# Patient Record
Sex: FEMALE | Race: WHITE | NOT HISPANIC OR LATINO | Employment: OTHER | URBAN - METROPOLITAN AREA
[De-identification: names, ages, dates, MRNs, and addresses within clinical notes are randomized per-mention and may not be internally consistent; named-entity substitution may affect disease eponyms.]

---

## 2017-01-02 DIAGNOSIS — E78.5 HYPERLIPIDEMIA LDL GOAL <130: Primary | ICD-10-CM

## 2017-01-02 RX ORDER — ROSUVASTATIN CALCIUM 10 MG/1
TABLET, COATED ORAL
Qty: 45 TABLET | Refills: 3 | Status: SHIPPED | OUTPATIENT
Start: 2017-01-02 | End: 2017-11-09

## 2017-01-02 NOTE — TELEPHONE ENCOUNTER
CRESTOR 10MG     Last Written Prescription Date: 9/22/2016  Last Fill Quantity: 45, # refills: 3  Last Office Visit with [facility], [facility] or [facility] prescribing provider: 9/22/2016       CHOL      157   12/22/2016  HDL       35   12/22/2016  LDL       69   12/22/2016  LDL      105   9/3/2014  TRIG      264   12/22/2016  CHOLHDLRATIO      4.7   10/13/2015

## 2017-01-02 NOTE — TELEPHONE ENCOUNTER
Prescription approved per [facility] [place] Protocol.  Per lab note, continue same dose.  Martha Rendon, RN  Triage Nurse

## 2017-01-25 ENCOUNTER — ALLIED HEALTH/NURSE VISIT (OUTPATIENT)
Dept: CARDIOLOGY | Facility: CLINIC | Age: 72
End: 2017-01-25
Payer: COMMERCIAL

## 2017-01-25 DIAGNOSIS — Z95.810 ICD (IMPLANTABLE CARDIOVERTER-DEFIBRILLATOR), BIVENTRICULAR, IN SITU: Primary | ICD-10-CM

## 2017-01-25 DIAGNOSIS — I42.9 IDIOPATHIC CARDIOMYOPATHY (H): ICD-10-CM

## 2017-01-25 PROCEDURE — 93296 REM INTERROG EVL PM/IDS: CPT | Performed by: INTERNAL MEDICINE

## 2017-01-25 PROCEDURE — 93295 DEV INTERROG REMOTE 1/2/MLT: CPT | Performed by: INTERNAL MEDICINE

## 2017-01-25 NOTE — PROGRESS NOTES
[employer] Incepta CRT ICD Remote Device Check  AP: 0 % : 0 %  Mode: VVI Lower rate 40 bpm        Presenting Rhythm: Presenting EGM on transmission showed AS/VS  Heart Rate: Heart rate stable with good variability.  Sensing: P waves chronically low, R waves WNL    Pacing Threshold: not performed, auto capture not on    Impedance: WNL  Battery Status: Approximately 6.5 years longevity.  Atrial Arrhythmia: 0  Ventricular Arrhythmia: 0  ATP: 0    Shocks: 0    Care Plan: Follow up with Q 3 month remote checks. Will call the patient with today's results and date of next remote. SEFERINO Ahn RN

## 2017-04-27 ENCOUNTER — ALLIED HEALTH/NURSE VISIT (OUTPATIENT)
Dept: CARDIOLOGY | Facility: CLINIC | Age: 72
End: 2017-04-27
Payer: COMMERCIAL

## 2017-04-27 DIAGNOSIS — I42.9 IDIOPATHIC CARDIOMYOPATHY (H): Primary | ICD-10-CM

## 2017-04-27 DIAGNOSIS — Z95.810 ICD (IMPLANTABLE CARDIOVERTER-DEFIBRILLATOR), BIVENTRICULAR, IN SITU: ICD-10-CM

## 2017-04-27 PROCEDURE — 93296 REM INTERROG EVL PM/IDS: CPT | Performed by: INTERNAL MEDICINE

## 2017-04-27 PROCEDURE — 93295 DEV INTERROG REMOTE 1/2/MLT: CPT | Performed by: INTERNAL MEDICINE

## 2017-04-27 NOTE — PROGRESS NOTES
[employer] Incepta CRT-D ICD Remote Device Check  AP: 0 % BVP: 0 %  Mode: VVI 40        Presenting Rhythm: regular VS  Heart Rate: excellent variability  Sensing: WNL on RV and LV, no reading on A (A lead known microdislodgment)    Pacing Threshold: not on auto capture    Impedance: WNL  Battery Status: 6.5 yrs estimated longevity, 10.3 second charge time  Atrial Arrhythmia: none  Ventricular Arrhythmia: none  ATP: none    Shocks: none    Care Plan: Pt due for annual OV with Dr. Osborne in August, entered order in [facility] for annual threshold on same day as OV with Dr. Osborne.   Called pt, no answer, left message with results and plan.   MAXIME MOCTEZUMA

## 2017-04-27 NOTE — MR AVS SNAPSHOT
After Visit Summary   4/27/2017    Kristie Jones    MRN: [id number]           Patient Information     Date Of Birth          1945        Visit Information        Provider Department      4/27/2017 4:30 PM CULLEN DCR2 [facility]        Today's Diagnoses     Idiopathic cardiomyopathy (H)    -  1    ICD (implantable cardioverter-defibrillator), biventricular, in situ           Follow-ups after your visit        Additional Services     Follow-Up with Device Clinic                 Your next 10 appointments already scheduled     Apr 27, 2017  4:30 PM CDT   Remote ICD Check with CULLEN DCR2   [facility] ([facility])    [address] [place], [place] [id number]  [facility] [address] [phone]           This appointment is for a remote check of your debrillator.  This is not an appointment at the office.              Future tests that were ordered for you today     Open Future Orders        Priority Expected Expires Ordered    Follow-Up with Device Clinic Routine 8/1/2017 4/27/2018 4/27/2017            Who to contact     If you have questions or need follow up information about today's clinic visit or your schedule please contact [facility] directly at [phone].  Normal or non-critical lab and imaging results will be communicated to you by MyChart, letter or phone within 4 business days after the clinic has received the results. If you do not hear from us within 7 days, please contact the clinic through [email or web address] or phone. If you have a critical or abnormal lab result, we will notify you by phone as soon as possible.  Submit refill requests through [employer] or call your pharmacy and they will forward the refill request to us. Please allow 3 business days for your refill to be completed.          Additional Information About Your Visit        MyChart Information     [employer] gives you  secure access to your electronic health record. If you see a primary care provider, you can also send messages to your care team and make appointments. If you have questions, please call your primary care clinic.  If you do not have a primary care provider, please call [phone] and they will assist you.        Care EveryWhere ID     This is your Care EveryWhere ID. This could be used by other organizations to access your [place] medical records  [id number]         Blood Pressure from Last 3 Encounters:   09/22/16 102/54   08/01/16 108/60   03/04/16 144/87    Weight from Last 3 Encounters:   09/22/16 90.9 kg (200 lb 6.4 oz)   08/01/16 90.7 kg (200 lb)   03/04/16 86.2 kg (190 lb)              We Performed the Following     ICD DEVICE INTERROGAT REMOTE (95217)     INTERROGATION DEVICE EVAL REMOTE, PACER/ICD (14223)          Today's Medication Changes          These changes are accurate as of: 4/27/17 10:30 AM.  If you have any questions, ask your nurse or doctor.               These medicines have changed or have updated prescriptions.        Dose/Directions    celecoxib 200 MG capsule   Commonly known as:  celeBREX   This may have changed:    - when to take this  - reasons to take this   Used for:  Status post total right knee replacement        Dose:  200 mg   Take 1 capsule (200 mg) by mouth every morning   Quantity:  90 capsule   Refills:  3                Primary Care Provider Office Phone # Fax #    Christin Diehl [phone]       [address] [place] [address]        Thank you!     Thank you for choosing [facility] [place]  for your care. Our goal is always to provide you with excellent care. Hearing back from our patients is one way we can continue to improve our services. Please take a few minutes to complete the written survey that you may receive in the mail after your visit with us. Thank you!              Your Updated Medication List - Protect others around you: Learn how to safely use, store and throw away your medicines at www.disposemymeds.org.          This list is accurate as of: 4/27/17 10:30 AM.  Always use your most recent med list.                   Brand Name Dispense Instructions for use    acetaminophen 650 MG 8 hour tablet     100 tablet    Take 650 mg by mouth every 6 hours       ALPRAZolam 0.25 MG tablet    XANAX    30 tablet    Take 1 tablet (0.25 mg) by mouth 3 times daily as needed       blood glucose monitoring lancets     1 Box    Use to test blood sugar 1 time daily or as directed.       blood glucose monitoring test strip    no brand specified    3 Month    Use to test blood sugar 1 time daily or as directed.       calcium carbonate 500 MG tablet    OS-WILEY 500 mg [place]. Ca     Takes 3 tabs daily takees total of 1200 mg daily       carvedilol 25 MG tablet    COREG    180 tablet    Take 1 tablet (25 mg) by mouth 2 times daily (with meals)       celecoxib 200 MG capsule    celeBREX    90 capsule    Take 1 capsule (200 mg) by mouth every morning       ciprofloxacin 500 MG tablet    CIPRO    6 tablet    Take 1 tablet (500 mg) by mouth 2 times daily       co-enzyme Q-10 50 MG Caps      Take 1 capsule by mouth daily.       fluticasone 50 MCG/ACT spray    FLONASE    48 g    Spray 1-2 sprays into both nostrils daily       IRON SUPPLEMENT PO      Take 325 mg by mouth daily (with breakfast)       MULTIVITAMIN TABS   OR      1 qd       rosuvastatin 10 MG tablet    CRESTOR    45 tablet    Take one tablet every other day       UNKNOWN TO PATIENT      Topical cream       valsartan 160 MG tablet    DIOVAN    45 tablet    Take one half tablet (80 MG) daily at bedtime       venlafaxine 75 MG 24 hr capsule    EFFEXOR-XR    270 capsule    Take 3 capsules (225 mg) by mouth daily       vitamin D 2000 UNITS tablet      Take 1 tablet by mouth daily.

## 2017-05-14 DIAGNOSIS — I42.9 IDIOPATHIC CARDIOMYOPATHY (H): ICD-10-CM

## 2017-05-16 RX ORDER — CARVEDILOL 25 MG/1
TABLET ORAL
Qty: 180 TABLET | Refills: 1 | Status: SHIPPED | OUTPATIENT
Start: 2017-05-16 | End: 2017-08-31

## 2017-05-16 RX ORDER — VALSARTAN 160 MG/1
TABLET ORAL
Qty: 45 TABLET | Refills: 1 | Status: SHIPPED | OUTPATIENT
Start: 2017-05-16 | End: 2017-11-22

## 2017-05-16 NOTE — TELEPHONE ENCOUNTER
Prescription approved per [facility] Refill Protocol.  Martha Rendon, RN  Triage Nurse

## 2017-08-28 ENCOUNTER — PRE VISIT (OUTPATIENT)
Dept: CARDIOLOGY | Facility: CLINIC | Age: 72
End: 2017-08-28

## 2017-08-30 ENCOUNTER — OFFICE VISIT (OUTPATIENT)
Dept: CARDIOLOGY | Facility: CLINIC | Age: 72
End: 2017-08-30
Attending: INTERNAL MEDICINE
Payer: COMMERCIAL

## 2017-08-30 VITALS
SYSTOLIC BLOOD PRESSURE: 110 MMHG | HEIGHT: 65 IN | BODY MASS INDEX: 32.57 KG/M2 | WEIGHT: 195.5 LBS | HEART RATE: 64 BPM | DIASTOLIC BLOOD PRESSURE: 66 MMHG

## 2017-08-30 DIAGNOSIS — I44.7 LBBB (LEFT BUNDLE BRANCH BLOCK): ICD-10-CM

## 2017-08-30 DIAGNOSIS — I42.9 IDIOPATHIC CARDIOMYOPATHY (H): ICD-10-CM

## 2017-08-30 DIAGNOSIS — Z95.810 ICD (IMPLANTABLE CARDIOVERTER-DEFIBRILLATOR), BIVENTRICULAR, IN SITU: ICD-10-CM

## 2017-08-30 DIAGNOSIS — Z95.0 CARDIAC PACEMAKER IN SITU: ICD-10-CM

## 2017-08-30 DIAGNOSIS — E78.5 HYPERLIPIDEMIA LDL GOAL <130: ICD-10-CM

## 2017-08-30 DIAGNOSIS — I51.9 LEFT VENTRICULAR SYSTOLIC DYSFUNCTION: ICD-10-CM

## 2017-08-30 PROCEDURE — 99213 OFFICE O/P EST LOW 20 MIN: CPT | Performed by: INTERNAL MEDICINE

## 2017-08-30 PROCEDURE — 93284 PRGRMG EVAL IMPLANTABLE DFB: CPT | Performed by: INTERNAL MEDICINE

## 2017-08-30 NOTE — PROGRESS NOTES
HPI and Plan:   See dictation    No orders of the defined types were placed in this encounter.      No orders of the defined types were placed in this encounter.      Encounter Diagnoses   Name Primary?     Left ventricular systolic dysfunction:EF 35%      Idiopathic cardiomyopathy (H)      LBBB (left bundle branch block)      Hyperlipidemia LDL goal <130      Cardiac pacemaker in situ:5/1/12 for [place]CM[place]        CURRENT MEDICATIONS:  Current Outpatient Prescriptions   Medication Sig Dispense Refill     carvedilol (COREG) 25 MG tablet TAKE 1 TABLET TWO TIMES A DAY WITH MEALS 180 tablet 1     valsartan (DIOVAN) 160 MG tablet TAKE 1/2 (ONE-HALF) TABLET (80MG) DAILY AT BEDTIME 45 tablet 1     rosuvastatin (CRESTOR) 10 MG tablet Take one tablet every other day 45 tablet 3     venlafaxine (EFFEXOR-XR) 75 MG 24 hr capsule Take 3 capsules (225 mg) by mouth daily 270 capsule 2     UNKNOWN TO PATIENT Topical cream       celecoxib (CELEBREX) 200 MG capsule Take 1 capsule (200 mg) by mouth every morning (Patient taking differently: Take 200 mg by mouth as needed ) 90 capsule 3     fluticasone (FLONASE) 50 MCG/ACT nasal spray Spray 1-2 sprays into both nostrils daily 48 g 3     blood glucose monitoring (NO BRAND SPECIFIED) test strip Use to test blood sugar 1 time daily or as directed. 3 Month 3     blood glucose monitoring (ACCU-CHEK FASTCLIX) lancets Use to test blood sugar 1 time daily or as directed. 1 Box 3     acetaminophen 650 MG TABS Take 650 mg by mouth every 6 hours 100 tablet      ALPRAZolam (XANAX) 0.25 MG tablet Take 1 tablet (0.25 mg) by mouth 3 times daily as needed 30 tablet 0     Ferrous Sulfate (IRON SUPPLEMENT PO) Take 325 mg by mouth daily (with breakfast)       Cholecalciferol (VITAMIN D) 2000 UNITS tablet Take 1 tablet by mouth daily.       co-enzyme Q-10 50 MG CAPS Take 1 capsule by mouth daily.       CALCIUM 500 MG OR TABS Takes 3 tabs daily takees total of 1200 mg daily       MULTIVITAMIN TABS   OR 1 qd  0        ALLERGIES     Allergies   Allergen Reactions     Prochlorperazine      Other reaction(s): Tardive Dyskinesia     Amoxicillin Hives     Decadron      flushing     Dexamethasone      Other reaction(s): Erythema     Compazine Anxiety       PAST MEDICAL HISTORY:  Past Medical History:   Diagnosis Date     Acute posthemorrhagic anemia 9/11/2015     Anemia      Anxiety      Arthritis      Depression      DM type 2 (diabetes mellitus, type 2) (H)     Diet controlled     Fibromyalgia      Gastro-oesophageal reflux disease      Hypertension      Iron deficiency anemia 8/12/2015     Problem list name updated by automated process. Provider to review     LBBB (left bundle branch block)      Migraines      Nonischemic cardiomyopathy (H)     EF 30-35%, Dr. Osborne [facility] (suspect virus); s/p AICD     NSVT (nonsustained ventricular tachycardia) (H)      Pure hypercholesterolemia      Restless leg syndrome      Sleep apnea     CPAP        PAST SURGICAL HISTORY:  Past Surgical History:   Procedure Laterality Date     APPENDECTOMY       ARTHROPLASTY KNEE  1/7/2013    Procedure: ARTHROPLASTY KNEE;  Right Total Knee Arthroplasty       ARTHROPLASTY REVISION KNEE Right 8/26/2015    Procedure: ARTHROPLASTY REVISION KNEE;  Surgeon: Néstor Beth MD;  Location: [facility] OR     ARTHROSCOPY KNEE       bunionectomy left foot       CORONARY ANGIOGRAPHY ADULT ORDER  2002    normal     CORONARY ANGIOGRAPHY ADULT ORDER  12/7/12    no significant focal narrowing that would benefit from mechanical intervention      HYSTERECTOMY       IMPLANT AUTOMATIC IMPLANTABLE CARDIOVERTER DEFIBRILLATOR  4/30/12     INSERT THORACIC PACEMAKER LEAD EPICARDIAL  5/1/2012    Procedure:INSERT THORACIC PACEMAKER LEAD EPICARDIAL; EPICARDIAL LEAD PLACEMENT; Surgeon:WEST ARECHIGA; Location:[facility] OR     TONSILLECTOMY       TRANSPLANT - corneal lenses      Bilateral for cataracts       FAMILY HISTORY:  Family History   Problem Relation Age of Onset     CANCER Father       intraabdominal mass     C.A.D. 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 Multiple ignacio problems, multiple hospitalizations in last 7 years, anxiety        Pt has 2 daughters, 2 step-daughters's and  7 grandchildrens        Youngest daughter Sabi has malignant brain tumor                   Review of Systems:  Skin:  Positive for scaling   Eyes:  Positive for    ENT:  Positive for nasal congestion  Respiratory:  Positive for shortness of breath;sleep apnea;CPAP  Cardiovascular:    chest pain;Positive for  Gastroenterology: Negative    Genitourinary:       Musculoskeletal:  Negative    Neurologic:  Positive for numbness or tingling of hands;numbness or tingling of feet  Psychiatric:  Negative   "  Heme/Lymph/Imm:  Negative    Endocrine:  Positive for diabetes    Physical Exam:  Vitals: /66 (BP Location: Right arm, Patient Position: Sitting, Cuff Size: Adult Regular)  Pulse 64  Ht 1.651 m (5' 5\")  Wt 88.7 kg (195 lb 8 oz)  Breastfeeding? No  BMI 32.53 kg/m2    Constitutional:  cooperative, alert and oriented, well developed, well nourished, in no acute distress        Skin:  warm and dry to the touch, no apparent skin lesions or masses noted        Head:  normocephalic, no masses or lesions        Eyes:           ENT:  no pallor or cyanosis, dentition good        Neck:  carotid pulses are full and equal bilaterally, JVP normal, no carotid bruit, no thyromegaly        Chest:  normal breath sounds, clear to auscultation, normal A-P diameter, normal symmetry, normal respiratory excursion, no use of accessory muscles        Cardiac: regular rhythm, normal S1/S2, no S3 or S4, apical impulse not displaced, no murmurs, gallops or rubs                  Abdomen:  abdomen soft, non-tender, BS normoactive, no mass, no HSM, no bruits        Vascular: pulses full and equal, no bruits auscultated                                      Extremities and Back:  no deformities, clubbing, cyanosis, erythema observed        Neurological:  affect appropriate, oriented to time, person and place          Recent Lab Results:  LIPID RESULTS:  Lab Results   Component Value Date    CHOL 157 12/22/2016    HDL 35 (L) 12/22/2016    LDL 69 12/22/2016    TRIG 264 (H) 12/22/2016    CHOLHDLRATIO 4.7 10/13/2015       LIVER ENZYME RESULTS:  Lab Results   Component Value Date    AST 20 12/22/2016    ALT 28 12/22/2016       CBC RESULTS:  Lab Results   Component Value Date    WBC 3.8 (L) 03/04/2016    RBC 3.58 (L) 03/04/2016    HGB 11.5 (L) 03/04/2016    HCT 33.2 (L) 03/04/2016    MCV 93 03/04/2016    MCH 32.1 03/04/2016    MCHC 34.6 03/04/2016    RDW 12.3 03/04/2016     03/04/2016       BMP RESULTS:  Lab Results   Component Value " Date     12/22/2016    POTASSIUM 4.0 12/22/2016    CHLORIDE 108 12/22/2016    CO2 30 12/22/2016    ANIONGAP 4 12/22/2016     (H) 12/22/2016    BUN 15 12/22/2016    CR 1.02 12/22/2016    GFRESTIMATED 54 (L) 12/22/2016    GFRESTBLACK 65 12/22/2016    WILEY 9.0 12/22/2016        A1C RESULTS:  Lab Results   Component Value Date    A1C 4.9 12/22/2016       INR RESULTS:  Lab Results   Component Value Date    INR 0.88 12/05/2012    INR 0.92 06/26/2012           CC  Ciaran Osborne MD  [id number]  [place] [id number]

## 2017-08-30 NOTE — MR AVS SNAPSHOT
After Visit Summary   8/30/2017    Kristie Jones    MRN: [id number]           Patient Information     Date Of Birth          1945        Visit Information        Provider Department      8/30/2017 10:45 AM Ciaran Osborne MD [facility]        Today's Diagnoses     Left ventricular systolic dysfunction:EF 35%        Idiopathic cardiomyopathy (H)        LBBB (left bundle branch block)        Hyperlipidemia LDL goal <130        Cardiac pacemaker in situ:5/1/12 for ICMY           Follow-ups after your visit        Additional Services     Follow-Up with Cardiologist                 Your next 10 appointments already scheduled     Dec 18, 2017  4:30 PM CST   Remote ICD Check with CULLEN DCR2   [facility] ([facility])    [address] [place] [address] [phone]           This appointment is for a remote check of your debrillator.  This is not an appointment at the office.              Future tests that were ordered for you today     Open Future Orders        Priority Expected Expires Ordered    Echocardiogram Routine 8/30/2018 8/31/2018 8/30/2017    Follow-Up with Cardiologist Routine 8/30/2018 8/31/2018 8/30/2017            Who to contact     If you have questions or need follow up information about today's clinic visit or your schedule please contact [facility] directly at [phone].  Normal or non-critical lab and imaging results will be communicated to you by MyChart, letter or phone within 4 business days after the clinic has received the results. If you do not hear from us within 7 days, please contact the clinic through MyChart or phone. If you have a critical or abnormal lab result, we will notify you by phone as soon as possible.  Submit refill requests through [employer] or call your pharmacy and they will forward the refill request  "to us. Please allow 3 business days for your refill to be completed.          Additional Information About Your Visit        Lozoh[employer] Information     [employer] gives you secure access to your electronic health record. If you see a primary care provider, you can also send messages to your care team and make appointments. If you have questions, please call your primary care clinic.  If you do not have a primary care provider, please call [phone] and they will assist you.        Care EveryWhere ID     This is your Care EveryWhere ID. This could be used by other organizations to access your [place] medical records  [id number]        Your Vitals Were     Pulse Height Breastfeeding? BMI (Body Mass Index)          64 1.651 m (5' 5\") No 32.53 kg/m2         Blood Pressure from Last 3 Encounters:   08/30/17 110/66   09/22/16 102/54   08/01/16 108/60    Weight from Last 3 Encounters:   08/30/17 88.7 kg (195 lb 8 oz)   09/22/16 90.9 kg (200 lb 6.4 oz)   08/01/16 90.7 kg (200 lb)              We Performed the Following     Follow-Up with Cardiologist          Today's Medication Changes          These changes are accurate as of: 8/30/17 11:26 AM.  If you have any questions, ask your nurse or doctor.               These medicines have changed or have updated prescriptions.        Dose/Directions    celecoxib 200 MG capsule   Commonly known as:  celeBREX   This may have changed:    - when to take this  - reasons to take this   Used for:  Status post total right knee replacement        Dose:  200 mg   Take 1 capsule (200 mg) by mouth every morning   Quantity:  90 capsule   Refills:  3                Primary Care Provider Office Phone # Fax #    Christin Dihel [phone] [address] [place] [address]        Equal Access to Services     ROBIN GALEANO AH: Peterson Tatum, lazarus oneil, dolly adams. So wacierra " [phone].    ATENCIÓN: Si arnold lópez, tiene a robles disposición servicios gratuitos de asistencia lingüística. Pratima kirkpatrick [phone].    We comply with applicable federal civil rights laws and [place] laws. We do not discriminate on the basis of race, color, national origin, age, disability sex, sexual orientation or gender identity.            Thank you!     Thank you for choosing [facility] [place]  for your care. Our goal is always to provide you with excellent care. Hearing back from our patients is one way we can continue to improve our services. Please take a few minutes to complete the written survey that you may receive in the mail after your visit with us. Thank you!             Your Updated Medication List - Protect others around you: Learn how to safely use, store and throw away your medicines at www.disposemymeds.org.          This list is accurate as of: 8/30/17 11:26 AM.  Always use your most recent med list.                   Brand Name Dispense Instructions for use Diagnosis    acetaminophen 650 MG 8 hour tablet     100 tablet    Take 650 mg by mouth every 6 hours    Status post total right knee replacement       ALPRAZolam 0.25 MG tablet    XANAX    30 tablet    Take 1 tablet (0.25 mg) by mouth 3 times daily as needed    Generalized anxiety disorder       blood glucose monitoring lancets     1 Box    Use to test blood sugar 1 time daily or as directed.    Type 2 diabetes, HbA1C goal < 8% (H)       blood glucose monitoring test strip    no brand specified    3 Month    Use to test blood sugar 1 time daily or as directed.    Type 2 diabetes, HbA1C goal < 8% (H)       calcium carbonate 1250 MG tablet    OS-WILEY 500 mg [place]. Ca     Takes 3 tabs daily takees total of 1200 mg daily        carvedilol 25 MG tablet    COREG    180 tablet    TAKE 1 TABLET TWO TIMES A DAY WITH MEALS    Idiopathic cardiomyopathy (H)       celecoxib 200 MG capsule    celeBREX    90 capsule     Take 1 capsule (200 mg) by mouth every morning    Status post total right knee replacement       co-enzyme Q-10 50 MG Caps      Take 1 capsule by mouth daily.        fluticasone 50 MCG/ACT spray    FLONASE    48 g    Spray 1-2 sprays into both nostrils daily    PND (post-nasal drip)       IRON SUPPLEMENT PO      Take 325 mg by mouth daily (with breakfast)        MULTIVITAMIN TABS   OR      1 qd        rosuvastatin 10 MG tablet    CRESTOR    45 tablet    Take one tablet every other day    Hyperlipidemia LDL goal <130       UNKNOWN TO PATIENT      Topical cream        valsartan 160 MG tablet    DIOVAN    45 tablet    TAKE 1/2 (ONE-HALF) TABLET (80MG) DAILY AT BEDTIME    Idiopathic cardiomyopathy (H)       venlafaxine 75 MG 24 hr capsule    EFFEXOR-XR    270 capsule    Take 3 capsules (225 mg) by mouth daily    Generalized anxiety disorder       vitamin D 2000 UNITS tablet      Take 1 tablet by mouth daily.    Obesity, unspecified

## 2017-08-30 NOTE — MR AVS SNAPSHOT
After Visit Summary   8/30/2017    Kristie Jones    MRN: [id number]           Patient Information     Date Of Birth          1945        Visit Information        Provider Department      8/30/2017 10:10 AM SHIRA SAXENA [facility]        Today's Diagnoses     ICD (implantable cardioverter-defibrillator), biventricular, in situ        Idiopathic cardiomyopathy (H)           Follow-ups after your visit        Your next 10 appointments already scheduled     Aug 30, 2017 10:45 AM CDT   Return Visit with Ciaran Osborne MD   [facility] ([facility])    [address]  [place] [id number] [phone]            Dec 18, 2017  4:30 PM CST   Remote ICD Check with ALYSE MENDOZA2   [facility] ([facility])    [address]  [place] [id number] [phone]           This appointment is for a remote check of your debrillator.  This is not an appointment at the office.              Who to contact     If you have questions or need follow up information about today's clinic visit or your schedule please contact [facility] directly at [phone].  Normal or non-critical lab and imaging results will be communicated to you by GLGhart, letter or phone within 4 business days after the clinic has received the results. If you do not hear from us within 7 days, please contact the clinic through GLGhart or phone. If you have a critical or abnormal lab result, we will notify you by phone as soon as possible.  Submit refill requests through Bizmore or call your pharmacy and they will forward the refill request to us. Please allow 3 business days for your refill to be completed.          Additional Information About Your Visit        GLGharOlacabs Information     Bizmore gives you secure access to your electronic  health record. If you see a primary care provider, you can also send messages to your care team and make appointments. If you have questions, please call your primary care clinic.  If you do not have a primary care provider, please call [phone] and they will assist you.        Care EveryWhere ID     This is your Care EveryWhere ID. This could be used by other organizations to access your [facility] medical records  [id number]         Blood Pressure from Last 3 Encounters:   09/22/16 102/54   08/01/16 108/60   03/04/16 144/87    Weight from Last 3 Encounters:   09/22/16 90.9 kg (200 lb 6.4 oz)   08/01/16 90.7 kg (200 lb)   03/04/16 86.2 kg (190 lb)              We Performed the Following     Follow-Up with Device Clinic     ICD DEVICE PROGRAMMING EVAL, MULTI LEAD ICD (48455)          Today's Medication Changes          These changes are accurate as of: 8/30/17 10:44 AM.  If you have any questions, ask your nurse or doctor.               These medicines have changed or have updated prescriptions.        Dose/Directions    celecoxib 200 MG capsule   Commonly known as:  celeBREX   This may have changed:    - when to take this  - reasons to take this   Used for:  Status post total right knee replacement        Dose:  200 mg   Take 1 capsule (200 mg) by mouth every morning   Quantity:  90 capsule   Refills:  3                Primary Care Provider Office Phone # Fax #    Christin Diehl [phone] [address] [place] [address]        Equal Access to Services     ROBIN GALEANO AH: Hadii salbador larson Soiglesia, waaxda luqadaha, qaybta kaalmadolly cheng. So [facility] [phone].    ATENCIÓN: Si habla español, tiene a robles disposición servicios gratuitos de asistencia lingüística. Llame al [phone].    We comply with applicable federal civil rights laws and [place] laws. We do not discriminate on the basis of race, color, national  origin, age, disability sex, sexual orientation or gender identity.            Thank you!     Thank you for choosing [facility] [place]  for your care. Our goal is always to provide you with excellent care. Hearing back from our patients is one way we can continue to improve our services. Please take a few minutes to complete the written survey that you may receive in the mail after your visit with us. Thank you!             Your Updated Medication List - Protect others around you: Learn how to safely use, store and throw away your medicines at www.disposemymeds.org.          This list is accurate as of: 8/30/17 10:44 AM.  Always use your most recent med list.                   Brand Name Dispense Instructions for use Diagnosis    acetaminophen 650 MG 8 hour tablet     100 tablet    Take 650 mg by mouth every 6 hours    Status post total right knee replacement       ALPRAZolam 0.25 MG tablet    XANAX    30 tablet    Take 1 tablet (0.25 mg) by mouth 3 times daily as needed    Generalized anxiety disorder       blood glucose monitoring lancets     1 Box    Use to test blood sugar 1 time daily or as directed.    Type 2 diabetes, HbA1C goal < 8% (H)       blood glucose monitoring test strip    no brand specified    3 Month    Use to test blood sugar 1 time daily or as directed.    Type 2 diabetes, HbA1C goal < 8% (H)       calcium carbonate 1250 MG tablet    OS-WILEY 500 mg [place]. Ca     Takes 3 tabs daily takees total of 1200 mg daily        carvedilol 25 MG tablet    COREG    180 tablet    TAKE 1 TABLET TWO TIMES A DAY WITH MEALS    Idiopathic cardiomyopathy (H)       celecoxib 200 MG capsule    celeBREX    90 capsule    Take 1 capsule (200 mg) by mouth every morning    Status post total right knee replacement       co-enzyme Q-10 50 MG Caps      Take 1 capsule by mouth daily.        fluticasone 50 MCG/ACT spray    FLONASE    48 g    Spray 1-2 sprays into both nostrils daily    PND  (post-nasal drip)       IRON SUPPLEMENT PO      Take 325 mg by mouth daily (with breakfast)        MULTIVITAMIN TABS   OR      1 qd        rosuvastatin 10 MG tablet    CRESTOR    45 tablet    Take one tablet every other day    Hyperlipidemia LDL goal <130       UNKNOWN TO PATIENT      Topical cream        valsartan 160 MG tablet    DIOVAN    45 tablet    TAKE 1/2 (ONE-HALF) TABLET (80MG) DAILY AT BEDTIME    Idiopathic cardiomyopathy (H)       venlafaxine 75 MG 24 hr capsule    EFFEXOR-XR    270 capsule    Take 3 capsules (225 mg) by mouth daily    Generalized anxiety disorder       vitamin D 2000 UNITS tablet      Take 1 tablet by mouth daily.    Obesity, unspecified

## 2017-08-30 NOTE — PROGRESS NOTES
[employer] Incept[employer] BV/ ICD Device Check  BVP: RV pacing only 1 %. LV lead off  Mode: VVI        Underlying Rhythm: SR  Heart Rate: Adequate variation per histogram.   Sensing: stable    Pacing Threshold: stable    Impedance: stable  Battery Status: 6 years  Atrial Arrhythmia: na  Ventricular Arrhythmia: 1 NSVT: EGM shows 5 beats of VT at rate 173/   ATP: none   Shocks: none  Setting Change: none    Care Plan: f/u 3 months remote ICD check and with Dr Osborne today. Ter[place]

## 2017-08-30 NOTE — PROGRESS NOTES
HISTORY OF PRESENT ILLNESS:  It is a pleasure for me to see Mrs. Mckeon.  This is a delightful lady with nonischemic cardiomyopathy with moderate-to-severe left ventricular systolic dysfunction.  She has a biventricular ICD in place.  She remains completely asymptomatic from a cardiac point of view with good exercise tolerance.  I am happy to hear that she had a good trip to [place] towards the end of last year.  She was very physically vigorous during that tour.  I am also happy to hear that her daughter who has brain cancer now appears quite stable after the diagnosis was made 5 years ago.  Cardiac examination remains completely normal.        ASSESSMENT:  Device interrogation today revealed something untoward, though she does not have any symptoms.  I will follow up on that.  Otherwise, medications remain unchanged.  I will see her again in a year's time.  I requested an echocardiogram for periodic followup of her left ventricular systolic function.  It was nice to see her again.         TRENT BARBOSA MD, [facility]             D: 2017 11:25   T: 2017 13:34   MT: GAIL      Name:     LEONID MCKEON   MRN:      [id number]        Account:      [id number]   :      1945           Service Date: 2017      Document: [id number]

## 2017-08-30 NOTE — LETTER
8/30/2017    Christin Diehl MD  [address] [place] [address]    RE: Kristie Jones       Dear Colleague,    I had the pleasure of seeing Kristie Jones in the [facility].    It is a pleasure for me to see Mrs. Jones.  This is a delightful lady with nonischemic cardiomyopathy with moderate-to-severe left ventricular systolic dysfunction.  She has a biventricular ICD in place.  She remains completely asymptomatic from a cardiac point of view with good exercise tolerance.  I am happy to hear that she had a good trip to [place] towards the end of last year.  She was very physically vigorous during that tour.  I am also happy to hear that her daughter who has brain cancer now appears quite stable after the diagnosis was made 5 years ago.  Cardiac examination remains completely normal.       Outpatient Encounter Prescriptions as of 8/30/2017   Medication Sig Dispense Refill     valsartan (DIOVAN) 160 MG tablet TAKE 1/2 (ONE-HALF) TABLET (80MG) DAILY AT BEDTIME 45 tablet 1     [DISCONTINUED] carvedilol (COREG) 25 MG tablet TAKE 1 TABLET TWO TIMES A DAY WITH MEALS 180 tablet 1     rosuvastatin (CRESTOR) 10 MG tablet Take one tablet every other day 45 tablet 3     [DISCONTINUED] venlafaxine (EFFEXOR-XR) 75 MG 24 hr capsule Take 3 capsules (225 mg) by mouth daily 270 capsule 2     UNKNOWN TO PATIENT Topical cream       celecoxib (CELEBREX) 200 MG capsule Take 1 capsule (200 mg) by mouth every morning (Patient taking differently: Take 200 mg by mouth as needed ) 90 capsule 3     fluticasone (FLONASE) 50 MCG/ACT nasal spray Spray 1-2 sprays into both nostrils daily 48 g 3     blood glucose monitoring (NO BRAND SPECIFIED) test strip Use to test blood sugar 1 time daily or as directed. 3 Month 3     blood glucose monitoring (ACCU-CHEK FASTCLIX) lancets Use to test blood sugar 1 time daily or as directed. 1 Box 3     acetaminophen 650 MG TABS Take 650 mg by mouth every 6  hours 100 tablet      ALPRAZolam (XANAX) 0.25 MG tablet Take 1 tablet (0.25 mg) by mouth 3 times daily as needed 30 tablet 0     Ferrous Sulfate (IRON SUPPLEMENT PO) Take 325 mg by mouth daily (with breakfast)       Cholecalciferol (VITAMIN D) 2000 UNITS tablet Take 1 tablet by mouth daily.       co-enzyme Q-10 50 MG CAPS Take 1 capsule by mouth daily.       CALCIUM 500 MG OR TABS Takes 3 tabs daily takees total of 1200 mg daily       MULTIVITAMIN TABS   OR 1 qd  0     [DISCONTINUED] ciprofloxacin (CIPRO) 500 MG tablet Take 1 tablet (500 mg) by mouth 2 times daily 6 tablet 0     No facility-administered encounter medications on file as of 8/30/2017.       ASSESSMENT:  Device interrogation today revealed something untoward, though she does not have any symptoms.  I will follow up on that.  Otherwise, medications remain unchanged.  I will see her again in a year's time.  I requested an echocardiogram for periodic followup of her left ventricular systolic function.  It was nice to see her again.     Again, thank you for allowing me to participate in the care of your patient.      Sincerely,    DR TRENT BARBOSA MD     [facility]

## 2017-08-31 ENCOUNTER — TELEPHONE (OUTPATIENT)
Dept: PEDIATRICS | Facility: CLINIC | Age: 72
End: 2017-08-31

## 2017-08-31 DIAGNOSIS — I42.9 IDIOPATHIC CARDIOMYOPATHY (H): ICD-10-CM

## 2017-08-31 DIAGNOSIS — F41.1 GENERALIZED ANXIETY DISORDER: ICD-10-CM

## 2017-08-31 RX ORDER — CARVEDILOL 25 MG/1
TABLET ORAL
Qty: 14 TABLET | Refills: 0 | Status: SHIPPED | OUTPATIENT
Start: 2017-08-31 | End: 2018-02-20

## 2017-08-31 NOTE — TELEPHONE ENCOUNTER
Patient calling to request a 7 day supply of Carvedilol to be sent to a local pharmacy.  Has a current RX for Carvedilol but will run out before mail order supply arrives.  Ordered 05/16/17 for a 6 month supply.  Order placed (see prior encounter) per patient request.  No further questions.  Will call back if any other questions or concerns.  SEFERINO Sosa RN

## 2017-09-01 NOTE — TELEPHONE ENCOUNTER
venlafaxine (EFFEXOR-XR) 75 MG 24 hr capsule    Last Written Prescription Date: 12/5/2016  Last Fill Quantity: 270, # refills: 2  Last Office Visit with [facility], [facility] or [facility] prescribing provider: 9/22/2016        BP Readings from Last 3 Encounters:   08/30/17 110/66   09/22/16 102/54   08/01/16 108/60     Pulse: (for Fetzima)  Creatinine   Date Value Ref Range Status   12/22/2016 1.02 0.52 - 1.04 mg/dL Final   ]    Last PHQ-9 score on record=   PHQ-9 SCORE 9/22/2016   Total Score -   Total Score 11

## 2017-09-06 RX ORDER — VENLAFAXINE HYDROCHLORIDE 75 MG/1
225 CAPSULE, EXTENDED RELEASE ORAL DAILY
Qty: 270 CAPSULE | Refills: 0 | Status: SHIPPED | OUTPATIENT
Start: 2017-09-06 | End: 2017-11-22

## 2017-09-06 RX ORDER — VENLAFAXINE HYDROCHLORIDE 75 MG/1
CAPSULE, EXTENDED RELEASE ORAL
Qty: 270 CAPSULE | Refills: 0 | OUTPATIENT
Start: 2017-09-06

## 2017-09-06 ASSESSMENT — ANXIETY QUESTIONNAIRES
1. FEELING NERVOUS, ANXIOUS, OR ON EDGE: SEVERAL DAYS
GAD7 TOTAL SCORE: 3
6. BECOMING EASILY ANNOYED OR IRRITABLE: SEVERAL DAYS
5. BEING SO RESTLESS THAT IT IS HARD TO SIT STILL: NOT AT ALL
2. NOT BEING ABLE TO STOP OR CONTROL WORRYING: NOT AT ALL
7. FEELING AFRAID AS IF SOMETHING AWFUL MIGHT HAPPEN: NOT AT ALL
IF YOU CHECKED OFF ANY PROBLEMS ON THIS QUESTIONNAIRE, HOW DIFFICULT HAVE THESE PROBLEMS MADE IT FOR YOU TO DO YOUR WORK, TAKE CARE OF THINGS AT HOME, OR GET ALONG WITH OTHER PEOPLE: NOT DIFFICULT AT ALL
3. WORRYING TOO MUCH ABOUT DIFFERENT THINGS: SEVERAL DAYS

## 2017-09-06 ASSESSMENT — PATIENT HEALTH QUESTIONNAIRE - PHQ9
SUM OF ALL RESPONSES TO PHQ QUESTIONS 1-9: 1
5. POOR APPETITE OR OVEREATING: NOT AT ALL

## 2017-09-06 NOTE — TELEPHONE ENCOUNTER
Patient is not active on my chart. Due for a visit and   Updated PHQ 9/TOD 7.  Called her to update this over the phone. PHQ 9 is a 1 today, TOD 7 is a 3.  Has some family members going through some health issues now, and is having more  Trouble thinking about it. She is doing fine, does not feel her medication needs to be adjusted.  Transferred her to scheduling line to set up an annual visit with Dr. Diehl.  Refills are sent.     Martha Rendon RN  Triage Nurse

## 2017-09-07 ASSESSMENT — ANXIETY QUESTIONNAIRES: GAD7 TOTAL SCORE: 3

## 2017-09-12 ENCOUNTER — TRANSFERRED RECORDS (OUTPATIENT)
Dept: HEALTH INFORMATION MANAGEMENT | Facility: CLINIC | Age: 72
End: 2017-09-12

## 2017-09-15 ENCOUNTER — TRANSFERRED RECORDS (OUTPATIENT)
Dept: HEALTH INFORMATION MANAGEMENT | Facility: CLINIC | Age: 72
End: 2017-09-15

## 2017-09-27 ENCOUNTER — ALLIED HEALTH/NURSE VISIT (OUTPATIENT)
Dept: NURSING | Facility: CLINIC | Age: 72
End: 2017-09-27
Payer: COMMERCIAL

## 2017-09-27 DIAGNOSIS — Z23 NEED FOR PROPHYLACTIC VACCINATION AND INOCULATION AGAINST INFLUENZA: Primary | ICD-10-CM

## 2017-09-27 PROCEDURE — G0008 ADMIN INFLUENZA VIRUS VAC: HCPCS

## 2017-09-27 PROCEDURE — 90662 IIV NO PRSV INCREASED AG IM: CPT

## 2017-09-27 PROCEDURE — 99207 ZZC NO CHARGE NURSE ONLY: CPT

## 2017-09-27 NOTE — PROGRESS NOTES
Injectable Influenza Immunization Documentation    1.  Is the person to be vaccinated sick today?   No    2. Does the person to be vaccinated have an allergy to a component   of the vaccine?   No    3. Has the person to be vaccinated ever had a serious reaction   to influenza vaccine in the past?   No    4. Has the person to be vaccinated ever had Guillain-Barré syndrome?   No    Form completed by Denise Ann MA// September 27, 2017 2:53 PM

## 2017-09-27 NOTE — MR AVS SNAPSHOT
After Visit Summary   9/27/2017    Kristie Jones    MRN: [id number]           Patient Information     Date Of Birth          1945        Visit Information        Provider Department      9/27/2017 2:00 PM AGUILAR NURSE [facility]        Today's Diagnoses     Need for prophylactic vaccination and inoculation against influenza    -  1       Follow-ups after your visit        Your next 10 appointments already scheduled     Dec 18, 2017  4:30 PM CST   Remote ICD Check with CULLEN DCR2   [facility] [place] ([facility])    [address] [place] [id number] [phone]           This appointment is for a remote check of your debrillator.  This is not an appointment at the office.              Who to contact     If you have questions or need follow up information about today's clinic visit or your schedule please contact [facility] directly at [phone].  Normal or non-critical lab and imaging results will be communicated to you by Offermatichart, letter or phone within 4 business days after the clinic has received the results. If you do not hear from us within 7 days, please contact the clinic through Offermatichart or phone. If you have a critical or abnormal lab result, we will notify you by phone as soon as possible.  Submit refill requests through Flomio or call your pharmacy and they will forward the refill request to us. Please allow 3 business days for your refill to be completed.          Additional Information About Your Visit        MyChart Information     Flomio gives you secure access to your electronic health record. If you see a primary care provider, you can also send messages to your care team and make appointments. If you have questions, please call your primary care clinic.  If you do not have a primary care provider, please call [phone] and they will assist you.        Care EveryWhere ID     This is your  Care EveryWhere ID. This could be used by other organizations to access your [facility] medical records  [id number]         Blood Pressure from Last 3 Encounters:   08/30/17 110/66   09/22/16 102/54   08/01/16 108/60    Weight from Last 3 Encounters:   08/30/17 195 lb 8 oz (88.7 kg)   09/22/16 200 lb 6.4 oz (90.9 kg)   08/01/16 200 lb (90.7 kg)              We Performed the Following     FLU VACCINE, INCREASED ANTIGEN, PRESV FREE, AGE 65+ [29715]     Vaccine Administration, Initial [40543]          Today's Medication Changes          These changes are accurate as of: 9/27/17  2:55 PM.  If you have any questions, ask your nurse or doctor.               These medicines have changed or have updated prescriptions.        Dose/Directions    celecoxib 200 MG capsule   Commonly known as:  celeBREX   This may have changed:    - when to take this  - reasons to take this   Used for:  Status post total right knee replacement        Dose:  200 mg   Take 1 capsule (200 mg) by mouth every morning   Quantity:  90 capsule   Refills:  3                Primary Care Provider Office Phone # Fax #    Christin Diehl [phone] [address] [place] [address]        Equal Access to Services     ROBIN GALEANO AH: Peterson santizoo Rc, waaxda ludavonadaha, qaybta kaalmada aram, dolly doan. So [facility] [phone].    ATENCIÓN: Si habla español, tiene a robles disposición servicios gratuitos de asistencia lingüística. Llame al [phone].    We comply with applicable federal civil rights laws and [place] laws. We do not discriminate on the basis of race, color, national origin, age, disability sex, sexual orientation or gender identity.            Thank you!     Thank you for choosing [facility]  for your care. Our goal is always to provide you with excellent care. Hearing back from our patients is one way we can continue to improve our services.  Please take a few minutes to complete the written survey that you may receive in the mail after your visit with us. Thank you!             Your Updated Medication List - Protect others around you: Learn how to safely use, store and throw away your medicines at www.disposemymeds.org.          This list is accurate as of: 9/27/17  2:55 PM.  Always use your most recent med list.                   Brand Name Dispense Instructions for use Diagnosis    acetaminophen 650 MG 8 hour tablet     100 tablet    Take 650 mg by mouth every 6 hours    Status post total right knee replacement       ALPRAZolam 0.25 MG tablet    XANAX    30 tablet    Take 1 tablet (0.25 mg) by mouth 3 times daily as needed    Generalized anxiety disorder       blood glucose monitoring lancets     1 Box    Use to test blood sugar 1 time daily or as directed.    Type 2 diabetes, HbA1C goal < 8% (H)       blood glucose monitoring test strip    no brand specified    3 Month    Use to test blood sugar 1 time daily or as directed.    Type 2 diabetes, HbA1C goal < 8% (H)       calcium carbonate 1250 MG tablet    OS-WILEY 500 mg [place]. Ca     Takes 3 tabs daily takees total of 1200 mg daily        carvedilol 25 MG tablet    COREG    14 tablet    TAKE 1 TABLET TWO TIMES A DAY WITH MEALS-SHORT TERM SUPPY-7 DAYS ONLY    Idiopathic cardiomyopathy (H)       celecoxib 200 MG capsule    celeBREX    90 capsule    Take 1 capsule (200 mg) by mouth every morning    Status post total right knee replacement       co-enzyme Q-10 50 MG Caps      Take 1 capsule by mouth daily.        fluticasone 50 MCG/ACT spray    FLONASE    48 g    Spray 1-2 sprays into both nostrils daily    PND (post-nasal drip)       IRON SUPPLEMENT PO      Take 325 mg by mouth daily (with breakfast)        MULTIVITAMIN TABS   OR      1 qd        rosuvastatin 10 MG tablet    CRESTOR    45 tablet    Take one tablet every other day    Hyperlipidemia LDL goal <130       UNKNOWN TO PATIENT      Topical cream         valsartan 160 MG tablet    DIOVAN    45 tablet    TAKE 1/2 (ONE-HALF) TABLET (80MG) DAILY AT BEDTIME    Idiopathic cardiomyopathy (H)       venlafaxine 75 MG 24 hr capsule    EFFEXOR-XR    270 capsule    Take 3 capsules (225 mg) by mouth daily    Generalized anxiety disorder       vitamin D 2000 UNITS tablet      Take 1 tablet by mouth daily.    Obesity, unspecified

## 2017-11-08 ENCOUNTER — E-VISIT (OUTPATIENT)
Dept: PEDIATRICS | Facility: CLINIC | Age: 72
End: 2017-11-08
Payer: COMMERCIAL

## 2017-11-08 DIAGNOSIS — R19.7 DIARRHEA, UNSPECIFIED TYPE: Primary | ICD-10-CM

## 2017-11-08 PROCEDURE — 99207 ZZC NO CHARGE LOS: CPT | Performed by: INTERNAL MEDICINE

## 2017-11-08 NOTE — MR AVS SNAPSHOT
After Visit Summary   11/8/2017    Kristie Jones    MRN: [id number]           Patient Information     Date Of Birth          1945        Visit Information        Provider Department      11/8/2017 9:38 AM Christin Diehl MD [facility]        Today's Diagnoses     Diarrhea, unspecified type    -  1       Follow-ups after your visit        Your next 10 appointments already scheduled     Nov 09, 2017 11:00 AM CST   Office Visit with Christin Diehl MD   [facility] ([facility])    [address]  [address]  [place] [place] [facility] [id number] [phone]           Bring a current list of meds and any records pertaining to this visit. For Physicals, please bring immunization records and any forms needing to be filled out. Please arrive 10 minutes early to complete paperwork.            Dec 18, 2017  4:30 PM CST   Remote ICD Check with CULLEN DCR2   [facility] ([facility])    [address]  [facility] [id number] [phone]           This appointment is for a remote check of your debrillator.  This is not an appointment at the office.              Who to contact     If you have questions or need follow up information about today's clinic visit or your schedule please contact [facility] directly at [phone].  Normal or non-critical lab and imaging results will be communicated to you by MyChart, letter or phone within 4 business days after the clinic has received the results. If you do not hear from us within 7 days, please contact the clinic through MyChart or phone. If you have a critical or abnormal lab result, we will notify you by phone as soon as possible.  Submit refill requests through EverTrue or call your pharmacy and they will forward the refill request to us. Please allow 3 business days for your refill to be completed.          Additional Information  About Your Visit        [employer] Information     Keep[employer] gives you secure access to your electronic health record. If you see a primary care provider, you can also send messages to your care team and make appointments. If you have questions, please call your primary care clinic.  If you do not have a primary care provider, please call [phone] and they will assist you.        Care EveryWhere ID     This is your Care EveryWhere ID. This could be used by other organizations to access your [facility] medical records  [id number]         Blood Pressure from Last 3 Encounters:   08/30/17 110/66   09/22/16 102/54   08/01/16 108/60    Weight from Last 3 Encounters:   08/30/17 195 lb 8 oz (88.7 kg)   09/22/16 200 lb 6.4 oz (90.9 kg)   08/01/16 200 lb (90.7 kg)              Today, you had the following     No orders found for display         Today's Medication Changes          These changes are accurate as of: 11/8/17 11:59 PM.  If you have any questions, ask your nurse or doctor.               These medicines have changed or have updated prescriptions.        Dose/Directions    celecoxib 200 MG capsule   Commonly known as:  celeBREX   This may have changed:    - when to take this  - reasons to take this   Used for:  Status post total right knee replacement        Dose:  200 mg   Take 1 capsule (200 mg) by mouth every morning   Quantity:  90 capsule   Refills:  3                Primary Care Provider Office Phone # Fax #    Christin Diehl [phone] [address] [place] [address]        Equal Access to Services     [facility] AH: Hadii salbadro renee hadasho Sokeishaali, waaxda luqadaha, qaybta kaalmada ildada, dolly doan. So [facility] [phone].    ATENCIÓN: Si habla español, tiene a robles disposición servicios gratuitos de asistencia lingüística. Llame al [phone].    We comply with applicable federal civil rights laws and [place] laws. We do not  discriminate on the basis of race, color, national origin, age, disability, sex, sexual orientation, or gender identity.            Thank you!     Thank you for choosing [facility]  for your care. Our goal is always to provide you with excellent care. Hearing back from our patients is one way we can continue to improve our services. Please take a few minutes to complete the written survey that you may receive in the mail after your visit with us. Thank you!             Your Updated Medication List - Protect others around you: Learn how to safely use, store and throw away your medicines at www.disposemymeds.org.          This list is accurate as of: 11/8/17 11:59 PM.  Always use your most recent med list.                   Brand Name Dispense Instructions for use Diagnosis    acetaminophen 650 MG 8 hour tablet     100 tablet    Take 650 mg by mouth every 6 hours    Status post total right knee replacement       ALPRAZolam 0.25 MG tablet    XANAX    30 tablet    Take 1 tablet (0.25 mg) by mouth 3 times daily as needed    Generalized anxiety disorder       blood glucose monitoring lancets     1 Box    Use to test blood sugar 1 time daily or as directed.    Type 2 diabetes, HbA1C goal < 8% (H)       blood glucose monitoring test strip    no brand specified    3 Month    Use to test blood sugar 1 time daily or as directed.    Type 2 diabetes, HbA1C goal < 8% (H)       calcium carbonate 1250 MG tablet    OS-WILEY 500 mg [place]. Ca     Takes 3 tabs daily takees total of 1200 mg daily        carvedilol 25 MG tablet    COREG    14 tablet    TAKE 1 TABLET TWO TIMES A DAY WITH MEALS-SHORT TERM SUPPY-7 DAYS ONLY    Idiopathic cardiomyopathy (H)       celecoxib 200 MG capsule    celeBREX    90 capsule    Take 1 capsule (200 mg) by mouth every morning    Status post total right knee replacement       co-enzyme Q-10 50 MG Caps      Take 1 capsule by mouth daily.        fluticasone 50 MCG/ACT spray    FLONASE    48 g     Spray 1-2 sprays into both nostrils daily    PND (post-nasal drip)       IRON SUPPLEMENT PO      Take 325 mg by mouth daily (with breakfast)        MULTIVITAMIN TABS   OR      1 qd        rosuvastatin 10 MG tablet    CRESTOR    45 tablet    Take one tablet every other day    Hyperlipidemia LDL goal <130       UNKNOWN TO PATIENT      Topical cream        valsartan 160 MG tablet    DIOVAN    45 tablet    TAKE 1/2 (ONE-HALF) TABLET (80MG) DAILY AT BEDTIME    Idiopathic cardiomyopathy (H)       venlafaxine 75 MG 24 hr capsule    EFFEXOR-XR    270 capsule    Take 3 capsules (225 mg) by mouth daily    Generalized anxiety disorder       vitamin D 2000 UNITS tablet      Take 1 tablet by mouth daily.    Obesity, unspecified

## 2017-11-08 NOTE — TELEPHONE ENCOUNTER
Please call patient and help her schedule an appointment to discuss these symptoms; not an appropriate evisit.  Christin Diehl MD

## 2017-11-09 ENCOUNTER — OFFICE VISIT (OUTPATIENT)
Dept: PEDIATRICS | Facility: CLINIC | Age: 72
End: 2017-11-09
Payer: COMMERCIAL

## 2017-11-09 VITALS
DIASTOLIC BLOOD PRESSURE: 52 MMHG | BODY MASS INDEX: 33.3 KG/M2 | WEIGHT: 199.9 LBS | SYSTOLIC BLOOD PRESSURE: 100 MMHG | OXYGEN SATURATION: 98 % | HEART RATE: 79 BPM | HEIGHT: 65 IN | TEMPERATURE: 96.7 F

## 2017-11-09 DIAGNOSIS — R19.8 RECTAL DISCHARGE: Primary | ICD-10-CM

## 2017-11-09 DIAGNOSIS — E11.9 TYPE 2 DIABETES MELLITUS WITHOUT COMPLICATION, WITHOUT LONG-TERM CURRENT USE OF INSULIN (H): ICD-10-CM

## 2017-11-09 DIAGNOSIS — E78.5 HYPERLIPIDEMIA LDL GOAL <130: ICD-10-CM

## 2017-11-09 LAB
CRP SERPL-MCNC: 96 MG/L (ref 0–8)
ERYTHROCYTE [DISTWIDTH] IN BLOOD BY AUTOMATED COUNT: 12.1 % (ref 10–15)
ERYTHROCYTE [SEDIMENTATION RATE] IN BLOOD BY WESTERGREN METHOD: 33 MM/H (ref 0–30)
HCT VFR BLD AUTO: 37.6 % (ref 35–47)
HGB BLD-MCNC: 12.2 G/DL (ref 11.7–15.7)
MCH RBC QN AUTO: 31.4 PG (ref 26.5–33)
MCHC RBC AUTO-ENTMCNC: 32.4 G/DL (ref 31.5–36.5)
MCV RBC AUTO: 97 FL (ref 78–100)
PLATELET # BLD AUTO: 179 10E9/L (ref 150–450)
RBC # BLD AUTO: 3.89 10E12/L (ref 3.8–5.2)
WBC # BLD AUTO: 9.5 10E9/L (ref 4–11)

## 2017-11-09 PROCEDURE — 86140 C-REACTIVE PROTEIN: CPT | Performed by: INTERNAL MEDICINE

## 2017-11-09 PROCEDURE — 80053 COMPREHEN METABOLIC PANEL: CPT | Performed by: INTERNAL MEDICINE

## 2017-11-09 PROCEDURE — 99214 OFFICE O/P EST MOD 30 MIN: CPT | Performed by: INTERNAL MEDICINE

## 2017-11-09 PROCEDURE — 85652 RBC SED RATE AUTOMATED: CPT | Performed by: INTERNAL MEDICINE

## 2017-11-09 PROCEDURE — 87506 IADNA-DNA/RNA PROBE TQ 6-11: CPT | Performed by: INTERNAL MEDICINE

## 2017-11-09 PROCEDURE — 36415 COLL VENOUS BLD VENIPUNCTURE: CPT | Performed by: INTERNAL MEDICINE

## 2017-11-09 PROCEDURE — 85027 COMPLETE CBC AUTOMATED: CPT | Performed by: INTERNAL MEDICINE

## 2017-11-09 RX ORDER — LORATADINE 10 MG/1
10 TABLET ORAL EVERY EVENING
COMMUNITY

## 2017-11-09 NOTE — MR AVS SNAPSHOT
After Visit Summary   11/9/2017    Kristie Jones    MRN: [id number]           Patient Information     Date Of Birth          1945        Visit Information        Provider Department      11/9/2017 11:00 AM Christin Diehl MD [facility]        Today's Diagnoses     Rectal discharge    -  1    Type 2 diabetes mellitus without complication, without long-term current use of insulin (H)        Hyperlipidemia LDL goal <130          Care Instructions    Take stool culture and bring into lab.     Make the appointment with GI; you can cancel the appointment if you do not need it.     Come back for physical in one month. Schedule fasting labs 2-3 days before visit           Follow-ups after your visit        Additional Services     GASTROENTEROLOGY ADULT REF CONSULT ONLY       Preferred Location: [place] [facility] [phone]      Please be aware that coverage of these services is subject to the terms and limitations of your health insurance plan.  Call member services at your health plan with any benefit or coverage questions.  Any procedures must be performed at a [place] facility OR coordinated by your clinic's referral office.    Please bring the following with you to your appointment:    (1) Any X-Rays, CTs or MRIs which have been performed.  Contact the facility where they were done to arrange for  prior to your scheduled appointment.    (2) List of current medications   (3) This referral request   (4) Any documents/labs given to you for this referral                  Your next 10 appointments already scheduled     Dec 18, 2017  4:30 PM CST   Remote ICD Check with CULLEN DCR2   [facility] ([facility])    [address] [place] [id number] [phone]           This appointment is for a remote check of your debrillator.  This is not an appointment at the office.              Future tests that were ordered for you  "today     Open Future Orders        Priority Expected Expires Ordered    Lipid panel reflex to direct LDL Fasting Routine  2/9/2018 11/9/2017    TSH Routine  2/9/2018 11/9/2017    T4 FREE Routine  2/9/2018 11/9/2017    Hemoglobin A1c Routine  2/9/2018 11/9/2017    Albumin Random Urine Quantitative with Creat Ratio Routine  2/9/2018 11/9/2017    Enteric Bacteria and Virus Panel by ZOË Stool Routine  11/9/2018 11/9/2017            Who to contact     If you have questions or need follow up information about today's clinic visit or your schedule please contact [facility] directly at [phone].  Normal or non-critical lab and imaging results will be communicated to you by MicroEdge[employer], letter or phone within 4 business days after the clinic has received the results. If you do not hear from us within 7 days, please contact the clinic through [employer] or phone. If you have a critical or abnormal lab result, we will notify you by phone as soon as possible.  Submit refill requests through [employer] or call your pharmacy and they will forward the refill request to us. Please allow 3 business days for your refill to be completed.          Additional Information About Your Visit        [employer] Information     [employer] gives you secure access to your electronic health record. If you see a primary care provider, you can also send messages to your care team and make appointments. If you have questions, please call your primary care clinic.  If you do not have a primary care provider, please call [phone] and they will assist you.        Care EveryWhere ID     This is your Care EveryWhere ID. This could be used by other organizations to access your [place] medical records  [id number]        Your Vitals Were     Pulse Temperature Height Pulse Oximetry BMI (Body Mass Index)       79 96.7  F (35.9  C) (Tympanic) 5' 5\" (1.651 m) 98% 33.27 kg/m2        Blood Pressure from Last 3 Encounters:   11/09/17 100/52   08/30/17 " 110/66   09/22/16 102/54    Weight from Last 3 Encounters:   11/09/17 199 lb 14.4 oz (90.7 kg)   08/30/17 195 lb 8 oz (88.7 kg)   09/22/16 200 lb 6.4 oz (90.9 kg)              We Performed the Following     CBC with platelets     Comprehensive metabolic panel     CRP inflammation     DEPRESSION ACTION PLAN (DAP)     Erythrocyte sedimentation rate auto     GASTROENTEROLOGY ADULT REF CONSULT ONLY          Today's Medication Changes          These changes are accurate as of: 11/9/17 11:45 AM.  If you have any questions, ask your nurse or doctor.               These medicines have changed or have updated prescriptions.        Dose/Directions    celecoxib 200 MG capsule   Commonly known as:  celeBREX   This may have changed:    - when to take this  - reasons to take this   Used for:  Status post total right knee replacement        Dose:  200 mg   Take 1 capsule (200 mg) by mouth every morning   Quantity:  90 capsule   Refills:  3                Primary Care Provider Office Phone # Fax #    Christin Diehl [phone]       [facility] [place] [address]        Equal Access to Services     [facility] AH: Hadii salbador renee hadasho Soomaali, waaxda luqadaha, qaybta kaalmada adeegchristal, dolly saldana . So [facility] [phone].    ATENCIÓN: Si habla español, tiene a robles disposición servicios gratuitos de asistencia lingüística. Llame al [phone].    We comply with applicable federal civil rights laws and [place] laws. We do not discriminate on the basis of race, color, national origin, age, disability, sex, sexual orientation, or gender identity.            Thank you!     Thank you for choosing [facility]  for your care. Our goal is always to provide you with excellent care. Hearing back from our patients is one way we can continue to improve our services. Please take a few minutes to complete the written survey that you may receive in the mail after  your visit with us. Thank you!             Your Updated Medication List - Protect others around you: Learn how to safely use, store and throw away your medicines at www.disposemymeds.org.          This list is accurate as of: 11/9/17 11:45 AM.  Always use your most recent med list.                   Brand Name Dispense Instructions for use Diagnosis    acetaminophen 650 MG 8 hour tablet     100 tablet    Take 650 mg by mouth every 6 hours    Status post total right knee replacement       ALPRAZolam 0.25 MG tablet    XANAX    30 tablet    Take 1 tablet (0.25 mg) by mouth 3 times daily as needed    Generalized anxiety disorder       blood glucose monitoring lancets     1 Box    Use to test blood sugar 1 time daily or as directed.    Type 2 diabetes, HbA1C goal < 8% (H)       blood glucose monitoring test strip    no brand specified    3 Month    Use to test blood sugar 1 time daily or as directed.    Type 2 diabetes, HbA1C goal < 8% (H)       calcium carbonate 1250 MG tablet    OS-WILEY 500 mg [place]. Ca     Takes 3 tabs daily takees total of 1200 mg daily        carvedilol 25 MG tablet    COREG    14 tablet    TAKE 1 TABLET TWO TIMES A DAY WITH MEALS-SHORT TERM SUPPY-7 DAYS ONLY    Idiopathic cardiomyopathy (H)       celecoxib 200 MG capsule    celeBREX    90 capsule    Take 1 capsule (200 mg) by mouth every morning    Status post total right knee replacement       co-enzyme Q-10 50 MG Caps      Take 1 capsule by mouth daily.        fluticasone 50 MCG/ACT spray    FLONASE    48 g    Spray 1-2 sprays into both nostrils daily    PND (post-nasal drip)       IRON SUPPLEMENT PO      Take 325 mg by mouth daily (with breakfast)        loratadine 10 MG tablet    CLARITIN     Take 10 mg by mouth daily        MULTIVITAMIN TABS   OR      1 qd        UNKNOWN TO PATIENT      Topical cream        valsartan 160 MG tablet    DIOVAN    45 tablet    TAKE 1/2 (ONE-HALF) TABLET (80MG) DAILY AT BEDTIME    Idiopathic cardiomyopathy (H)        venlafaxine 75 MG 24 hr capsule    EFFEXOR-XR    270 capsule    Take 3 capsules (225 mg) by mouth daily    Generalized anxiety disorder       vitamin D 2000 UNITS tablet      Take 1 tablet by mouth daily.    Obesity, unspecified

## 2017-11-09 NOTE — PROGRESS NOTES
SUBJECTIVE:   Kristie Jones is a 72 year old female who presents to clinic today for the following health issues:    Kristie Mcknight is a patient with a complex PMHx who presents to the clinic for the complaint of pelvic abdominal pain, rectal discharge and fatigue. Patient states pain started about one week ago accompanied by mucus discharge from rectum with painful cramping and bloating. Pain is described as stabbing and sharp with a 5-6/10 severity. BM are regular and covered in mucus. Mucus is a clear, yellow color; changes to brown at times. She has to wear a pad to control leakage. Notes she has had episodes of mucus in her stool in the past but would resolve after 24 hours. Soaking sweats last night; appetite is regular. Denies fevers, nausea, vomiting,weight loss and hematazochia. Of note, patient returned from [place] on 10/20; she did travel within [place] but did not have any fresh water exposures.      Gastrointestinal symptoms      Duration: one week     Description    ABDOMINAL PAIN - bilateral lower quadrants.   Pain is described as stabbing and sharp.      Intensity:  5-6/10    Accompanying signs and symptoms:  mucus in stools, painful bowel movements and bloating    History  Previous {similar problem: YES- Has had occasional mucous, but would go away after a day  Previous evaluation:  none    Aggravating factors: none    Alleviating factors: nothing    Other Therapies tried: None            Problem list and histories reviewed & adjusted, as indicated.  Additional history: as documented    Patient Active Problem List   Diagnosis     Chest pain     GENERAL OSTEOARTHROSIS [715.00]     Decreased libido     Migraine     Esophageal reflux     Hyperlipidemia LDL goal <130     LBBB (left bundle branch block)     Advanced directives, counseling/discussion     SUSAN (obstructive sleep apnea)     Idiopathic cardiomyopathy (H)     Cataract     Leg pain     Insomnia     Obesity     Left ventricular systolic  dysfunction:EF 35%     Physical deconditioning     S/P total  right knee replacement: 1/7/13     Generalized anxiety disorder     Total knee replacement status     S/P revision of total knee, 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     Major depressive disorder, recurrent episode, mild (H)     ICD (implantable cardioverter-defibrillator), biventricular, in situ     Type 2 diabetes mellitus without complication (H)     Past Surgical History:   Procedure Laterality Date     APPENDECTOMY       ARTHROPLASTY KNEE  1/7/2013    Procedure: ARTHROPLASTY KNEE;  Right Total Knee Arthroplasty       ARTHROPLASTY REVISION KNEE Right 8/26/2015    Procedure: ARTHROPLASTY REVISION KNEE;  Surgeon: Néstor Beth MD;  Location: [facility] OR     ARTHROSCOPY KNEE       bunionectomy left foot       CORONARY ANGIOGRAPHY ADULT ORDER  2002    normal     CORONARY ANGIOGRAPHY ADULT ORDER  12/7/12    no significant focal narrowing that would benefit from mechanical intervention      HYSTERECTOMY       IMPLANT AUTOMATIC IMPLANTABLE CARDIOVERTER DEFIBRILLATOR  4/30/12     INSERT THORACIC PACEMAKER LEAD EPICARDIAL  5/1/2012    Procedure:INSERT THORACIC PACEMAKER LEAD EPICARDIAL; EPICARDIAL LEAD PLACEMENT; Surgeon:WEST ARECHIGA; Location:[facility] OR     TONSILLECTOMY       TRANSPLANT - corneal lenses      Bilateral for cataracts       Social History   Substance Use Topics     Smoking status: Never Smoker     Smokeless tobacco: Never Used     Alcohol use 0.0 oz/week     0 Standard drinks or equivalent per week      Comment: One glass of wine per week     Family History   Problem Relation Age of Onset     CANCER Father      intraabdominal mass     C.A.D. Mother      Hypertension Mother      Lipids Mother      HEART DISEASE Mother      CANCER Daughter 36     brain      DIABETES Paternal Uncle 52     Prostate Cancer Brother      HEART DISEASE Sister      murmur     Other - See Comments Sister      anorexia     Depression Sister      Anxiety Disorder Sister          Current  Outpatient Prescriptions   Medication Sig Dispense Refill     loratadine (CLARITIN) 10 MG tablet Take 10 mg by mouth daily       venlafaxine (EFFEXOR-XR) 75 MG 24 hr capsule Take 3 capsules (225 mg) by mouth daily 270 capsule 0     carvedilol (COREG) 25 MG tablet TAKE 1 TABLET TWO TIMES A DAY WITH MEALS-SHORT TERM SUPPY-7 DAYS ONLY 14 tablet 0     valsartan (DIOVAN) 160 MG tablet TAKE 1/2 (ONE-HALF) TABLET (80MG) DAILY AT BEDTIME 45 tablet 1     UNKNOWN TO PATIENT Topical cream       celecoxib (CELEBREX) 200 MG capsule Take 1 capsule (200 mg) by mouth every morning (Patient taking differently: Take 200 mg by mouth as needed ) 90 capsule 3     blood glucose monitoring (NO BRAND SPECIFIED) test strip Use to test blood sugar 1 time daily or as directed. 3 Month 3     blood glucose monitoring (ACCU-CHEK FASTCLIX) lancets Use to test blood sugar 1 time daily or as directed. 1 Box 3     acetaminophen 650 MG TABS Take 650 mg by mouth every 6 hours 100 tablet      ALPRAZolam (XANAX) 0.25 MG tablet Take 1 tablet (0.25 mg) by mouth 3 times daily as needed 30 tablet 0     Ferrous Sulfate (IRON SUPPLEMENT PO) Take 325 mg by mouth daily (with breakfast)       Cholecalciferol (VITAMIN D) 2000 UNITS tablet Take 1 tablet by mouth daily.       co-enzyme Q-10 50 MG CAPS Take 1 capsule by mouth daily.       CALCIUM 500 MG OR TABS Takes 3 tabs daily takees total of 1200 mg daily       MULTIVITAMIN TABS   OR 1 qd  0     fluticasone (FLONASE) 50 MCG/ACT nasal spray Spray 1-2 sprays into both nostrils daily (Patient not taking: Reported on 11/9/2017) 48 g 3     Allergies   Allergen Reactions     Prochlorperazine      Other reaction(s): Tardive Dyskinesia     Amoxicillin Hives     Decadron      flushing     Dexamethasone      Other reaction(s): Erythema     Compazine Anxiety     BP Readings from Last 3 Encounters:   11/09/17 100/52   08/30/17 110/66   09/22/16 102/54    Wt Readings from Last 3 Encounters:   11/09/17 199 lb 14.4 oz (90.7  "kg)   08/30/17 195 lb 8 oz (88.7 kg)   09/22/16 200 lb 6.4 oz (90.9 kg)                        Reviewed and updated as needed this visit by clinical staffTobacco  Allergies  Meds  Med Hx  Surg Hx  Fam Hx  Soc Hx      Reviewed and updated as needed this visit by Provider         ROS:  Constitutional, HEENT, cardiovascular, pulmonary, GI, , musculoskeletal, neuro, skin, endocrine and psych systems are negative, except as otherwise noted.    SEE HPI ABOVE     This document serves as a record of the services and decisions personally performed and made by Christin Diehl MD. It was created on her behalf by Sulma Moreno, a trained medical scribe. The creation of this document is based the provider's statements to the medical scribe.    Sulma Moreno November 9, 2017 10:47 AM  OBJECTIVE:   /52 (BP Location: Right arm, Patient Position: Sitting, Cuff Size: Adult Regular)  Pulse 79  Temp 96.7  F (35.9  C) (Tympanic)  Ht 5' 5\" (1.651 m)  Wt 199 lb 14.4 oz (90.7 kg)  SpO2 98%  BMI 33.27 kg/m2  Body mass index is 33.27 kg/(m^2).     GENERAL: healthy, alert and no distress  EYES: Eyes grossly normal to inspection, PERRL and conjunctivae and sclerae normal  HENT: ear canals and TM's normal, nose and mouth without ulcers or lesions  NECK: no adenopathy, no asymmetry, masses, or scars and thyroid normal to palpation  RESP: lungs clear to auscultation - no rales, rhonchi or wheezes  BREAST: normal without masses, tenderness or nipple discharge and no palpable axillary masses or adenopathy  CV: 1/6 systolic murmur left upper sternal border  ABDOMEN: BS +, soft.  Mildly tender bilateral lower quadrants, no rebound or guarding   MS: no gross musculoskeletal defects noted, no edema  SKIN: no suspicious lesions or rashes  NEURO: Normal strength and tone, mentation intact and speech normal  PSYCH: mentation appears normal, affect normal/bright      Diagnostic Test Results:  No results found for this or any " previous visit (from the past 24 hour(s)).    ASSESSMENT/PLAN:   (R19.8) Rectal discharge  (primary encounter diagnosis)  -- unclear etiology- pt with normal stools but significant rectal mucous discharge  -- will check stool studies and labs   -- reviewed red flag sx to come into clinic  -- will have her see GI   -no indication for CT scan today- benign abdominal exam   Plan: GASTROENTEROLOGY ADULT REF CONSULT ONLY,         Comprehensive metabolic panel, Erythrocyte         sedimentation rate auto, CRP inflammation,         Enteric Bacteria and Virus Panel by [facility] Stool,         CBC with platelets         (E11.9) Type 2 diabetes mellitus without complication, without long-term current use of insulin (H)  -- due for fasting labs and dm follow up  -will schedule fasting labs,  needs f/u visit in 1 month for DM visit   Plan: TSH, T4 FREE, Hemoglobin A1c, Albumin Random         Urine Quantitative with Creat Ratio          (E78.5) Hyperlipidemia LDL goal <130  -- future labs   Plan: Lipid panel reflex to direct LDL Fasting          Follow up in one month or as needed     The information in this document, created by the medical scribe for me, accurately reflects the services I personally performed and the decisions made by me. I have reviewed and approved this document for accuracy prior to leaving the patient care area.  Christin Diehl MD  [facility]

## 2017-11-09 NOTE — PATIENT INSTRUCTIONS
Take stool culture and bring into lab.     Make the appointment with GI; you can cancel the appointment if you do not need it.     Come back for physical in one month. Schedule fasting labs 2-3 days before visit

## 2017-11-09 NOTE — NURSING NOTE
"Chief Complaint   Patient presents with     Gastrointestinal Problem       Initial /52 (BP Location: Right arm, Patient Position: Sitting, Cuff Size: Adult Regular)  Pulse 79  Temp 96.7  F (35.9  C) (Tympanic)  Ht 5' 5\" (1.651 m)  Wt 199 lb 14.4 oz (90.7 kg)  SpO2 98%  BMI 33.27 kg/m2 Estimated body mass index is 33.27 kg/(m^2) as calculated from the following:    Height as of this encounter: 5' 5\" (1.651 m).    Weight as of this encounter: 199 lb 14.4 oz (90.7 kg).  Medication Reconciliation: complete     Maria L Simon      "

## 2017-11-09 NOTE — LETTER
My Depression Action Plan  Name: Kristie Jones   Date of Birth 1945  Date: 11/9/2017    My doctor: Christin Diehl   My clinic: [address]  [address]  [place] [address] [phone]          GREEN    ZONE   Good Control    What it looks like:     Things are going generally well. You have normal up s and down s. You may even feel depressed from time to time, but bad moods usually last less than a day.   What you need to do:  1. Continue to care for yourself (see self care plan)  2. Check your depression survival kit and update it as needed  3. Follow your physician s recommendations including any medication.  4. Do not stop taking medication unless you consult with your physician first.           YELLOW         ZONE Getting Worse    What it looks like:     Depression is starting to interfere with your life.     It may be hard to get out of bed; you may be starting to isolate yourself from others.    Symptoms of depression are starting to last most all day and this has happened for several days.     You may have suicidal thoughts but they are not constant.   What you need to do:     1. Call your care team, your response to treatment will improve if you keep your care team informed of your progress. Yellow periods are signs an adjustment may need to be made.     2. Continue your self-care, even if you have to fake it!    3. Talk to someone in your support network    4. Open up your depression survival kit           RED    ZONE Medical Alert - Get Help    What it looks like:     Depression is seriously interfering with your life.     You may experience these or other symptoms: You can t get out of bed most days, can t work or engage in other necessary activities, you have trouble taking care of basic hygiene, or basic responsibilities, thoughts of suicide or death that will not go away, self-injurious behavior.     What you need to  do:  1. Call your care team and request a same-day appointment. If they are not available (weekends or after hours) call your local crisis line, emergency room or 911.      Electronically signed by: Maria L Simon, November 9, 2017    Depression Self Care Plan / Survival Kit    Self-Care for Depression  Here s the deal. Your body and mind are really not as separate as most people think.  What you do and think affects how you feel and how you feel influences what you do and think. This means if you do things that people who feel good do, it will help you feel better.  Sometimes this is all it takes.  There is also a place for medication and therapy depending on how severe your depression is, so be sure to consult with your medical provider and/ or Behavioral Health Consultant if your symptoms are worsening or not improving.     In order to better manage my stress, I will:    Exercise  Get some form of exercise, every day. This will help reduce pain and release endorphins, the  feel good  chemicals in your brain. This is almost as good as taking antidepressants!  This is not the same as joining a gym and then never going! (they count on that by the way ) It can be as simple as just going for a walk or doing some gardening, anything that will get you moving.      Hygiene   Maintain good hygiene (Get out of bed in the morning, Make your bed, Brush your teeth, Take a shower, and Get dressed like you were going to work, even if you are unemployed).  If your clothes don't fit try to get ones that do.    Diet  I will strive to eat foods that are good for me, drink plenty of water, and avoid excessive sugar, caffeine, alcohol, and other mood-altering substances.  Some foods that are helpful in depression are: complex carbohydrates, B vitamins, flaxseed, fish or fish oil, fresh fruits and vegetables.    Psychotherapy  I agree to participate in Individual Therapy (if recommended).    Medication  If prescribed medications, I  agree to take them.  Missing doses can result in serious side effects.  I understand that drinking alcohol, or other illicit drug use, may cause potential side effects.  I will not stop my medication abruptly without first discussing it with my provider.    Staying Connected With Others  I will stay in touch with my friends, family members, and my primary care provider/team.    Use your imagination  Be creative.  We all have a creative side; it doesn t matter if it s oil painting, sand castles, or mud pies! This will also kick up the endorphins.    Witness Beauty  (AKA stop and smell the roses) Take a look outside, even in mid-winter. Notice colors, textures. Watch the squirrels and birds.     Service to others  Be of service to others.  There is always someone else in need.  By helping others we can  get out of ourselves  and remember the really important things.  This also provides opportunities for practicing all the other parts of the program.    Humor  Laugh and be silly!  Adjust your TV habits for less news and crime-drama and more comedy.    Control your stress  Try breathing deep, massage therapy, biofeedback, and meditation. Find time to relax each day.     My support system    Clinic Contact:  Phone number:    Contact 1:  Phone number:    Contact 2:  Phone number:    Latter-day/:  Phone number:    Therapist:  Phone number:    Local crisis center:    Phone number:    Other community support:  Phone number:

## 2017-11-10 DIAGNOSIS — R19.8 RECTAL DISCHARGE: ICD-10-CM

## 2017-11-10 LAB
ALBUMIN SERPL-MCNC: 3.5 G/DL (ref 3.4–5)
ALP SERPL-CCNC: 95 U/L (ref 40–150)
ALT SERPL W P-5'-P-CCNC: 78 U/L (ref 0–50)
ANION GAP SERPL CALCULATED.3IONS-SCNC: 7 MMOL/L (ref 3–14)
AST SERPL W P-5'-P-CCNC: 57 U/L (ref 0–45)
BILIRUB SERPL-MCNC: 0.8 MG/DL (ref 0.2–1.3)
BUN SERPL-MCNC: 16 MG/DL (ref 7–30)
CALCIUM SERPL-MCNC: 9.3 MG/DL (ref 8.5–10.1)
CHLORIDE SERPL-SCNC: 102 MMOL/L (ref 94–109)
CO2 SERPL-SCNC: 27 MMOL/L (ref 20–32)
CREAT SERPL-MCNC: 1 MG/DL (ref 0.52–1.04)
GFR SERPL CREATININE-BSD FRML MDRD: 55 ML/MIN/1.7M2
GLUCOSE SERPL-MCNC: 139 MG/DL (ref 70–99)
POTASSIUM SERPL-SCNC: 3.9 MMOL/L (ref 3.4–5.3)
PROT SERPL-MCNC: 7.2 G/DL (ref 6.8–8.8)
SODIUM SERPL-SCNC: 136 MMOL/L (ref 133–144)

## 2017-11-14 ENCOUNTER — TRANSFERRED RECORDS (OUTPATIENT)
Dept: HEALTH INFORMATION MANAGEMENT | Facility: CLINIC | Age: 72
End: 2017-11-14

## 2017-12-18 ENCOUNTER — ALLIED HEALTH/NURSE VISIT (OUTPATIENT)
Dept: CARDIOLOGY | Facility: CLINIC | Age: 72
End: 2017-12-18
Payer: COMMERCIAL

## 2017-12-18 DIAGNOSIS — Z95.810 ICD (IMPLANTABLE CARDIOVERTER-DEFIBRILLATOR), BIVENTRICULAR, IN SITU: ICD-10-CM

## 2017-12-18 PROCEDURE — 93296 REM INTERROG EVL PM/IDS: CPT | Performed by: INTERNAL MEDICINE

## 2017-12-18 PROCEDURE — 93295 DEV INTERROG REMOTE 1/2/MLT: CPT | Performed by: INTERNAL MEDICINE

## 2017-12-18 NOTE — LETTER
Kristie Jones    [address]  [place] [address]    December 18, 2017      Dear Kristie Jones,     Your transmission sent on 12/18/17 has been reviewed. It was determined the results are normal. No significant events were detected.   ___X__ Your next scheduled date for you to send a transmission is on 4/9/18.   _____ You are due for an in office appointment, please call [phone] to arrange.   Please call Danish at [phone] to change your appointment if needed. You may call and leave a message for a device RN if you have any questions at [phone] and they will return your call. Device clinic hours: Monday - Friday  8:00am-4:00pm   Sincerely,   Leilani Hess RN

## 2017-12-18 NOTE — MR AVS SNAPSHOT
After Visit Summary   12/18/2017    Kristie Jones    MRN: [id number]           Patient Information     Date Of Birth          1945        Visit Information        Provider Department      12/18/2017 4:30 PM [facility] DCR2 [facility]        Today's Diagnoses     ICD (implantable cardioverter-defibrillator), biventricular, in situ           Follow-ups after your visit        Your next 10 appointments already scheduled     Dec 18, 2017  4:30 PM CST   Remote ICD Check with [facility]ULLEN DCR2   [facility] ([facility])    [id number]  [facility] [address] [phone]           This appointment is for a remote check of your debrillator.  This is not an appointment at the office.            Apr 09, 2018  4:30 PM CDT   Remote ICD Check with CULLEN DCR2   [facility] ([facility])    [id number]  [facility] [id number] [phone]           This appointment is for a remote check of your debrillator.  This is not an appointment at the office.              Who to contact     If you have questions or need follow up information about today's clinic visit or your schedule please contact [facility] directly at [phone].  Normal or non-critical lab and imaging results will be communicated to you by MyChart, letter or phone within 4 business days after the clinic has received the results. If you do not hear from us within 7 days, please contact the clinic through MyChart or phone. If you have a critical or abnormal lab result, we will notify you by phone as soon as possible.  Submit refill requests through [employer] or call your pharmacy and they will forward the refill request to us. Please allow 3 business days for your refill to be completed.          Additional Information About Your Visit        MyChart Information      [employer] gives you secure access to your electronic health record. If you see a primary care provider, you can also send messages to your care team and make appointments. If you have questions, please call your primary care clinic.  If you do not have a primary care provider, please call [phone] and they will assist you.        Care EveryWhere ID     This is your Care EveryWhere ID. This could be used by other organizations to access your [facility] medical records  [id number]         Blood Pressure from Last 3 Encounters:   11/09/17 100/52   08/30/17 110/66   09/22/16 102/54    Weight from Last 3 Encounters:   11/09/17 90.7 kg (199 lb 14.4 oz)   08/30/17 88.7 kg (195 lb 8 oz)   09/22/16 90.9 kg (200 lb 6.4 oz)              We Performed the Following     ICD DEVICE INTERROGAT REMOTE (21097)     INTERROGATION DEVICE EVAL REMOTE, PACER/ICD (55526)          Today's Medication Changes          These changes are accurate as of: 12/18/17 10:34 AM.  If you have any questions, ask your nurse or doctor.               These medicines have changed or have updated prescriptions.        Dose/Directions    celecoxib 200 MG capsule   Commonly known as:  celeBREX   This may have changed:    - when to take this  - reasons to take this   Used for:  Status post total right knee replacement        Dose:  200 mg   Take 1 capsule (200 mg) by mouth every morning   Quantity:  90 capsule   Refills:  3                Primary Care Provider Office Phone # Fax #    Christin Diehl [phone] [address] [place] [address]        Equal Access to Services     [facility]: Hadii salbador larson Soiglesia, waaxda luqadaha, qaybta kaalmadolly cheng. So [facility] [phone].    ATENCIÓN: Si habla español, tiene a robles disposición servicios gratuitos de asistencia lingüística. Llame al [phone].    We comply with applicable federal civil rights laws and  [place] laws. We do not discriminate on the basis of race, color, national origin, age, disability, sex, sexual orientation, or gender identity.            Thank you!     Thank you for choosing [facility]  for your care. Our goal is always to provide you with excellent care. Hearing back from our patients is one way we can continue to improve our services. Please take a few minutes to complete the written survey that you may receive in the mail after your visit with us. Thank you!             Your Updated Medication List - Protect others around you: Learn how to safely use, store and throw away your medicines at www.disposemymeds.org.          This list is accurate as of: 12/18/17 10:34 AM.  Always use your most recent med list.                   Brand Name Dispense Instructions for use Diagnosis    acetaminophen 650 MG 8 hour tablet     100 tablet    Take 650 mg by mouth every 6 hours    Status post total right knee replacement       ALPRAZolam 0.25 MG tablet    XANAX    30 tablet    Take 1 tablet (0.25 mg) by mouth 3 times daily as needed    Generalized anxiety disorder       blood glucose monitoring lancets     1 Box    Use to test blood sugar 1 time daily or as directed.    Type 2 diabetes, HbA1C goal < 8% (H)       blood glucose monitoring test strip    no brand specified    3 Month    Use to test blood sugar 1 time daily or as directed.    Type 2 diabetes, HbA1C goal < 8% (H)       calcium carbonate 1250 MG tablet    OS-WILEY 500 mg [place]. Ca     Takes 3 tabs daily takees total of 1200 mg daily        * carvedilol 25 MG tablet    COREG    14 tablet    TAKE 1 TABLET TWO TIMES A DAY WITH MEALS-SHORT TERM SUPPY-7 DAYS ONLY    Idiopathic cardiomyopathy (H)       * carvedilol 25 MG tablet    COREG    180 tablet    TAKE 1 TABLET TWICE A DAY WITH MEALS    Idiopathic cardiomyopathy (H)       celecoxib 200 MG capsule    celeBREX    90 capsule    Take 1 capsule (200 mg) by mouth  every morning    Status post total right knee replacement       co-enzyme Q-10 50 MG Caps      Take 1 capsule by mouth daily.        fluticasone 50 MCG/ACT spray    FLONASE    48 g    Spray 1-2 sprays into both nostrils daily    PND (post-nasal drip)       IRON SUPPLEMENT PO      Take 325 mg by mouth daily (with breakfast)        loratadine 10 MG tablet    CLARITIN     Take 10 mg by mouth daily        MULTIVITAMIN TABS   OR      1 qd        UNKNOWN TO PATIENT      Topical cream        valsartan 160 MG tablet    DIOVAN    45 tablet    TAKE ONE-HALF (1/2) TABLET DAILY AT BEDTIME    Idiopathic cardiomyopathy (H)       venlafaxine 75 MG 24 hr capsule    EFFEXOR-XR    270 capsule    TAKE 3 CAPSULES DAILY    Generalized anxiety disorder       vitamin D 2000 UNITS tablet      Take 1 tablet by mouth daily.    Obesity, unspecified       * Notice:  This list has 2 medication(s) that are the same as other medications prescribed for you. Read the directions carefully, and ask your doctor or other care provider to review them with you.

## 2017-12-18 NOTE — PROGRESS NOTES
[employer]  ICD Remote Device Check  AP: 0 % : 0 %  Mode: VVI - 40        Presenting Rhythm: SR   Heart Rate: Adequate variation per histogram  Sensing: stable    Pacing Threshold: na    Impedance: stable  Battery Status: 5.5 years  Atrial Arrhythmia: na  Ventricular Arrhythmia: 1 NSVT : EGMS show 5 sec of VT at rate 170 on 10/7/17.  ATP: none    Shocks: none    Care Plan: f/u 3 months remote ICD check. Sent letter with results. Terrell

## 2018-02-13 DIAGNOSIS — E11.9 TYPE 2 DIABETES MELLITUS WITHOUT COMPLICATION, WITHOUT LONG-TERM CURRENT USE OF INSULIN (H): ICD-10-CM

## 2018-02-13 DIAGNOSIS — E78.5 HYPERLIPIDEMIA LDL GOAL <130: ICD-10-CM

## 2018-02-13 LAB
CHOLEST SERPL-MCNC: 249 MG/DL
CREAT UR-MCNC: 113 MG/DL
HBA1C MFR BLD: 5.4 % (ref 4.3–6)
HDLC SERPL-MCNC: 34 MG/DL
LDLC SERPL CALC-MCNC: 160 MG/DL
MICROALBUMIN UR-MCNC: 7 MG/L
MICROALBUMIN/CREAT UR: 6.02 MG/G CR (ref 0–25)
NONHDLC SERPL-MCNC: 215 MG/DL
T4 FREE SERPL-MCNC: 0.78 NG/DL (ref 0.76–1.46)
TRIGL SERPL-MCNC: 276 MG/DL
TSH SERPL DL<=0.005 MIU/L-ACNC: 1.89 MU/L (ref 0.4–4)

## 2018-02-13 PROCEDURE — 84439 ASSAY OF FREE THYROXINE: CPT | Performed by: INTERNAL MEDICINE

## 2018-02-13 PROCEDURE — 84443 ASSAY THYROID STIM HORMONE: CPT | Performed by: INTERNAL MEDICINE

## 2018-02-13 PROCEDURE — 80061 LIPID PANEL: CPT | Performed by: INTERNAL MEDICINE

## 2018-02-13 PROCEDURE — 83036 HEMOGLOBIN GLYCOSYLATED A1C: CPT | Performed by: INTERNAL MEDICINE

## 2018-02-13 PROCEDURE — 82043 UR ALBUMIN QUANTITATIVE: CPT | Performed by: INTERNAL MEDICINE

## 2018-02-13 PROCEDURE — 36415 COLL VENOUS BLD VENIPUNCTURE: CPT | Performed by: INTERNAL MEDICINE

## 2018-02-20 ENCOUNTER — OFFICE VISIT (OUTPATIENT)
Dept: PEDIATRICS | Facility: CLINIC | Age: 73
End: 2018-02-20
Payer: COMMERCIAL

## 2018-02-20 VITALS
SYSTOLIC BLOOD PRESSURE: 108 MMHG | HEART RATE: 69 BPM | TEMPERATURE: 98.5 F | WEIGHT: 201.1 LBS | BODY MASS INDEX: 33.51 KG/M2 | OXYGEN SATURATION: 97 % | HEIGHT: 65 IN | DIASTOLIC BLOOD PRESSURE: 58 MMHG

## 2018-02-20 DIAGNOSIS — Z00.00 ROUTINE GENERAL MEDICAL EXAMINATION AT A HEALTH CARE FACILITY: Primary | ICD-10-CM

## 2018-02-20 DIAGNOSIS — I42.9 IDIOPATHIC CARDIOMYOPATHY (H): ICD-10-CM

## 2018-02-20 DIAGNOSIS — Z13.820 SCREENING FOR OSTEOPOROSIS: ICD-10-CM

## 2018-02-20 DIAGNOSIS — Z23 NEED FOR TD VACCINE: ICD-10-CM

## 2018-02-20 DIAGNOSIS — E78.5 HYPERLIPIDEMIA LDL GOAL <130: ICD-10-CM

## 2018-02-20 DIAGNOSIS — E11.9 TYPE 2 DIABETES MELLITUS WITHOUT COMPLICATION, WITHOUT LONG-TERM CURRENT USE OF INSULIN (H): ICD-10-CM

## 2018-02-20 DIAGNOSIS — F33.0 MAJOR DEPRESSIVE DISORDER, RECURRENT EPISODE, MILD (H): ICD-10-CM

## 2018-02-20 DIAGNOSIS — F41.1 GENERALIZED ANXIETY DISORDER: ICD-10-CM

## 2018-02-20 PROCEDURE — 99214 OFFICE O/P EST MOD 30 MIN: CPT | Mod: 25 | Performed by: INTERNAL MEDICINE

## 2018-02-20 PROCEDURE — 90471 IMMUNIZATION ADMIN: CPT | Performed by: INTERNAL MEDICINE

## 2018-02-20 PROCEDURE — 90714 TD VACC NO PRESV 7 YRS+ IM: CPT | Performed by: INTERNAL MEDICINE

## 2018-02-20 PROCEDURE — G0439 PPPS, SUBSEQ VISIT: HCPCS | Performed by: INTERNAL MEDICINE

## 2018-02-20 RX ORDER — ROSUVASTATIN CALCIUM 10 MG/1
10 TABLET, COATED ORAL EVERY OTHER DAY
Qty: 45 TABLET | Refills: 1 | Status: SHIPPED | OUTPATIENT
Start: 2018-02-20 | End: 2018-09-02

## 2018-02-20 RX ORDER — VALSARTAN 160 MG/1
TABLET ORAL
Qty: 45 TABLET | Refills: 1 | Status: SHIPPED | OUTPATIENT
Start: 2018-02-20 | End: 2018-06-17

## 2018-02-20 RX ORDER — VENLAFAXINE HYDROCHLORIDE 75 MG/1
225 CAPSULE, EXTENDED RELEASE ORAL DAILY
Qty: 270 CAPSULE | Refills: 3 | Status: SHIPPED | OUTPATIENT
Start: 2018-02-20 | End: 2019-02-16

## 2018-02-20 RX ORDER — CARVEDILOL 25 MG/1
TABLET ORAL
Qty: 180 TABLET | Refills: 1 | Status: SHIPPED | OUTPATIENT
Start: 2018-02-20 | End: 2018-09-02

## 2018-02-20 ASSESSMENT — PATIENT HEALTH QUESTIONNAIRE - PHQ9: 5. POOR APPETITE OR OVEREATING: NOT AT ALL

## 2018-02-20 ASSESSMENT — ANXIETY QUESTIONNAIRES
5. BEING SO RESTLESS THAT IT IS HARD TO SIT STILL: NOT AT ALL
GAD7 TOTAL SCORE: 0
6. BECOMING EASILY ANNOYED OR IRRITABLE: NOT AT ALL
7. FEELING AFRAID AS IF SOMETHING AWFUL MIGHT HAPPEN: NOT AT ALL
IF YOU CHECKED OFF ANY PROBLEMS ON THIS QUESTIONNAIRE, HOW DIFFICULT HAVE THESE PROBLEMS MADE IT FOR YOU TO DO YOUR WORK, TAKE CARE OF THINGS AT HOME, OR GET ALONG WITH OTHER PEOPLE: NOT DIFFICULT AT ALL
2. NOT BEING ABLE TO STOP OR CONTROL WORRYING: NOT AT ALL
1. FEELING NERVOUS, ANXIOUS, OR ON EDGE: NOT AT ALL
3. WORRYING TOO MUCH ABOUT DIFFERENT THINGS: NOT AT ALL

## 2018-02-20 ASSESSMENT — ACTIVITIES OF DAILY LIVING (ADL)
I_NEED_ASSISTANCE_FOR_THE_FOLLOWING_DAILY_ACTIVITIES:: NO ASSISTANCE IS NEEDED
CURRENT_FUNCTION: NO ASSISTANCE NEEDED

## 2018-02-20 NOTE — PROGRESS NOTES
"SUBJECTIVE:   Kristie Jones is a 72 year old female who presents for Preventive Visit.  Are you in the first 12 months of your Medicare coverage?  No    Kristie Mcknight is a patient with a complex PMHx who presents to the clinic for her annual physical examination. She believes she is doing well even though she gained some weight. Reports when she was abroad she was able to do everything she wanted with occasional shortness of breath; notes shortness of breath is her biggest problem.. She reports fatigue waxes and wanes, but she is able to manage. . Notes yesterday she had to scrape ice off her car and the next morning she had muscle pain in her chest. Denies LE swelling, dizziness.     Mood is reported stable. Patient believes her  may be dealing with dementia and this is stressful and frustrating for her. Reports a period of time where she was taking Xanax for 1 week to sleep at night.  About 1-1.5 years ago he had an evaluation which found no problem. Patient is frustrated that he does not believe he has a problem.     Reports she had a \"terrible reaction\" when on Clindamycin, called her orthopedist and changed to Cipro. Her orthopedist feels she should be on antibiotics with dental procedures for life.      Notes having issues while on statins in the past, but tolerated Crestor every other day. She stopped it as her cholesterol was \"better\". Notes she exercises 3x a week. BP in clinic was 108/58; weight was 201 lbs.       Recent Labs   Lab Test  02/13/18   0833  12/22/16   0925  10/13/15   0858   09/03/14   0853   CHOL  249*  157  173   --   207*   HDL  34*  35*  37*   --   30*   LDL  160*  69  74   --   Cannot estimate LDL when triglyceride exceeds 400 mg/dL  105   TRIG  276*  264*  312*   < >  711*   CHOLHDLRATIO   --    --   4.7   --   6.9*    < > = values in this interval not displayed.               Physical   Annual:     Getting at least 3 servings of Calcium per day::  Yes    Bi-annual eye exam::  " Yes    Dental care twice a year::  Yes    Sleep apnea or symptoms of sleep apnea::  Sleep apnea    Diet::  Diabetic    Frequency of exercise::  2-3 days/week    Duration of exercise::  30-45 minutes    Taking medications regularly::  Yes    Medication side effects::  Muscle aches    Additional concerns today::  YES (discuss  with possibility of having dementia)    Ability to successfully perform activities of daily living: no assistance needed  Home Safety:  No safety concerns identified  Hearing Impairment: no hearing concerns      Fall risk:  Fallen 2 or more times in the past year?: No  Any fall with injury in the past year?: No  click delete button to remove this line now  COGNITIVE SCREEN  1) Repeat 3 items (Banana, Sunrise, Chair)    2) Clock draw: NORMAL  3) 3 item recall: Recalls 3 objects  Results: 3 items recalled: COGNITIVE IMPAIRMENT LESS LIKELY    Mini-CogTM Copyright S [employer]. Licensed by the author for use in [facility]; reprinted with permission (jeramy@[email or web address]). All rights reserved.        Reviewed and updated as needed this visit by clinical staff  Tobacco  Allergies  Meds  Med Hx  Surg Hx  Fam Hx  Soc Hx        Reviewed and updated as needed this visit by Provider        Social History   Substance Use Topics     Smoking status: Never Smoker     Smokeless tobacco: Never Used     Alcohol use 0.0 oz/week     0 Standard drinks or equivalent per week      Comment: One glass of wine per week       Alcohol Use 2/20/2018   If you drink alcohol, do you typically have greater than 3 drinks per day OR greater than 7 drinks per week?   No   No flowsheet data found.            Today's PHQ-2 Score:   PHQ-2 ( 1999 [employer]) 2/20/2018   Q1: Little interest or pleasure in doing things 0   Q2: Feeling down, depressed or hopeless 0   PHQ-2 Score 0   Q1: Little interest or pleasure in doing things Not at all   Q2: Feeling down, depressed or hopeless Not at all   PHQ-2 Score 0       Do you feel  safe in your environment - Yes    Do you have a Health Care Directive?: Yes: Advance Directive has been received and scanned.    Current providers sharing in care for this patient include:   Patient Care Team:  Christin Diehl MD as PCP - General (Pediatrics)  Pepper Baugh MD as MD (Internal Medicine)    The following health maintenance items are reviewed in Epic and correct as of today:  Health Maintenance   Topic Date Due     TETANUS Q10 YR  10/31/2016     FOOT EXAM Q1 YEAR  02/16/2017     MEDICARE ANNUAL WELLNESS VISIT  02/16/2017     DEXA Q3 YR  03/20/2017     EYE EXAM Q1 YEAR  08/08/2017     FALL RISK ASSESSMENT  09/22/2017     COLONOSCOPY Q10 YR  12/19/2017     PHQ-9 Q6 MONTHS  02/28/2018     BMP Q6 MOS  05/09/2018     A1C Q6 MO  08/13/2018     ALT Q1 YR  11/09/2018     CREATININE Q1 YEAR  11/09/2018     DEPRESSION ACTION PLAN Q1 YR  11/09/2018     CBC Q1 YR  11/09/2018     MAMMO Q2 YR  11/17/2018     LIPID MONITORING Q1 YEAR  02/13/2019     MICROALBUMIN Q1 YEAR  02/13/2019     HF ACTION PLAN Q3 YR  02/16/2019     TSH W/ FREE T4 REFLEX Q2 YEAR  02/13/2020     ADVANCE DIRECTIVE PLANNING Q5 YRS  02/20/2023     INFLUENZA VACCINE (SYSTEM ASSIGNED)  Completed     MIGRAINE ACTION PLAN  Completed     PNEUMOCOCCAL  Completed     HEPATITIS C SCREENING  Completed     Labs reviewed in EPIC  BP Readings from Last 3 Encounters:   02/20/18 108/58   11/09/17 100/52   08/30/17 110/66    Wt Readings from Last 3 Encounters:   02/20/18 91.2 kg (201 lb 1.6 oz)   11/09/17 90.7 kg (199 lb 14.4 oz)   08/30/17 88.7 kg (195 lb 8 oz)                  Patient Active Problem List   Diagnosis     GENERAL OSTEOARTHROSIS [715.00]     Esophageal reflux     Hyperlipidemia LDL goal <130     LBBB (left bundle branch block)     Advanced directives, counseling/discussion     SUSAN (obstructive sleep apnea)     Idiopathic cardiomyopathy (H)     Cataract     Leg pain     Insomnia     Obesity     Left ventricular systolic  dysfunction:EF 35%     S/P total  right knee replacement: 1/7/13     Generalized anxiety disorder     Total knee replacement status     S/P revision of total knee, 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     Major depressive disorder, recurrent episode, mild (H)     ICD (implantable cardioverter-defibrillator), biventricular, in situ     Type 2 diabetes mellitus without complication (H)     Past Surgical History:   Procedure Laterality Date     APPENDECTOMY       ARTHROPLASTY KNEE  1/7/2013    Procedure: ARTHROPLASTY KNEE;  Right Total Knee Arthroplasty       ARTHROPLASTY REVISION KNEE Right 8/26/2015    Procedure: ARTHROPLASTY REVISION KNEE;  Surgeon: Néstor Beth MD;  Location: [facility] OR     ARTHROSCOPY KNEE       bunionectomy left foot       CORONARY ANGIOGRAPHY ADULT ORDER  2002    normal     CORONARY ANGIOGRAPHY ADULT ORDER  12/7/12    no significant focal narrowing that would benefit from mechanical intervention      HYSTERECTOMY       IMPLANT AUTOMATIC IMPLANTABLE CARDIOVERTER DEFIBRILLATOR  4/30/12     INSERT THORACIC PACEMAKER LEAD EPICARDIAL  5/1/2012    Procedure:INSERT THORACIC PACEMAKER LEAD EPICARDIAL; EPICARDIAL LEAD PLACEMENT; Surgeon:WEST ARECHIGA; Location:[facility] OR     TONSILLECTOMY       TRANSPLANT - corneal lenses      Bilateral for cataracts       Social History   Substance Use Topics     Smoking status: Never Smoker     Smokeless tobacco: Never Used     Alcohol use 0.0 oz/week     0 Standard drinks or equivalent per week      Comment: One glass of wine per week     Family History   Problem Relation Age of Onset     CANCER Father      intraabdominal mass     C.A.D. Mother      Hypertension Mother      Lipids Mother      HEART DISEASE Mother      CANCER Daughter 36     brain      DIABETES Paternal Uncle 52     Prostate Cancer Brother      HEART DISEASE Sister      murmur     Other - See Comments Sister      anorexia     Depression Sister      Anxiety Disorder Sister          Current Outpatient Prescriptions    Medication Sig Dispense Refill     venlafaxine (EFFEXOR-XR) 75 MG 24 hr capsule TAKE 3 CAPSULES DAILY 270 capsule 0     valsartan (DIOVAN) 160 MG tablet TAKE ONE-HALF (1/2) TABLET DAILY AT BEDTIME 45 tablet 1     carvedilol (COREG) 25 MG tablet TAKE 1 TABLET TWICE A DAY WITH MEALS 180 tablet 1     loratadine (CLARITIN) 10 MG tablet Take 10 mg by mouth daily       UNKNOWN TO PATIENT Topical cream       celecoxib (CELEBREX) 200 MG capsule Take 1 capsule (200 mg) by mouth every morning (Patient taking differently: Take 200 mg by mouth as needed ) 90 capsule 3     fluticasone (FLONASE) 50 MCG/ACT nasal spray Spray 1-2 sprays into both nostrils daily 48 g 3     blood glucose monitoring (NO BRAND SPECIFIED) test strip Use to test blood sugar 1 time daily or as directed. 3 Month 3     blood glucose monitoring (ACCU-CHEK FASTCLIX) lancets Use to test blood sugar 1 time daily or as directed. 1 Box 3     acetaminophen 650 MG TABS Take 650 mg by mouth every 6 hours 100 tablet      ALPRAZolam (XANAX) 0.25 MG tablet Take 1 tablet (0.25 mg) by mouth 3 times daily as needed 30 tablet 0     Ferrous Sulfate (IRON SUPPLEMENT PO) Take 325 mg by mouth daily (with breakfast)       Cholecalciferol (VITAMIN D) 2000 UNITS tablet Take 1 tablet by mouth daily.       co-enzyme Q-10 50 MG CAPS Take 1 capsule by mouth daily.       CALCIUM 500 MG OR TABS Takes 3 tabs daily takees total of 1200 mg daily       MULTIVITAMIN TABS   OR 1 qd  0     [DISCONTINUED] carvedilol (COREG) 25 MG tablet TAKE 1 TABLET TWO TIMES A DAY WITH MEALS-SHORT TERM SUPPY-7 DAYS ONLY 14 tablet 0     Allergies   Allergen Reactions     Prochlorperazine      Other reaction(s): Tardive Dyskinesia     Amoxicillin Hives     Clindamycin Other (See Comments)     Burning sensation in chest     Decadron      flushing     Dexamethasone      Other reaction(s): Erythema     Compazine Anxiety           Review of Systems  Constitutional, HEENT, cardiovascular, pulmonary, GI, ,  "musculoskeletal, neuro, skin, endocrine and psych systems are negative, except as otherwise noted.    This document serves as a record of the services and decisions personally performed and made by Christin Diehl MD. It was created on her behalf by Sulma Moreno, a trained medical scribe. The creation of this document is based the provider's statements to the medical scribe.    Sulma Moreno February 20, 2018 2:06 PM  OBJECTIVE:   /58 (BP Location: Right arm, Patient Position: Chair, Cuff Size: Adult Regular)  Pulse 69  Temp 98.5  F (36.9  C) (Oral)  Ht 1.651 m (5' 5\")  Wt 91.2 kg (201 lb 1.6 oz)  SpO2 97%  BMI 33.46 kg/m2 Estimated body mass index is 33.46 kg/(m^2) as calculated from the following:    Height as of this encounter: 1.651 m (5' 5\").    Weight as of this encounter: 91.2 kg (201 lb 1.6 oz).  Physical Exam  GENERAL APPEARANCE: healthy, alert and no distress  EYES: Eyes grossly normal to inspection, PERRL and conjunctivae and sclerae normal  HENT: ear canals and TM's normal, nose and mouth without ulcers or lesions, oropharynx clear and oral mucous membranes moist  NECK: no adenopathy, no asymmetry, masses, or scars and thyroid normal to palpation  RESP: lungs clear to auscultation - no rales, rhonchi or wheezes  BREAST: normal without masses, tenderness or nipple discharge and no palpable axillary masses or adenopathy  CV: regular rate and rhythm, normal S1 S2, no S3 or S4, no murmur, click or rub, no peripheral edema   ABDOMEN: soft, nontender, no hepatosplenomegaly, no masses and bowel sounds normal  MS: no musculoskeletal defects are noted and gait is age appropriate without ataxia  SKIN: no suspicious lesions or rashes  NEURO: Normal strength and tone, sensory exam grossly normal, mentation intact and speech normal  PSYCH: mentation appears normal and affect normal/bright    ASSESSMENT / PLAN:   (Z00.00) Routine general medical examination at a health care facility  (primary " encounter diagnosis)  -- immunizations utd; teatnus today   -- schedule DEXA  -- colon scheduled   -- schedule eye exam   -- mammogram utd     (E11.9) Type 2 diabetes mellitus without complication, without long-term current use of insulin (H)  -- A1c on 2/13 was 5.4   -- diabetes diet controlled  -on arb and asa, add statin  -- encouraged continued exercise and diet modifications     (I42.8) Idiopathic cardiomyopathy (H)  -- reviewed cardiology notes  -pt doing well, very good functional status  -no change to medications   Plan: valsartan (DIOVAN) 160 MG tablet, carvedilol         (COREG) 25 MG tablet            (E78.5) Hyperlipidemia LDL goal <130  -- patient has not tolerated statins in the past, but tolerated every other day Crestor schedule   -- will start back on Crestor every other night; recheck cholesterol in 3 months  -- encouraged continued exercise and diet modifications   Plan: rosuvastatin (CRESTOR) 10 MG tablet, Lipid         panel reflex to direct LDL Fasting,         Comprehensive metabolic panel            (F41.1) Generalized anxiety disorder  -- controlled on Effexor  TOD 7 = 0  -discussed self care and self compassion as a caregiver  -- recommended getting established with therapy group for dementia after  is diagnosed   Plan: venlafaxine (EFFEXOR-XR) 75 MG 24 hr capsule            (F33.0) Major depressive disorder, recurrent episode, mild (H)  -- well controlled; PHQ 9 = 0  -no change to medications.      Follow up in 6 months or as needed.     End of Life Planning:  Patient currently has an advanced directive: Yes.  Practitioner is supportive of decision.    COUNSELING:  Reviewed preventive health counseling, as reflected in patient instructions       Regular exercise       Healthy diet/nutrition       Vision screening       Dental care       Colon cancer screening        Estimated body mass index is 33.46 kg/(m^2) as calculated from the following:    Height as of this encounter: 1.651 m  "(5' 5\").    Weight as of this encounter: 91.2 kg (201 lb 1.6 oz).  Weight management plan: Discussed healthy diet and exercise guidelines and patient will follow up in 12 months in clinic to re-evaluate.   reports that she has never smoked. She has never used smokeless tobacco.      Appropriate preventive services were discussed with this patient, including applicable screening as appropriate for cardiovascular disease, diabetes, osteopenia/osteoporosis, and glaucoma.  As appropriate for age/gender, discussed screening for colorectal cancer, prostate cancer, breast cancer, and cervical cancer. Checklist reviewing preventive services available has been given to the patient.    Reviewed patients plan of care and provided an AVS. The Basic Care Plan (routine screening as documented in Health Maintenance) for Kristie Mcknight meets the Care Plan requirement. This Care Plan has been established and reviewed with the Patient.    Counseling Resources:  ATP IV Guidelines  Pooled Cohorts Equation Calculator  Breast Cancer Risk Calculator  FRAX Risk Assessment  ICSI Preventive Guidelines  Dietary Guidelines for Americans, 2010  USDA's MyPlate  ASA Prophylaxis  Lung CA Screening    The information in this document, created by the medical scribe for me, accurately reflects the services I personally performed and the decisions made by me. I have reviewed and approved this document for accuracy prior to leaving the patient care area.  Christin Diehl MD  [facility]  "

## 2018-02-20 NOTE — PATIENT INSTRUCTIONS
Go back on cholesterol medication every other night.      Let me know what dose of antibiotics your orthopedist wants you on.     Schedule appointment with neurologist.     Practice self care with your . Get out of the situation or the house when you need to.     You may use a physical reminder to let yourself know you are struggling at that point in time and treat yourself with compassion.     Think about finding a support group so you can connect with others going through the same thing.     Schedule fasting labs in 3 months.     Follow up in 6 months or as needed.       Preventive Health Recommendations    Female Ages 65 +    Yearly exam:     See your health care provider every year in order to  o Review health changes.   o Discuss preventive care.    o Review your medicines if your doctor has prescribed any.      You no longer need a yearly Pap test unless you've had an abnormal Pap test in the past 10 years. If you have vaginal symptoms, such as bleeding or discharge, be sure to talk with your provider about a Pap test.      Every 1 to 2 years, have a mammogram.  If you are over 69, talk with your health care provider about whether or not you want to continue having screening mammograms.      Every 10 years, have a colonoscopy. Or, have a yearly FIT test (stool test). These exams will check for colon cancer.       Have a cholesterol test every 5 years, or more often if your doctor advises it.       Have a diabetes test (fasting glucose) every three years. If you are at risk for diabetes, you should have this test more often.       At age 65, have a bone density scan (DEXA) to check for osteoporosis (brittle bone disease).    Shots:    Get a flu shot each year.    Get a tetanus shot every 10 years.    Talk to your doctor about your pneumonia vaccines. There are now two you should receive - Pneumovax (PPSV 23) and Prevnar (PCV 13).    Talk to your doctor about the shingles vaccine.    Talk to your doctor  about the hepatitis B vaccine.    Nutrition:     Eat at least 5 servings of fruits and vegetables each day.      Eat whole-grain bread, whole-wheat pasta and brown rice instead of white grains and rice.      Talk to your provider about Calcium and Vitamin D.     Lifestyle    Exercise at least 150 minutes a week (30 minutes a day, 5 days a week). This will help you control your weight and prevent disease.      Limit alcohol to one drink per day.      No smoking.       Wear sunscreen to prevent skin cancer.       See your dentist twice a year for an exam and cleaning.      See your eye doctor every 1 to 2 years to screen for conditions such as glaucoma, macular degeneration and cataracts.

## 2018-02-20 NOTE — MR AVS SNAPSHOT
After Visit Summary   2/20/2018    Kristie Jones    MRN: [id number]           Patient Information     Date Of Birth          1945        Visit Information        Provider Department      2/20/2018 1:40 PM Christin Diehl MD [facility]        Today's Diagnoses     Routine general medical examination at a health care facility    -  1    Type 2 diabetes mellitus without complication, without long-term current use of insulin (H)        Idiopathic cardiomyopathy (H)        Hyperlipidemia LDL goal <130        Generalized anxiety disorder        Major depressive disorder, recurrent episode, mild (H)        Screening for osteoporosis        Need for TD vaccine          Care Instructions    Go back on cholesterol medication every other night.      Let me know what dose of antibiotics your orthopedist wants you on.     Schedule appointment with neurologist.     Practice self care with your . Get out of the situation or the house when you need to.     You may use a physical reminder to let yourself know you are struggling at that point in time and treat yourself with compassion.     Think about finding a support group so you can connect with others going through the same thing.     Schedule fasting labs in 3 months.     Follow up in 6 months or as needed.       Preventive Health Recommendations    Female Ages 65 +    Yearly exam:     See your health care provider every year in order to  o Review health changes.   o Discuss preventive care.    o Review your medicines if your doctor has prescribed any.      You no longer need a yearly Pap test unless you've had an abnormal Pap test in the past 10 years. If you have vaginal symptoms, such as bleeding or discharge, be sure to talk with your provider about a Pap test.      Every 1 to 2 years, have a mammogram.  If you are over 69, talk with your health care provider about whether or not you want to continue having screening  mammograms.      Every 10 years, have a colonoscopy. Or, have a yearly FIT test (stool test). These exams will check for colon cancer.       Have a cholesterol test every 5 years, or more often if your doctor advises it.       Have a diabetes test (fasting glucose) every three years. If you are at risk for diabetes, you should have this test more often.       At age 65, have a bone density scan (DEXA) to check for osteoporosis (brittle bone disease).    Shots:    Get a flu shot each year.    Get a tetanus shot every 10 years.    Talk to your doctor about your pneumonia vaccines. There are now two you should receive - Pneumovax (PPSV 23) and Prevnar (PCV 13).    Talk to your doctor about the shingles vaccine.    Talk to your doctor about the hepatitis B vaccine.    Nutrition:     Eat at least 5 servings of fruits and vegetables each day.      Eat whole-grain bread, whole-wheat pasta and brown rice instead of white grains and rice.      Talk to your provider about Calcium and Vitamin D.     Lifestyle    Exercise at least 150 minutes a week (30 minutes a day, 5 days a week). This will help you control your weight and prevent disease.      Limit alcohol to one drink per day.      No smoking.       Wear sunscreen to prevent skin cancer.       See your dentist twice a year for an exam and cleaning.      See your eye doctor every 1 to 2 years to screen for conditions such as glaucoma, macular degeneration and cataracts.          Follow-ups after your visit        Your next 10 appointments already scheduled     Apr 04, 2018   Procedure with Travis Hoskins MD   [facility] ([facility])    [address] [place] [address]   [phone]           [facility] is located at [address] [place]            Apr 09, 2018  4:30 PM CDT   Remote ICD Check with CULLEN DCR2   [facility] ([facility])    [address] [place] [address]  "[address]  [place] [id number] [phone]           This appointment is for a remote check of your debrillator.  This is not an appointment at the office.              Future tests that were ordered for you today     Open Future Orders        Priority Expected Expires Ordered    DX Hip/Pelvis/Spine Routine  2/20/2019 2/20/2018    Lipid panel reflex to direct LDL Fasting Routine  6/20/2018 2/20/2018    Comprehensive metabolic panel Routine  6/20/2018 2/20/2018            Who to contact     If you have questions or need follow up information about today's clinic visit or your schedule please contact [facility] [place] directly at [phone].  Normal or non-critical lab and imaging results will be communicated to you by Bluechillihart, letter or phone within 4 business days after the clinic has received the results. If you do not hear from us within 7 days, please contact the clinic through Dojot or phone. If you have a critical or abnormal lab result, we will notify you by phone as soon as possible.  Submit refill requests through Fixber or call your pharmacy and they will forward the refill request to us. Please allow 3 business days for your refill to be completed.          Additional Information About Your Visit        [employer] Information     Fixber gives you secure access to your electronic health record. If you see a primary care provider, you can also send messages to your care team and make appointments. If you have questions, please call your primary care clinic.  If you do not have a primary care provider, please call [phone] and they will assist you.        Care EveryWhere ID     This is your Care EveryWhere ID. This could be used by other organizations to access your [place] medical records  [id number]        Your Vitals Were     Pulse Temperature Height Pulse Oximetry BMI (Body Mass Index)       69 98.5  F (36.9  C) (Oral) 5' 5\" (1.651 m) 97% 33.46 kg/m2        Blood Pressure from Last 3 " Encounters:   02/20/18 108/58   11/09/17 100/52   08/30/17 110/66    Weight from Last 3 Encounters:   02/20/18 201 lb 1.6 oz (91.2 kg)   11/09/17 199 lb 14.4 oz (90.7 kg)   08/30/17 195 lb 8 oz (88.7 kg)              We Performed the Following     TD PRESERV FREE >=7 YRS ADS IM          Today's Medication Changes          These changes are accurate as of 2/20/18  2:32 PM.  If you have any questions, ask your nurse or doctor.               Start taking these medicines.        Dose/Directions    rosuvastatin 10 MG tablet   Commonly known as:  CRESTOR   Used for:  Hyperlipidemia LDL goal <130   Started by:  Christin Diehl MD        Dose:  10 mg   Take 1 tablet (10 mg) by mouth every other day   Quantity:  45 tablet   Refills:  1         These medicines have changed or have updated prescriptions.        Dose/Directions    carvedilol 25 MG tablet   Commonly known as:  COREG   This may have changed:  See the new instructions.   Used for:  Idiopathic cardiomyopathy (H)   Changed by:  Christin Diehl MD        TAKE 1 TABLET TWICE A DAY WITH MEALS   Quantity:  180 tablet   Refills:  1       celecoxib 200 MG capsule   Commonly known as:  celeBREX   This may have changed:    - when to take this  - reasons to take this   Used for:  Status post total right knee replacement        Dose:  200 mg   Take 1 capsule (200 mg) by mouth every morning   Quantity:  90 capsule   Refills:  3       valsartan 160 MG tablet   Commonly known as:  DIOVAN   This may have changed:  See the new instructions.   Used for:  Idiopathic cardiomyopathy (H)   Changed by:  Christin Diehl MD        TAKE ONE-HALF (1/2) TABLET DAILY AT BEDTIME   Quantity:  45 tablet   Refills:  1       venlafaxine 75 MG 24 hr capsule   Commonly known as:  EFFEXOR-XR   This may have changed:  See the new instructions.   Used for:  Generalized anxiety disorder   Changed by:  Christin Diehl MD        Dose:  225 mg   Take 3 capsules (225  mg) by mouth daily   Quantity:  270 capsule   Refills:  3         Stop taking these medicines if you haven't already. Please contact your care team if you have questions.     CIPROFLOXACIN HCL PO   Stopped by:  Christin Diehl MD                Where to get your medicines      These medications were sent to [id number] - [place], [place] - [address]  [address], [place] [address]     Phone:  [phone]     carvedilol 25 MG tablet    rosuvastatin 10 MG tablet    valsartan 160 MG tablet    venlafaxine 75 MG 24 hr capsule                Primary Care Provider Office Phone # Fax #    Christin Diehl [phone] [address] [place] [address]        Equal Access to Services     [facility]: Hadii aad ku hadasho Soomaali, waaxda luqadaha, qaybta kaalmada adeegyada, waxay idiin hayclayn yo saldana . So [facility] [phone].    ATENCIÓN: Si habla español, tiene a robles disposición servicios gratuitos de asistencia lingüística. [facility] [phone].    We comply with applicable federal civil rights laws and [place] laws. We do not discriminate on the basis of race, color, national origin, age, disability, sex, sexual orientation, or gender identity.            Thank you!     Thank you for choosing [facility]  for your care. Our goal is always to provide you with excellent care. Hearing back from our patients is one way we can continue to improve our services. Please take a few minutes to complete the written survey that you may receive in the mail after your visit with us. Thank you!             Your Updated Medication List - Protect others around you: Learn how to safely use, store and throw away your medicines at www.disposemymeds.org.          This list is accurate as of 2/20/18  2:32 PM.  Always use your most recent med list.                   Brand Name Dispense Instructions for use Diagnosis    acetaminophen 650 MG 8  hour tablet     100 tablet    Take 650 mg by mouth every 6 hours    Status post total right knee replacement       ALPRAZolam 0.25 MG tablet    XANAX    30 tablet    Take 1 tablet (0.25 mg) by mouth 3 times daily as needed    Generalized anxiety disorder       blood glucose monitoring lancets     1 Box    Use to test blood sugar 1 time daily or as directed.    Type 2 diabetes, HbA1C goal < 8% (H)       blood glucose monitoring test strip    no brand specified    3 Month    Use to test blood sugar 1 time daily or as directed.    Type 2 diabetes, HbA1C goal < 8% (H)       calcium carbonate 1250 MG tablet    OS-WILEY 500 mg [place]. Ca     Takes 3 tabs daily takees total of 1200 mg daily        carvedilol 25 MG tablet    COREG    180 tablet    TAKE 1 TABLET TWICE A DAY WITH MEALS    Idiopathic cardiomyopathy (H)       celecoxib 200 MG capsule    celeBREX    90 capsule    Take 1 capsule (200 mg) by mouth every morning    Status post total right knee replacement       co-enzyme Q-10 50 MG Caps      Take 1 capsule by mouth daily.        fluticasone 50 MCG/ACT spray    FLONASE    48 g    Spray 1-2 sprays into both nostrils daily    PND (post-nasal drip)       IRON SUPPLEMENT PO      Take 325 mg by mouth daily (with breakfast)        loratadine 10 MG tablet    CLARITIN     Take 10 mg by mouth daily        MULTIVITAMIN TABS   OR      1 qd        rosuvastatin 10 MG tablet    CRESTOR    45 tablet    Take 1 tablet (10 mg) by mouth every other day    Hyperlipidemia LDL goal <130       UNKNOWN TO PATIENT      Topical cream        valsartan 160 MG tablet    DIOVAN    45 tablet    TAKE ONE-HALF (1/2) TABLET DAILY AT BEDTIME    Idiopathic cardiomyopathy (H)       venlafaxine 75 MG 24 hr capsule    EFFEXOR-XR    270 capsule    Take 3 capsules (225 mg) by mouth daily    Generalized anxiety disorder       vitamin D 2000 UNITS tablet      Take 1 tablet by mouth daily.    Obesity, unspecified

## 2018-02-21 ASSESSMENT — PATIENT HEALTH QUESTIONNAIRE - PHQ9: SUM OF ALL RESPONSES TO PHQ QUESTIONS 1-9: 0

## 2018-02-21 ASSESSMENT — ANXIETY QUESTIONNAIRES: GAD7 TOTAL SCORE: 0

## 2018-02-24 ENCOUNTER — HEALTH MAINTENANCE LETTER (OUTPATIENT)
Age: 73
End: 2018-02-24

## 2018-04-04 ENCOUNTER — HOSPITAL ENCOUNTER (OUTPATIENT)
Facility: CLINIC | Age: 73
Discharge: HOME | End: 2018-04-04
Attending: INTERNAL MEDICINE | Admitting: INTERNAL MEDICINE
Payer: COMMERCIAL

## 2018-04-04 ENCOUNTER — SURGERY (OUTPATIENT)
Age: 73
End: 2018-04-04

## 2018-04-04 VITALS
DIASTOLIC BLOOD PRESSURE: 57 MMHG | OXYGEN SATURATION: 93 % | HEIGHT: 65 IN | WEIGHT: 200 LBS | BODY MASS INDEX: 33.32 KG/M2 | SYSTOLIC BLOOD PRESSURE: 114 MMHG | RESPIRATION RATE: 16 BRPM

## 2018-04-04 LAB
COLONOSCOPY: NORMAL
GLUCOSE BLDC GLUCOMTR-MCNC: 121 MG/DL (ref 70–99)

## 2018-04-04 PROCEDURE — G0500 MOD SEDAT ENDO SERVICE >5YRS: HCPCS | Performed by: INTERNAL MEDICINE

## 2018-04-04 PROCEDURE — 45385 COLONOSCOPY W/LESION REMOVAL: CPT | Performed by: INTERNAL MEDICINE

## 2018-04-04 PROCEDURE — 25000128 H RX IP 250 OP 636: Performed by: INTERNAL MEDICINE

## 2018-04-04 PROCEDURE — 88305 TISSUE EXAM BY PATHOLOGIST: CPT | Mod: 26 | Performed by: INTERNAL MEDICINE

## 2018-04-04 PROCEDURE — 82962 GLUCOSE BLOOD TEST: CPT

## 2018-04-04 PROCEDURE — 88305 TISSUE EXAM BY PATHOLOGIST: CPT | Performed by: INTERNAL MEDICINE

## 2018-04-04 RX ORDER — ONDANSETRON 4 MG/1
4 TABLET, ORALLY DISINTEGRATING ORAL EVERY 6 HOURS PRN
Status: DISCONTINUED | OUTPATIENT
Start: 2018-04-04 | End: 2018-04-04 | Stop reason: HOSPADM

## 2018-04-04 RX ORDER — LIDOCAINE 40 MG/G
CREAM TOPICAL
Status: DISCONTINUED | OUTPATIENT
Start: 2018-04-04 | End: 2018-04-04 | Stop reason: HOSPADM

## 2018-04-04 RX ORDER — ONDANSETRON 2 MG/ML
4 INJECTION INTRAMUSCULAR; INTRAVENOUS
Status: DISCONTINUED | OUTPATIENT
Start: 2018-04-04 | End: 2018-04-04 | Stop reason: HOSPADM

## 2018-04-04 RX ORDER — NALOXONE HYDROCHLORIDE 0.4 MG/ML
.1-.4 INJECTION, SOLUTION INTRAMUSCULAR; INTRAVENOUS; SUBCUTANEOUS
Status: DISCONTINUED | OUTPATIENT
Start: 2018-04-04 | End: 2018-04-04 | Stop reason: HOSPADM

## 2018-04-04 RX ORDER — FENTANYL CITRATE 50 UG/ML
INJECTION, SOLUTION INTRAMUSCULAR; INTRAVENOUS PRN
Status: DISCONTINUED | OUTPATIENT
Start: 2018-04-04 | End: 2018-04-04 | Stop reason: HOSPADM

## 2018-04-04 RX ORDER — ONDANSETRON 2 MG/ML
4 INJECTION INTRAMUSCULAR; INTRAVENOUS EVERY 6 HOURS PRN
Status: DISCONTINUED | OUTPATIENT
Start: 2018-04-04 | End: 2018-04-04 | Stop reason: HOSPADM

## 2018-04-04 RX ORDER — FLUMAZENIL 0.1 MG/ML
0.2 INJECTION, SOLUTION INTRAVENOUS
Status: DISCONTINUED | OUTPATIENT
Start: 2018-04-04 | End: 2018-04-04 | Stop reason: HOSPADM

## 2018-04-04 RX ADMIN — MIDAZOLAM 1 MG: 1 INJECTION INTRAMUSCULAR; INTRAVENOUS at 08:12

## 2018-04-04 RX ADMIN — FENTANYL CITRATE 100 MCG: 50 INJECTION, SOLUTION INTRAMUSCULAR; INTRAVENOUS at 08:12

## 2018-04-04 NOTE — PROCEDURES
Pre-Endoscopy History and Physical     Kristie Jones MRN# [id number]   YOB: 1945 Age: 72 year old     Date of Procedure: 4/4/2018  Primary care provider: Christin Diehl  Type of Endoscopy: colonoscopy  Reason for Procedure: screening  Type of Anesthesia Anticipated: Moderate (conscious) sedation    HPI:    Kristie Mcknight is a 72 year old female who will be undergoing the above procedure.      A history and physical has been performed. The patient's medications and allergies have been reviewed. The risks and benefits of the procedure and the sedation options and risks were discussed with the patient.  All questions were answered and informed consent was obtained.      Allergies   Allergen Reactions     Prochlorperazine      Other reaction(s): Tardive Dyskinesia     Amoxicillin Hives     Clindamycin Other (See Comments)     Burning sensation in chest     Decadron      flushing     Dexamethasone      Other reaction(s): Erythema     Compazine Anxiety        Current Facility-Administered Medications   Medication     lidocaine 1 % 1 mL     lidocaine (LMX4) kit     sodium chloride (PF) 0.9% PF flush 3 mL     sodium chloride (PF) 0.9% PF flush 3 mL     0.9% sodium chloride BOLUS     sodium chloride (PF) 0.9% PF flush 3 mL     ondansetron (ZOFRAN) injection 4 mg       Patient Active Problem List   Diagnosis     GENERAL OSTEOARTHROSIS [715.00]     Esophageal reflux     Hyperlipidemia LDL goal <130     LBBB (left bundle branch block)     Advanced directives, counseling/discussion     SUSAN (obstructive sleep apnea)     Idiopathic cardiomyopathy (H)     Cataract     Leg pain     Insomnia     Obesity     Left ventricular systolic dysfunction:EF 35%     S/P total  right knee replacement: 1/7/13     Generalized anxiety disorder     Total knee replacement status     S/P revision of total knee, 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     Major depressive disorder, recurrent episode, mild (H)     ICD (implantable  cardioverter-defibrillator), biventricular, in situ     Type 2 diabetes mellitus without complication (H)        Past Medical History:   Diagnosis Date     Acute posthemorrhagic anemia 9/11/2015     Anemia      Anxiety      Arthritis      Depression      Depressive disorder      DM type 2 (diabetes mellitus, type 2) (H)     Diet controlled     Fibromyalgia      Gastro-oesophageal reflux disease      Hypertension     because of the heart     Iron deficiency anemia 8/12/2015     Problem list name updated by automated process. Provider to review     LBBB (left bundle branch block)      Migraine 6/21/2005     Problem list name updated by automated process. Provider to review     Migraines      Nonischemic cardiomyopathy (H)     EF 30-35%, Dr. [facility] (suspect virus); s/p AICD     NSVT (nonsustained ventricular tachycardia) (H)      Pure hypercholesterolemia      Restless leg syndrome      Sleep apnea     CPAP         Past Surgical History:   Procedure Laterality Date     APPENDECTOMY       ARTHROPLASTY KNEE  1/7/2013    Procedure: ARTHROPLASTY KNEE;  Right Total Knee Arthroplasty       ARTHROPLASTY REVISION KNEE Right 8/26/2015    Procedure: ARTHROPLASTY REVISION KNEE;  Surgeon: Néstor Beth MD;  Location: [facility] OR     ARTHROSCOPY KNEE       bunionectomy left foot       COLONOSCOPY       CORONARY ANGIOGRAPHY ADULT ORDER  2002    normal     CORONARY ANGIOGRAPHY ADULT ORDER  12/7/12    no significant focal narrowing that would benefit from mechanical intervention      HYSTERECTOMY       IMPLANT AUTOMATIC IMPLANTABLE CARDIOVERTER DEFIBRILLATOR  4/30/12     INSERT THORACIC PACEMAKER LEAD EPICARDIAL  5/1/2012    Procedure:INSERT THORACIC PACEMAKER LEAD EPICARDIAL; EPICARDIAL LEAD PLACEMENT; Surgeon:WEST ARECHIGA; Location:[facility] OR     TONSILLECTOMY       TRANSPLANT - corneal lenses      Bilateral for cataracts       Social History   Substance Use Topics     Smoking status: Never Smoker     Smokeless tobacco: Never  Used     Alcohol use 0.0 oz/week     0 Standard drinks or equivalent per week      Comment: One glass of wine per week       Family History   Problem Relation Age of Onset     CANCER Father      intraabdominal mass - not colon cancer     C.A.D. Mother      Hypertension Mother      Lipids Mother      HEART DISEASE Mother      CANCER Daughter 36     brain      DIABETES Paternal Uncle 52     Prostate Cancer Brother      HEART DISEASE Sister      murmur     Other - See Comments Sister      anorexia     Depression Sister      Anxiety Disorder Sister      Colon Cancer No family hx of             Medications:     Prescriptions Prior to Admission   Medication Sig Dispense Refill Last Dose     venlafaxine (EFFEXOR-XR) 75 MG 24 hr capsule Take 3 capsules (225 mg) by mouth daily 270 capsule 3 4/3/2018     valsartan (DIOVAN) 160 MG tablet TAKE ONE-HALF (1/2) TABLET DAILY AT BEDTIME 45 tablet 1 4/3/2018     carvedilol (COREG) 25 MG tablet TAKE 1 TABLET TWICE A DAY WITH MEALS 180 tablet 1 4/3/2018     rosuvastatin (CRESTOR) 10 MG tablet Take 1 tablet (10 mg) by mouth every other day 45 tablet 1 Past Week     loratadine (CLARITIN) 10 MG tablet Take 10 mg by mouth daily   Taking     celecoxib (CELEBREX) 200 MG capsule Take 1 capsule (200 mg) by mouth every morning (Patient taking differently: Take 200 mg by mouth as needed ) 90 capsule 3 Taking     fluticasone (FLONASE) 50 MCG/ACT nasal spray Spray 1-2 sprays into both nostrils daily 48 g 3 4/3/2018     blood glucose monitoring (NO BRAND SPECIFIED) test strip Use to test blood sugar 1 time daily or as directed. 3 Month 3 Taking     blood glucose monitoring (ACCU-CHEK FASTCLIX) lancets Use to test blood sugar 1 time daily or as directed. 1 Box 3 Taking     acetaminophen 650 MG TABS Take 650 mg by mouth every 6 hours 100 tablet  Taking     ALPRAZolam (XANAX) 0.25 MG tablet Take 1 tablet (0.25 mg) by mouth 3 times daily as needed 30 tablet 0 Taking     Ferrous Sulfate (IRON SUPPLEMENT  "PO) Take 325 mg by mouth daily (with breakfast)   Past Week     Cholecalciferol (VITAMIN D) 2000 UNITS tablet Take 1 tablet by mouth daily.   4/3/2018     co-enzyme Q-10 50 MG CAPS Take 1 capsule by mouth daily.   4/3/2018     CALCIUM 500 MG OR TABS Takes 3 tabs daily takees total of 1200 mg daily   4/3/2018     MULTIVITAMIN TABS   OR 1 qd  0 Past Week       Scheduled Medications:    sodium chloride (PF)  3 mL Intracatheter Q8H     sodium chloride 0.9%  500 mL Intravenous Once       PRN:  lidocaine (buffered or not buffered), lidocaine 4%, sodium chloride (PF), sodium chloride (PF), ondansetron    PHYSICAL EXAM:   /60  Resp 22  Ht 1.651 m (5' 5\")  Wt 90.7 kg (200 lb)  SpO2 96%  BMI 33.28 kg/m2 Estimated body mass index is 33.28 kg/(m^2) as calculated from the following:    Height as of this encounter: 1.651 m (5' 5\").    Weight as of this encounter: 90.7 kg (200 lb).   RESP: lungs clear to auscultation - no rales, rhonchi or wheezes  CV: regular rates and rhythm    IMPRESSION   ASA Class 2 - Mild systemic disease      Signed Electronically by: Travis Hoskins MD  April 4, 2018    .            "

## 2018-04-04 NOTE — DISCHARGE INSTRUCTIONS
Understanding Colon and Rectal Polyps     The colon has a smooth lining composed of millions of cells.     The colon (also called the large intestine) is a muscular tube that forms the last part of the digestive tract. It absorbs water and stores food waste. The colon is about 4 to 6 feet long. The rectum is the last 6 inches of the colon. The colon and rectum have a smooth lining composed of millions of cells. Changes in these cells can lead to growths in the colon that can become cancerous and should be removed.     When the Colon Lining Changes  Changes that occur in the cells that line the colon or rectum can lead to growths called polyps. Over a period of years, polyps can turn cancerous. Removing polyps early may prevent cancer from ever forming.      Polyps  Polyps are fleshy clumps of tissue that form on the lining of the colon or rectum. Small polyps are usually benign (not cancerous). However, over time, cells in a polyp can change and become cancerous. The larger a polyp grows, the more likely this is to happen. Also, certain types of polyps known as adenomatous polyps are considered premalignant. This means that they will almost always become cancerous if they re not removed.          Cancer  Almost all colorectal cancers start when polyp cells begin growing abnormally. As a cancerous tumor grows, it may involve more and more of the colon or rectum. In time, cancer can also grow beyond the colon or rectum and spread to nearby organs or to glands called lymph nodes. The cells can also travel to other parts of the body. This is known as metastasis. The earlier a cancerous tumor is removed, the better the chance of preventing its spread.        [id number] [facility], [address] [place] [address], [place] [address]. All rights reserved. This information is not intended as a substitute for professional medical care. Always follow your healthcare professional's instructions.

## 2018-04-04 NOTE — IP AVS SNAPSHOT
MRN:[id number]                      After Visit Summary   4/4/2018    Kristie Jones    MRN: [id number]           Thank you!     Thank you for choosing [facility] for your care. Our goal is always to provide you with excellent care. Hearing back from our patients is one way we can continue to improve our services. Please take a few minutes to complete the written survey that you may receive in the mail after you visit. If you would like to speak to someone directly about your visit please contact Patient Relations at [phone]. Thank you!          Patient Information     Date Of Birth          1945        About your hospital stay     You were admitted on:  April 4, 2018 You last received care in the:  [facility]    You were discharged on:  April 4, 2018       Who to Call     For medical emergencies, please call 911.  For non-urgent questions about your medical care, please call your primary care provider or clinic, [phone]  For questions related to your surgery, please call your surgery clinic        Attending Provider     Provider Specialty    Travis Hoskins MD Gastroenterology       Primary Care Provider Office Phone # Fax #    Christin Diehl [phone]      Your next 10 appointments already scheduled     Apr 09, 2018  4:30 PM CDT   Remote ICD Check with CULLEN DCR2   [facility] ([facility])    [address] [place] [id number] [phone] OPT 2           This appointment is for a remote check of your debrillator.  This is not an appointment at the office.              Further instructions from your care team         Understanding Colon and Rectal Polyps     The colon has a smooth lining composed of millions of cells.     The colon (also called the large intestine) is a muscular tube that forms the last part of the digestive tract. It absorbs water and  stores food waste. The colon is about 4 to 6 feet long. The rectum is the last 6 inches of the colon. The colon and rectum have a smooth lining composed of millions of cells. Changes in these cells can lead to growths in the colon that can become cancerous and should be removed.     When the Colon Lining Changes  Changes that occur in the cells that line the colon or rectum can lead to growths called polyps. Over a period of years, polyps can turn cancerous. Removing polyps early may prevent cancer from ever forming.      Polyps  Polyps are fleshy clumps of tissue that form on the lining of the colon or rectum. Small polyps are usually benign (not cancerous). However, over time, cells in a polyp can change and become cancerous. The larger a polyp grows, the more likely this is to happen. Also, certain types of polyps known as adenomatous polyps are considered premalignant. This means that they will almost always become cancerous if they re not removed.          Cancer  Almost all colorectal cancers start when polyp cells begin growing abnormally. As a cancerous tumor grows, it may involve more and more of the colon or rectum. In time, cancer can also grow beyond the colon or rectum and spread to nearby organs or to glands called lymph nodes. The cells can also travel to other parts of the body. This is known as metastasis. The earlier a cancerous tumor is removed, the better the chance of preventing its spread.        [id number] [facility], [address] [place] [address], [place] [address]. All rights reserved. This information is not intended as a substitute for professional medical care. Always follow your healthcare professional's instructions.    Pending Results     No orders found from 4/2/2018 to 4/5/2018.            Admission Information     Date & Time Provider Department Dept. Phone    4/4/2018 Travis Hoskins MD [facility] [phone]      Your Vitals Were     Blood Pressure Respirations  "Height Weight Pulse Oximetry BMI (Body Mass Index)    106/58 16 1.651 m (5' 5\") 90.7 kg (200 lb) 92% 33.28 kg/m2      JoyTunes Information     JoyTunes gives you secure access to your electronic health record. If you see a primary care provider, you can also send messages to your care team and make appointments. If you have questions, please call your primary care clinic.  If you do not have a primary care provider, please call [phone] and they will assist you.        Care EveryWhere ID     This is your Care EveryWhere ID. This could be used by other organizations to access your [place] medical records  [id number]        Equal Access to Services     ROBIN GALEANO : Peterson Tatum, lazarus oneil, yo chiu, dolly doan. So [facility] [phone].    ATENCIÓN: Si habla español, tiene a robles disposición servicios gratuitos de asistencia lingüística. Llame al [phone].    We comply with applicable federal civil rights laws and [place] laws. We do not discriminate on the basis of race, color, national origin, age, disability, sex, sexual orientation, or gender identity.               Review of your medicines      CONTINUE these medicines which may have CHANGED, or have new prescriptions. If we are uncertain of the size of tablets/capsules you have at home, strength may be listed as something that might have changed.        Dose / Directions    celecoxib 200 MG capsule   Commonly known as:  celeBREX   This may have changed:    - when to take this  - reasons to take this   Used for:  Status post total right knee replacement        Dose:  200 mg   Take 1 capsule (200 mg) by mouth every morning   Quantity:  90 capsule   Refills:  3         CONTINUE these medicines which have NOT CHANGED        Dose / Directions    acetaminophen 650 MG 8 hour tablet   Used for:  Status post total right knee replacement        Dose:  650 mg   Take 650 mg by mouth every 6 hours "   Quantity:  100 tablet   Refills:  0       ALPRAZolam 0.25 MG tablet   Commonly known as:  XANAX   Used for:  Generalized anxiety disorder        Dose:  0.25 mg   Take 1 tablet (0.25 mg) by mouth 3 times daily as needed   Quantity:  30 tablet   Refills:  0       blood glucose monitoring lancets   Used for:  Type 2 diabetes, HbA1C goal < 8% (H)        Use to test blood sugar 1 time daily or as directed.   Quantity:  1 Box   Refills:  3       blood glucose monitoring test strip   Commonly known as:  no brand specified   Used for:  Type 2 diabetes, HbA1C goal < 8% (H)        Use to test blood sugar 1 time daily or as directed.   Quantity:  3 Month   Refills:  3       calcium carbonate 1250 MG tablet   Commonly known as:  OS-WILEY 500 mg [place]. Ca        Takes 3 tabs daily takees total of 1200 mg daily   Refills:  0       carvedilol 25 MG tablet   Commonly known as:  COREG   Used for:  Idiopathic cardiomyopathy (H)        TAKE 1 TABLET TWICE A DAY WITH MEALS   Quantity:  180 tablet   Refills:  1       co-enzyme Q-10 50 MG Caps        Dose:  1 capsule   Take 1 capsule by mouth daily.   Refills:  0       fluticasone 50 MCG/ACT spray   Commonly known as:  FLONASE   Used for:  PND (post-nasal drip)        Dose:  1-2 spray   Spray 1-2 sprays into both nostrils daily   Quantity:  48 g   Refills:  3       IRON SUPPLEMENT PO        Dose:  325 mg   Take 325 mg by mouth daily (with breakfast)   Refills:  0       loratadine 10 MG tablet   Commonly known as:  CLARITIN        Dose:  10 mg   Take 10 mg by mouth daily   Refills:  0       MULTIVITAMIN TABS   OR        1 qd   Refills:  0       rosuvastatin 10 MG tablet   Commonly known as:  CRESTOR   Used for:  Hyperlipidemia LDL goal <130        Dose:  10 mg   Take 1 tablet (10 mg) by mouth every other day   Quantity:  45 tablet   Refills:  1       valsartan 160 MG tablet   Commonly known as:  DIOVAN   Used for:  Idiopathic cardiomyopathy (H)        TAKE ONE-HALF (1/2) TABLET DAILY AT  BEDTIME   Quantity:  45 tablet   Refills:  1       venlafaxine 75 MG 24 hr capsule   Commonly known as:  EFFEXOR-XR   Used for:  Generalized anxiety disorder        Dose:  225 mg   Take 3 capsules (225 mg) by mouth daily   Quantity:  270 capsule   Refills:  3       vitamin D 2000 UNITS tablet   Used for:  Obesity, unspecified        Dose:  1 tablet   Take 1 tablet by mouth daily.   Refills:  0                Protect others around you: Learn how to safely use, store and throw away your medicines at www.disposemymeds.org.             Medication List: This is a list of all your medications and when to take them. Check marks below indicate your daily home schedule. Keep this list as a reference.      Medications           Morning Afternoon Evening Bedtime As Needed    acetaminophen 650 MG 8 hour tablet   Take 650 mg by mouth every 6 hours                                ALPRAZolam 0.25 MG tablet   Commonly known as:  XANAX   Take 1 tablet (0.25 mg) by mouth 3 times daily as needed                                blood glucose monitoring lancets   Use to test blood sugar 1 time daily or as directed.                                blood glucose monitoring test strip   Commonly known as:  no brand specified   Use to test blood sugar 1 time daily or as directed.                                calcium carbonate 1250 MG tablet   Commonly known as:  OS-WILEY 500 mg [place]. Ca   Takes 3 tabs daily takees total of 1200 mg daily                                carvedilol 25 MG tablet   Commonly known as:  COREG   TAKE 1 TABLET TWICE A DAY WITH MEALS                                celecoxib 200 MG capsule   Commonly known as:  celeBREX   Take 1 capsule (200 mg) by mouth every morning                                co-enzyme Q-10 50 MG Caps   Take 1 capsule by mouth daily.                                fluticasone 50 MCG/ACT spray   Commonly known as:  FLONASE   Spray 1-2 sprays into both nostrils daily                                 IRON SUPPLEMENT PO   Take 325 mg by mouth daily (with breakfast)                                loratadine 10 MG tablet   Commonly known as:  CLARITIN   Take 10 mg by mouth daily                                MULTIVITAMIN TABS   OR   1 qd                                rosuvastatin 10 MG tablet   Commonly known as:  CRESTOR   Take 1 tablet (10 mg) by mouth every other day                                valsartan 160 MG tablet   Commonly known as:  DIOVAN   TAKE ONE-HALF (1/2) TABLET DAILY AT BEDTIME                                venlafaxine 75 MG 24 hr capsule   Commonly known as:  EFFEXOR-XR   Take 3 capsules (225 mg) by mouth daily                                vitamin D 2000 UNITS tablet   Take 1 tablet by mouth daily.

## 2018-04-05 LAB — COPATH REPORT: NORMAL

## 2018-04-07 ENCOUNTER — MYC REFILL (OUTPATIENT)
Dept: PEDIATRICS | Facility: CLINIC | Age: 73
End: 2018-04-07

## 2018-04-07 DIAGNOSIS — Z96.651 STATUS POST TOTAL RIGHT KNEE REPLACEMENT: ICD-10-CM

## 2018-04-09 ENCOUNTER — ALLIED HEALTH/NURSE VISIT (OUTPATIENT)
Dept: CARDIOLOGY | Facility: CLINIC | Age: 73
End: 2018-04-09
Payer: COMMERCIAL

## 2018-04-09 DIAGNOSIS — I42.9 IDIOPATHIC CARDIOMYOPATHY (H): Primary | ICD-10-CM

## 2018-04-09 DIAGNOSIS — Z95.810 ICD (IMPLANTABLE CARDIOVERTER-DEFIBRILLATOR), BIVENTRICULAR, IN SITU: ICD-10-CM

## 2018-04-09 PROCEDURE — 93296 REM INTERROG EVL PM/IDS: CPT | Performed by: INTERNAL MEDICINE

## 2018-04-09 PROCEDURE — 93295 DEV INTERROG REMOTE 1/2/MLT: CPT | Performed by: INTERNAL MEDICINE

## 2018-04-09 NOTE — PROGRESS NOTES
[employer] CRT-D Remote Device Check (BiV OFF)  AP: 0 % : 0 %  Mode: VVI 40        Presenting Rhythm: Sinus  Heart Rate: Stable with good variabilty  Sensing: WNL    Pacing Threshold: Not on auto capture    Impedance: WNL  Battery Status: Estimated at 5.5 years remaining longevity  Atrial Arrhythmia: N/A  Ventricular Arrhythmia: 0  ATP: 0    Shocks: 0    Care Plan: Next remote in 3 months. JOSE ELIAS Sierra

## 2018-04-09 NOTE — TELEPHONE ENCOUNTER
Message from Shahid:  Original authorizing provider: MD Kristie Berger would like a refill of the following medications:  celecoxib (CELEBREX) 200 MG capsule [Christin Diehl MD]    Preferred pharmacy: [facility] - [place], [place] - [address]    Comment:

## 2018-04-09 NOTE — TELEPHONE ENCOUNTER
Celebrex 200 mg  Last Written Prescription Date:  02/16/16  Last Fill Quantity: 90,  # refills: 3   Last office visit: 2/20/2018 with prescribing provider:  02/20/18   Future Office Visit:      Routing refill request to provider for review/approval because:  A break in medication  SEFERINO Sosa RN

## 2018-04-09 NOTE — MR AVS SNAPSHOT
After Visit Summary   4/9/2018    Kristie Jones    MRN: [id number]           Patient Information     Date Of Birth          1945        Visit Information        Provider Department      4/9/2018 4:30 PM CULLEN DCR2 [facility]        Today's Diagnoses     Idiopathic cardiomyopathy (H)    -  1    ICD (implantable cardioverter-defibrillator), biventricular, in situ           Follow-ups after your visit        Your next 10 appointments already scheduled     Apr 09, 2018  4:30 PM CDT   Remote ICD Check with CULLEN DCR2   [facility] ([facility])    [address] [id number] [phone] OPT 2           This appointment is for a remote check of your debrillator.  This is not an appointment at the office.            Jul 26, 2018  4:30 PM CDT   Remote ICD Check with CULLEN DCR2   [facility] ([facility])    [id number]  [facility] [id number] [phone] OPT 2           This appointment is for a remote check of your debrillator.  This is not an appointment at the office.              Who to contact     If you have questions or need follow up information about today's clinic visit or your schedule please contact [facility] directly at [phone].  Normal or non-critical lab and imaging results will be communicated to you by MyChart, letter or phone within 4 business days after the clinic has received the results. If you do not hear from us within 7 days, please contact the clinic through MyChart or phone. If you have a critical or abnormal lab result, we will notify you by phone as soon as possible.  Submit refill requests through Workable or call your pharmacy and they will forward the refill request to us. Please allow 3 business days for your refill to be completed.          Additional  Information About Your Visit        [employer] Information     Chatterfly gives you secure access to your electronic health record. If you see a primary care provider, you can also send messages to your care team and make appointments. If you have questions, please call your primary care clinic.  If you do not have a primary care provider, please call [phone] and they will assist you.        Care EveryWhere ID     This is your Care EveryWhere ID. This could be used by other organizations to access your [place] medical records  [id number]         Blood Pressure from Last 3 Encounters:   04/04/18 114/57   02/20/18 108/58   11/09/17 100/52    Weight from Last 3 Encounters:   04/04/18 90.7 kg (200 lb)   02/20/18 91.2 kg (201 lb 1.6 oz)   11/09/17 90.7 kg (199 lb 14.4 oz)              We Performed the Following     ICD DEVICE INTERROGAT REMOTE (95629)     INTERROGATION DEVICE EVAL REMOTE, PACER/ICD (00230)          Today's Medication Changes          These changes are accurate as of 4/9/18 11:56 AM.  If you have any questions, ask your nurse or doctor.               These medicines have changed or have updated prescriptions.        Dose/Directions    celecoxib 200 MG capsule   Commonly known as:  celeBREX   This may have changed:    - when to take this  - reasons to take this   Used for:  Status post total right knee replacement        Dose:  200 mg   Take 1 capsule (200 mg) by mouth every morning   Quantity:  90 capsule   Refills:  3                Primary Care Provider Office Phone # Fax #    Christin Diehl [phone] [address] [place] [address]        Equal Access to Services     [facility] AH: Hadii salbador renee haddaniella Soiglesia, waaxda luqadaha, qaybta kaalmada dolly chiu. So [facility] [phone].    ATENCIÓN: Si habla español, tiene a robles disposición servicios gratuitos de asistencia lingüística. Llame al [phone].    We comply  with applicable federal civil rights laws and [place] laws. We do not discriminate on the basis of race, color, national origin, age, disability, sex, sexual orientation, or gender identity.            Thank you!     Thank you for choosing [facility]  for your care. Our goal is always to provide you with excellent care. Hearing back from our patients is one way we can continue to improve our services. Please take a few minutes to complete the written survey that you may receive in the mail after your visit with us. Thank you!             Your Updated Medication List - Protect others around you: Learn how to safely use, store and throw away your medicines at www.disposemymeds.org.          This list is accurate as of 4/9/18 11:56 AM.  Always use your most recent med list.                   Brand Name Dispense Instructions for use Diagnosis    acetaminophen 650 MG 8 hour tablet     100 tablet    Take 650 mg by mouth every 6 hours    Status post total right knee replacement       ALPRAZolam 0.25 MG tablet    XANAX    30 tablet    Take 1 tablet (0.25 mg) by mouth 3 times daily as needed    Generalized anxiety disorder       blood glucose monitoring lancets     1 Box    Use to test blood sugar 1 time daily or as directed.    Type 2 diabetes, HbA1C goal < 8% (H)       blood glucose monitoring test strip    no brand specified    3 Month    Use to test blood sugar 1 time daily or as directed.    Type 2 diabetes, HbA1C goal < 8% (H)       calcium carbonate 1250 MG tablet    OS-WILEY 500 mg [place]. Ca     Takes 3 tabs daily takees total of 1200 mg daily        carvedilol 25 MG tablet    COREG    180 tablet    TAKE 1 TABLET TWICE A DAY WITH MEALS    Idiopathic cardiomyopathy (H)       celecoxib 200 MG capsule    celeBREX    90 capsule    Take 1 capsule (200 mg) by mouth every morning    Status post total right knee replacement       co-enzyme Q-10 50 MG Caps      Take 1 capsule by mouth  daily.        fluticasone 50 MCG/ACT spray    FLONASE    48 g    Spray 1-2 sprays into both nostrils daily    PND (post-nasal drip)       IRON SUPPLEMENT PO      Take 325 mg by mouth daily (with breakfast)        loratadine 10 MG tablet    CLARITIN     Take 10 mg by mouth daily        MULTIVITAMIN TABS   OR      1 qd        rosuvastatin 10 MG tablet    CRESTOR    45 tablet    Take 1 tablet (10 mg) by mouth every other day    Hyperlipidemia LDL goal <130       valsartan 160 MG tablet    DIOVAN    45 tablet    TAKE ONE-HALF (1/2) TABLET DAILY AT BEDTIME    Idiopathic cardiomyopathy (H)       venlafaxine 75 MG 24 hr capsule    EFFEXOR-XR    270 capsule    Take 3 capsules (225 mg) by mouth daily    Generalized anxiety disorder       vitamin D 2000 UNITS tablet      Take 1 tablet by mouth daily.    Obesity, unspecified

## 2018-04-10 RX ORDER — CELECOXIB 200 MG/1
200 CAPSULE ORAL EVERY MORNING
Qty: 90 CAPSULE | Refills: 3 | Status: SHIPPED | OUTPATIENT
Start: 2018-04-10 | End: 2019-08-04

## 2018-06-17 DIAGNOSIS — I42.9 IDIOPATHIC CARDIOMYOPATHY (H): ICD-10-CM

## 2018-06-17 NOTE — TELEPHONE ENCOUNTER
"Requested Prescriptions   Pending Prescriptions Disp Refills     valsartan (DIOVAN) 160 MG tablet [Pharmacy Med Name: VALSARTAN 160MG TAB]  Last Written Prescription Date:  02/20/2018  Last Fill Quantity: 45 tablet,  # refills: 1   Last office visit: 2/20/2018 with prescribing provider:  Christin Diehl MD    Future Office Visit:   Next 5 appointments (look out 90 days)     Sep 10, 2018  2:15 PM CDT   Return Visit with Ciaran Osborne MD   [facility] ([facility])    [address]  [facility] [address] [phone]                  45 tablet 1     Sig: TAKE 1/2 (ONE-HALF) TABLET BY MOUTH ONCE DAILY AT BEDTIME    Angiotensin-II Receptors Passed    6/17/2018 12:01 PM       Passed - Blood pressure under 140/90 in past 12 months    BP Readings from Last 3 Encounters:   04/04/18 114/57   02/20/18 108/58   11/09/17 100/52                Passed - Recent (12 mo) or future (30 days) visit within the authorizing provider's specialty    Patient had office visit in the last 12 months or has a visit in the next 30 days with authorizing provider or within the authorizing provider's specialty.  See \"Patient Info\" tab in inbasket, or \"Choose Columns\" in Meds & Orders section of the refill encounter.           Passed - Patient is age 18 or older       Passed - No active pregnancy on record       Passed - Normal serum creatinine on file in past 12 months    Recent Labs   Lab Test  11/09/17   1149   CR  1.00            Passed - Normal serum potassium on file in past 12 months    Recent Labs   Lab Test  11/09/17   1149   POTASSIUM  3.9                   Passed - No positive pregnancy test in past 12 months          "

## 2018-06-19 RX ORDER — VALSARTAN 160 MG/1
TABLET ORAL
Qty: 45 TABLET | Refills: 0 | Status: SHIPPED | OUTPATIENT
Start: 2018-06-19 | End: 2018-08-07

## 2018-06-19 NOTE — TELEPHONE ENCOUNTER
Script sent.  Pt is due to see me in august for 6 month f/u, please help her schedule.  Christin Diehl MD

## 2018-06-19 NOTE — TELEPHONE ENCOUNTER
Left vm for patient to call us back. She is due for an office visit around 8/20 to follow up with Dr Diehl.

## 2018-06-27 NOTE — TELEPHONE ENCOUNTER
Sent PROSimity message to schedule appointment.     Maria L Simon CMA ([facility])

## 2018-07-03 ENCOUNTER — TRANSFERRED RECORDS (OUTPATIENT)
Dept: HEALTH INFORMATION MANAGEMENT | Facility: CLINIC | Age: 73
End: 2018-07-03

## 2018-07-26 ENCOUNTER — ALLIED HEALTH/NURSE VISIT (OUTPATIENT)
Dept: CARDIOLOGY | Facility: CLINIC | Age: 73
End: 2018-07-26
Payer: COMMERCIAL

## 2018-07-26 DIAGNOSIS — Z95.810 ICD (IMPLANTABLE CARDIOVERTER-DEFIBRILLATOR), BIVENTRICULAR, IN SITU: ICD-10-CM

## 2018-07-26 DIAGNOSIS — I42.9 IDIOPATHIC CARDIOMYOPATHY (H): Primary | ICD-10-CM

## 2018-07-26 PROCEDURE — 93296 REM INTERROG EVL PM/IDS: CPT | Performed by: INTERNAL MEDICINE

## 2018-07-26 PROCEDURE — 93295 DEV INTERROG REMOTE 1/2/MLT: CPT | Performed by: INTERNAL MEDICINE

## 2018-07-26 NOTE — PROGRESS NOTES
[employer] CRT-D with LV off- Remote Device Check  AP: 0 % : 0 %  Mode: VVI 40        Presenting Rhythm: Sinus  Heart Rate: Stable with excellent variability  Sensing: WNL (a- a little low at 0.4-0.5)       Pacing Threshold: Not on auto capture    Impedance: WNL  Battery Status: Estimated at 5 years remaining longevity  Atrial Arrhythmia: 0  Ventricular Arrhythmia: 0  ATP: 0    Shocks: 0    Care Plan: Due for annual in office device check, [address]hl[address] Dr Ip in August. JOSE ELIAS Sierra

## 2018-07-26 NOTE — MR AVS SNAPSHOT
After Visit Summary   7/26/2018    Kristie Jones    MRN: [id number]           Patient Information     Date Of Birth          1945        Visit Information        Provider Department      7/26/2018 4:30 PM ALYSE MENDOZA2 [facility]        Today's Diagnoses     Idiopathic cardiomyopathy (H)    -  1    ICD (implantable cardioverter-defibrillator), biventricular, in situ           Follow-ups after your visit        Your next 10 appointments already scheduled     Jul 26, 2018  4:30 PM CDT   Remote ICD Check with CULLEN REJI2   [facility] ([facility])    [address]  [facility] [address] [phone] OPT 2           This appointment is for a remote check of your debrillator.  This is not an appointment at the office.            Sep 04, 2018  1:30 PM CDT   Ech Complete with [id number] ([facility])    [address]  [facility] [address] [phone]           1.  Please bring or wear a comfortable two-piece outfit. 2.  You may eat, drink and take your normal medicines. 3.  For any questions that cannot be answered, please contact the ordering physician 4.  Please do not wear perfumes or scented lotions on the day of your exam. ***Please check-in at the [facility] located in [address] in the [facility] building. When you are finished registering, please go to [address] and have a seat. The technician will call your name for the test.            Sep 10, 2018  2:15 PM CDT   Return Visit with Ciaran Osborne MD   [facility] ([facility])    [address]  [facility] [place] [address] [phone]              Who to contact     If you have questions or need follow up information about today's clinic visit or your schedule please contact  [facility] directly at [phone].  Normal or non-critical lab and imaging results will be communicated to you by MyChart, letter or phone within 4 business days after the clinic has received the results. If you do not hear from us within 7 days, please contact the clinic through Ulehart or phone. If you have a critical or abnormal lab result, we will notify you by phone as soon as possible.  Submit refill requests through [employer] ([employer]) or call your pharmacy and they will forward the refill request to us. Please allow 3 business days for your refill to be completed.          Additional Information About Your Visit        UleharAndre Phillipe Information     [employer] ([employer]) gives you secure access to your electronic health record. If you see a primary care provider, you can also send messages to your care team and make appointments. If you have questions, please call your primary care clinic.  If you do not have a primary care provider, please call [phone] and they will assist you.        Care EveryWhere ID     This is your Care EveryWhere ID. This could be used by other organizations to access your [facility] medical records  [id number]         Blood Pressure from Last 3 Encounters:   04/04/18 114/57   02/20/18 108/58   11/09/17 100/52    Weight from Last 3 Encounters:   04/04/18 90.7 kg (200 lb)   02/20/18 91.2 kg (201 lb 1.6 oz)   11/09/17 90.7 kg (199 lb 14.4 oz)              We Performed the Following     ICD DEVICE INTERROGAT REMOTE (25963)     INTERROGATION DEVICE EVAL REMOTE, PACER/ICD (08185)        Primary Care Provider Office Phone # Fax #    Christin Diehl [phone] [address] [place] [address]        Equal Access to Services     MAHNAZ GALEANO : Hadii salbador Tatum, wasorinda thad, qaybta kaaljose ramon chiu, dolly doan. So [facility] [phone].    ATENCIÓN: Si habla español, tiene a robles disposición  servicios gratuitos de asistencia lingüística. Pratima kirkpatrick [phone].    We comply with applicable federal civil rights laws and [place] laws. We do not discriminate on the basis of race, color, national origin, age, disability, sex, sexual orientation, or gender identity.            Thank you!     Thank you for choosing [facility]  for your care. Our goal is always to provide you with excellent care. Hearing back from our patients is one way we can continue to improve our services. Please take a few minutes to complete the written survey that you may receive in the mail after your visit with us. Thank you!             Your Updated Medication List - Protect others around you: Learn how to safely use, store and throw away your medicines at www.disposemymeds.org.          This list is accurate as of 7/26/18  8:24 AM.  Always use your most recent med list.                   Brand Name Dispense Instructions for use Diagnosis    acetaminophen 650 MG 8 hour tablet     100 tablet    Take 650 mg by mouth every 6 hours    Status post total right knee replacement       ALPRAZolam 0.25 MG tablet    XANAX    30 tablet    Take 1 tablet (0.25 mg) by mouth 3 times daily as needed    Generalized anxiety disorder       blood glucose monitoring lancets     1 Box    Use to test blood sugar 1 time daily or as directed.    Type 2 diabetes, HbA1C goal < 8% (H)       blood glucose monitoring test strip    no brand specified    3 Month    Use to test blood sugar 1 time daily or as directed.    Type 2 diabetes, HbA1C goal < 8% (H)       calcium carbonate 500 MG tablet    OS-WILEY 500 mg [place]. Ca     Takes 3 tabs daily takees total of 1200 mg daily        carvedilol 25 MG tablet    COREG    180 tablet    TAKE 1 TABLET TWICE A DAY WITH MEALS    Idiopathic cardiomyopathy (H)       celecoxib 200 MG capsule    celeBREX    90 capsule    Take 1 capsule (200 mg) by mouth every morning    Status post total  right knee replacement       co-enzyme Q-10 50 MG Caps      Take 1 capsule by mouth daily.        fluticasone 50 MCG/ACT spray    FLONASE    48 g    Spray 1-2 sprays into both nostrils daily    PND (post-nasal drip)       IRON SUPPLEMENT PO      Take 325 mg by mouth daily (with breakfast)        loratadine 10 MG tablet    CLARITIN     Take 10 mg by mouth daily        MULTIVITAMIN TABS   OR      1 qd        rosuvastatin 10 MG tablet    CRESTOR    45 tablet    Take 1 tablet (10 mg) by mouth every other day    Hyperlipidemia LDL goal <130       valsartan 160 MG tablet    DIOVAN    45 tablet    TAKE 1/2 (ONE-HALF) TABLET BY MOUTH ONCE DAILY AT BEDTIME    Idiopathic cardiomyopathy (H)       venlafaxine 75 MG 24 hr capsule    EFFEXOR-XR    270 capsule    Take 3 capsules (225 mg) by mouth daily    Generalized anxiety disorder       vitamin D 2000 units tablet      Take 1 tablet by mouth daily.    Obesity, unspecified

## 2018-08-06 ENCOUNTER — MYC MEDICAL ADVICE (OUTPATIENT)
Dept: PEDIATRICS | Facility: CLINIC | Age: 73
End: 2018-08-06

## 2018-08-06 DIAGNOSIS — E11.9 TYPE 2 DIABETES MELLITUS WITHOUT COMPLICATION (H): Primary | ICD-10-CM

## 2018-08-07 ENCOUNTER — TELEPHONE (OUTPATIENT)
Dept: PEDIATRICS | Facility: CLINIC | Age: 73
End: 2018-08-07

## 2018-08-07 DIAGNOSIS — I42.9 IDIOPATHIC CARDIOMYOPATHY (H): Primary | ICD-10-CM

## 2018-08-07 RX ORDER — LOSARTAN POTASSIUM 50 MG/1
50 TABLET ORAL DAILY
Qty: 30 TABLET | Refills: 1 | Status: SHIPPED | OUTPATIENT
Start: 2018-08-07 | End: 2018-09-30

## 2018-08-07 NOTE — TELEPHONE ENCOUNTER
Spoke to pt. She confirmed understanding.     Maria L Simon CMA ([facility])

## 2018-08-07 NOTE — TELEPHONE ENCOUNTER
Received request to changes patient diovan due to recall.  I sent prescription for her for losartan to the pharmacy.  She will need to continue to monitor her bp and heart failure symptoms with the change in medications.  Please let her know.    Christin Diehl MD

## 2018-08-07 NOTE — TELEPHONE ENCOUNTER
[employer] Smartview Test strips  Last Written Prescription Date:  09/11/15  Last Fill Quantity: 90,  # refills: 3   Last office visit: 2/20/2018 with prescribing provider:  02/20/18   Future Office Visit:   Next 5 appointments (look out 90 days)     Sep 10, 2018  2:15 PM CDT   Return Visit with Ciaran Osborne MD   [facility] ([facility])    [address] [address] [address] [phone]               Medication is being filled for 1 time refill only due to:  Due for diabetic check at the end of the month.   SEFERINO Sosa RN

## 2018-08-09 NOTE — TELEPHONE ENCOUNTER
Pharmacy Request:    Insurance prefers on touch ultra blue. Please send new rx for meter, strips, and Lancets.

## 2018-08-10 ENCOUNTER — MYC MEDICAL ADVICE (OUTPATIENT)
Dept: PEDIATRICS | Facility: CLINIC | Age: 73
End: 2018-08-10

## 2018-08-10 DIAGNOSIS — E11.9 TYPE 2 DIABETES MELLITUS WITHOUT COMPLICATION, WITHOUT LONG-TERM CURRENT USE OF INSULIN (H): Primary | ICD-10-CM

## 2018-08-10 RX ORDER — LANCING DEVICE
EACH MISCELLANEOUS
Qty: 1 EACH | Refills: 0 | Status: SHIPPED | OUTPATIENT
Start: 2018-08-10 | End: 2019-08-04

## 2018-08-10 RX ORDER — LANCETS 23 GAUGE
EACH MISCELLANEOUS
Qty: 100 EACH | Refills: 0 | Status: SHIPPED | OUTPATIENT
Start: 2018-08-10 | End: 2019-08-04

## 2018-09-02 DIAGNOSIS — I42.9 IDIOPATHIC CARDIOMYOPATHY (H): ICD-10-CM

## 2018-09-02 DIAGNOSIS — E78.5 HYPERLIPIDEMIA LDL GOAL <130: ICD-10-CM

## 2018-09-02 NOTE — TELEPHONE ENCOUNTER
"Requested Prescriptions   Pending Prescriptions Disp Refills     carvedilol (COREG) 25 MG tablet [Pharmacy Med Name: CARVEDILOL 25MG     TAB]  Last Written Prescription Date:  2/20/18  Last Fill Quantity: 180,  # refills: 1   Last office visit: 2/20/2018 with prescribing provider:  2/20/18   Future Office Visit:   Next 5 appointments (look out 90 days)     Sep 10, 2018  2:15 PM CDT   Return Visit with Ciaran Osborne MD   [facility] ([facility])    [address] [place], [place] [address] [phone]                  180 tablet 1     Sig: TAKE 1 TABLET BY MOUTH TWICE DAILY WITH MEALS    Beta-Blockers Protocol Passed    9/2/2018 11:45 AM       Passed - Blood pressure under 140/90 in past 12 months    BP Readings from Last 3 Encounters:   04/04/18 114/57   02/20/18 108/58   11/09/17 100/52                Passed - Patient is age 6 or older       Passed - Recent (12 mo) or future (30 days) visit within the authorizing provider's specialty    Patient had office visit in the last 12 months or has a visit in the next 30 days with authorizing provider or within the authorizing provider's specialty.  See \"Patient Info\" tab in inbasket, or \"Choose Columns\" in Meds & Orders section of the refill encounter.            rosuvastatin (CRESTOR) 10 MG tablet [Pharmacy Med Name: ROSUVASTATIN 10MG TAB]  Last Written Prescription Date:  2/20/18  Last Fill Quantity: 45,  # refills: 1   Last office visit: 2/20/2018 with prescribing provider:  2/20/18   Future Office Visit:   Next 5 appointments (look out 90 days)     Sep 10, 2018  2:15 PM CDT   Return Visit with Ciaran Osborne MD   [facility] ([facility])    [address] [id number] [phone]                  45 tablet 1     Sig: TAKE 1 TABLET BY MOUTH EVERY OTHER DAY    Statins Protocol Passed    9/2/2018 11:45 AM       Passed - " "LDL on file in past 12 months    Recent Labs   Lab Test  02/13/18   0833   LDL  160*            Passed - No abnormal creatine kinase in past 12 months    Recent Labs   Lab Test  02/04/15   1519   CKT  99               Passed - Recent (12 mo) or future (30 days) visit within the authorizing provider's specialty    Patient had office visit in the last 12 months or has a visit in the next 30 days with authorizing provider or within the authorizing provider's specialty.  See \"Patient Info\" tab in inbasket, or \"Choose Columns\" in Meds & Orders section of the refill encounter.           Passed - Patient is age 18 or older       Passed - No active pregnancy on record       Passed - No positive pregnancy test in past 12 months          "

## 2018-09-04 ENCOUNTER — HOSPITAL ENCOUNTER (OUTPATIENT)
Dept: CARDIOLOGY | Facility: CLINIC | Age: 73
Discharge: HOME | End: 2018-09-04
Attending: INTERNAL MEDICINE | Admitting: INTERNAL MEDICINE
Payer: COMMERCIAL

## 2018-09-04 DIAGNOSIS — Z95.0 CARDIAC PACEMAKER IN SITU: ICD-10-CM

## 2018-09-04 DIAGNOSIS — I51.9 LEFT VENTRICULAR SYSTOLIC DYSFUNCTION: ICD-10-CM

## 2018-09-04 DIAGNOSIS — E78.5 HYPERLIPIDEMIA LDL GOAL <130: ICD-10-CM

## 2018-09-04 DIAGNOSIS — I42.9 IDIOPATHIC CARDIOMYOPATHY (H): ICD-10-CM

## 2018-09-04 DIAGNOSIS — I44.7 LBBB (LEFT BUNDLE BRANCH BLOCK): ICD-10-CM

## 2018-09-04 PROCEDURE — 93306 TTE W/DOPPLER COMPLETE: CPT | Mod: 26 | Performed by: INTERNAL MEDICINE

## 2018-09-04 PROCEDURE — 25500064 ZZH RX 255 OP 636: Performed by: INTERNAL MEDICINE

## 2018-09-04 PROCEDURE — 40000264 ECHO COMPLETE WITH OPTISON

## 2018-09-04 RX ADMIN — HUMAN ALBUMIN MICROSPHERES AND PERFLUTREN 3 ML: 10; .22 INJECTION, SOLUTION INTRAVENOUS at 14:36

## 2018-09-06 RX ORDER — ROSUVASTATIN CALCIUM 10 MG/1
TABLET, COATED ORAL
Qty: 15 TABLET | Refills: 0 | Status: SHIPPED | OUTPATIENT
Start: 2018-09-06 | End: 2018-10-10

## 2018-09-06 RX ORDER — CARVEDILOL 25 MG/1
TABLET ORAL
Qty: 60 TABLET | Refills: 0 | Status: SHIPPED | OUTPATIENT
Start: 2018-09-06 | End: 2018-09-30

## 2018-09-06 NOTE — TELEPHONE ENCOUNTER
30 day supply sent.  Pt is due for follow up visit with a1c and bmp just prior to visit.  Does not need to be fasting.   Christin Diehl MD

## 2018-09-06 NOTE — TELEPHONE ENCOUNTER
Routing refill request to provider for review/approval because:  Labs out of range:  LDL above goal    Katya Carrillo,RN BSN  [facility]  [phone]

## 2018-09-07 NOTE — TELEPHONE ENCOUNTER
Called patient and gave her message. She is finishing up visits with her Cardiologist and states that she will be done after next week and then she will call us to schedule.

## 2018-09-25 ENCOUNTER — OFFICE VISIT (OUTPATIENT)
Dept: CARDIOLOGY | Facility: CLINIC | Age: 73
End: 2018-09-25
Payer: COMMERCIAL

## 2018-09-25 VITALS
WEIGHT: 203 LBS | HEART RATE: 60 BPM | SYSTOLIC BLOOD PRESSURE: 104 MMHG | DIASTOLIC BLOOD PRESSURE: 64 MMHG | HEIGHT: 65 IN | BODY MASS INDEX: 33.82 KG/M2

## 2018-09-25 DIAGNOSIS — I42.9 IDIOPATHIC CARDIOMYOPATHY (H): ICD-10-CM

## 2018-09-25 DIAGNOSIS — I51.9 LEFT VENTRICULAR SYSTOLIC DYSFUNCTION: ICD-10-CM

## 2018-09-25 DIAGNOSIS — G47.33 OSA (OBSTRUCTIVE SLEEP APNEA): ICD-10-CM

## 2018-09-25 DIAGNOSIS — E78.5 HYPERLIPIDEMIA LDL GOAL <130: ICD-10-CM

## 2018-09-25 DIAGNOSIS — E78.5 HYPERLIPIDEMIA LDL GOAL <130: Primary | ICD-10-CM

## 2018-09-25 LAB
ALBUMIN SERPL-MCNC: 4.1 G/DL (ref 3.4–5)
ALP SERPL-CCNC: 85 U/L (ref 40–150)
ALT SERPL W P-5'-P-CCNC: 44 U/L (ref 0–50)
ANION GAP SERPL CALCULATED.3IONS-SCNC: 9 MMOL/L (ref 3–14)
AST SERPL W P-5'-P-CCNC: 29 U/L (ref 0–45)
BILIRUB SERPL-MCNC: 0.8 MG/DL (ref 0.2–1.3)
BUN SERPL-MCNC: 15 MG/DL (ref 7–30)
CALCIUM SERPL-MCNC: 9.1 MG/DL (ref 8.5–10.1)
CHLORIDE SERPL-SCNC: 106 MMOL/L (ref 94–109)
CHOLEST SERPL-MCNC: 166 MG/DL
CO2 SERPL-SCNC: 26 MMOL/L (ref 20–32)
CREAT SERPL-MCNC: 0.93 MG/DL (ref 0.52–1.04)
GFR SERPL CREATININE-BSD FRML MDRD: 59 ML/MIN/1.7M2
GLUCOSE SERPL-MCNC: 131 MG/DL (ref 70–99)
HDLC SERPL-MCNC: 33 MG/DL
LDLC SERPL CALC-MCNC: 84 MG/DL
NONHDLC SERPL-MCNC: 133 MG/DL
POTASSIUM SERPL-SCNC: 4 MMOL/L (ref 3.4–5.3)
PROT SERPL-MCNC: 7.1 G/DL (ref 6.8–8.8)
SODIUM SERPL-SCNC: 141 MMOL/L (ref 133–144)
TRIGL SERPL-MCNC: 246 MG/DL

## 2018-09-25 PROCEDURE — 80061 LIPID PANEL: CPT | Performed by: INTERNAL MEDICINE

## 2018-09-25 PROCEDURE — 99214 OFFICE O/P EST MOD 30 MIN: CPT | Performed by: INTERNAL MEDICINE

## 2018-09-25 PROCEDURE — 36415 COLL VENOUS BLD VENIPUNCTURE: CPT | Performed by: INTERNAL MEDICINE

## 2018-09-25 PROCEDURE — 80053 COMPREHEN METABOLIC PANEL: CPT | Performed by: INTERNAL MEDICINE

## 2018-09-25 RX ORDER — SPIRONOLACTONE 25 MG/1
12.5 TABLET ORAL DAILY
Qty: 30 TABLET | Refills: 3 | Status: SHIPPED | OUTPATIENT
Start: 2018-09-25 | End: 2019-05-17

## 2018-09-25 NOTE — MR AVS SNAPSHOT
After Visit Summary   9/25/2018    Kristie Jones    MRN: [id number]           Patient Information     Date Of Birth          1945        Visit Information        Provider Department      9/25/2018 10:45 AM Ip, [facility]        Today's Diagnoses     Hyperlipidemia LDL goal <130    -  1    Idiopathic cardiomyopathy (H)        SUSAN (obstructive sleep apnea)        Left ventricular systolic dysfunction:EF 35%           Follow-ups after your visit        Additional Services     Follow-Up with Cardiac Advanced Practice Provider                 Your next 10 appointments already scheduled     Sep 26, 2018  9:40 AM CDT   Office Visit with Kyle Tejada MD   [facility] ([facility])    [address] [place] [address]  [address]  [facility] [address] [phone]           Bring a current list of meds and any records pertaining to this visit. For Physicals, please bring immunization records and any forms needing to be filled out. Please arrive 10 minutes early to complete paperwork.            Oct 01, 2018  9:30 AM CDT   Lab visit with EA LAB   [facility] ([facility])    [address] [place] [address]  [address]  [facility] [address] [phone]           Please do not eat 10-12 hours before your appointment if you are coming in fasting for labs on lipids, cholesterol, or glucose (sugar). Does not apply to pregnant women.  Water with medications is okay. Do not drink coffee or other fluids.  If you have concerns about taking your medications, please send a message by clicking on Secure Messaging, Message Your Care Team.            Oct 10, 2018  9:40 AM CDT   MyChart Long with Christin Diehl MD   [facility] ([facility])    [address] [place] [address]  [address]  [facility] [address]   [phone]            Nov 07, 2018  2:20 PM CST   ICD Check with RU DCR2  "  [facility] ([facility])    [address]  [facility] [id number] [phone]              Future tests that were ordered for you today     Open Future Orders        Priority Expected Expires Ordered    Basic metabolic panel Routine 10/2/2018 9/25/2019 9/25/2018    Follow-Up with Cardiac Advanced Practice Provider Routine 10/2/2018 9/25/2019 9/25/2018            Who to contact     If you have questions or need follow up information about today's clinic visit or your schedule please contact [facility]   [facility] directly at [phone].  Normal or non-critical lab and imaging results will be communicated to you by [employer], letter or phone within 4 business days after the clinic has received the results. If you do not hear from us within 7 days, please contact the clinic through [employer] or phone. If you have a critical or abnormal lab result, we will notify you by phone as soon as possible.  Submit refill requests through [employer] or call your pharmacy and they will forward the refill request to us. Please allow 3 business days for your refill to be completed.          Additional Information About Your Visit        [employer] Information     [employer] gives you secure access to your electronic health record. If you see a primary care provider, you can also send messages to your care team and make appointments. If you have questions, please call your primary care clinic.  If you do not have a primary care provider, please call [phone] and they will assist you.        Care EveryWhere ID     This is your Care EveryWhere ID. This could be used by other organizations to access your [facility] medical records  [id number]        Your Vitals Were     Pulse Height BMI (Body Mass Index)             60 1.651 m (5' 5\") 33.78 kg/m2          Blood Pressure from Last 3 Encounters:   09/25/18 104/64   04/04/18 114/57   02/20/18 108/58 "    Weight from Last 3 Encounters:   09/25/18 92.1 kg (203 lb)   04/04/18 90.7 kg (200 lb)   02/20/18 91.2 kg (201 lb 1.6 oz)                 Today's Medication Changes          These changes are accurate as of 9/25/18 11:12 AM.  If you have any questions, ask your nurse or doctor.               Start taking these medicines.        Dose/Directions    spironolactone 25 MG tablet   Commonly known as:  ALDACTONE   Used for:  Idiopathic cardiomyopathy (H)   Started by:  Ciaran Osborne MD        Dose:  12.5 mg   Take 0.5 tablets (12.5 mg) by mouth daily   Quantity:  30 tablet   Refills:  3         These medicines have changed or have updated prescriptions.        Dose/Directions    acetaminophen 650 MG 8 hour tablet   This may have changed:  additional instructions   Used for:  Status post total right knee replacement        Dose:  650 mg   Take 650 mg by mouth every 6 hours   Quantity:  100 tablet   Refills:  0       celecoxib 200 MG capsule   Commonly known as:  celeBREX   This may have changed:    - when to take this  - reasons to take this   Used for:  Status post total right knee replacement        Dose:  200 mg   Take 1 capsule (200 mg) by mouth every morning   Quantity:  90 capsule   Refills:  3            Where to get your medicines      These medications were sent to [id number]  [place], [place] - [address]  [address] [address]     Phone:  [phone]     spironolactone 25 MG tablet                Primary Care Provider Office Phone # Fax #    Christin Diehl [phone] [address] [place] [address]        Equal Access to Services     [facility] AH: Hadii salbador ku hadasho Soomaali, waaxda luqadaha, qaybta kaalmada adeegyada, dolly daon. So [facility] [phone].    ATENCIÓN: Si habla español, tiene a robles disposición servicios gratuitos de asistencia lingüística. Llame al [phone].    We comply  with applicable federal civil rights laws and [place] laws. We do not discriminate on the basis of race, color, national origin, age, disability, sex, sexual orientation, or gender identity.            Thank you!     Thank you for choosing [facility]  for your care. Our goal is always to provide you with excellent care. Hearing back from our patients is one way we can continue to improve our services. Please take a few minutes to complete the written survey that you may receive in the mail after your visit with us. Thank you!             Your Updated Medication List - Protect others around you: Learn how to safely use, store and throw away your medicines at www.disposemymeds.org.          This list is accurate as of 9/25/18 11:12 AM.  Always use your most recent med list.                   Brand Name Dispense Instructions for use Diagnosis    acetaminophen 650 MG 8 hour tablet     100 tablet    Take 650 mg by mouth every 6 hours    Status post total right knee replacement       ALPRAZolam 0.25 MG tablet    XANAX    30 tablet    Take 1 tablet (0.25 mg) by mouth 3 times daily as needed    Generalized anxiety disorder       blood glucose lancing device    no brand specified    1 each    Use to test blood sugars 1 times daily or as directed.Brand per insurance    Type 2 diabetes mellitus without complication, without long-term current use of insulin (H)       * blood glucose monitoring lancets     1 Box    Use to test blood sugar 1 time daily or as directed.    Type 2 diabetes, HbA1C goal < 8% (H)       * lancets 28G Misc     100 each    Use to test 1 time daily or as directed. Brand per insurance.    Type 2 diabetes mellitus without complication, without long-term current use of insulin (H)       blood glucose monitoring meter device kit    no brand specified    1 kit    Use to test blood sugar 1 times daily or as directed. Brand per insurance    Type 2 diabetes mellitus without  complication, without long-term current use of insulin (H)       * blood glucose monitoring test strip    no brand specified    90 strip    Use to test blood sugar 1 time daily or as directed.  Using Acc[employer]hek Smartview Test strips    Type 2 diabetes mellitus without complication (H)       * blood glucose monitoring test strip    no brand specified    100 strip    Use to test blood sugars 1 times daily or as directed Brand per insurance    Type 2 diabetes mellitus without complication, without long-term current use of insulin (H)       calcium carbonate 500 mg {elemental} 500 MG tablet    OS-WILEY     Takes 3 tabs daily takees total of 1200 mg daily        carvedilol 25 MG tablet    COREG    60 tablet    TAKE 1 TABLET BY MOUTH TWICE DAILY WITH MEALS    Idiopathic cardiomyopathy (H)       celecoxib 200 MG capsule    celeBREX    90 capsule    Take 1 capsule (200 mg) by mouth every morning    Status post total right knee replacement       co-enzyme Q-10 50 MG Caps      Take 1 capsule by mouth daily.        fluticasone 50 MCG/ACT spray    FLONASE    48 g    Spray 1-2 sprays into both nostrils daily    PND (post-nasal drip)       IRON SUPPLEMENT PO      Take 325 mg by mouth daily (with breakfast)        loratadine 10 MG tablet    CLARITIN     Take 10 mg by mouth daily        losartan 50 MG tablet    COZAAR    30 tablet    Take 1 tablet (50 mg) by mouth daily    Idiopathic cardiomyopathy (H)       MULTIVITAMIN TABS   OR      1 qd        rosuvastatin 10 MG tablet    CRESTOR    15 tablet    TAKE 1 TABLET BY MOUTH EVERY OTHER DAY    Hyperlipidemia LDL goal <130       spironolactone 25 MG tablet    ALDACTONE    30 tablet    Take 0.5 tablets (12.5 mg) by mouth daily    Idiopathic cardiomyopathy (H)       venlafaxine 75 MG 24 hr capsule    EFFEXOR-XR    270 capsule    Take 3 capsules (225 mg) by mouth daily    Generalized anxiety disorder       vitamin D 2000 units tablet      Take 1 tablet by mouth daily.    Obesity, unspecified        * Notice:  This list has 4 medication(s) that are the same as other medications prescribed for you. Read the directions carefully, and ask your doctor or other care provider to review them with you.

## 2018-09-25 NOTE — LETTER
9/25/2018    Christin Diehl MD  [address] [place] [address]    RE: Kristie Mcknight Karen       Dear Colleague,    I had the pleasure of seeing Kristie Roxanna Jones in the [facility].    HPI and Plan:   See dictation    Orders Placed This Encounter   Procedures     Basic metabolic panel     Follow-Up with Cardiac Advanced Practice Provider       Orders Placed This Encounter   Medications     spironolactone (ALDACTONE) 25 MG tablet     Sig: Take 0.5 tablets (12.5 mg) by mouth daily     Dispense:  30 tablet     Refill:  3       Encounter Diagnoses   Name Primary?     Hyperlipidemia LDL goal <130 Yes     Idiopathic cardiomyopathy (H)      SUSAN (obstructive sleep apnea)      Left ventricular systolic dysfunction:EF 35%        CURRENT MEDICATIONS:  Current Outpatient Prescriptions   Medication Sig Dispense Refill     acetaminophen 650 MG TABS Take 650 mg by mouth every 6 hours (Patient taking differently: Take 650 mg by mouth every 6 hours PRN) 100 tablet      ALPRAZolam (XANAX) 0.25 MG tablet Take 1 tablet (0.25 mg) by mouth 3 times daily as needed 30 tablet 0     blood glucose (NO BRAND SPECIFIED) lancing device Use to test blood sugars 1 times daily or as directed.Brand per insurance 1 each 0     blood glucose monitoring (ACCU-CHEK FASTCLIX) lancets Use to test blood sugar 1 time daily or as directed. 1 Box 3     blood glucose monitoring (NO BRAND SPECIFIED) test strip Use to test blood sugars 1 times daily or as directed Brand per insurance 100 strip 0     blood glucose monitoring (NO BRAND SPECIFIED) test strip Use to test blood sugar 1 time daily or as directed.  Using Accuchek Smartview Test strips 90 strip 0     CALCIUM 500 MG OR TABS Takes 3 tabs daily takees total of 1200 mg daily       carvedilol (COREG) 25 MG tablet TAKE 1 TABLET BY MOUTH TWICE DAILY WITH MEALS 60 tablet 0     celecoxib (CELEBREX) 200 MG capsule Take 1 capsule (200 mg) by mouth every morning  (Patient taking differently: Take 200 mg by mouth daily as needed ) 90 capsule 3     Cholecalciferol (VITAMIN D) 2000 UNITS tablet Take 1 tablet by mouth daily.       co-enzyme Q-10 50 MG CAPS Take 1 capsule by mouth daily.       Ferrous Sulfate (IRON SUPPLEMENT PO) Take 325 mg by mouth daily (with breakfast)       lancets 28G MISC Use to test 1 time daily or as directed. Brand per insurance. 100 each 0     loratadine (CLARITIN) 10 MG tablet Take 10 mg by mouth daily       losartan (COZAAR) 50 MG tablet Take 1 tablet (50 mg) by mouth daily 30 tablet 1     MULTIVITAMIN TABS   OR 1 qd  0     rosuvastatin (CRESTOR) 10 MG tablet TAKE 1 TABLET BY MOUTH EVERY OTHER DAY 15 tablet 0     spironolactone (ALDACTONE) 25 MG tablet Take 0.5 tablets (12.5 mg) by mouth daily 30 tablet 3     venlafaxine (EFFEXOR-XR) 75 MG 24 hr capsule Take 3 capsules (225 mg) by mouth daily 270 capsule 3     blood glucose monitoring (NO BRAND SPECIFIED) meter device kit Use to test blood sugar 1 times daily or as directed. Brand per insurance 1 kit 0     fluticasone (FLONASE) 50 MCG/ACT nasal spray Spray 1-2 sprays into both nostrils daily (Patient not taking: Reported on 9/25/2018) 48 g 3       ALLERGIES     Allergies   Allergen Reactions     Prochlorperazine      Other reaction(s): Tardive Dyskinesia     Amoxicillin Hives     Clindamycin Other (See Comments)     Burning sensation in chest     Decadron      flushing     Dexamethasone      Other reaction(s): Erythema     Compazine Anxiety       PAST MEDICAL HISTORY:  Past Medical History:   Diagnosis Date     Acute posthemorrhagic anemia 9/11/2015     Anemia      Fibromyalgia      Gastro-oesophageal reflux disease      GENERAL OSTEOARTHROSIS [715.00] 11/23/2004    knee     Generalized anxiety disorder 9/30/2013     Hyperlipidemia LDL goal <130 2/10/2010    Failed simvastatin- muscle aches      Hypertension     because of the heart     Insomnia 1/19/2012     Iron deficiency anemia 8/12/2015      Problem list name updated by automated process. Provider to review     LBBB (left bundle branch block)      Left ventricular systolic dysfunction:EF 35% 2/5/2012    Must stay on diovan per cardiology      Major depressive disorder, recurrent episode, mild (H) 2/17/2016     Migraine 6/21/2005     Problem list name updated by automated process. Provider to review     Nonischemic cardiomyopathy (H)     EF 30-35%, Dr. Osborne [facility] (suspect virus); s/p AICD     NSVT (nonsustained ventricular tachycardia) (H)      Obesity 1/20/2012     SUSAN (obstructive sleep apnea) 01/19/2012    CPAP      Pure hypercholesterolemia      Restless leg syndrome      Type 2 diabetes mellitus without complication (H) 9/24/2016       PAST SURGICAL HISTORY:  Past Surgical History:   Procedure Laterality Date     APPENDECTOMY       ARTHROPLASTY KNEE  1/7/2013    Procedure: ARTHROPLASTY KNEE;  Right Total Knee Arthroplasty       ARTHROPLASTY REVISION KNEE Right 8/26/2015    Procedure: ARTHROPLASTY REVISION KNEE;  Surgeon: Néstor Beth MD;  Location: [facility] OR     ARTHROSCOPY KNEE       bunionectomy left foot       COLONOSCOPY       CORONARY ANGIOGRAPHY ADULT ORDER  2002    normal     CORONARY ANGIOGRAPHY ADULT ORDER  12/7/12    no significant focal narrowing that would benefit from mechanical intervention      HYSTERECTOMY       IMPLANT AUTOMATIC IMPLANTABLE CARDIOVERTER DEFIBRILLATOR  4/30/12     INSERT THORACIC PACEMAKER LEAD EPICARDIAL  5/1/2012    Procedure:INSERT THORACIC PACEMAKER LEAD EPICARDIAL; EPICARDIAL LEAD PLACEMENT; Surgeon:WEST ARECHIGA; Location:[facility] OR     TONSILLECTOMY       TRANSPLANT - corneal lenses      Bilateral for cataracts       FAMILY HISTORY:  Family History   Problem Relation Age of Onset     Cancer Father      intraabdominal mass - not colon cancer     C.A.D. 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 Multiple ignacio problems, multiple hospitalizations in last 7 years, anxiety        Pt has 2 daughters, 2 step-daughters's and  7 grandchildrens        Youngest daughter Sabi has malignant brain tumor                   Review of Systems:  Skin:  Positive for scaling   Eyes:  Positive for    ENT:  Negative    Respiratory:  Positive for sleep apnea;CPAP;cough  Cardiovascular:  Negative;exercise intolerance;cyanosis;palpitations;chest pain;lightheadedness;dizziness;syncope or near-syncope;fatigue;edema Positive for  Gastroenterology: Positive for    Genitourinary:  Negative    Musculoskeletal:  Positive for joint pain  Neurologic:  Positive for numbness or tingling of hands  Psychiatric:  Positive for anxiety;excessive stress  Heme/Lymph/Imm:  Negative    Endocrine:  Positive for      Physical Exam:  Vitals: /64 (BP Location: Left arm,  "Cuff Size: Adult Large)  Pulse 60  Ht 1.651 m (5' 5\")  Wt 92.1 kg (203 lb)  BMI 33.78 kg/m2    Constitutional:           Skin:             Head:           Eyes:           Lymph:      ENT:           Neck:           Respiratory:            Cardiac:                                                           GI:           Extremities and Muscular Skeletal:                 Neurological:           Psych:           Recent Lab Results:  LIPID RESULTS:  Lab Results   Component Value Date    CHOL 249 (H) 02/13/2018    HDL 34 (L) 02/13/2018     (H) 02/13/2018    TRIG 276 (H) 02/13/2018    CHOLHDLRATIO 4.7 10/13/2015       LIVER ENZYME RESULTS:  Lab Results   Component Value Date    AST 57 (H) 11/09/2017    ALT 78 (H) 11/09/2017       CBC RESULTS:  Lab Results   Component Value Date    WBC 9.5 11/09/2017    RBC 3.89 11/09/2017    HGB 12.2 11/09/2017    HCT 37.6 11/09/2017    MCV 97 11/09/2017    MCH 31.4 11/09/2017    MCHC 32.4 11/09/2017    RDW 12.1 11/09/2017     11/09/2017       BMP RESULTS:  Lab Results   Component Value Date     11/09/2017    POTASSIUM 3.9 11/09/2017    CHLORIDE 102 11/09/2017    CO2 27 11/09/2017    ANIONGAP 7 11/09/2017     (H) 11/09/2017    BUN 16 11/09/2017    CR 1.00 11/09/2017    GFRESTIMATED 55 (L) 11/09/2017    GFRESTBLACK 66 11/09/2017    WILEY 9.3 11/09/2017        A1C RESULTS:  Lab Results   Component Value Date    A1C 5.4 02/13/2018       INR RESULTS:  Lab Results   Component Value Date    INR 0.88 12/05/2012    INR 0.92 06/26/2012           CC  No referring provider defined for this encounter.                    Thank you for allowing me to participate in the care of your patient.      Sincerely,     DR TRENT BARBOSA MD     [facility]    cc:   No referring provider defined for this encounter.        "

## 2018-09-25 NOTE — PROGRESS NOTES
Service Date: 09/25/2018      HISTORY OF PRESENT ILLNESS:  It is a pleasure for me to see Mrs. Jones as always for followup of nonischemic cardiomyopathy.  She has moderate-to-severe left ventricular systolic dysfunction, but has not had congestive heart failure for quite some time.       I saw her about a year ago and she was doing fine.      She is under even more stress at this time as her son-in-law will be deployed to the [place].  His wife has a brain tumor and now has to look after her children at home by herself.        Mrs. Jones tells me that she has been feeling more fatigued than usual.  She gets more easily out of breath when she walks and she sometimes gets out of breath when she bends over.  When she takes a deep breath in, she has to cough.  She has an occasional sharp pain in her left chest wall which lasts for seconds and I reassured her that I do not think this is likely cardiac in origin.  She also has tingling in her left hand if she holds objects for prolonged periods of time.  Again, I reassured her that this is probably noncardiac.      She has a biventricular pacer ICD in place, but the biventricular pacing has been turned off per Dr. Sahni's instructions as she has narrow QRS now.      Physical examination reveals no evidence of congestive heart failure.  Blood pressure is chronically on the low side.      Mrs. Jones definitely has more psychologic stress than usual and it is difficult to know whether her symptoms do that or whether it is due to low cardiac output.  Recent echocardiography continues to show ejection fraction of 25%-30% which is unchanged from previously.      She does use a CPAP mask for obstructive sleep apnea on a regular basis.      IMPRESSION:  I think it may be worthwhile adding spironolactone to see if it might make a difference to her symptomatology.  As her blood pressure is low, I will introduce this medication at a low dose of 12.5 mg once daily.  She  will follow up with my colleague, Merlyn Arango, in 2-3 weeks' time with electrolyte check prior to clinic visit.  I will see her again in a couple of months' time for further followup.         TRENT BARBOSA MD, [facility]             D: 2018   T: 2018   MT: GAIL      Name:     LEONID MCKEON   MRN:      [id number]        Account:      [id number]   :      1945           Service Date: 2018      Document: [id number]

## 2018-09-25 NOTE — PROGRESS NOTES
HPI and Plan:   See dictation    Orders Placed This Encounter   Procedures     Basic metabolic panel     Follow-Up with Cardiac Advanced Practice Provider       Orders Placed This Encounter   Medications     spironolactone (ALDACTONE) 25 MG tablet     Sig: Take 0.5 tablets (12.5 mg) by mouth daily     Dispense:  30 tablet     Refill:  3       Encounter Diagnoses   Name Primary?     Hyperlipidemia LDL goal <130 Yes     Idiopathic cardiomyopathy (H)      SUSAN (obstructive sleep apnea)      Left ventricular systolic dysfunction:EF 35%        CURRENT MEDICATIONS:  Current Outpatient Prescriptions   Medication Sig Dispense Refill     acetaminophen 650 MG TABS Take 650 mg by mouth every 6 hours (Patient taking differently: Take 650 mg by mouth every 6 hours PRN) 100 tablet      ALPRAZolam (XANAX) 0.25 MG tablet Take 1 tablet (0.25 mg) by mouth 3 times daily as needed 30 tablet 0     blood glucose (NO BRAND SPECIFIED) lancing device Use to test blood sugars 1 times daily or as directed.Brand per insurance 1 each 0     blood glucose monitoring (ACCU-CHEK FASTCLIX) lancets Use to test blood sugar 1 time daily or as directed. 1 Box 3     blood glucose monitoring (NO BRAND SPECIFIED) test strip Use to test blood sugars 1 times daily or as directed Brand per insurance 100 strip 0     blood glucose monitoring (NO BRAND SPECIFIED) test strip Use to test blood sugar 1 time daily or as directed.  Using Accuche[employer] Smartview Test strips 90 strip 0     CALCIUM 500 MG OR TABS Takes 3 tabs daily takees total of 1200 mg daily       carvedilol (COREG) 25 MG tablet TAKE 1 TABLET BY MOUTH TWICE DAILY WITH MEALS 60 tablet 0     celecoxib (CELEBREX) 200 MG capsule Take 1 capsule (200 mg) by mouth every morning (Patient taking differently: Take 200 mg by mouth daily as needed ) 90 capsule 3     Cholecalciferol (VITAMIN D) 2000 UNITS tablet Take 1 tablet by mouth daily.       co-enzyme Q-10 50 MG CAPS Take 1 capsule by mouth daily.       Ferrous  Sulfate (IRON SUPPLEMENT PO) Take 325 mg by mouth daily (with breakfast)       lancets 28G MISC Use to test 1 time daily or as directed. Brand per insurance. 100 each 0     loratadine (CLARITIN) 10 MG tablet Take 10 mg by mouth daily       losartan (COZAAR) 50 MG tablet Take 1 tablet (50 mg) by mouth daily 30 tablet 1     MULTIVITAMIN TABS   OR 1 qd  0     rosuvastatin (CRESTOR) 10 MG tablet TAKE 1 TABLET BY MOUTH EVERY OTHER DAY 15 tablet 0     spironolactone (ALDACTONE) 25 MG tablet Take 0.5 tablets (12.5 mg) by mouth daily 30 tablet 3     venlafaxine (EFFEXOR-XR) 75 MG 24 hr capsule Take 3 capsules (225 mg) by mouth daily 270 capsule 3     blood glucose monitoring (NO BRAND SPECIFIED) meter device kit Use to test blood sugar 1 times daily or as directed. Brand per insurance 1 kit 0     fluticasone (FLONASE) 50 MCG/ACT nasal spray Spray 1-2 sprays into both nostrils daily (Patient not taking: Reported on 9/25/2018) 48 g 3       ALLERGIES     Allergies   Allergen Reactions     Prochlorperazine      Other reaction(s): Tardive Dyskinesia     Amoxicillin Hives     Clindamycin Other (See Comments)     Burning sensation in chest     Decadron      flushing     Dexamethasone      Other reaction(s): Erythema     Compazine Anxiety       PAST MEDICAL HISTORY:  Past Medical History:   Diagnosis Date     Acute posthemorrhagic anemia 9/11/2015     Anemia      Fibromyalgia      Gastro-oesophageal reflux disease      GENERAL OSTEOARTHROSIS [715.00] 11/23/2004    knee     Generalized anxiety disorder 9/30/2013     Hyperlipidemia LDL goal <130 2/10/2010    Failed simvastatin- muscle aches      Hypertension     because of the heart     Insomnia 1/19/2012     Iron deficiency anemia 8/12/2015     Problem list name updated by automated process. Provider to review     LBBB (left bundle branch block)      Left ventricular systolic dysfunction:EF 35% 2/5/2012    Must stay on diovan per cardiology      Major depressive disorder, recurrent  episode, mild (H) 2/17/2016     Migraine 6/21/2005     Problem list name updated by automated process. Provider to review     Nonischemic cardiomyopathy (H)     EF 30-35%, Dr. Osborne [facility] (suspect virus); s/p AICD     NSVT (nonsustained ventricular tachycardia) (H)      Obesity 1/20/2012     SUSAN (obstructive sleep apnea) 01/19/2012    CPAP      Pure hypercholesterolemia      Restless leg syndrome      Type 2 diabetes mellitus without complication (H) 9/24/2016       PAST SURGICAL HISTORY:  Past Surgical History:   Procedure Laterality Date     APPENDECTOMY       ARTHROPLASTY KNEE  1/7/2013    Procedure: ARTHROPLASTY KNEE;  Right Total Knee Arthroplasty       ARTHROPLASTY REVISION KNEE Right 8/26/2015    Procedure: ARTHROPLASTY REVISION KNEE;  Surgeon: Néstor Beth MD;  Location: [facility] OR     ARTHROSCOPY KNEE       bunionectomy left foot       COLONOSCOPY       CORONARY ANGIOGRAPHY ADULT ORDER  2002    normal     CORONARY ANGIOGRAPHY ADULT ORDER  12/7/12    no significant focal narrowing that would benefit from mechanical intervention      HYSTERECTOMY       IMPLANT AUTOMATIC IMPLANTABLE CARDIOVERTER DEFIBRILLATOR  4/30/12     INSERT THORACIC PACEMAKER LEAD EPICARDIAL  5/1/2012    Procedure:INSERT THORACIC PACEMAKER LEAD EPICARDIAL; EPICARDIAL LEAD PLACEMENT; Surgeon:WEST ARECHIGA; Location:[facility] OR     TONSILLECTOMY       TRANSPLANT - corneal lenses      Bilateral for cataracts       FAMILY HISTORY:  Family History   Problem Relation Age of Onset     Cancer Father      intraabdominal mass - not colon cancer     C.A.D. 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 Multiple ignacio problems, multiple hospitalizations in last 7 years, anxiety        Pt has 2 daughters, 2 step-daughters's and  7 grandchildrens        Youngest daughter Sabi has malignant brain tumor                   Review of Systems:  Skin:  Positive for scaling   Eyes:  Positive for    ENT:  Negative    Respiratory:  Positive for sleep apnea;CPAP;cough  Cardiovascular:  Negative;exercise intolerance;cyanosis;palpitations;chest pain;lightheadedness;dizziness;syncope or near-syncope;fatigue;edema Positive for  Gastroenterology: Positive for    Genitourinary:  Negative    Musculoskeletal:  Positive for joint pain  Neurologic:  Positive for numbness or tingling of hands  Psychiatric:  Positive for anxiety;excessive stress  Heme/Lymph/Imm:  Negative    Endocrine:  Positive for      Physical Exam:  Vitals: /64 (BP Location: Left arm, Cuff Size: Adult Large)  Pulse 60  Ht 1.651 m (5' 5\")  Wt 92.1 kg (203 lb)  BMI 33.78 kg/m2    Constitutional:           Skin:             Head:           Eyes:           Lymph:      ENT:           Neck:           Respiratory:        "     Cardiac:                                                           GI:           Extremities and Muscular Skeletal:                 Neurological:           Psych:           Recent Lab Results:  LIPID RESULTS:  Lab Results   Component Value Date    CHOL 249 (H) 02/13/2018    HDL 34 (L) 02/13/2018     (H) 02/13/2018    TRIG 276 (H) 02/13/2018    CHOLHDLRATIO 4.7 10/13/2015       LIVER ENZYME RESULTS:  Lab Results   Component Value Date    AST 57 (H) 11/09/2017    ALT 78 (H) 11/09/2017       CBC RESULTS:  Lab Results   Component Value Date    WBC 9.5 11/09/2017    RBC 3.89 11/09/2017    HGB 12.2 11/09/2017    HCT 37.6 11/09/2017    MCV 97 11/09/2017    MCH 31.4 11/09/2017    MCHC 32.4 11/09/2017    RDW 12.1 11/09/2017     11/09/2017       BMP RESULTS:  Lab Results   Component Value Date     11/09/2017    POTASSIUM 3.9 11/09/2017    CHLORIDE 102 11/09/2017    CO2 27 11/09/2017    ANIONGAP 7 11/09/2017     (H) 11/09/2017    BUN 16 11/09/2017    CR 1.00 11/09/2017    GFRESTIMATED 55 (L) 11/09/2017    GFRESTBLACK 66 11/09/2017    WILEY 9.3 11/09/2017        A1C RESULTS:  Lab Results   Component Value Date    A1C 5.4 02/13/2018       INR RESULTS:  Lab Results   Component Value Date    INR 0.88 12/05/2012    INR 0.92 06/26/2012           CC  No referring provider defined for this encounter.

## 2018-09-25 NOTE — LETTER
9/25/2018      Christin Diehl MD  [address] [place] [address]      RE: Kristie Jones       Dear Colleague,    I had the pleasure of seeing Kristie Jones in the [facility].    Service Date: 09/25/2018      HISTORY OF PRESENT ILLNESS:  It is a pleasure for me to see Mrs. Jones as always for followup of nonischemic cardiomyopathy.  She has moderate-to-severe left ventricular systolic dysfunction, but has not had congestive heart failure for quite some time.       I saw her about a year ago and she was doing fine.      She is under even more stress at this time as her son-in-law will be deployed to the [place].  His wife has a brain tumor and now has to look after her children at home by herself.        Mrs. Jones tells me that she has been feeling more fatigued than usual.  She gets more easily out of breath when she walks and she sometimes gets out of breath when she bends over.  When she takes a deep breath in, she has to cough.  She has an occasional sharp pain in her left chest wall which lasts for seconds and I reassured her that I do not think this is likely cardiac in origin.  She also has tingling in her left hand if she holds objects for prolonged periods of time.  Again, I reassured her that this is probably noncardiac.      She has a biventricular pacer ICD in place, but the biventricular pacing has been turned off per Dr. Sahni's instructions as she has narrow QRS now.      Physical examination reveals no evidence of congestive heart failure.  Blood pressure is chronically on the low side.      Mrs. Jones definitely has more psychologic stress than usual and it is difficult to know whether her symptoms do that or whether it is due to low cardiac output.  Recent echocardiography continues to show ejection fraction of 25%-30% which is unchanged from previously.      She does use a CPAP mask for obstructive sleep apnea on a regular basis.       IMPRESSION:  I think it may be worthwhile adding spironolactone to see if it might make a difference to her symptomatology.  As her blood pressure is low, I will introduce this medication at a low dose of 12.5 mg once daily.  She will follow up with my colleague, Merlyn Arango, in 2-3 weeks' time with electrolyte check prior to clinic visit.  I will see her again in a couple of months' time for further followup.         TRENT BARBOSA MD, [facility]             D: 2018   T: 2018   MT: GAIL      Name:     LEONID MCKEON   MRN:      [id number]        Account:      [id number]   :      1945           Service Date: 2018      Document: [id number]           Outpatient Encounter Prescriptions as of 2018   Medication Sig Dispense Refill     acetaminophen 650 MG TABS Take 650 mg by mouth every 6 hours (Patient taking differently: Take 650 mg by mouth every 6 hours PRN) 100 tablet      blood glucose (NO BRAND SPECIFIED) lancing device Use to test blood sugars 1 times daily or as directed.Brand per insurance 1 each 0     blood glucose monitoring (ACCU-CHEK FASTCLIX) lancets Use to test blood sugar 1 time daily or as directed. 1 Box 3     blood glucose monitoring (NO BRAND SPECIFIED) test strip Use to test blood sugars 1 times daily or as directed Brand per insurance 100 strip 0     CALCIUM 500 MG OR TABS Takes 3 tabs daily takees total of 1200 mg daily       carvedilol (COREG) 25 MG tablet TAKE 1 TABLET BY MOUTH TWICE DAILY WITH MEALS 60 tablet 0     celecoxib (CELEBREX) 200 MG capsule Take 1 capsule (200 mg) by mouth every morning (Patient taking differently: Take 200 mg by mouth daily as needed ) 90 capsule 3     Cholecalciferol (VITAMIN D) 2000 UNITS tablet Take 1 tablet by mouth daily.       co-enzyme Q-10 50 MG CAPS Take 1 capsule by mouth daily.       Ferrous Sulfate (IRON SUPPLEMENT PO) Take 325 mg by mouth daily (with breakfast)       lancets 28G MISC Use to test 1 time daily or as directed.  Brand per insurance. 100 each 0     loratadine (CLARITIN) 10 MG tablet Take 10 mg by mouth daily       losartan (COZAAR) 50 MG tablet Take 1 tablet (50 mg) by mouth daily 30 tablet 1     MULTIVITAMIN TABS   OR 1 qd  0     rosuvastatin (CRESTOR) 10 MG tablet TAKE 1 TABLET BY MOUTH EVERY OTHER DAY 15 tablet 0     spironolactone (ALDACTONE) 25 MG tablet Take 0.5 tablets (12.5 mg) by mouth daily 30 tablet 3     venlafaxine (EFFEXOR-XR) 75 MG 24 hr capsule Take 3 capsules (225 mg) by mouth daily 270 capsule 3     [DISCONTINUED] ALPRAZolam (XANAX) 0.25 MG tablet Take 1 tablet (0.25 mg) by mouth 3 times daily as needed 30 tablet 0     [DISCONTINUED] blood glucose monitoring (NO BRAND SPECIFIED) test strip Use to test blood sugar 1 time daily or as directed.  Using [employer] Smartview Test strips 90 strip 0     blood glucose monitoring (NO BRAND SPECIFIED) meter device kit Use to test blood sugar 1 times daily or as directed. Brand per insurance 1 kit 0     fluticasone (FLONASE) 50 MCG/ACT nasal spray Spray 1-2 sprays into both nostrils daily 48 g 3     No facility-administered encounter medications on file as of 9/25/2018.      Again, thank you for allowing me to participate in the care of your patient.      Sincerely,    DR TRENT BARBOSA MD     [facility]

## 2018-09-26 ENCOUNTER — TELEPHONE (OUTPATIENT)
Dept: PEDIATRICS | Facility: CLINIC | Age: 73
End: 2018-09-26

## 2018-09-26 ENCOUNTER — OFFICE VISIT (OUTPATIENT)
Dept: PEDIATRICS | Facility: CLINIC | Age: 73
End: 2018-09-26
Payer: COMMERCIAL

## 2018-09-26 VITALS
BODY MASS INDEX: 33.82 KG/M2 | OXYGEN SATURATION: 96 % | HEART RATE: 78 BPM | HEIGHT: 65 IN | DIASTOLIC BLOOD PRESSURE: 58 MMHG | WEIGHT: 203 LBS | RESPIRATION RATE: 13 BRPM | SYSTOLIC BLOOD PRESSURE: 108 MMHG

## 2018-09-26 DIAGNOSIS — F41.1 GENERALIZED ANXIETY DISORDER: ICD-10-CM

## 2018-09-26 DIAGNOSIS — E11.9 TYPE 2 DIABETES MELLITUS WITHOUT COMPLICATION, WITHOUT LONG-TERM CURRENT USE OF INSULIN (H): Primary | ICD-10-CM

## 2018-09-26 DIAGNOSIS — I42.9 IDIOPATHIC CARDIOMYOPATHY (H): ICD-10-CM

## 2018-09-26 LAB — HBA1C MFR BLD: 5.6 % (ref 0–5.6)

## 2018-09-26 PROCEDURE — 36415 COLL VENOUS BLD VENIPUNCTURE: CPT | Performed by: INTERNAL MEDICINE

## 2018-09-26 PROCEDURE — 99214 OFFICE O/P EST MOD 30 MIN: CPT | Performed by: INTERNAL MEDICINE

## 2018-09-26 PROCEDURE — 83036 HEMOGLOBIN GLYCOSYLATED A1C: CPT | Performed by: INTERNAL MEDICINE

## 2018-09-26 RX ORDER — ALPRAZOLAM 0.25 MG
0.25 TABLET ORAL 3 TIMES DAILY PRN
Qty: 30 TABLET | Refills: 1 | Status: SHIPPED | OUTPATIENT
Start: 2018-09-26 | End: 2019-12-20

## 2018-09-26 RX ORDER — BLOOD-GLUCOSE CONTROL, NORMAL
EACH MISCELLANEOUS
Qty: 1 EACH | Refills: 0 | Status: SHIPPED | OUTPATIENT
Start: 2018-09-26 | End: 2019-08-04

## 2018-09-26 ASSESSMENT — ANXIETY QUESTIONNAIRES
6. BECOMING EASILY ANNOYED OR IRRITABLE: SEVERAL DAYS
1. FEELING NERVOUS, ANXIOUS, OR ON EDGE: SEVERAL DAYS
IF YOU CHECKED OFF ANY PROBLEMS ON THIS QUESTIONNAIRE, HOW DIFFICULT HAVE THESE PROBLEMS MADE IT FOR YOU TO DO YOUR WORK, TAKE CARE OF THINGS AT HOME, OR GET ALONG WITH OTHER PEOPLE: SOMEWHAT DIFFICULT
7. FEELING AFRAID AS IF SOMETHING AWFUL MIGHT HAPPEN: SEVERAL DAYS
5. BEING SO RESTLESS THAT IT IS HARD TO SIT STILL: NOT AT ALL
2. NOT BEING ABLE TO STOP OR CONTROL WORRYING: SEVERAL DAYS
3. WORRYING TOO MUCH ABOUT DIFFERENT THINGS: SEVERAL DAYS
GAD7 TOTAL SCORE: 5

## 2018-09-26 ASSESSMENT — PATIENT HEALTH QUESTIONNAIRE - PHQ9: 5. POOR APPETITE OR OVEREATING: NOT AT ALL

## 2018-09-26 NOTE — TELEPHONE ENCOUNTER
Prior Authorization Approval    Authorization Effective Date: 8/27/2018  Authorization Expiration Date: 9/26/2018  Medication: ALPRAZOLAM   Approved Dose/Quantity:    Reference #:     Insurance Company: [employer] - Phone [phone] Fax [phone]  Expected CoPay:       CoPay Card Available:      Foundation Assistance Needed:    Which Pharmacy is filling the prescription (Not needed for infusion/clinic administered): [facility] [address], [place] - [address]  Pharmacy Notified: Yes  Patient Notified: Yes

## 2018-09-26 NOTE — MR AVS SNAPSHOT
After Visit Summary   9/26/2018    Kristie Jones    MRN: [id number]           Patient Information     Date Of Birth          1945        Visit Information        Provider Department      9/26/2018 9:40 AM Kyle Tejada MD [facility]        Today's Diagnoses     Type 2 diabetes mellitus without complication, without long-term current use of insulin (H)    -  1    Generalized anxiety disorder          Care Instructions    Check your A1C today   I will contact you with the results    OK to use alprazolam more frequently for the next 1-2 weeks   If you continue to have high levels of anxiety, plan to add Buspar (buspirone) 5 mg twice per day    Check your glucometer with control solution           Follow-ups after your visit        Your next 10 appointments already scheduled     Oct 01, 2018  9:30 AM CDT   Lab visit with EA LAB   [facility] ([facility]    [address] [place] [address]  [address]  [facility] [address] [phone]           Please do not eat 10-12 hours before your appointment if you are coming in fasting for labs on lipids, cholesterol, or glucose (sugar). Does not apply to pregnant women.  Water with medications is okay. Do not drink coffee or other fluids.  If you have concerns about taking your medications, please send a message by clicking on Secure Messaging, Message Your Care Team.            Oct 10, 2018  9:40 AM CDT   MyChart Long with Christin Diehl MD   [facility] ([facility])    [address]  [address]  [facility] [address] [phone]            Oct 17, 2018  2:30 PM CDT   LAB with RU LAB   [facility] ([facility])    [address]  [facility] [id number] [phone]           Please do not eat 10-12 hours before your appointment if you are coming in fasting for labs on lipids, cholesterol, or glucose  (sugar). This does not apply to pregnant women. Water, hot tea and black coffee (with nothing added) are okay. Do not drink other fluids, diet soda or chew gum.            Oct 17, 2018  3:10 PM CDT   Return Visit with WINNIE Sliva CNP   [facility] ([facility])    [address]  [facility] [address]   [phone]            Nov 07, 2018  2:20 PM CST   ICD Check with RU DCR2   [facility] ([facility])    [address]  [facility] [address]   [phone]              Future tests that were ordered for you today     Open Future Orders        Priority Expected Expires Ordered    Basic metabolic panel Routine 10/2/2018 9/25/2019 9/25/2018    Follow-Up with Cardiac Advanced Practice Provider Routine 10/2/2018 9/25/2019 9/25/2018            Who to contact     If you have questions or need follow up information about today's clinic visit or your schedule please contact [facility] [place] directly at [phone].  Normal or non-critical lab and imaging results will be communicated to you by [employer], letter or phone within 4 business days after the clinic has received the results. If you do not hear from us within 7 days, please contact the clinic through [employer] or phone. If you have a critical or abnormal lab result, we will notify you by phone as soon as possible.  Submit refill requests through [employer] or call your pharmacy and they will forward the refill request to us. Please allow 3 business days for your refill to be completed.          Additional Information About Your Visit        [employer] Information     [employer] gives you secure access to your electronic health record. If you see a primary care provider, you can also send messages to your care team and make appointments. If you have questions, please call your primary care clinic.  If you do not have a primary care  "provider, please call [phone] and they will assist you.        Care EveryWhere ID     This is your Care EveryWhere ID. This could be used by other organizations to access your [place] medical records  [id number]        Your Vitals Were     Pulse Respirations Height Pulse Oximetry Breastfeeding? BMI (Body Mass Index)    78 13 5' 5\" (1.651 m) 96% No 33.78 kg/m2       Blood Pressure from Last 3 Encounters:   09/26/18 108/58   09/25/18 104/64   04/04/18 114/57    Weight from Last 3 Encounters:   09/26/18 203 lb (92.1 kg)   09/25/18 203 lb (92.1 kg)   04/04/18 200 lb (90.7 kg)              We Performed the Following     Hemoglobin A1c          Today's Medication Changes          These changes are accurate as of 9/26/18 10:10 AM.  If you have any questions, ask your nurse or doctor.               Start taking these medicines.        Dose/Directions    blood glucose calibration solution   Commonly known as:  no brand specified   Used for:  Type 2 diabetes mellitus without complication, without long-term current use of insulin (H)   Started by:  Kyle Tejada MD        Use to calibrate blood glucose monitor as directed.   Quantity:  1 each   Refills:  0         These medicines have changed or have updated prescriptions.        Dose/Directions    acetaminophen 650 MG 8 hour tablet   This may have changed:  additional instructions   Used for:  Status post total right knee replacement        Dose:  650 mg   Take 650 mg by mouth every 6 hours   Quantity:  100 tablet   Refills:  0       ALPRAZolam 0.25 MG tablet   Commonly known as:  XANAX   This may have changed:  reasons to take this   Used for:  Generalized anxiety disorder   Changed by:  Kyle Tejada MD        Dose:  0.25 mg   Take 1 tablet (0.25 mg) by mouth 3 times daily as needed for anxiety   Quantity:  30 tablet   Refills:  1       celecoxib 200 MG capsule   Commonly known as:  celeBREX   This may have changed:    - when to take this  - reasons to take this "   Used for:  Status post total right knee replacement        Dose:  200 mg   Take 1 capsule (200 mg) by mouth every morning   Quantity:  90 capsule   Refills:  3            Where to get your medicines      These medications were sent to [id number] - [place] - [address]  [address] [place] [address]     Phone:  [phone]     blood glucose calibration solution         Some of these will need a paper prescription and others can be bought over the counter.  Ask your nurse if you have questions.     Bring a paper prescription for each of these medications     ALPRAZolam 0.25 MG tablet                Primary Care Provider Office Phone # Fax #    Christin Diehl [phone] [address] [place] [address]        Equal Access to Services     [facility] : Hadii salbador santizoo Soomaali, waaxda luqadaha, qaybta kaalmada adeegyada, dolly saldana . [facility] [phone].    ATENCIÓN: Si habla español, tiene a robles disposición servicios gratuitos de asistencia lingüística. [facility] [phone].    We comply with applicable federal civil rights laws and [place] laws. We do not discriminate on the basis of race, color, national origin, age, disability, sex, sexual orientation, or gender identity.            Thank you!     Thank you for choosing [facility]  for your care. Our goal is always to provide you with excellent care. Hearing back from our patients is one way we can continue to improve our services. Please take a few minutes to complete the written survey that you may receive in the mail after your visit with us. Thank you!             Your Updated Medication List - Protect others around you: Learn how to safely use, store and throw away your medicines at www.disposemymeds.org.          This list is accurate as of 9/26/18 10:10 AM.  Always use your most recent med list.                   Brand Name  Dispense Instructions for use Diagnosis    acetaminophen 650 MG 8 hour tablet     100 tablet    Take 650 mg by mouth every 6 hours    Status post total right knee replacement       ALPRAZolam 0.25 MG tablet    XANAX    30 tablet    Take 1 tablet (0.25 mg) by mouth 3 times daily as needed for anxiety    Generalized anxiety disorder       blood glucose calibration solution    no brand specified    1 each    Use to calibrate blood glucose monitor as directed.    Type 2 diabetes mellitus without complication, without long-term current use of insulin (H)       blood glucose lancing device    no brand specified    1 each    Use to test blood sugars 1 times daily or as directed.Brand per insurance    Type 2 diabetes mellitus without complication, without long-term current use of insulin (H)       * blood glucose monitoring lancets     1 Box    Use to test blood sugar 1 time daily or as directed.    Type 2 diabetes, HbA1C goal < 8% (H)       * lancets 28G Misc     100 each    Use to test 1 time daily or as directed. Brand per insurance.    Type 2 diabetes mellitus without complication, without long-term current use of insulin (H)       blood glucose monitoring meter device kit    no brand specified    1 kit    Use to test blood sugar 1 times daily or as directed. Brand per insurance    Type 2 diabetes mellitus without complication, without long-term current use of insulin (H)       blood glucose monitoring test strip    no brand specified    100 strip    Use to test blood sugars 1 times daily or as directed Brand per insurance    Type 2 diabetes mellitus without complication, without long-term current use of insulin (H)       calcium carbonate 500 mg {elemental} 500 MG tablet    OS-WILEY     Takes 3 tabs daily takees total of 1200 mg daily        carvedilol 25 MG tablet    COREG    60 tablet    TAKE 1 TABLET BY MOUTH TWICE DAILY WITH MEALS    Idiopathic cardiomyopathy (H)       celecoxib 200 MG capsule    celeBREX    90  capsule    Take 1 capsule (200 mg) by mouth every morning    Status post total right knee replacement       co-enzyme Q-10 50 MG Caps      Take 1 capsule by mouth daily.        fluticasone 50 MCG/ACT spray    FLONASE    48 g    Spray 1-2 sprays into both nostrils daily    PND (post-nasal drip)       IRON SUPPLEMENT PO      Take 325 mg by mouth daily (with breakfast)        loratadine 10 MG tablet    CLARITIN     Take 10 mg by mouth daily        losartan 50 MG tablet    COZAAR    30 tablet    Take 1 tablet (50 mg) by mouth daily    Idiopathic cardiomyopathy (H)       MULTIVITAMIN TABS   OR      1 qd        rosuvastatin 10 MG tablet    CRESTOR    15 tablet    TAKE 1 TABLET BY MOUTH EVERY OTHER DAY    Hyperlipidemia LDL goal <130       spironolactone 25 MG tablet    ALDACTONE    30 tablet    Take 0.5 tablets (12.5 mg) by mouth daily    Idiopathic cardiomyopathy (H)       venlafaxine 75 MG 24 hr capsule    EFFEXOR-XR    270 capsule    Take 3 capsules (225 mg) by mouth daily    Generalized anxiety disorder       vitamin D 2000 units tablet      Take 1 tablet by mouth daily.    Obesity, unspecified       * Notice:  This list has 2 medication(s) that are the same as other medications prescribed for you. Read the directions carefully, and ask your doctor or other care provider to review them with you.

## 2018-09-26 NOTE — TELEPHONE ENCOUNTER
Prior Authorization Retail Medication Request    Medication/Dose: Alprazolam .25 mg  ICD code (if different than what is on RX):  F41.1  Previously Tried and Failed:  Lorazepam  Rationale:  Pt has been using this treatment since 2011    Insurance Name:  [facility]   Insurance ID:  [id number]       Pharmacy Information (if different than what is on RX)  Name:  [facility] [place]  Phone:  [phone]

## 2018-09-26 NOTE — PATIENT INSTRUCTIONS
Check your A1C today   I will contact you with the results    OK to use alprazolam more frequently for the next 1-2 weeks   If you continue to have high levels of anxiety, plan to add Buspar (buspirone) 5 mg twice per day    Check your glucometer with control solution

## 2018-09-26 NOTE — PROGRESS NOTES
SUBJECTIVE:   Kristie Jones is a 72 year old female who presents to clinic today for the following health issues:    Diabetes Follow-up  Patient is checking blood sugars: once daily.  Results are as follows:         A couple times did check after exercising and it went down dramatically at those points    Was sitting 125-130 for the last couple years, out of nowhere has spiked up to (150-170s)    Diabetic concerns: None     Symptoms of hypoglycemia (low blood sugar): none     Paresthesias (numbness or burning in feet) or sores: No     Date of last diabetic eye exam: about a year ago    BP Readings from Last 2 Encounters:   09/26/18 108/58   09/25/18 104/64     Hemoglobin A1C (%)   Date Value   02/13/2018 5.4   12/22/2016 4.9     LDL Cholesterol Calculated (mg/dL)   Date Value   09/25/2018 84   02/13/2018 160 (H)       Diabetes Management Resources    Amount of exercise or physical activity: 2-3 days/week for an average of 30-45 minutes    Problems taking medications regularly: No    Medication side effects: none    Diet: diabetic    Anxiety. Significantly increased over the past few weeks.  Several life stressors - son-in-law deployed, daughter w/ ongoing medical issues,  with memory concerns.   Has been treated w/ Effexor for the past several years.  Had been working well.  Has alprazolam, old rx of 30 and still has some remaining.  Has not taken any in the past 2 weeks even w/ increased anxiety symptoms.  Discussed at length options to help with anxiety control.     Problem list and histories reviewed & adjusted, as indicated.    Labs reviewed in EPIC    Reviewed and updated as needed this visit by clinical staff  Tobacco  Allergies  Meds  Med Hx  Surg Hx  Fam Hx  Soc Hx      Reviewed and updated as needed this visit by Provider  Tobacco  Allergies  Meds  Problems  Med Hx  Surg Hx  Fam Hx  Soc Hx          ROS:  Constitutional, HEENT, cardiovascular, pulmonary, gi and gu systems are  "negative, except as otherwise noted.    OBJECTIVE:     /58 (BP Location: Right arm, Patient Position: Chair, Cuff Size: Adult Large)  Pulse 78  Resp 13  Ht 5' 5\" (1.651 m)  Wt 203 lb (92.1 kg)  SpO2 96%  Breastfeeding? No  BMI 33.78 kg/m2  Body mass index is 33.78 kg/(m^2).  GEN: no distress  SKIN: no rashes  EXTR: no edema  PSYCH: Normal affect. Well groomed. Good eye contact.     Results for orders placed or performed in visit on 09/26/18   Hemoglobin A1c   Result Value Ref Range    Hemoglobin A1C 5.6 0 - 5.6 %       ASSESSMENT/PLAN:       ICD-10-CM    1. Type 2 diabetes mellitus without complication, without long-term current use of insulin (H) E11.9 Hemoglobin A1c     blood glucose calibration (NO BRAND SPECIFIED) solution   2. Generalized anxiety disorder F41.1 ALPRAZolam (XANAX) 0.25 MG tablet   3. Idiopathic cardiomyopathy (H) I42.8      Type 2 diabetes.  Higher blood sugar readings at home.   A1C indicates excellent diabetes control. Unsure if d/t new meter, potentially a calibration issue?  Anxiety - multiple stressors recently w/ increased sx. Discussed management options.    Patient Instructions   Check your A1C today   I will contact you with the results    OK to use alprazolam more frequently for the next 1-2 weeks   If you continue to have high levels of anxiety, plan to add Buspar (buspirone) 5 mg twice per day    Check your glucometer with control solution     A total of 25 minutes was spent in face-to-face contact with Kristie oropeza in clinic, of which >50% was for counseling of diabetes and anxiety management options.    Kyle Tejada MD  [facility] [place]  "

## 2018-09-26 NOTE — TELEPHONE ENCOUNTER
Central Prior Authorization Team   Phone: [phone]    PA Initiation    Medication: ALPRAZOLAM   Insurance Company: [employer] - Phone [phone] Fax [phone]  Pharmacy Filling the Rx: [facility] [address] [place] [address]  Filling Pharmacy Phone: [phone]  Filling Pharmacy Fax:    Start Date: 9/26/2018

## 2018-09-27 ASSESSMENT — PATIENT HEALTH QUESTIONNAIRE - PHQ9: SUM OF ALL RESPONSES TO PHQ QUESTIONS 1-9: 2

## 2018-09-27 ASSESSMENT — ANXIETY QUESTIONNAIRES: GAD7 TOTAL SCORE: 5

## 2018-09-27 NOTE — TELEPHONE ENCOUNTER
PA was approved.   Pharmacy and pt notified - closing encounter.     Joaquina Bautista MA   September 27, 2018,  10:43 AM

## 2018-10-02 ENCOUNTER — TELEPHONE (OUTPATIENT)
Dept: PEDIATRICS | Facility: CLINIC | Age: 73
End: 2018-10-02

## 2018-10-02 NOTE — TELEPHONE ENCOUNTER
Prior Authorization Retail Medication Request    Medication/Dose: ALPRAZolam .24 mg  ICD code (if different than what is on RX):  F41.1  Previously Tried and Failed:  N/A  Rationale:  Pt has been on this treatment effectively  since 2012    Insurance Name:  [facility]  Insurance ID:  [id number]       Pharmacy Information (if different than what is on RX)  Name:  [facility]  Phone:  [phone]

## 2018-10-02 NOTE — TELEPHONE ENCOUNTER
Duplicate request.  PA was already completed and approved.  Please seen encounter dated 09/26/2018.

## 2018-10-10 ENCOUNTER — OFFICE VISIT (OUTPATIENT)
Dept: PEDIATRICS | Facility: CLINIC | Age: 73
End: 2018-10-10
Payer: COMMERCIAL

## 2018-10-10 VITALS
DIASTOLIC BLOOD PRESSURE: 56 MMHG | HEART RATE: 78 BPM | WEIGHT: 203.4 LBS | TEMPERATURE: 98.4 F | SYSTOLIC BLOOD PRESSURE: 102 MMHG | OXYGEN SATURATION: 94 % | HEIGHT: 65 IN | BODY MASS INDEX: 33.89 KG/M2

## 2018-10-10 DIAGNOSIS — E78.5 HYPERLIPIDEMIA LDL GOAL <130: ICD-10-CM

## 2018-10-10 DIAGNOSIS — R07.81 RIB PAIN ON LEFT SIDE: ICD-10-CM

## 2018-10-10 DIAGNOSIS — Z78.0 ASYMPTOMATIC POSTMENOPAUSAL STATUS: ICD-10-CM

## 2018-10-10 DIAGNOSIS — I50.22 CHRONIC SYSTOLIC CONGESTIVE HEART FAILURE (H): Primary | ICD-10-CM

## 2018-10-10 DIAGNOSIS — F41.1 GENERALIZED ANXIETY DISORDER: ICD-10-CM

## 2018-10-10 DIAGNOSIS — I42.9 IDIOPATHIC CARDIOMYOPATHY (H): ICD-10-CM

## 2018-10-10 DIAGNOSIS — E11.9 TYPE 2 DIABETES MELLITUS WITHOUT COMPLICATION, WITHOUT LONG-TERM CURRENT USE OF INSULIN (H): ICD-10-CM

## 2018-10-10 DIAGNOSIS — L30.9 ECZEMA, UNSPECIFIED TYPE: ICD-10-CM

## 2018-10-10 PROCEDURE — 99214 OFFICE O/P EST MOD 30 MIN: CPT | Performed by: INTERNAL MEDICINE

## 2018-10-10 RX ORDER — CYCLOSPORINE 0.5 MG/ML
1 EMULSION OPHTHALMIC 2 TIMES DAILY
COMMUNITY
End: 2019-12-20

## 2018-10-10 RX ORDER — CARVEDILOL 25 MG/1
25 TABLET ORAL 2 TIMES DAILY WITH MEALS
Qty: 180 TABLET | Refills: 1 | Status: SHIPPED | OUTPATIENT
Start: 2018-10-10 | End: 2019-04-12

## 2018-10-10 RX ORDER — CLOBETASOL PROPIONATE 0.5 MG/G
CREAM TOPICAL 2 TIMES DAILY
COMMUNITY
End: 2019-12-20

## 2018-10-10 RX ORDER — ROSUVASTATIN CALCIUM 10 MG/1
TABLET, COATED ORAL
Qty: 45 TABLET | Refills: 1 | Status: SHIPPED | OUTPATIENT
Start: 2018-10-10 | End: 2019-04-12

## 2018-10-10 RX ORDER — SPIRONOLACTONE 25 MG/1
12.5 TABLET ORAL DAILY
Qty: 45 TABLET | Refills: 1 | Status: CANCELLED | OUTPATIENT
Start: 2018-10-10

## 2018-10-10 NOTE — PROGRESS NOTES
"  SUBJECTIVE:   Kristie Jones is a 73 year old female who presents to clinic today for the following health issues:      Rib Pain    Onset before 9/25 with pain under her ribs accompanied by anxiety. Pt states she has intermittent pain under the left lowest rib. It's very sharp and comes on suddenly; only lasts a few min. Notices the pain most when she is in bed reading crunching with adjustable bed. Occasionally she has it with exercise. Denies shortness of breath. Adding in spirolactone has helped with shortness of breath.     Anxiety     Pt is dealing with a large amount family stress.  Her daughter has brain cancer and her son in-law was just deployed.  Anxiety has increased, but she spoke with Dr. Tejada and he recommended increasing Xanax use.  She is taking 2-3 a week. It has improved her anxiety. Overall she states she is doing better. Is meeting some resistance from her  who wishes to go back to playing hockey. Is very hard to not come off as controlling with her  even though it may be adverse to his health. Uses her friends for venting but no formal therapist.     Notes she has started eating \"step 1 foods\" created by a cardiologist that are heart healthy and better for diabetic control. She only needs to eat 2 of them per day and normally gets 1 in and is working for her.     Using clobetasol on hands for eczema for only 2 week periods. She has needed to use it every other day but her skin is getting thinner and she is concerned.     Diabetes Follow-up    Pt got a new meter and did not calibrate it. Glucose readings were high, but A1C through Dr. Tejada was 5.6.  She has stopped checking sugar for now, but will calibrate meter soon since her uncle recently passed away and was traveling.      Hyperlipidemia Follow-Up      Rate your low fat/cholesterol diet?: not monitoring fat    Taking statin?  Yes, no muscle aches from statin    Other lipid medications/supplements?:  none    Hypertension " Follow-up      Outpatient blood pressures are being checked [facility].  Results are low.    Low Salt Diet: no added salt    BP Readings from Last 2 Encounters:   10/10/18 102/56   09/26/18 108/58     Hemoglobin A1C (%)   Date Value   09/26/2018 5.6   02/13/2018 5.4     LDL Cholesterol Calculated (mg/dL)   Date Value   09/25/2018 84   02/13/2018 160 (H)       Amount of exercise or physical activity: 2-3 days/week for an average of 45-60 minutes    Problems taking medications regularly: No    Medication side effects: none    Diet: regular (no restrictions)            Problem list and histories reviewed & adjusted, as indicated.  Additional history: as documented    Patient Active Problem List   Diagnosis     GENERAL OSTEOARTHROSIS [715.00]     Esophageal reflux     Hyperlipidemia LDL goal <130     LBBB (left bundle branch block)     Advanced directives, counseling/discussion     SUSAN (obstructive sleep apnea)     Idiopathic cardiomyopathy (H)     Cataract     Leg pain     Insomnia     Obesity     S/P total  right knee replacement: 1/7/13     Generalized anxiety disorder     Total knee replacement status     S/P revision of total knee, 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     Major depressive disorder, recurrent episode, mild (H)     ICD (implantable cardioverter-defibrillator), biventricular, in situ     Type 2 diabetes mellitus without complication (H)     Chronic systolic congestive heart failure (H)     Past Surgical History:   Procedure Laterality Date     APPENDECTOMY       ARTHROPLASTY KNEE  1/7/2013    Procedure: ARTHROPLASTY KNEE;  Right Total Knee Arthroplasty       ARTHROPLASTY REVISION KNEE Right 8/26/2015    Procedure: ARTHROPLASTY REVISION KNEE;  Surgeon: Néstor Beth MD;  Location: [facility] OR     ARTHROSCOPY KNEE       bunionectomy left foot       COLONOSCOPY       CORONARY ANGIOGRAPHY ADULT ORDER  2002    normal     CORONARY ANGIOGRAPHY ADULT ORDER  12/7/12    no significant focal narrowing that would benefit  from mechanical intervention      HYSTERECTOMY       IMPLANT AUTOMATIC IMPLANTABLE CARDIOVERTER DEFIBRILLATOR  4/30/12     INSERT THORACIC PACEMAKER LEAD EPICARDIAL  5/1/2012    Procedure:INSERT THORACIC PACEMAKER LEAD EPICARDIAL; EPICARDIAL LEAD PLACEMENT; Surgeon:WEST ARECHIGA; Location:[facility] OR     TONSILLECTOMY       TRANSPLANT - corneal lenses      Bilateral for cataracts       Social History   Substance Use Topics     Smoking status: Never Smoker     Smokeless tobacco: Never Used     Alcohol use 0.0 oz/week     0 Standard drinks or equivalent per week      Comment: One glass of wine per week     Family History   Problem Relation Age of Onset     Cancer Father      intraabdominal mass - not colon cancer     C.A.D. Mother      Hypertension Mother      Lipids Mother      HEART DISEASE Mother      Cancer Daughter 36     brain      Diabetes Paternal Uncle 52     Prostate Cancer Brother      HEART DISEASE Sister      murmur     Anxiety Disorder Sister      Colon Cancer No family hx of          Current Outpatient Prescriptions   Medication Sig Dispense Refill     acetaminophen 650 MG TABS Take 650 mg by mouth every 6 hours (Patient taking differently: Take 650 mg by mouth every 6 hours PRN) 100 tablet      ALPRAZolam (XANAX) 0.25 MG tablet Take 1 tablet (0.25 mg) by mouth 3 times daily as needed for anxiety 30 tablet 1     blood glucose (NO BRAND SPECIFIED) lancing device Use to test blood sugars 1 times daily or as directed.Brand per insurance 1 each 0     blood glucose calibration (NO BRAND SPECIFIED) solution Use to calibrate blood glucose monitor as directed. 1 each 0     blood glucose monitoring (ACCU-CHEK FASTCLIX) lancets Use to test blood sugar 1 time daily or as directed. 1 Box 3     blood glucose monitoring (NO BRAND SPECIFIED) meter device kit Use to test blood sugar 1 times daily or as directed. Brand per insurance 1 kit 0     blood glucose monitoring (NO BRAND SPECIFIED) test strip Use to test  blood sugars 1 times daily or as directed Brand per insurance 100 strip 0     CALCIUM 500 MG OR TABS Takes 3 tabs daily takees total of 1200 mg daily       carvedilol (COREG) 25 MG tablet TAKE 1 TABLET BY MOUTH TWICE DAILY WITH MEALS 60 tablet 0     celecoxib (CELEBREX) 200 MG capsule Take 1 capsule (200 mg) by mouth every morning (Patient taking differently: Take 200 mg by mouth daily as needed ) 90 capsule 3     Cholecalciferol (VITAMIN D) 2000 UNITS tablet Take 1 tablet by mouth daily.       CIPROFLOXACIN PO For dental appointmetns.       clobetasol (TEMOVATE) 0.05 % cream Apply topically 2 times daily       co-enzyme Q-10 50 MG CAPS Take 1 capsule by mouth daily.       cycloSPORINE (RESTASIS) 0.05 % ophthalmic emulsion 1 drop 2 times daily       Ferrous Sulfate (IRON SUPPLEMENT PO) Take 325 mg by mouth daily (with breakfast)       lancets 28G MISC Use to test 1 time daily or as directed. Brand per insurance. 100 each 0     loratadine (CLARITIN) 10 MG tablet Take 10 mg by mouth daily       losartan (COZAAR) 50 MG tablet TAKE 1 TABLET BY MOUTH ONCE DAILY 30 tablet 0     MULTIVITAMIN TABS   OR 1 qd  0     rosuvastatin (CRESTOR) 10 MG tablet TAKE 1 TABLET BY MOUTH EVERY OTHER DAY 15 tablet 0     spironolactone (ALDACTONE) 25 MG tablet Take 0.5 tablets (12.5 mg) by mouth daily 30 tablet 3     venlafaxine (EFFEXOR-XR) 75 MG 24 hr capsule Take 3 capsules (225 mg) by mouth daily 270 capsule 3     fluticasone (FLONASE) 50 MCG/ACT nasal spray Spray 1-2 sprays into both nostrils daily (Patient not taking: Reported on 10/10/2018) 48 g 3     Allergies   Allergen Reactions     Prochlorperazine      Other reaction(s): Tardive Dyskinesia     Amoxicillin Hives     Clindamycin Other (See Comments)     Burning sensation in chest     Decadron      flushing     Dexamethasone      Other reaction(s): Erythema     Compazine Anxiety     Recent Labs   Lab Test  09/26/18   1014  09/25/18   0930  02/13/18   0833  11/09/17   1149   "12/22/16   0925   08/06/15   1034   A1C  5.6   --   5.4   --   4.9   < >   --    LDL   --   84  160*   --   69   < >   --    HDL   --   33*  34*   --   35*   < >   --    TRIG   --   246*  276*   --   264*   < >   --    ALT   --   44   --   78*  28   < >  35   CR   --   0.93   --   1.00  1.02   < >  0.93   GFRESTIMATED   --   59*   --   55*  54*   < >  60*   GFRESTBLACK   --   72   --   66  65   < >  72   POTASSIUM   --   4.0   --   3.9  4.0   < >  3.9   TSH   --    --   1.89   --    --    --   1.15    < > = values in this interval not displayed.      BP Readings from Last 3 Encounters:   10/10/18 102/56   09/26/18 108/58   09/25/18 104/64    Wt Readings from Last 3 Encounters:   10/10/18 92.3 kg (203 lb 6.4 oz)   09/26/18 92.1 kg (203 lb)   09/25/18 92.1 kg (203 lb)                  Labs reviewed in EPIC    Reviewed and updated as needed this visit by clinical staff  Tobacco  Allergies  Meds  Med Hx  Surg Hx  Fam Hx  Soc Hx      Reviewed and updated as needed this visit by Provider         ROS:  Constitutional, HEENT, cardiovascular, pulmonary, GI, , musculoskeletal, neuro, skin, endocrine and psych systems are negative, except as otherwise noted.    This document serves as a record of the services and decisions personally performed and made by Christin Diehl MD. It was created on her behalf by Sulma Moreno, a trained medical scribe. The creation of this document is based the provider's statements to the medical scribe.    Sulma Moreno October 10, 2018 9:58 AM  OBJECTIVE:     /56 (BP Location: Right arm, Patient Position: Sitting, Cuff Size: Adult Regular)  Pulse 78  Temp 98.4  F (36.9  C) (Oral)  Ht 1.651 m (5' 5\")  Wt 92.3 kg (203 lb 6.4 oz)  SpO2 94%  BMI 33.85 kg/m2  Body mass index is 33.85 kg/(m^2).  GENERAL: , alert and no distress  NECK: no adenopathy, no asymmetry, masses, or scars and thyroid normal to palpation  RESP: lungs clear to auscultation - no rales, rhonchi or " wheezes  CV: regular rate and rhythm, normal S1 S2, no S3 or S4, no murmur, click or rub, no peripheral edema   ABDOMEN: soft, nontender, no hepatosplenomegaly, no masses and bowel sounds normal  MS: no gross musculoskeletal defects noted, no edema  PSYCH: mentation appears normal, affect normal/bright    Diagnostic Test Results:  none     ASSESSMENT/PLAN:       (I50.22) Chronic systolic congestive heart failure (H)  (primary encounter diagnosis)  -- currently euvolemic, tolerating medications well  -recently started spironolactone, has f/u with cardiology next week   Plan: CBC with platelets          (F41.1) Generalized anxiety disorder  -- okay for patient to use Xanax prn as long as it is not daily   -- referral for therapist since patient is under significant life changes   -- encouraged to continue setting boundaries with      (R07.81) Rib pain on left side  -- as this is intermittent pain and frequently positional I suspect it is musculoskeletal pain    (E11.9) Type 2 diabetes mellitus without complication, without long-term current use of insulin (H)  -- diet controlled currently; A1c 5.6; patient to check meter since high sugar readings do not match a1c   -- continue without changes     (L30.9) Eczema, unspecified type  -- advised patient using clobetasol every other day with aquaphor in between   -- if not getting better advised to go back to derm   Plan: clobetasol (TEMOVATE) 0.05 % cream      (I42.8) Idiopathic cardiomyopathy (H)  -- stable; continue without changes    Plan: carvedilol (COREG) 25 MG tablet            (E78.5) Hyperlipidemia LDL goal <130  -- controlled; continue without changes   Plan: rosuvastatin (CRESTOR) 10 MG tablet          (Z78.0) Asymptomatic postmenopausal status  -- due for DEXA   Plan: DEXA HIP/PELVIS/SPINE - Future      Follow up on or after February 20th for physical or as needed.             The information in this document, created by the medical scribe for me,  accurately reflects the services I personally performed and the decisions made by me. I have reviewed and approved this document for accuracy prior to leaving the patient care area.  Christin Diehl MD  [facility]

## 2018-10-10 NOTE — PATIENT INSTRUCTIONS
Okay to remind your  about the boundary with talking about hockey.     Consider working with a therapist.     Okay to continue with the Xanax intermittently.     Aquaphor ointment on days you do not use clobetasol, put on white cotton gloves and sleep with them on.     [facility] in [place]: [facility] in [place]:   [phone] [address], [place] [address]       Physical on or after February 20th.

## 2018-10-10 NOTE — MR AVS SNAPSHOT
After Visit Summary   10/10/2018    Kristie Jones    MRN: [id number]           Patient Information     Date Of Birth          1945        Visit Information        Provider Department      10/10/2018 9:40 AM Christin Diehl MD [facility]        Today's Diagnoses     Chronic systolic congestive heart failure (H)    -  1    Generalized anxiety disorder        Rib pain on left side        Type 2 diabetes mellitus without complication, without long-term current use of insulin (H)        Eczema, unspecified type        Idiopathic cardiomyopathy (H)        Hyperlipidemia LDL goal <130        Asymptomatic postmenopausal status          Care Instructions    Okay to remind your  about the boundary with talking about hockey.     Consider working with a therapist.     Okay to continue with the Xanax intermittently.     Aquaphor ointment on days you do not use clobetasol, put on white cotton gloves and sleep with them on.     [facility] in [place]: [facility] in [place]:   [phone] [address], [place] [address]       Physical on or after February 20th.            Follow-ups after your visit        Follow-up notes from your care team     Return in 4 months (on 2/20/2019) for Physical Exam.      Your next 10 appointments already scheduled     Oct 17, 2018  2:30 PM CDT   LAB with RU LAB   [facility] [place] ([facility])    [address]  [place] [address] [phone]           Please do not eat 10-12 hours before your appointment if you are coming in fasting for labs on lipids, cholesterol, or glucose (sugar). This does not apply to pregnant women. Water, hot tea and black coffee (with nothing added) are okay. Do not drink other fluids, diet soda or chew gum.            Oct 17, 2018  3:10 PM CDT   Return Visit with WINNIE Silva CNP   [facility] ([facility])    [address]  [place] [address]   [phone]            Nov 07, 2018  2:20 PM CST   ICD Check with RU DCR2   [facility] ([facility])    [address]  [place] [address]   [phone]              Future tests that were ordered for you today     Open Future Orders        Priority Expected Expires Ordered    DEXA HIP/PELVIS/SPINE - Future Routine  10/10/2019 10/10/2018    CBC with platelets Routine  11/10/2018 10/10/2018            Who to contact     If you have questions or need follow up information about today's clinic visit or your schedule please contact [facility] [place] directly at [phone].  Normal or non-critical lab and imaging results will be communicated to you by [employer], letter or phone within 4 business days after the clinic has received the results. If you do not hear from us within 7 days, please contact the clinic through [employer] or phone. If you have a critical or abnormal lab result, we will notify you by phone as soon as possible.  Submit refill requests through [employer] or call your pharmacy and they will forward the refill request to us. Please allow 3 business days for your refill to be completed.          Additional Information About Your Visit        [employer] Information     [employer] gives you secure access to your electronic health record. If you see a primary care provider, you can also send messages to your care team and make appointments. If you have questions, please call your primary care clinic.  If you do not have a primary care provider, please call [phone] and they will assist you.        Care EveryWhere ID     This is your Care EveryWhere ID. This could be used by other organizations to access your [facility] medical records  [id number]        Your Vitals Were     Pulse Temperature Height Pulse Oximetry BMI  "(Body Mass Index)       78 98.4  F (36.9  C) (Oral) 5' 5\" (1.651 m) 94% 33.85 kg/m2        Blood Pressure from Last 3 Encounters:   10/10/18 102/56   09/26/18 108/58   09/25/18 104/64    Weight from Last 3 Encounters:   10/10/18 203 lb 6.4 oz (92.3 kg)   09/26/18 203 lb (92.1 kg)   09/25/18 203 lb (92.1 kg)                 Today's Medication Changes          These changes are accurate as of 10/10/18 10:28 AM.  If you have any questions, ask your nurse or doctor.               These medicines have changed or have updated prescriptions.        Dose/Directions    acetaminophen 650 MG 8 hour tablet   This may have changed:  additional instructions   Used for:  Status post total right knee replacement        Dose:  650 mg   Take 650 mg by mouth every 6 hours   Quantity:  100 tablet   Refills:  0       carvedilol 25 MG tablet   Commonly known as:  COREG   This may have changed:  See the new instructions.   Used for:  Idiopathic cardiomyopathy (H)   Changed by:  Christin Diehl MD        Dose:  25 mg   Take 1 tablet (25 mg) by mouth 2 times daily (with meals)   Quantity:  180 tablet   Refills:  1       celecoxib 200 MG capsule   Commonly known as:  celeBREX   This may have changed:    - when to take this  - reasons to take this   Used for:  Status post total right knee replacement        Dose:  200 mg   Take 1 capsule (200 mg) by mouth every morning   Quantity:  90 capsule   Refills:  3            Where to get your medicines      These medications were sent to [id number]  [place] [address]  [address] [facility] [address]     Phone:  [phone]     carvedilol 25 MG tablet    rosuvastatin 10 MG tablet                Primary Care Provider Office Phone # Fax #    Christin Diehl [phone] [address] [place] [address]        Equal Access to Services     MAHNAZ GALEANO AH: Hadii salbador Tatum, waaxda luqadaha, qaybta kaalmacarla " dolly chiukenyon patino'aan ah. [facility] [phone].    ATENCIÓN: Si habla rene, tiene a robles disposición servicios gratuitos de asistencia lingüística. Pratima al [phone].    We comply with applicable federal civil rights laws and [place] laws. We do not discriminate on the basis of race, color, national origin, age, disability, sex, sexual orientation, or gender identity.            Thank you!     Thank you for choosing [facility] [place]  for your care. Our goal is always to provide you with excellent care. Hearing back from our patients is one way we can continue to improve our services. Please take a few minutes to complete the written survey that you may receive in the mail after your visit with us. Thank you!             Your Updated Medication List - Protect others around you: Learn how to safely use, store and throw away your medicines at www.disposemymeds.org.          This list is accurate as of 10/10/18 10:28 AM.  Always use your most recent med list.                   Brand Name Dispense Instructions for use Diagnosis    acetaminophen 650 MG 8 hour tablet     100 tablet    Take 650 mg by mouth every 6 hours    Status post total right knee replacement       ALPRAZolam 0.25 MG tablet    XANAX    30 tablet    Take 1 tablet (0.25 mg) by mouth 3 times daily as needed for anxiety    Generalized anxiety disorder       blood glucose calibration solution    no brand specified    1 each    Use to calibrate blood glucose monitor as directed.    Type 2 diabetes mellitus without complication, without long-term current use of insulin (H)       blood glucose lancing device    no brand specified    1 each    Use to test blood sugars 1 times daily or as directed.Brand per insurance    Type 2 diabetes mellitus without complication, without long-term current use of insulin (H)       * blood glucose monitoring lancets     1 Box    Use to test blood sugar 1 time daily or as directed.    Type  2 diabetes, HbA1C goal < 8% (H)       * lancets 28G Misc     100 each    Use to test 1 time daily or as directed. Brand per insurance.    Type 2 diabetes mellitus without complication, without long-term current use of insulin (H)       blood glucose monitoring meter device kit    no brand specified    1 kit    Use to test blood sugar 1 times daily or as directed. Brand per insurance    Type 2 diabetes mellitus without complication, without long-term current use of insulin (H)       blood glucose monitoring test strip    no brand specified    100 strip    Use to test blood sugars 1 times daily or as directed Brand per insurance    Type 2 diabetes mellitus without complication, without long-term current use of insulin (H)       calcium carbonate 500 mg (elemental) 500 MG tablet    OS-WILEY     Takes 3 tabs daily takees total of 1200 mg daily        carvedilol 25 MG tablet    COREG    180 tablet    Take 1 tablet (25 mg) by mouth 2 times daily (with meals)    Idiopathic cardiomyopathy (H)       celecoxib 200 MG capsule    celeBREX    90 capsule    Take 1 capsule (200 mg) by mouth every morning    Status post total right knee replacement       CIPROFLOXACIN PO      For dental appointmetns.        clobetasol 0.05 % cream    TEMOVATE     Apply topically 2 times daily    Eczema, unspecified type       co-enzyme Q-10 50 MG Caps      Take 1 capsule by mouth daily.        fluticasone 50 MCG/ACT spray    FLONASE    48 g    Spray 1-2 sprays into both nostrils daily    PND (post-nasal drip)       IRON SUPPLEMENT PO      Take 325 mg by mouth daily (with breakfast)        loratadine 10 MG tablet    CLARITIN     Take 10 mg by mouth daily        losartan 50 MG tablet    COZAAR    30 tablet    TAKE 1 TABLET BY MOUTH ONCE DAILY    Idiopathic cardiomyopathy (H)       MULTIVITAMIN TABS   OR      1 qd        RESTASIS 0.05 % ophthalmic emulsion   Generic drug:  cycloSPORINE      1 drop 2 times daily        rosuvastatin 10 MG tablet     CRESTOR    45 tablet    TAKE 1 TABLET BY MOUTH EVERY OTHER DAY    Hyperlipidemia LDL goal <130       spironolactone 25 MG tablet    ALDACTONE    30 tablet    Take 0.5 tablets (12.5 mg) by mouth daily    Idiopathic cardiomyopathy (H)       venlafaxine 75 MG 24 hr capsule    EFFEXOR-XR    270 capsule    Take 3 capsules (225 mg) by mouth daily    Generalized anxiety disorder       vitamin D 2000 units tablet      Take 1 tablet by mouth daily.    Obesity, unspecified       * Notice:  This list has 2 medication(s) that are the same as other medications prescribed for you. Read the directions carefully, and ask your doctor or other care provider to review them with you.

## 2018-10-17 ENCOUNTER — OFFICE VISIT (OUTPATIENT)
Dept: CARDIOLOGY | Facility: CLINIC | Age: 73
End: 2018-10-17
Attending: INTERNAL MEDICINE
Payer: COMMERCIAL

## 2018-10-17 VITALS
SYSTOLIC BLOOD PRESSURE: 102 MMHG | WEIGHT: 206 LBS | DIASTOLIC BLOOD PRESSURE: 68 MMHG | HEART RATE: 64 BPM | HEIGHT: 65 IN | BODY MASS INDEX: 34.32 KG/M2

## 2018-10-17 DIAGNOSIS — I51.9 LEFT VENTRICULAR SYSTOLIC DYSFUNCTION: ICD-10-CM

## 2018-10-17 DIAGNOSIS — G47.33 OSA (OBSTRUCTIVE SLEEP APNEA): ICD-10-CM

## 2018-10-17 DIAGNOSIS — I42.9 IDIOPATHIC CARDIOMYOPATHY (H): ICD-10-CM

## 2018-10-17 DIAGNOSIS — E78.5 HYPERLIPIDEMIA LDL GOAL <130: ICD-10-CM

## 2018-10-17 LAB
ANION GAP SERPL CALCULATED.3IONS-SCNC: 7 MMOL/L (ref 3–14)
BUN SERPL-MCNC: 18 MG/DL (ref 7–30)
CALCIUM SERPL-MCNC: 8.7 MG/DL (ref 8.5–10.1)
CHLORIDE SERPL-SCNC: 107 MMOL/L (ref 94–109)
CO2 SERPL-SCNC: 26 MMOL/L (ref 20–32)
CREAT SERPL-MCNC: 1.05 MG/DL (ref 0.52–1.04)
GFR SERPL CREATININE-BSD FRML MDRD: 52 ML/MIN/1.7M2
GLUCOSE SERPL-MCNC: 72 MG/DL (ref 70–99)
POTASSIUM SERPL-SCNC: 4.4 MMOL/L (ref 3.4–5.3)
SODIUM SERPL-SCNC: 140 MMOL/L (ref 133–144)

## 2018-10-17 PROCEDURE — 80048 BASIC METABOLIC PNL TOTAL CA: CPT | Performed by: INTERNAL MEDICINE

## 2018-10-17 PROCEDURE — 36415 COLL VENOUS BLD VENIPUNCTURE: CPT | Performed by: INTERNAL MEDICINE

## 2018-10-17 PROCEDURE — 99214 OFFICE O/P EST MOD 30 MIN: CPT | Performed by: NURSE PRACTITIONER

## 2018-10-17 NOTE — PROGRESS NOTES
Service Date: 10/17/2018      HISTORY OF PRESENT ILLNESS:  This 73-year-old female presents to the [facility] today for a followup visit.  She is a patient of Dr. Osborne seen in our clinic for idiopathic cardiomyopathy, treated sleep apnea, status post ICD.      Kristie Mcknight has been followed in our clinic since 2002 when she was initially diagnosed with idiopathic cardiomyopathy in [place].  Coronary angiography at that time showed no significant obstructive coronary artery disease.  Since then she has undergone stress echocardiograms and CT angiogram done years ago.  The CTA demonstrated moderate ostial right coronary artery disease.  Her left ventricular ejection fraction has been anywhere from 25%-35% and quite stable over the years with global hypokinesia.  In 2012, she underwent BiV ICD for primary prevention.  Recently her Bi-V was turned off due to narrowing of her QRS complex.  Over the years she has not had any significant heart failure symptoms.  She has used CPAP for a number of years for her sleep apnea.    Last month, Kristie Mcknight saw Dr. Osborne and had some mild shortness of breath and fatigue.  She did admit to being under quite a bit of stress due to family issues.  An echocardiogram demonstrated a stable left ventricular ejection fraction of 25%-30% with some possible mild to moderate right ventricular enlargement.  However, the images were suboptimal.  He asked her to initiate low-dose spironolactone.  She returns today for BMP and reassessment.      Kristie Mcknight tells me she is doing fairly well.  Since the new medication adjustment, she does feel like her breathing is easier.  She does tend to tire out in the afternoons, but she is much more active helping care for her grandchildren.  She has not felt any chest pain or significant shortness of breath.  She denies palpitations or near-syncope.  Occasionally, she will get some mild lightheadedness if she is walking quickly.   This has been an issue for her in the past.      I reviewed her laboratory work today showing a sodium of 140, potassium 4.4, BUN of 18, creatinine 1.05.      Her blood pressure is 102/68, heart rate is 64 beats per minute and is regular.  Her lungs are clear.  There is no peripheral edema.  Weight fairly stable at 206 pounds, placing her BMI at 34.      IMPRESSION AND PLAN:   1.  Longstanding IIdiopathic cardiomyopathy.  Left ventricular ejection fraction is fairly stable around 25%-30% with global hypokinesia and possibly some right ventricular enlargement.  Recent echocardiogram did have suboptimal imaging.  Due to some recent shortness of breath, spironolactone was added.  Dr. Osborne did not feel like she had any significant heart failure signs and symptoms and that likely her shortness of breath is due to increased stress due to family issues.  However, she has tolerated initiation of low dose spironolactone.  Creatinine is 1.05 with the potassium 4.4.  I would like to make sure that her creatinine does not trend up and will have repeat BMP in a couple of months and I will review with her over the phone.  Kimmy does state that she has a little less shortness of breath since starting spironolactone.  I do not see any significant signs and symptoms of heart failure today.  I encouraged low-salt diet, regular physical activity.   2.  Status post ICD for primary prevention.  There has been no shock from her device.  Recent interrogations did not show any ventricular high rates or shocks from her device.   3.  Treated sleep apnea.      Thanks for allowing me to participate in this patient's care.  We will continue her medications unchanged.  Otherwise, she will follow up with Dr. Osborne next year with an echocardiogram.  I have asked her to notify our clinic with worsening shortness of breath, chest pain, weight gain or other concerns that she may have during the interim.         WINNIE RIVERS, CNP             D:  10/17/2018   T: 10/17/2018   MT: GAIL      Name:     LEONID MCKEON   MRN:      [id number]        Account:      [id number]   :      1945           Service Date: 10/17/2018      Document: [id number]

## 2018-10-17 NOTE — LETTER
10/17/2018    Christin Diehl MD  [address] [place] [address]    RE: Kristie Mcknight Karen       Dear Colleague,    I had the pleasure of seeing Kristie Albarranjavier Jones in the [facility].    HPI and Plan: #[id number]  See dictation    Orders Placed This Encounter   Procedures     Basic metabolic panel     Follow-Up with Cardiologist     Echocardiogram       Orders Placed This Encounter   Medications     aspirin 81 MG tablet     Sig: Take 81 mg by mouth daily       There are no discontinued medications.      Encounter Diagnoses   Name Primary?     Hyperlipidemia LDL goal <130      Idiopathic cardiomyopathy (H)      SUSAN (obstructive sleep apnea)      Left ventricular systolic dysfunction:EF 35%        CURRENT MEDICATIONS:  Current Outpatient Prescriptions   Medication Sig Dispense Refill     acetaminophen 650 MG TABS Take 650 mg by mouth every 6 hours (Patient taking differently: Take 650 mg by mouth every 6 hours PRN) 100 tablet      ALPRAZolam (XANAX) 0.25 MG tablet Take 1 tablet (0.25 mg) by mouth 3 times daily as needed for anxiety 30 tablet 1     aspirin 81 MG tablet Take 81 mg by mouth daily       blood glucose (NO BRAND SPECIFIED) lancing device Use to test blood sugars 1 times daily or as directed.Brand per insurance 1 each 0     blood glucose calibration (NO BRAND SPECIFIED) solution Use to calibrate blood glucose monitor as directed. 1 each 0     blood glucose monitoring (ACCU-CHEK FASTCLIX) lancets Use to test blood sugar 1 time daily or as directed. 1 Box 3     blood glucose monitoring (NO BRAND SPECIFIED) meter device kit Use to test blood sugar 1 times daily or as directed. Brand per insurance 1 kit 0     blood glucose monitoring (NO BRAND SPECIFIED) test strip Use to test blood sugars 1 times daily or as directed Brand per insurance 100 strip 0     CALCIUM 500 MG OR TABS Takes 3 tabs daily takees total of 1200 mg daily       carvedilol (COREG) 25 MG tablet Take 1  tablet (25 mg) by mouth 2 times daily (with meals) 180 tablet 1     celecoxib (CELEBREX) 200 MG capsule Take 1 capsule (200 mg) by mouth every morning (Patient taking differently: Take 200 mg by mouth daily as needed ) 90 capsule 3     Cholecalciferol (VITAMIN D) 2000 UNITS tablet Take 1 tablet by mouth daily.       CIPROFLOXACIN PO For dental appointmetns.       clobetasol (TEMOVATE) 0.05 % cream Apply topically 2 times daily       co-enzyme Q-10 50 MG CAPS Take 1 capsule by mouth daily.       cycloSPORINE (RESTASIS) 0.05 % ophthalmic emulsion 1 drop 2 times daily       Ferrous Sulfate (IRON SUPPLEMENT PO) Take 325 mg by mouth daily (with breakfast)       lancets 28G MISC Use to test 1 time daily or as directed. Brand per insurance. 100 each 0     loratadine (CLARITIN) 10 MG tablet Take 10 mg by mouth daily       losartan (COZAAR) 50 MG tablet TAKE 1 TABLET BY MOUTH ONCE DAILY 30 tablet 0     MULTIVITAMIN TABS   OR 1 qd  0     rosuvastatin (CRESTOR) 10 MG tablet TAKE 1 TABLET BY MOUTH EVERY OTHER DAY 45 tablet 1     spironolactone (ALDACTONE) 25 MG tablet Take 0.5 tablets (12.5 mg) by mouth daily 30 tablet 3     venlafaxine (EFFEXOR-XR) 75 MG 24 hr capsule Take 3 capsules (225 mg) by mouth daily 270 capsule 3     fluticasone (FLONASE) 50 MCG/ACT nasal spray Spray 1-2 sprays into both nostrils daily (Patient not taking: Reported on 10/10/2018) 48 g 3       ALLERGIES     Allergies   Allergen Reactions     Prochlorperazine      Other reaction(s): Tardive Dyskinesia     Amoxicillin Hives     Clindamycin Other (See Comments)     Burning sensation in chest     Decadron      flushing     Dexamethasone      Other reaction(s): Erythema     Compazine Anxiety       PAST MEDICAL HISTORY:  Past Medical History:   Diagnosis Date     Acute posthemorrhagic anemia 9/11/2015     Anemia      Fibromyalgia      Gastro-oesophageal reflux disease      GENERAL OSTEOARTHROSIS [715.00] 11/23/2004    knee     Generalized anxiety disorder  9/30/2013     Hyperlipidemia LDL goal <130 2/10/2010    Failed simvastatin- muscle aches      Hypertension     because of the heart     Insomnia 1/19/2012     Iron deficiency anemia 8/12/2015     Problem list name updated by automated process. Provider to review     LBBB (left bundle branch block)      Left ventricular systolic dysfunction:EF 35% 2/5/2012    Must stay on diovan per cardiology      Major depressive disorder, recurrent episode, mild (H) 2/17/2016     Migraine 6/21/2005     Problem list name updated by automated process. Provider to review     Nonischemic cardiomyopathy (H)     EF 30-35%, Dr. Osborne [facility] (suspect virus); s/p AICD     NSVT (nonsustained ventricular tachycardia) (H)      Obesity 1/20/2012     SUSAN (obstructive sleep apnea) 01/19/2012    CPAP      Pure hypercholesterolemia      Restless leg syndrome      Type 2 diabetes mellitus without complication (H) 9/24/2016       PAST SURGICAL HISTORY:  Past Surgical History:   Procedure Laterality Date     APPENDECTOMY       ARTHROPLASTY KNEE  1/7/2013    Procedure: ARTHROPLASTY KNEE;  Right Total Knee Arthroplasty       ARTHROPLASTY REVISION KNEE Right 8/26/2015    Procedure: ARTHROPLASTY REVISION KNEE;  Surgeon: Néstor Beth MD;  Location: [facility] OR     ARTHROSCOPY KNEE       bunionectomy left foot       COLONOSCOPY       CORONARY ANGIOGRAPHY ADULT ORDER  2002    normal     CORONARY ANGIOGRAPHY ADULT ORDER  12/7/12    no significant focal narrowing that would benefit from mechanical intervention      HYSTERECTOMY       IMPLANT AUTOMATIC IMPLANTABLE CARDIOVERTER DEFIBRILLATOR  4/30/12     INSERT THORACIC PACEMAKER LEAD EPICARDIAL  5/1/2012    Procedure:INSERT THORACIC PACEMAKER LEAD EPICARDIAL; EPICARDIAL LEAD PLACEMENT; Surgeon:WEST ARECHIGA; Location:[facility] OR     TONSILLECTOMY       TRANSPLANT - corneal lenses      Bilateral for cataracts       FAMILY HISTORY:  Family History   Problem Relation Age of Onset     Cancer Father       intraabdominal mass - not colon cancer     C.A.D. 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 Multiple ignacio problems, multiple hospitalizations in last 7 years, anxiety        Pt has 2 daughters, 2 step-daughters's and  7 grandchildrens        Youngest daughter Sabi has malignant brain tumor                   Review of Systems:  Skin:  Positive for scaling sees derm for   Eyes:  Positive for   dry eyes  ENT:  Negative      Respiratory:  Positive for dyspnea on exertion;sleep apnea;CPAP dyspnea has improved   Cardiovascular:    Positive for;lightheadedness;fatigue    Gastroenterology: Negative      Genitourinary:  Negative      Musculoskeletal:  Negative      Neurologic:  Positive for numbness or tingling of hands   "  Psychiatric:  Positive for anxiety;excessive stress;sleep disturbances daughter has brain cancer  Heme/Lymph/Imm:  Negative      Endocrine:  Positive for diabetes      Physical Exam:  Vitals: /68 (BP Location: Right arm, Patient Position: Chair, Cuff Size: Adult Large)  Pulse 64  Ht 1.651 m (5' 5\")  Wt 93.4 kg (206 lb)  BMI 34.28 kg/m2    Constitutional:  cooperative;in no acute distress obese      Skin:  warm and dry to the touch   pacemaker incision in the left infraclavicular area was well-healed      Head:  normocephalic        Eyes:  pupils equal and round        Lymph:      ENT:  no pallor or cyanosis, dentition good        Neck:  JVP normal;no carotid bruit        Respiratory:  clear to auscultation;normal respiratory excursion         Cardiac: regular rhythm;normal S1 and S2   distant heart sounds            pulses full and equal                                        GI:  abdomen soft obese      Extremities and Muscular Skeletal:  no deformities, clubbing, cyanosis, erythema observed;no edema              Neurological:  affect appropriate        Psych:  Alert and Oriented x 3          CC  Ciaran Osborne MD  [id number]  [place] [id number]                    Thank you for allowing me to participate in the care of your patient.      Sincerely,     WINNIE Li [facility]    cc:   Ciaran Osborne MD  [id number]  [place] [id number]        "

## 2018-10-17 NOTE — PROGRESS NOTES
HPI and Plan: #[id number]  See dictation    Orders Placed This Encounter   Procedures     Basic metabolic panel     Follow-Up with Cardiologist     Echocardiogram       Orders Placed This Encounter   Medications     aspirin 81 MG tablet     Sig: Take 81 mg by mouth daily       There are no discontinued medications.      Encounter Diagnoses   Name Primary?     Hyperlipidemia LDL goal <130      Idiopathic cardiomyopathy (H)      SUSAN (obstructive sleep apnea)      Left ventricular systolic dysfunction:EF 35%        CURRENT MEDICATIONS:  Current Outpatient Prescriptions   Medication Sig Dispense Refill     acetaminophen 650 MG TABS Take 650 mg by mouth every 6 hours (Patient taking differently: Take 650 mg by mouth every 6 hours PRN) 100 tablet      ALPRAZolam (XANAX) 0.25 MG tablet Take 1 tablet (0.25 mg) by mouth 3 times daily as needed for anxiety 30 tablet 1     aspirin 81 MG tablet Take 81 mg by mouth daily       blood glucose (NO BRAND SPECIFIED) lancing device Use to test blood sugars 1 times daily or as directed.Brand per insurance 1 each 0     blood glucose calibration (NO BRAND SPECIFIED) solution Use to calibrate blood glucose monitor as directed. 1 each 0     blood glucose monitoring (ACCU-CHEK FASTCLIX) lancets Use to test blood sugar 1 time daily or as directed. 1 Box 3     blood glucose monitoring (NO BRAND SPECIFIED) meter device kit Use to test blood sugar 1 times daily or as directed. Brand per insurance 1 kit 0     blood glucose monitoring (NO BRAND SPECIFIED) test strip Use to test blood sugars 1 times daily or as directed Brand per insurance 100 strip 0     CALCIUM 500 MG OR TABS Takes 3 tabs daily takees total of 1200 mg daily       carvedilol (COREG) 25 MG tablet Take 1 tablet (25 mg) by mouth 2 times daily (with meals) 180 tablet 1     celecoxib (CELEBREX) 200 MG capsule Take 1 capsule (200 mg) by mouth every morning (Patient taking differently: Take 200 mg by mouth daily as needed ) 90 capsule 3      Cholecalciferol (VITAMIN D) 2000 UNITS tablet Take 1 tablet by mouth daily.       CIPROFLOXACIN PO For dental appointmetns.       clobetasol (TEMOVATE) 0.05 % cream Apply topically 2 times daily       co-enzyme Q-10 50 MG CAPS Take 1 capsule by mouth daily.       cycloSPORINE (RESTASIS) 0.05 % ophthalmic emulsion 1 drop 2 times daily       Ferrous Sulfate (IRON SUPPLEMENT PO) Take 325 mg by mouth daily (with breakfast)       lancets 28G MISC Use to test 1 time daily or as directed. Brand per insurance. 100 each 0     loratadine (CLARITIN) 10 MG tablet Take 10 mg by mouth daily       losartan (COZAAR) 50 MG tablet TAKE 1 TABLET BY MOUTH ONCE DAILY 30 tablet 0     MULTIVITAMIN TABS   OR 1 qd  0     rosuvastatin (CRESTOR) 10 MG tablet TAKE 1 TABLET BY MOUTH EVERY OTHER DAY 45 tablet 1     spironolactone (ALDACTONE) 25 MG tablet Take 0.5 tablets (12.5 mg) by mouth daily 30 tablet 3     venlafaxine (EFFEXOR-XR) 75 MG 24 hr capsule Take 3 capsules (225 mg) by mouth daily 270 capsule 3     fluticasone (FLONASE) 50 MCG/ACT nasal spray Spray 1-2 sprays into both nostrils daily (Patient not taking: Reported on 10/10/2018) 48 g 3       ALLERGIES     Allergies   Allergen Reactions     Prochlorperazine      Other reaction(s): Tardive Dyskinesia     Amoxicillin Hives     Clindamycin Other (See Comments)     Burning sensation in chest     Decadron      flushing     Dexamethasone      Other reaction(s): Erythema     Compazine Anxiety       PAST MEDICAL HISTORY:  Past Medical History:   Diagnosis Date     Acute posthemorrhagic anemia 9/11/2015     Anemia      Fibromyalgia      Gastro-oesophageal reflux disease      GENERAL OSTEOARTHROSIS [715.00] 11/23/2004    knee     Generalized anxiety disorder 9/30/2013     Hyperlipidemia LDL goal <130 2/10/2010    Failed simvastatin- muscle aches      Hypertension     because of the heart     Insomnia 1/19/2012     Iron deficiency anemia 8/12/2015     Problem list name updated by automated  process. Provider to review     LBBB (left bundle branch block)      Left ventricular systolic dysfunction:EF 35% 2/5/2012    Must stay on diovan per cardiology      Major depressive disorder, recurrent episode, mild (H) 2/17/2016     Migraine 6/21/2005     Problem list name updated by automated process. Provider to review     Nonischemic cardiomyopathy (H)     EF 30-35%, Dr. Osborne [facility] (suspect virus); s/p AICD     NSVT (nonsustained ventricular tachycardia) (H)      Obesity 1/20/2012     SUSAN (obstructive sleep apnea) 01/19/2012    CPAP      Pure hypercholesterolemia      Restless leg syndrome      Type 2 diabetes mellitus without complication (H) 9/24/2016       PAST SURGICAL HISTORY:  Past Surgical History:   Procedure Laterality Date     APPENDECTOMY       ARTHROPLASTY KNEE  1/7/2013    Procedure: ARTHROPLASTY KNEE;  Right Total Knee Arthroplasty       ARTHROPLASTY REVISION KNEE Right 8/26/2015    Procedure: ARTHROPLASTY REVISION KNEE;  Surgeon: Néstor Beth MD;  Location: [facility] OR     ARTHROSCOPY KNEE       bunionectomy left foot       COLONOSCOPY       CORONARY ANGIOGRAPHY ADULT ORDER  2002    normal     CORONARY ANGIOGRAPHY ADULT ORDER  12/7/12    no significant focal narrowing that would benefit from mechanical intervention      HYSTERECTOMY       IMPLANT AUTOMATIC IMPLANTABLE CARDIOVERTER DEFIBRILLATOR  4/30/12     INSERT THORACIC PACEMAKER LEAD EPICARDIAL  5/1/2012    Procedure:INSERT THORACIC PACEMAKER LEAD EPICARDIAL; EPICARDIAL LEAD PLACEMENT; Surgeon:WEST ARECHIGA; Location:[facility] OR     TONSILLECTOMY       TRANSPLANT - corneal lenses      Bilateral for cataracts       FAMILY HISTORY:  Family History   Problem Relation Age of Onset     Cancer Father      intraabdominal mass - not colon cancer     C.A.D. 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 Multiple ignacio problems, multiple hospitalizations in last 7 years, anxiety        Pt has 2 daughters, 2 step-daughters's and  7 grandchildrens        Youngest daughter Sabi has malignant brain tumor                   Review of Systems:  Skin:  Positive for scaling sees derm for   Eyes:  Positive for   dry eyes  ENT:  Negative      Respiratory:  Positive for dyspnea on exertion;sleep apnea;CPAP dyspnea has improved   Cardiovascular:    Positive for;lightheadedness;fatigue    Gastroenterology: Negative      Genitourinary:  Negative      Musculoskeletal:  Negative      Neurologic:  Positive for numbness or tingling of hands    Psychiatric:  Positive for anxiety;excessive stress;sleep disturbances daughter has brain cancer  Heme/Lymph/Imm:  Negative      Endocrine:  Positive for diabetes      Physical Exam:  Vitals: /68 (BP Location: Right arm, Patient Position: Chair, Cuff  "Size: Adult Large)  Pulse 64  Ht 1.651 m (5' 5\")  Wt 93.4 kg (206 lb)  BMI 34.28 kg/m2    Constitutional:  cooperative;in no acute distress obese      Skin:  warm and dry to the touch   pacemaker incision in the left infraclavicular area was well-healed      Head:  normocephalic        Eyes:  pupils equal and round        Lymph:      ENT:  no pallor or cyanosis, dentition good        Neck:  JVP normal;no carotid bruit        Respiratory:  clear to auscultation;normal respiratory excursion         Cardiac: regular rhythm;normal S1 and S2   distant heart sounds            pulses full and equal                                        GI:  abdomen soft obese      Extremities and Muscular Skeletal:  no deformities, clubbing, cyanosis, erythema observed;no edema              Neurological:  affect appropriate        Psych:  Alert and Oriented x 3          CC  Ciaran Osborne MD  [id number]  [place] [id number]                  "

## 2018-10-17 NOTE — MR AVS SNAPSHOT
After Visit Summary   10/17/2018    Kristie Jones    MRN: [id number]           Patient Information     Date Of Birth          1945        Visit Information        Provider Department      10/17/2018 3:10 PM Merlyn Arango APRN CNP [facility]        Today's Diagnoses     Hyperlipidemia LDL goal <130        Idiopathic cardiomyopathy (H)        SUSAN (obstructive sleep apnea)        Left ventricular systolic dysfunction:EF 35%           Follow-ups after your visit        Your next 10 appointments already scheduled     Nov 07, 2018  2:20 PM CST   ICD Check with RU DCR2   [facility] ([facility])    [id number] [place] [address] [address]  [facility] [id number] [phone]            Nov 15, 2018 12:45 PM CST   LAB with RU LAB   [facility] ([facility])    [id number] [place] [address] [address]  [facility] [id number] [phone]           Please do not eat 10-12 hours before your appointment if you are coming in fasting for labs on lipids, cholesterol, or glucose (sugar). This does not apply to pregnant women. Water, hot tea and black coffee (with nothing added) are okay. Do not drink other fluids, diet soda or chew gum.              Future tests that were ordered for you today     Open Future Orders        Priority Expected Expires Ordered    Basic metabolic panel Routine 11/16/2018 10/17/2019 10/17/2018            Who to contact     If you have questions or need follow up information about today's clinic visit or your schedule please contact [facility] directly at [phone].  Normal or non-critical lab and imaging results will be communicated to you by MyChart, letter or phone within 4 business days after the clinic has received the results. If you do not hear from us within 7 days, please contact the clinic through  "MyChart or phone. If you have a critical or abnormal lab result, we will notify you by phone as soon as possible.  Submit refill requests through [employer] or call your pharmacy and they will forward the refill request to us. Please allow 3 business days for your refill to be completed.          Additional Information About Your Visit        [employer] Information     [employer] gives you secure access to your electronic health record. If you see a primary care provider, you can also send messages to your care team and make appointments. If you have questions, please call your primary care clinic.  If you do not have a primary care provider, please call [phone] and they will assist you.        Care EveryWhere ID     This is your Care EveryWhere ID. This could be used by other organizations to access your [facility] medical records  [id number]        Your Vitals Were     Pulse Height BMI (Body Mass Index)             64 1.651 m (5' 5\") 34.28 kg/m2          Blood Pressure from Last 3 Encounters:   10/17/18 102/68   10/10/18 102/56   09/26/18 108/58    Weight from Last 3 Encounters:   10/17/18 93.4 kg (206 lb)   10/10/18 92.3 kg (203 lb 6.4 oz)   09/26/18 92.1 kg (203 lb)              We Performed the Following     Follow-Up with Cardiac Advanced Practice Provider          Today's Medication Changes          These changes are accurate as of 10/17/18  3:43 PM.  If you have any questions, ask your nurse or doctor.               These medicines have changed or have updated prescriptions.        Dose/Directions    acetaminophen 650 MG 8 hour tablet   This may have changed:  additional instructions   Used for:  Status post total right knee replacement        Dose:  650 mg   Take 650 mg by mouth every 6 hours   Quantity:  100 tablet   Refills:  0       celecoxib 200 MG capsule   Commonly known as:  celeBREX   This may have changed:    - when to take this  - reasons to take this   Used for:  Status post total right knee " replacement        Dose:  200 mg   Take 1 capsule (200 mg) by mouth every morning   Quantity:  90 capsule   Refills:  3                Primary Care Provider Office Phone # Fax #    Christin Diehl [phone] [address] [place] [address]        Equal Access to Services     [facility]: Hadii aad ku hadasho Soomaali, waaxda luqadaha, qaybta kaalmada adeegyada, waxay idiin hayaan adeeg timoteolorena laradhan ah. So [facility] [phone].    ATENCIÓN: Si habla español, tiene a robles disposición servicios gratuitos de asistencia lingüística. Llame al [phone].    We comply with applicable federal civil rights laws and [place] laws. We do not discriminate on the basis of race, color, national origin, age, disability, sex, sexual orientation, or gender identity.            Thank you!     Thank you for choosing [facility]  for your care. Our goal is always to provide you with excellent care. Hearing back from our patients is one way we can continue to improve our services. Please take a few minutes to complete the written survey that you may receive in the mail after your visit with us. Thank you!             Your Updated Medication List - Protect others around you: Learn how to safely use, store and throw away your medicines at www.disposemymeds.org.          This list is accurate as of 10/17/18  3:43 PM.  Always use your most recent med list.                   Brand Name Dispense Instructions for use Diagnosis    acetaminophen 650 MG 8 hour tablet     100 tablet    Take 650 mg by mouth every 6 hours    Status post total right knee replacement       ALPRAZolam 0.25 MG tablet    XANAX    30 tablet    Take 1 tablet (0.25 mg) by mouth 3 times daily as needed for anxiety    Generalized anxiety disorder       aspirin 81 MG tablet      Take 81 mg by mouth daily        blood glucose calibration solution    no brand specified    1 each    Use to  calibrate blood glucose monitor as directed.    Type 2 diabetes mellitus without complication, without long-term current use of insulin (H)       blood glucose lancing device    no brand specified    1 each    Use to test blood sugars 1 times daily or as directed.Brand per insurance    Type 2 diabetes mellitus without complication, without long-term current use of insulin (H)       * blood glucose monitoring lancets     1 Box    Use to test blood sugar 1 time daily or as directed.    Type 2 diabetes, HbA1C goal < 8% (H)       * lancets 28G Misc     100 each    Use to test 1 time daily or as directed. Brand per insurance.    Type 2 diabetes mellitus without complication, without long-term current use of insulin (H)       blood glucose monitoring meter device kit    no brand specified    1 kit    Use to test blood sugar 1 times daily or as directed. Brand per insurance    Type 2 diabetes mellitus without complication, without long-term current use of insulin (H)       blood glucose monitoring test strip    no brand specified    100 strip    Use to test blood sugars 1 times daily or as directed Brand per insurance    Type 2 diabetes mellitus without complication, without long-term current use of insulin (H)       calcium carbonate 500 mg (elemental) 500 MG tablet    OS-WILEY     Takes 3 tabs daily takees total of 1200 mg daily        carvedilol 25 MG tablet    COREG    180 tablet    Take 1 tablet (25 mg) by mouth 2 times daily (with meals)    Idiopathic cardiomyopathy (H)       celecoxib 200 MG capsule    celeBREX    90 capsule    Take 1 capsule (200 mg) by mouth every morning    Status post total right knee replacement       CIPROFLOXACIN PO      For dental appointmetns.        clobetasol 0.05 % cream    TEMOVATE     Apply topically 2 times daily    Eczema, unspecified type       co-enzyme Q-10 50 MG Caps      Take 1 capsule by mouth daily.        fluticasone 50 MCG/ACT spray    FLONASE    48 g    Spray 1-2 sprays into  both nostrils daily    PND (post-nasal drip)       IRON SUPPLEMENT PO      Take 325 mg by mouth daily (with breakfast)        loratadine 10 MG tablet    CLARITIN     Take 10 mg by mouth daily        losartan 50 MG tablet    COZAAR    30 tablet    TAKE 1 TABLET BY MOUTH ONCE DAILY    Idiopathic cardiomyopathy (H)       MULTIVITAMIN TABS   OR      1 qd        RESTASIS 0.05 % ophthalmic emulsion   Generic drug:  cycloSPORINE      1 drop 2 times daily        rosuvastatin 10 MG tablet    CRESTOR    45 tablet    TAKE 1 TABLET BY MOUTH EVERY OTHER DAY    Hyperlipidemia LDL goal <130       spironolactone 25 MG tablet    ALDACTONE    30 tablet    Take 0.5 tablets (12.5 mg) by mouth daily    Idiopathic cardiomyopathy (H)       venlafaxine 75 MG 24 hr capsule    EFFEXOR-XR    270 capsule    Take 3 capsules (225 mg) by mouth daily    Generalized anxiety disorder       vitamin D 2000 units tablet      Take 1 tablet by mouth daily.    Obesity, unspecified       * Notice:  This list has 2 medication(s) that are the same as other medications prescribed for you. Read the directions carefully, and ask your doctor or other care provider to review them with you.

## 2018-10-17 NOTE — LETTER
10/17/2018      Christin Diehl MD  [address] [place] [address]      RE: Kristie Mcknight Karen       Dear Colleague,    I had the pleasure of seeing Kristie Jones in the [facility].    Service Date: 10/17/2018      HISTORY OF PRESENT ILLNESS:  This 73-year-old female presents to the [facility] today for a followup visit.  She is a patient of Dr. Osborne seen in our clinic for idiopathic cardiomyopathy, treated sleep apnea, status post ICD.      Kristie Mcknight has been followed in our clinic since 2002 when she was initially diagnosed with idiopathic cardiomyopathy in [place].  Coronary angiography at that time showed no significant obstructive coronary artery disease.  Since then she has undergone stress echocardiograms and CT angiogram done years ago.  The CTA demonstrated moderate ostial right coronary artery disease.  Her left ventricular ejection fraction has been anywhere from 25%-35% and quite stable over the years with global hypokinesia.  In 2012, she underwent BiV ICD for primary prevention.  Recently her Bi-V was turned off due to narrowing of her QRS complex.  Over the years she has not had any significant heart failure symptoms.  She has used CPAP for a number of years for her sleep apnea.    Last month, Kristie Mcknight saw Dr. Osborne and had some mild shortness of breath and fatigue.  She did admit to being under quite a bit of stress due to family issues.  An echocardiogram demonstrated a stable left ventricular ejection fraction of 25%-30% with some possible mild to moderate right ventricular enlargement.  However, the images were suboptimal.  He asked her to initiate low-dose spironolactone.  She returns today for BMP and reassessment.      Kristie Mcknight tells me she is doing fairly well.  Since the new medication adjustment, she does feel like her breathing is easier.  She does tend to tire out in the afternoons, but she is much more  active helping care for her grandchildren.  She has not felt any chest pain or significant shortness of breath.  She denies palpitations or near-syncope.  Occasionally, she will get some mild lightheadedness if she is walking quickly.  This has been an issue for her in the past.      I reviewed her laboratory work today showing a sodium of 140, potassium 4.4, BUN of 18, creatinine 1.05.      Her blood pressure is 102/68, heart rate is 64 beats per minute and is regular.  Her lungs are clear.  There is no peripheral edema.  Weight fairly stable at 206 pounds, placing her BMI at 34.      IMPRESSION AND PLAN:   1.  Longstanding IIdiopathic cardiomyopathy.  Left ventricular ejection fraction is fairly stable around 25%-30% with global hypokinesia and possibly some right ventricular enlargement.  Recent echocardiogram did have suboptimal imaging.  Due to some recent shortness of breath, spironolactone was added.  Dr. Osborne did not feel like she had any significant heart failure signs and symptoms and that likely her shortness of breath is due to increased stress due to family issues.  However, she has tolerated initiation of low dose spironolactone.  Creatinine is 1.05 with the potassium 4.4.  I would like to make sure that her creatinine does not trend up and will have repeat BMP in a couple of months and I will review with her over the phone.  Kimmy does state that she has a little less shortness of breath since starting spironolactone.  I do not see any significant signs and symptoms of heart failure today.  I encouraged low-salt diet, regular physical activity.   2.  Status post ICD for primary prevention.  There has been no shock from her device.  Recent interrogations did not show any ventricular high rates or shocks from her device.   3.  Treated sleep apnea.      Thanks for allowing me to participate in this patient's care.  We will continue her medications unchanged.  Otherwise, she will follow up with Dr. Osborne  next year with an echocardiogram.  I have asked her to notify our clinic with worsening shortness of breath, chest pain, weight gain or other concerns that she may have during the interim.         WINNIE RIVERS CNP             D: 10/17/2018   T: 10/17/2018   MT: GAIL      Name:     LEONID MCKEON   MRN:      [id number]        Account:      [id number]   :      1945           Service Date: 10/17/2018      Document: [id number]           Outpatient Encounter Prescriptions as of 10/17/2018   Medication Sig Dispense Refill     acetaminophen 650 MG TABS Take 650 mg by mouth every 6 hours (Patient taking differently: Take 650 mg by mouth every 6 hours PRN) 100 tablet      ALPRAZolam (XANAX) 0.25 MG tablet Take 1 tablet (0.25 mg) by mouth 3 times daily as needed for anxiety 30 tablet 1     aspirin 81 MG tablet Take 81 mg by mouth daily       blood glucose (NO BRAND SPECIFIED) lancing device Use to test blood sugars 1 times daily or as directed.Brand per insurance 1 each 0     blood glucose calibration (NO BRAND SPECIFIED) solution Use to calibrate blood glucose monitor as directed. 1 each 0     blood glucose monitoring (ACCU-CHEK FASTCLIX) lancets Use to test blood sugar 1 time daily or as directed. 1 Box 3     blood glucose monitoring (NO BRAND SPECIFIED) meter device kit Use to test blood sugar 1 times daily or as directed. Brand per insurance 1 kit 0     blood glucose monitoring (NO BRAND SPECIFIED) test strip Use to test blood sugars 1 times daily or as directed Brand per insurance 100 strip 0     CALCIUM 500 MG OR TABS Takes 3 tabs daily takees total of 1200 mg daily       carvedilol (COREG) 25 MG tablet Take 1 tablet (25 mg) by mouth 2 times daily (with meals) 180 tablet 1     celecoxib (CELEBREX) 200 MG capsule Take 1 capsule (200 mg) by mouth every morning (Patient taking differently: Take 200 mg by mouth daily as needed ) 90 capsule 3     Cholecalciferol (VITAMIN D) 2000 UNITS tablet Take 1 tablet  by mouth daily.       CIPROFLOXACIN PO For dental appointmetns.       clobetasol (TEMOVATE) 0.05 % cream Apply topically 2 times daily       co-enzyme Q-10 50 MG CAPS Take 1 capsule by mouth daily.       cycloSPORINE (RESTASIS) 0.05 % ophthalmic emulsion 1 drop 2 times daily       Ferrous Sulfate (IRON SUPPLEMENT PO) Take 325 mg by mouth daily (with breakfast)       lancets 28G MISC Use to test 1 time daily or as directed. Brand per insurance. 100 each 0     loratadine (CLARITIN) 10 MG tablet Take 10 mg by mouth daily       losartan (COZAAR) 50 MG tablet TAKE 1 TABLET BY MOUTH ONCE DAILY 30 tablet 0     MULTIVITAMIN TABS   OR 1 qd  0     rosuvastatin (CRESTOR) 10 MG tablet TAKE 1 TABLET BY MOUTH EVERY OTHER DAY 45 tablet 1     spironolactone (ALDACTONE) 25 MG tablet Take 0.5 tablets (12.5 mg) by mouth daily 30 tablet 3     venlafaxine (EFFEXOR-XR) 75 MG 24 hr capsule Take 3 capsules (225 mg) by mouth daily 270 capsule 3     fluticasone (FLONASE) 50 MCG/ACT nasal spray Spray 1-2 sprays into both nostrils daily (Patient not taking: Reported on 10/10/2018) 48 g 3     [DISCONTINUED] ALPRAZolam (XANAX) 0.25 MG tablet Take 1 tablet (0.25 mg) by mouth 3 times daily as needed 30 tablet 0     [DISCONTINUED] blood glucose monitoring (NO BRAND SPECIFIED) test strip Use to test blood sugar 1 time daily or as directed.  Using [employer] Smartview Test strips 90 strip 0     [DISCONTINUED] carvedilol (COREG) 25 MG tablet TAKE 1 TABLET BY MOUTH TWICE DAILY WITH MEALS 60 tablet 0     [DISCONTINUED] losartan (COZAAR) 50 MG tablet Take 1 tablet (50 mg) by mouth daily 30 tablet 1     [DISCONTINUED] rosuvastatin (CRESTOR) 10 MG tablet TAKE 1 TABLET BY MOUTH EVERY OTHER DAY 15 tablet 0     No facility-administered encounter medications on file as of 10/17/2018.        Again, thank you for allowing me to participate in the care of your patient.      Sincerely,    WINNIE Li [facility]

## 2018-11-15 ENCOUNTER — TELEPHONE (OUTPATIENT)
Dept: CARDIOLOGY | Facility: CLINIC | Age: 73
End: 2018-11-15

## 2018-11-15 ENCOUNTER — TELEPHONE (OUTPATIENT)
Dept: LAB | Facility: CLINIC | Age: 73
End: 2018-11-15

## 2018-11-15 DIAGNOSIS — I51.9 LEFT VENTRICULAR SYSTOLIC DYSFUNCTION: ICD-10-CM

## 2018-11-15 LAB
ANION GAP SERPL CALCULATED.3IONS-SCNC: 4 MMOL/L (ref 3–14)
BUN SERPL-MCNC: 15 MG/DL (ref 7–30)
CALCIUM SERPL-MCNC: 8.7 MG/DL (ref 8.5–10.1)
CHLORIDE SERPL-SCNC: 105 MMOL/L (ref 94–109)
CO2 SERPL-SCNC: 30 MMOL/L (ref 20–32)
CREAT SERPL-MCNC: 0.95 MG/DL (ref 0.52–1.04)
GFR SERPL CREATININE-BSD FRML MDRD: 58 ML/MIN/1.7M2
GLUCOSE SERPL-MCNC: 163 MG/DL (ref 70–99)
POTASSIUM SERPL-SCNC: 4.3 MMOL/L (ref 3.4–5.3)
SODIUM SERPL-SCNC: 139 MMOL/L (ref 133–144)

## 2018-11-15 PROCEDURE — 36415 COLL VENOUS BLD VENIPUNCTURE: CPT | Performed by: NURSE PRACTITIONER

## 2018-11-15 PROCEDURE — 80048 BASIC METABOLIC PNL TOTAL CA: CPT | Performed by: NURSE PRACTITIONER

## 2018-11-15 NOTE — TELEPHONE ENCOUNTER
Component      Latest Ref Rng & Units 10/17/2018 11/15/2018   Sodium      133 - 144 mmol/L 140 139   Potassium      3.4 - 5.3 mmol/L 4.4 4.3   Chloride      94 - 109 mmol/L 107 105   Carbon Dioxide      20 - 32 mmol/L 26 30   Anion Gap      3 - 14 mmol/L 7 4   Glucose      70 - 99 mg/dL 72 163 (H)   Urea Nitrogen      7 - 30 mg/dL 18 15   Creatinine      0.52 - 1.04 mg/dL 1.05 (H) 0.95   GFR Estimate      >60 mL/min/1.7m2 52 (L) 58 (L)   GFR Estimate If Black      >60 mL/min/1.7m2 62 70   Calcium      8.5 - 10.1 mg/dL 8.7 8.7     Spironolactone was added. Repeat BMP was ordered to make sure creatinine was not trending upward. Creatinine has improved and is now normal.      Next f/u  w/echo.     Messaged Merlyn Arango NP to review.     TGarbers JOSE ELIAS

## 2018-11-15 NOTE — LETTER
December 13, 2018       TO: Kristie Jones   [address] [address]       Dear Ms. Jones,    The results of your recent laboratory tests.    Results for orders placed or performed in visit on 11/15/18   Basic metabolic panel   Result Value Ref Range    Sodium 139 133 - 144 mmol/L    Potassium 4.3 3.4 - 5.3 mmol/L    Chloride 105 94 - 109 mmol/L    Carbon Dioxide 30 20 - 32 mmol/L    Anion Gap 4 3 - 14 mmol/L    Glucose 163 (H) 70 - 99 mg/dL    Urea Nitrogen 15 7 - 30 mg/dL    Creatinine 0.95 0.52 - 1.04 mg/dL    GFR Estimate 58 (L) >60 mL/min/1.7m2    GFR Estimate If Black 70 >60 mL/min/1.7m2    Calcium 8.7 8.5 - 10.1 mg/dL       Your test results fall within the expected ranges. Please continue your present medications. If you have any questions, please contact Odilia Arango NP's nurse at [phone].     [facility]

## 2018-11-20 NOTE — TELEPHONE ENCOUNTER
Called patient, patient was not available. I left patient a message to return my call. Rima MOCTEZUMA

## 2018-11-28 NOTE — TELEPHONE ENCOUNTER
Third attempt to reach patient.     Left patient another message to return my call.     Rima MOCTEZUMA

## 2018-12-13 NOTE — TELEPHONE ENCOUNTER
Patient did not return my call. Encounter closed. Results and recommendations were mailed to patient. Rima MOCTEZUMA

## 2019-01-11 ENCOUNTER — ANCILLARY PROCEDURE (OUTPATIENT)
Dept: CARDIOLOGY | Facility: CLINIC | Age: 74
End: 2019-01-11
Payer: COMMERCIAL

## 2019-01-11 DIAGNOSIS — Z95.810 ICD (IMPLANTABLE CARDIOVERTER-DEFIBRILLATOR) IN PLACE: ICD-10-CM

## 2019-01-11 PROCEDURE — 93289 INTERROG DEVICE EVAL HEART: CPT | Performed by: INTERNAL MEDICINE

## 2019-01-15 LAB
ICDO DEVICE COMMENTS: NORMAL
MDC_IDC_LEAD_IMPLANT_DT: NORMAL
MDC_IDC_LEAD_LOCATION: NORMAL
MDC_IDC_LEAD_LOCATION_DETAIL_1: NORMAL
MDC_IDC_LEAD_MFG: NORMAL
MDC_IDC_LEAD_MODEL: 4047
MDC_IDC_LEAD_MODEL: 4047
MDC_IDC_LEAD_MODEL: NORMAL
MDC_IDC_LEAD_MODEL: NORMAL
MDC_IDC_LEAD_POLARITY_TYPE: NORMAL
MDC_IDC_LEAD_SERIAL: NORMAL
MDC_IDC_MSMT_BATTERY_STATUS: NORMAL
MDC_IDC_MSMT_CAP_CHARGE_DTM: NORMAL
MDC_IDC_MSMT_CAP_CHARGE_ENERGY: 0 J
MDC_IDC_MSMT_CAP_CHARGE_TIME: 0 S
MDC_IDC_MSMT_CAP_CHARGE_TIME: 10.88
MDC_IDC_MSMT_CAP_CHARGE_TYPE: NORMAL
MDC_IDC_MSMT_CAP_CHARGE_TYPE: NORMAL
MDC_IDC_MSMT_LEADCHNL_LV_IMPEDANCE_VALUE: 385 OHM
MDC_IDC_MSMT_LEADCHNL_LV_SENSING_INTR_AMPL: 13.4 MV
MDC_IDC_MSMT_LEADCHNL_RA_IMPEDANCE_VALUE: 588 OHM
MDC_IDC_MSMT_LEADCHNL_RA_SENSING_INTR_AMPL: 1 MV
MDC_IDC_MSMT_LEADCHNL_RV_IMPEDANCE_VALUE: 446 OHM
MDC_IDC_MSMT_LEADCHNL_RV_PACING_THRESHOLD_AMPLITUDE: 0.6 V
MDC_IDC_MSMT_LEADCHNL_RV_PACING_THRESHOLD_PULSEWIDTH: 1 MS
MDC_IDC_MSMT_LEADCHNL_RV_SENSING_INTR_AMPL: 12.6 MV
MDC_IDC_PG_IMPLANT_DTM: NORMAL
MDC_IDC_PG_MFG: NORMAL
MDC_IDC_PG_MODEL: NORMAL
MDC_IDC_PG_SERIAL: NORMAL
MDC_IDC_PG_TYPE: NORMAL
MDC_IDC_SESS_CLINIC_NAME: NORMAL
MDC_IDC_SESS_DTM: NORMAL
MDC_IDC_SESS_TYPE: NORMAL
MDC_IDC_SET_BRADY_AT_MODE_SWITCH_MODE: NORMAL
MDC_IDC_SET_BRADY_LOWRATE: 40 {BEATS}/MIN
MDC_IDC_SET_BRADY_MODE: NORMAL
MDC_IDC_SET_BRADY_PAV_DELAY_HIGH: 160 MS
MDC_IDC_SET_BRADY_PAV_DELAY_LOW: 160 MS
MDC_IDC_SET_BRADY_SAV_DELAY_LOW: 110 MS
MDC_IDC_SET_CRT_LVRV_DELAY: 0 MS
MDC_IDC_SET_CRT_PACED_CHAMBERS: NORMAL
MDC_IDC_SET_LEADCHNL_LV_PACING_AMPLITUDE: 0.1 V
MDC_IDC_SET_LEADCHNL_LV_PACING_ANODE_ELECTRODE_1: NORMAL
MDC_IDC_SET_LEADCHNL_LV_PACING_ANODE_LOCATION_1: NORMAL
MDC_IDC_SET_LEADCHNL_LV_PACING_CAPTURE_MODE: NORMAL
MDC_IDC_SET_LEADCHNL_LV_PACING_CATHODE_ELECTRODE_1: NORMAL
MDC_IDC_SET_LEADCHNL_LV_PACING_CATHODE_LOCATION_1: NORMAL
MDC_IDC_SET_LEADCHNL_LV_PACING_POLARITY: NORMAL
MDC_IDC_SET_LEADCHNL_LV_PACING_PULSEWIDTH: 0.1 MS
MDC_IDC_SET_LEADCHNL_LV_SENSING_ADAPTATION_MODE: NORMAL
MDC_IDC_SET_LEADCHNL_LV_SENSING_ANODE_ELECTRODE_1: NORMAL
MDC_IDC_SET_LEADCHNL_LV_SENSING_ANODE_LOCATION_1: NORMAL
MDC_IDC_SET_LEADCHNL_LV_SENSING_CATHODE_ELECTRODE_1: NORMAL
MDC_IDC_SET_LEADCHNL_LV_SENSING_CATHODE_LOCATION_1: NORMAL
MDC_IDC_SET_LEADCHNL_LV_SENSING_POLARITY: NORMAL
MDC_IDC_SET_LEADCHNL_LV_SENSING_SENSITIVITY: 1 MV
MDC_IDC_SET_LEADCHNL_RA_PACING_ANODE_ELECTRODE_1: NORMAL
MDC_IDC_SET_LEADCHNL_RA_PACING_ANODE_LOCATION_1: NORMAL
MDC_IDC_SET_LEADCHNL_RA_PACING_CATHODE_ELECTRODE_1: NORMAL
MDC_IDC_SET_LEADCHNL_RA_PACING_CATHODE_LOCATION_1: NORMAL
MDC_IDC_SET_LEADCHNL_RA_PACING_POLARITY: NORMAL
MDC_IDC_SET_LEADCHNL_RA_SENSING_ADAPTATION_MODE: NORMAL
MDC_IDC_SET_LEADCHNL_RA_SENSING_ANODE_ELECTRODE_1: NORMAL
MDC_IDC_SET_LEADCHNL_RA_SENSING_ANODE_LOCATION_1: NORMAL
MDC_IDC_SET_LEADCHNL_RA_SENSING_CATHODE_ELECTRODE_1: NORMAL
MDC_IDC_SET_LEADCHNL_RA_SENSING_CATHODE_LOCATION_1: NORMAL
MDC_IDC_SET_LEADCHNL_RA_SENSING_POLARITY: NORMAL
MDC_IDC_SET_LEADCHNL_RA_SENSING_SENSITIVITY: 0.15 MV
MDC_IDC_SET_LEADCHNL_RV_PACING_AMPLITUDE: 2 V
MDC_IDC_SET_LEADCHNL_RV_PACING_ANODE_ELECTRODE_1: NORMAL
MDC_IDC_SET_LEADCHNL_RV_PACING_ANODE_LOCATION_1: NORMAL
MDC_IDC_SET_LEADCHNL_RV_PACING_CAPTURE_MODE: NORMAL
MDC_IDC_SET_LEADCHNL_RV_PACING_CATHODE_ELECTRODE_1: NORMAL
MDC_IDC_SET_LEADCHNL_RV_PACING_CATHODE_LOCATION_1: NORMAL
MDC_IDC_SET_LEADCHNL_RV_PACING_POLARITY: NORMAL
MDC_IDC_SET_LEADCHNL_RV_PACING_PULSEWIDTH: 1 MS
MDC_IDC_SET_LEADCHNL_RV_SENSING_ADAPTATION_MODE: NORMAL
MDC_IDC_SET_LEADCHNL_RV_SENSING_ANODE_ELECTRODE_1: NORMAL
MDC_IDC_SET_LEADCHNL_RV_SENSING_ANODE_LOCATION_1: NORMAL
MDC_IDC_SET_LEADCHNL_RV_SENSING_CATHODE_ELECTRODE_1: NORMAL
MDC_IDC_SET_LEADCHNL_RV_SENSING_CATHODE_LOCATION_1: NORMAL
MDC_IDC_SET_LEADCHNL_RV_SENSING_POLARITY: NORMAL
MDC_IDC_SET_LEADCHNL_RV_SENSING_SENSITIVITY: 0.6 MV
MDC_IDC_SET_ZONE_DETECTION_INTERVAL: 250 MS
MDC_IDC_SET_ZONE_DETECTION_INTERVAL: 333 MS
MDC_IDC_SET_ZONE_DETECTION_INTERVAL: 375 MS
MDC_IDC_SET_ZONE_TYPE: NORMAL
MDC_IDC_SET_ZONE_VENDOR_TYPE: NORMAL
MDC_IDC_STAT_EPISODE_RECENT_COUNT: 0
MDC_IDC_STAT_EPISODE_RECENT_COUNT: 0
MDC_IDC_STAT_EPISODE_RECENT_COUNT: 3
MDC_IDC_STAT_EPISODE_RECENT_COUNT_DTM_END: NORMAL
MDC_IDC_STAT_EPISODE_RECENT_COUNT_DTM_START: NORMAL
MDC_IDC_STAT_EPISODE_TOTAL_COUNT: 0
MDC_IDC_STAT_EPISODE_TOTAL_COUNT: 0
MDC_IDC_STAT_EPISODE_TOTAL_COUNT: 20
MDC_IDC_STAT_EPISODE_TOTAL_COUNT_DTM_END: NORMAL
MDC_IDC_STAT_EPISODE_TYPE: NORMAL
MDC_IDC_STAT_EPISODE_VENDOR_TYPE: NORMAL
MDC_IDC_STAT_TACHYTHERAPY_ATP_DELIVERED_RECENT: 0
MDC_IDC_STAT_TACHYTHERAPY_ATP_DELIVERED_TOTAL: 0
MDC_IDC_STAT_TACHYTHERAPY_RECENT_DTM_END: NORMAL
MDC_IDC_STAT_TACHYTHERAPY_RECENT_DTM_START: NORMAL
MDC_IDC_STAT_TACHYTHERAPY_SHOCKS_ABORTED_RECENT: 0
MDC_IDC_STAT_TACHYTHERAPY_SHOCKS_ABORTED_TOTAL: 0
MDC_IDC_STAT_TACHYTHERAPY_SHOCKS_DELIVERED_RECENT: 0
MDC_IDC_STAT_TACHYTHERAPY_SHOCKS_DELIVERED_TOTAL: 0
MDC_IDC_STAT_TACHYTHERAPY_TOTAL_DTM_END: NORMAL

## 2019-01-29 ENCOUNTER — TRANSFERRED RECORDS (OUTPATIENT)
Dept: HEALTH INFORMATION MANAGEMENT | Facility: CLINIC | Age: 74
End: 2019-01-29

## 2019-02-06 ENCOUNTER — MEDICAL CORRESPONDENCE (OUTPATIENT)
Dept: HEALTH INFORMATION MANAGEMENT | Facility: CLINIC | Age: 74
End: 2019-02-06

## 2019-02-16 DIAGNOSIS — F41.1 GENERALIZED ANXIETY DISORDER: ICD-10-CM

## 2019-02-16 NOTE — TELEPHONE ENCOUNTER
"Requested Prescriptions   Pending Prescriptions Disp Refills     venlafaxine (EFFEXOR-XR) 75 MG 24 hr capsule [Pharmacy Med Name: VENLAFAXINE ER 75MG CAP]  Last Written Prescription Date:  02/20/2018  Last Fill Quantity: 270 capsule,  # refills: 3   Last office visit: 10/10/2018 with prescribing provider:  Christin Diehl MD    Future Office Visit:     270 capsule 3     Sig: TAKE 3 CAPSULES BY MOUTH ONCE DAILY    Serotonin-Norepinephrine Reuptake Inhibitors  Passed - 2/16/2019 12:11 PM       Passed - Blood pressure under 140/90 in past 12 months    BP Readings from Last 3 Encounters:   10/17/18 102/68   10/10/18 102/56   09/26/18 108/58                Passed - Recent (12 mo) or future (30 days) visit within the authorizing provider's specialty    Patient had office visit in the last 12 months or has a visit in the next 30 days with authorizing provider or within the authorizing provider's specialty.  See \"Patient Info\" tab in inbasket, or \"Choose Columns\" in Meds & Orders section of the refill encounter.             Passed - Medication is active on med list       Passed - Patient is age 18 or older       Passed - No active pregnancy on record       Passed - Normal serum creatinine on file in past 12 months    Recent Labs   Lab Test 11/15/18  1224   CR 0.95            Passed - No positive pregnancy test in past 12 months          "

## 2019-02-20 RX ORDER — VENLAFAXINE HYDROCHLORIDE 75 MG/1
CAPSULE, EXTENDED RELEASE ORAL
Qty: 270 CAPSULE | Refills: 1 | Status: SHIPPED | OUTPATIENT
Start: 2019-02-20 | End: 2019-08-02

## 2019-02-20 NOTE — TELEPHONE ENCOUNTER
Prescription approved per [facility] Refill Protocol.    Megan Holloway RN Flex

## 2019-03-11 ENCOUNTER — TELEPHONE (OUTPATIENT)
Dept: PEDIATRICS | Facility: CLINIC | Age: 74
End: 2019-03-11

## 2019-03-11 NOTE — TELEPHONE ENCOUNTER
Reason for Call:  Form, our goal is to have forms completed with 72 hours, however, some forms may require a visit or additional information.    Type of letter, form or note:  medical    Who is the form from?: Patient    Where did the form come from: Patient or family brought in       What clinic location was the form placed at?: [facility]    Where the form was placed: 's Box    What number is listed as a contact on the form?:[phone]       Additional comments:Please complete the form for cancer screening and mail (self addressed and postage paid).  Please call pt at [phone] when completed and mailed. Thank you.    Call taken on 3/11/2019 at 1:45 PM by Maggie Francis

## 2019-03-12 ENCOUNTER — TELEPHONE (OUTPATIENT)
Dept: PEDIATRICS | Facility: CLINIC | Age: 74
End: 2019-03-12

## 2019-04-12 DIAGNOSIS — E78.5 HYPERLIPIDEMIA LDL GOAL <130: ICD-10-CM

## 2019-04-12 DIAGNOSIS — I42.9 IDIOPATHIC CARDIOMYOPATHY (H): ICD-10-CM

## 2019-04-12 NOTE — LETTER
[facility]  [address]  [place] [address]  [phone]      April 25, 2019    Kristie Jones                                                                                                                                                       [address] [address]              Dear Kristie Mcknight,    According to our records you are due for an annual physical. Please contact our office at your earliest convenience to schedule this appointment.    Thank you    Sincerely,  [facility]

## 2019-04-12 NOTE — TELEPHONE ENCOUNTER
"Requested Prescriptions   Pending Prescriptions Disp Refills     rosuvastatin (CRESTOR) 10 MG tablet [Pharmacy Med Name: ROSUVASTATIN 10MG TAB]  Last Written Prescription Date:  10/10/2018  Last Fill Quantity: 45 tablet,  # refills: 1    Last office visit: 10/10/2018 with prescribing provider:  Christin Diehl MD        Future Office Visit:     45 tablet 1     Sig: TAKE 1 TABLET BY MOUTH EVERY OTHER DAY       Statins Protocol Passed - 4/12/2019  2:25 PM        Passed - LDL on file in past 12 months     Recent Labs   Lab Test 09/25/18  0930   LDL 84             Passed - No abnormal creatine kinase in past 12 months     Recent Labs   Lab Test 02/04/15  1519   CKT 99                Passed - Recent (12 mo) or future (30 days) visit within the authorizing provider's specialty     Patient had office visit in the last 12 months or has a visit in the next 30 days with authorizing provider or within the authorizing provider's specialty.  See \"Patient Info\" tab in inbasket, or \"Choose Columns\" in Meds & Orders section of the refill encounter.              Passed - Medication is active on med list        Passed - Patient is age 18 or older        Passed - No active pregnancy on record        Passed - No positive pregnancy test in past 12 months        carvedilol (COREG) 25 MG tablet [Pharmacy Med Name: CARVEDILOL 25MG     TAB]  Last Written Prescription Date:  10/10/2018  Last Fill Quantity: 180 tablet,  # refills: 1    Last office visit: 10/10/2018 with prescribing provider:  Christin Diehl MD        Future Office Visit:     180 tablet 1     Sig: TAKE 1 TABLET BY MOUTH TWICE DAILY WITH MEALS       Beta-Blockers Protocol Passed - 4/12/2019  2:25 PM        Passed - Blood pressure under 140/90 in past 12 months     BP Readings from Last 3 Encounters:   10/17/18 102/68   10/10/18 102/56   09/26/18 108/58                 Passed - Patient is age 6 or older        Passed - Recent (12 mo) or future (30 days) " "visit within the authorizing provider's specialty     Patient had office visit in the last 12 months or has a visit in the next 30 days with authorizing provider or within the authorizing provider's specialty.  See \"Patient Info\" tab in inbasket, or \"Choose Columns\" in Meds & Orders section of the refill encounter.              Passed - Medication is active on med list          "

## 2019-04-16 RX ORDER — ROSUVASTATIN CALCIUM 10 MG/1
TABLET, COATED ORAL
Qty: 45 TABLET | Refills: 0 | Status: SHIPPED | OUTPATIENT
Start: 2019-04-16 | End: 2019-08-04

## 2019-04-16 RX ORDER — CARVEDILOL 25 MG/1
TABLET ORAL
Qty: 180 TABLET | Refills: 0 | Status: SHIPPED | OUTPATIENT
Start: 2019-04-16 | End: 2019-08-02

## 2019-04-16 NOTE — TELEPHONE ENCOUNTER
TC:please call to schedule a physical.    Patient was due in Feb 2019 for a physical  Medication is being filled for 1 time refill only due to:  Patient needs to be seen because it has been more than one year since last visit.   Due for a physical, please call patient and schedule.    Edel Barroso RN  Message handled by Nurse Triage.

## 2019-04-23 ENCOUNTER — TRANSFERRED RECORDS (OUTPATIENT)
Dept: HEALTH INFORMATION MANAGEMENT | Facility: CLINIC | Age: 74
End: 2019-04-23

## 2019-05-02 ENCOUNTER — ANCILLARY PROCEDURE (OUTPATIENT)
Dept: CARDIOLOGY | Facility: CLINIC | Age: 74
End: 2019-05-02
Payer: COMMERCIAL

## 2019-05-02 DIAGNOSIS — Z95.810 ICD (IMPLANTABLE CARDIOVERTER-DEFIBRILLATOR) IN PLACE: ICD-10-CM

## 2019-05-02 PROCEDURE — 93296 REM INTERROG EVL PM/IDS: CPT | Performed by: INTERNAL MEDICINE

## 2019-05-02 PROCEDURE — 93295 DEV INTERROG REMOTE 1/2/MLT: CPT | Performed by: INTERNAL MEDICINE

## 2019-05-07 LAB
MDC_IDC_EPISODE_DTM: NORMAL
MDC_IDC_EPISODE_ID: NORMAL
MDC_IDC_EPISODE_TYPE: NORMAL
MDC_IDC_LEAD_IMPLANT_DT: NORMAL
MDC_IDC_LEAD_LOCATION: NORMAL
MDC_IDC_LEAD_LOCATION_DETAIL_1: NORMAL
MDC_IDC_LEAD_MFG: NORMAL
MDC_IDC_LEAD_MODEL: 4047
MDC_IDC_LEAD_MODEL: 4047
MDC_IDC_LEAD_MODEL: NORMAL
MDC_IDC_LEAD_MODEL: NORMAL
MDC_IDC_LEAD_POLARITY_TYPE: NORMAL
MDC_IDC_LEAD_SERIAL: NORMAL
MDC_IDC_MSMT_BATTERY_DTM: NORMAL
MDC_IDC_MSMT_BATTERY_REMAINING_LONGEVITY: 48 MO
MDC_IDC_MSMT_BATTERY_REMAINING_PERCENTAGE: 78 %
MDC_IDC_MSMT_BATTERY_STATUS: NORMAL
MDC_IDC_MSMT_CAP_CHARGE_DTM: NORMAL
MDC_IDC_MSMT_CAP_CHARGE_TIME: 11.1 S
MDC_IDC_MSMT_CAP_CHARGE_TYPE: NORMAL
MDC_IDC_MSMT_LEADCHNL_LV_IMPEDANCE_VALUE: 388 OHM
MDC_IDC_MSMT_LEADCHNL_LV_PACING_THRESHOLD_AMPLITUDE: 1.3 V
MDC_IDC_MSMT_LEADCHNL_LV_PACING_THRESHOLD_PULSEWIDTH: 1 MS
MDC_IDC_MSMT_LEADCHNL_RA_IMPEDANCE_VALUE: 565 OHM
MDC_IDC_MSMT_LEADCHNL_RA_LEAD_CHANNEL_STATUS: NORMAL
MDC_IDC_MSMT_LEADCHNL_RA_PACING_THRESHOLD_AMPLITUDE: 0.1 V
MDC_IDC_MSMT_LEADCHNL_RA_PACING_THRESHOLD_PULSEWIDTH: 1 MS
MDC_IDC_MSMT_LEADCHNL_RV_IMPEDANCE_VALUE: 418 OHM
MDC_IDC_MSMT_LEADCHNL_RV_PACING_THRESHOLD_AMPLITUDE: 0.6 V
MDC_IDC_MSMT_LEADCHNL_RV_PACING_THRESHOLD_PULSEWIDTH: 1 MS
MDC_IDC_PG_IMPLANT_DTM: NORMAL
MDC_IDC_PG_MFG: NORMAL
MDC_IDC_PG_MODEL: NORMAL
MDC_IDC_PG_SERIAL: NORMAL
MDC_IDC_PG_TYPE: NORMAL
MDC_IDC_SESS_CLINIC_NAME: NORMAL
MDC_IDC_SESS_DTM: NORMAL
MDC_IDC_SESS_TYPE: NORMAL
MDC_IDC_SET_BRADY_AT_MODE_SWITCH_RATE: 170 {BEATS}/MIN
MDC_IDC_SET_BRADY_LOWRATE: 40 {BEATS}/MIN
MDC_IDC_SET_BRADY_MODE: NORMAL
MDC_IDC_SET_CRT_LVRV_DELAY: 0 MS
MDC_IDC_SET_CRT_PACED_CHAMBERS: NORMAL
MDC_IDC_SET_LEADCHNL_LV_PACING_AMPLITUDE: 0.1 V
MDC_IDC_SET_LEADCHNL_LV_PACING_ANODE_ELECTRODE_1: NORMAL
MDC_IDC_SET_LEADCHNL_LV_PACING_ANODE_LOCATION_1: NORMAL
MDC_IDC_SET_LEADCHNL_LV_PACING_CATHODE_ELECTRODE_1: NORMAL
MDC_IDC_SET_LEADCHNL_LV_PACING_CATHODE_LOCATION_1: NORMAL
MDC_IDC_SET_LEADCHNL_LV_PACING_PULSEWIDTH: 0.1 MS
MDC_IDC_SET_LEADCHNL_LV_SENSING_ADAPTATION_MODE: NORMAL
MDC_IDC_SET_LEADCHNL_LV_SENSING_ANODE_ELECTRODE_1: NORMAL
MDC_IDC_SET_LEADCHNL_LV_SENSING_ANODE_LOCATION_1: NORMAL
MDC_IDC_SET_LEADCHNL_LV_SENSING_CATHODE_ELECTRODE_1: NORMAL
MDC_IDC_SET_LEADCHNL_LV_SENSING_CATHODE_LOCATION_1: NORMAL
MDC_IDC_SET_LEADCHNL_LV_SENSING_SENSITIVITY: 1 MV
MDC_IDC_SET_LEADCHNL_RA_PACING_POLARITY: NORMAL
MDC_IDC_SET_LEADCHNL_RA_SENSING_ADAPTATION_MODE: NORMAL
MDC_IDC_SET_LEADCHNL_RA_SENSING_POLARITY: NORMAL
MDC_IDC_SET_LEADCHNL_RA_SENSING_SENSITIVITY: 0.15 MV
MDC_IDC_SET_LEADCHNL_RV_PACING_AMPLITUDE: 2 V
MDC_IDC_SET_LEADCHNL_RV_PACING_POLARITY: NORMAL
MDC_IDC_SET_LEADCHNL_RV_PACING_PULSEWIDTH: 1 MS
MDC_IDC_SET_LEADCHNL_RV_SENSING_ADAPTATION_MODE: NORMAL
MDC_IDC_SET_LEADCHNL_RV_SENSING_POLARITY: NORMAL
MDC_IDC_SET_LEADCHNL_RV_SENSING_SENSITIVITY: 0.6 MV
MDC_IDC_SET_ZONE_DETECTION_INTERVAL: 250 MS
MDC_IDC_SET_ZONE_DETECTION_INTERVAL: 333 MS
MDC_IDC_SET_ZONE_DETECTION_INTERVAL: 375 MS
MDC_IDC_SET_ZONE_TYPE: NORMAL
MDC_IDC_SET_ZONE_VENDOR_TYPE: NORMAL
MDC_IDC_STAT_BRADY_DTM_END: NORMAL
MDC_IDC_STAT_BRADY_DTM_START: NORMAL
MDC_IDC_STAT_BRADY_RA_PERCENT_PACED: 0 %
MDC_IDC_STAT_BRADY_RV_PERCENT_PACED: 0 %
MDC_IDC_STAT_CRT_DTM_END: NORMAL
MDC_IDC_STAT_CRT_DTM_START: NORMAL
MDC_IDC_STAT_CRT_LV_PERCENT_PACED: 0 %
MDC_IDC_STAT_EPISODE_RECENT_COUNT: 0
MDC_IDC_STAT_EPISODE_RECENT_COUNT: 1
MDC_IDC_STAT_EPISODE_RECENT_COUNT_DTM_END: NORMAL
MDC_IDC_STAT_EPISODE_RECENT_COUNT_DTM_START: NORMAL
MDC_IDC_STAT_EPISODE_TYPE: NORMAL
MDC_IDC_STAT_EPISODE_VENDOR_TYPE: NORMAL
MDC_IDC_STAT_TACHYTHERAPY_ATP_DELIVERED_RECENT: 0
MDC_IDC_STAT_TACHYTHERAPY_ATP_DELIVERED_TOTAL: 0
MDC_IDC_STAT_TACHYTHERAPY_RECENT_DTM_END: NORMAL
MDC_IDC_STAT_TACHYTHERAPY_RECENT_DTM_START: NORMAL
MDC_IDC_STAT_TACHYTHERAPY_SHOCKS_ABORTED_RECENT: 0
MDC_IDC_STAT_TACHYTHERAPY_SHOCKS_ABORTED_TOTAL: 0
MDC_IDC_STAT_TACHYTHERAPY_SHOCKS_DELIVERED_RECENT: 0
MDC_IDC_STAT_TACHYTHERAPY_SHOCKS_DELIVERED_TOTAL: 0
MDC_IDC_STAT_TACHYTHERAPY_TOTAL_DTM_END: NORMAL

## 2019-05-09 ENCOUNTER — TRANSFERRED RECORDS (OUTPATIENT)
Dept: HEALTH INFORMATION MANAGEMENT | Facility: CLINIC | Age: 74
End: 2019-05-09

## 2019-05-11 DIAGNOSIS — I42.9 IDIOPATHIC CARDIOMYOPATHY (H): ICD-10-CM

## 2019-05-12 NOTE — TELEPHONE ENCOUNTER
"Requested Prescriptions   Pending Prescriptions Disp Refills     carvedilol (COREG) 25 MG tablet [Pharmacy Med Name: CARVEDILOL 25MG     TAB] 180 tablet 0     Sig: TAKE 1 TABLET BY MOUTH TWICE DAILY WITH MEALS  Last Written Prescription Date:  04/16/2019  Last Fill Quantity: 180 tablet,  # refills: 0    Last office visit: 10/10/2018 with prescribing provider:  Christin Diehl MD       Future Office Visit:   Next 5 appointments (look out 90 days)    Aug 06, 2019  1:20 PM CDT  PHYSICAL with Christin Diehl MD  [facility] ([facility]) [address] [place] [address]  [address]  [facility] [address]  [phone]                Beta-Blockers Protocol Passed - 5/11/2019 12:11 PM        Passed - Blood pressure under 140/90 in past 12 months     BP Readings from Last 3 Encounters:   10/17/18 102/68   10/10/18 102/56   09/26/18 108/58                 Passed - Patient is age 6 or older        Passed - Recent (12 mo) or future (30 days) visit within the authorizing provider's specialty     Patient had office visit in the last 12 months or has a visit in the next 30 days with authorizing provider or within the authorizing provider's specialty.  See \"Patient Info\" tab in inbasket, or \"Choose Columns\" in Meds & Orders section of the refill encounter.              Passed - Medication is active on med list          "

## 2019-05-13 RX ORDER — CARVEDILOL 25 MG/1
TABLET ORAL
Qty: 180 TABLET | Refills: 0 | OUTPATIENT
Start: 2019-05-13

## 2019-05-13 NOTE — TELEPHONE ENCOUNTER
Coreg  Sent 4/16/19 with 3 month supply. Refill not appropriate at this time.     Next 5 appointments (look out 90 days)    Aug 06, 2019  1:20 PM CDT  PHYSICAL with Christin Diehl MD  [facility] ([facility]) [address] [place] [id number]  [phone]        Jocelyn Hunter RN, BSN

## 2019-05-17 DIAGNOSIS — I42.9 IDIOPATHIC CARDIOMYOPATHY (H): ICD-10-CM

## 2019-05-17 RX ORDER — SPIRONOLACTONE 25 MG/1
12.5 TABLET ORAL DAILY
Qty: 90 TABLET | Refills: 2 | Status: SHIPPED | OUTPATIENT
Start: 2019-05-17 | End: 2019-08-06

## 2019-05-29 ENCOUNTER — TRANSFERRED RECORDS (OUTPATIENT)
Dept: HEALTH INFORMATION MANAGEMENT | Facility: CLINIC | Age: 74
End: 2019-05-29

## 2019-05-30 ENCOUNTER — HOSPITAL ENCOUNTER (OUTPATIENT)
Dept: LAB | Facility: CLINIC | Age: 74
Discharge: HOME | End: 2019-05-30
Attending: ORTHOPAEDIC SURGERY | Admitting: ORTHOPAEDIC SURGERY
Payer: COMMERCIAL

## 2019-05-30 DIAGNOSIS — E11.9 DIABETES MELLITUS WITHOUT COMPLICATION, WITHOUT LONG-TERM CURRENT USE OF INSULIN (H): ICD-10-CM

## 2019-05-30 DIAGNOSIS — Z47.1 AFTERCARE FOLLOWING JOINT REPLACEMENT: Primary | ICD-10-CM

## 2019-05-30 LAB
BASOPHILS # BLD AUTO: 0 10E9/L (ref 0–0.2)
BASOPHILS NFR BLD AUTO: 0.9 %
CRP SERPL-MCNC: <2.9 MG/L (ref 0–8)
DIFFERENTIAL METHOD BLD: ABNORMAL
EOSINOPHIL # BLD AUTO: 0.1 10E9/L (ref 0–0.7)
EOSINOPHIL NFR BLD AUTO: 3.7 %
ERYTHROCYTE [DISTWIDTH] IN BLOOD BY AUTOMATED COUNT: 12.7 % (ref 10–15)
ERYTHROCYTE [SEDIMENTATION RATE] IN BLOOD BY WESTERGREN METHOD: 8 MM/H (ref 0–30)
HCT VFR BLD AUTO: 37.1 % (ref 35–47)
HGB BLD-MCNC: 11.9 G/DL (ref 11.7–15.7)
IMM GRANULOCYTES # BLD: 0 10E9/L (ref 0–0.4)
IMM GRANULOCYTES NFR BLD: 0.9 %
LYMPHOCYTES # BLD AUTO: 0.9 10E9/L (ref 0.8–5.3)
LYMPHOCYTES NFR BLD AUTO: 25 %
MCH RBC QN AUTO: 30.7 PG (ref 26.5–33)
MCHC RBC AUTO-ENTMCNC: 32.1 G/DL (ref 31.5–36.5)
MCV RBC AUTO: 96 FL (ref 78–100)
MONOCYTES # BLD AUTO: 0.3 10E9/L (ref 0–1.3)
MONOCYTES NFR BLD AUTO: 8.3 %
NEUTROPHILS # BLD AUTO: 2.1 10E9/L (ref 1.6–8.3)
NEUTROPHILS NFR BLD AUTO: 61.2 %
NRBC # BLD AUTO: 0 10*3/UL
NRBC BLD AUTO-RTO: 0 /100
PLATELET # BLD AUTO: 166 10E9/L (ref 150–450)
RBC # BLD AUTO: 3.88 10E12/L (ref 3.8–5.2)
WBC # BLD AUTO: 3.5 10E9/L (ref 4–11)

## 2019-05-30 PROCEDURE — 36415 COLL VENOUS BLD VENIPUNCTURE: CPT | Performed by: ORTHOPAEDIC SURGERY

## 2019-05-30 PROCEDURE — 85652 RBC SED RATE AUTOMATED: CPT | Performed by: ORTHOPAEDIC SURGERY

## 2019-05-30 PROCEDURE — 85025 COMPLETE CBC W/AUTO DIFF WBC: CPT | Performed by: ORTHOPAEDIC SURGERY

## 2019-05-30 PROCEDURE — 86140 C-REACTIVE PROTEIN: CPT | Performed by: ORTHOPAEDIC SURGERY

## 2019-07-23 ENCOUNTER — TRANSFERRED RECORDS (OUTPATIENT)
Dept: HEALTH INFORMATION MANAGEMENT | Facility: CLINIC | Age: 74
End: 2019-07-23

## 2019-08-02 DIAGNOSIS — I42.9 IDIOPATHIC CARDIOMYOPATHY (H): ICD-10-CM

## 2019-08-02 DIAGNOSIS — F41.1 GENERALIZED ANXIETY DISORDER: ICD-10-CM

## 2019-08-02 NOTE — TELEPHONE ENCOUNTER
"Requested Prescriptions   Pending Prescriptions Disp Refills     carvedilol (COREG) 25 MG tablet [Pharmacy Med Name: CARVEDILOL 25MG     TAB]  Last Written Prescription Date:  04/16/2019  Last Fill Quantity: 180 tablet,  # refills: 0   Last Office Visit: 10/10/2018    Christin Diehl MD        Future Office Visit:    Next 5 appointments (look out 90 days)    Aug 06, 2019  1:10 PM CDT  Adult Preventative Visit with Christin Diehl MD, Ea Rn Pal 3a, EA EXAM ROOM 06  [facility] ([facility]) [address] [place] [address]  [address]  [facility] [id number] [phone]          180 tablet 0     Sig: TAKE 1 TABLET BY MOUTH TWICE DAILY WITH MEALS       Beta-Blockers Protocol Passed - 8/2/2019  2:23 PM        Passed - Blood pressure under 140/90 in past 12 months     BP Readings from Last 3 Encounters:   10/17/18 102/68   10/10/18 102/56   09/26/18 108/58           Passed - Patient is age 6 or older        Passed - Recent (12 mo) or future (30 days) visit within the authorizing provider's specialty     Patient had office visit in the last 12 months or has a visit in the next 30 days with authorizing provider or within the authorizing provider's specialty.  See \"Patient Info\" tab in inbasket, or \"Choose Columns\" in Meds & Orders section of the refill encounter.              Passed - Medication is active on med list        venlafaxine (EFFEXOR-XR) 75 MG 24 hr capsule [Pharmacy Med Name: VENLAFAXINE ER 75MG CAP]  Last Written Prescription Date:  02/20/2019  Last Fill Quantity: 270 capsule,  # refills: 1   Last Office Visit: 10/10/2018    Christin Diehl MD        Future Office Visit:    Next 5 appointments (look out 90 days)    Aug 06, 2019  1:10 PM CDT  Adult Preventative Visit with Christin Diehl MD, Ea Rn Pal 3a, AGUILAR EXAM ROOM 06  [facility] ([facility]) [address] [facility]  [address]  [place] [phone]          " "270 capsule 1     Sig: TAKE 3 CAPSULES BY MOUTH ONCE DAILY       Serotonin-Norepinephrine Reuptake Inhibitors  Passed - 8/2/2019  2:23 PM        Passed - Blood pressure under 140/90 in past 12 months     BP Readings from Last 3 Encounters:   10/17/18 102/68   10/10/18 102/56   09/26/18 108/58                 Passed - Recent (12 mo) or future (30 days) visit within the authorizing provider's specialty     Patient had office visit in the last 12 months or has a visit in the next 30 days with authorizing provider or within the authorizing provider's specialty.  See \"Patient Info\" tab in inbasket, or \"Choose Columns\" in Meds & Orders section of the refill encounter.              Passed - Medication is active on med list        Passed - Patient is age 18 or older        Passed - No active pregnancy on record        Passed - Normal serum creatinine on file in past 12 months     Recent Labs   Lab Test 11/15/18  1224   CR 0.95             Passed - No positive pregnancy test in past 12 months          "

## 2019-08-04 DIAGNOSIS — E78.5 HYPERLIPIDEMIA LDL GOAL <130: ICD-10-CM

## 2019-08-04 NOTE — TELEPHONE ENCOUNTER
"Requested Prescriptions   Pending Prescriptions Disp Refills     rosuvastatin (CRESTOR) 10 MG tablet [Pharmacy Med Name: ROSUVASTATIN 10MG TAB] 45 tablet 0     Sig: TAKE 1 TABLET BY MOUTH EVERY OTHER DAY  Last Written Prescription Date:  04/16/2019  Last Fill Quantity: 45 tablet,  # refills: 0   Last office visit: 10/10/2018 with prescribing provider:  Christin Diehl MD   Future Office Visit:   Next 5 appointments (look out 90 days)    Aug 06, 2019  1:10 PM CDT  Adult Preventative Visit with Christin Diehl MD, Ea Rn Pal 3a, EA EXAM ROOM 06  [facility] ([facility]) [address] [place] [address]  [address]  [facility] [id number] [phone]                Statins Protocol Passed - 8/4/2019 12:47 PM        Passed - LDL on file in past 12 months     Recent Labs   Lab Test 09/25/18  0930   LDL 84             Passed - No abnormal creatine kinase in past 12 months     Recent Labs   Lab Test 02/04/15  1519   CKT 99                Passed - Recent (12 mo) or future (30 days) visit within the authorizing provider's specialty     Patient had office visit in the last 12 months or has a visit in the next 30 days with authorizing provider or within the authorizing provider's specialty.  See \"Patient Info\" tab in inbasket, or \"Choose Columns\" in Meds & Orders section of the refill encounter.              Passed - Medication is active on med list        Passed - Patient is age 18 or older        Passed - No active pregnancy on record        Passed - No positive pregnancy test in past 12 months          "

## 2019-08-05 RX ORDER — VENLAFAXINE HYDROCHLORIDE 75 MG/1
CAPSULE, EXTENDED RELEASE ORAL
Qty: 270 CAPSULE | Refills: 0 | Status: SHIPPED | OUTPATIENT
Start: 2019-08-05 | End: 2019-08-06

## 2019-08-05 RX ORDER — CARVEDILOL 25 MG/1
TABLET ORAL
Qty: 180 TABLET | Refills: 0 | Status: SHIPPED | OUTPATIENT
Start: 2019-08-05 | End: 2019-08-06

## 2019-08-05 NOTE — TELEPHONE ENCOUNTER
Routing refill request to provider for review/approval because:  A break in medication  Salina Wylie RN

## 2019-08-05 NOTE — TELEPHONE ENCOUNTER
Pt is scheduled on 8/6/19. Prescription approved per [facility] Refill Protocol.  INDIANA MonroyN, RN

## 2019-08-06 ENCOUNTER — OFFICE VISIT (OUTPATIENT)
Dept: PEDIATRICS | Facility: CLINIC | Age: 74
End: 2019-08-06
Payer: COMMERCIAL

## 2019-08-06 VITALS
DIASTOLIC BLOOD PRESSURE: 58 MMHG | SYSTOLIC BLOOD PRESSURE: 128 MMHG | OXYGEN SATURATION: 99 % | BODY MASS INDEX: 31.65 KG/M2 | HEIGHT: 65 IN | HEART RATE: 60 BPM | TEMPERATURE: 98.6 F | WEIGHT: 190 LBS

## 2019-08-06 DIAGNOSIS — F33.0 MAJOR DEPRESSIVE DISORDER, RECURRENT EPISODE, MILD (H): ICD-10-CM

## 2019-08-06 DIAGNOSIS — F41.1 GENERALIZED ANXIETY DISORDER: ICD-10-CM

## 2019-08-06 DIAGNOSIS — E78.5 HYPERLIPIDEMIA LDL GOAL <130: ICD-10-CM

## 2019-08-06 DIAGNOSIS — E89.40 ASYMPTOMATIC POSTSURGICAL MENOPAUSE: ICD-10-CM

## 2019-08-06 DIAGNOSIS — I42.9 IDIOPATHIC CARDIOMYOPATHY (H): ICD-10-CM

## 2019-08-06 DIAGNOSIS — Z12.31 VISIT FOR SCREENING MAMMOGRAM: ICD-10-CM

## 2019-08-06 DIAGNOSIS — E11.9 TYPE 2 DIABETES MELLITUS WITHOUT COMPLICATION, WITHOUT LONG-TERM CURRENT USE OF INSULIN (H): ICD-10-CM

## 2019-08-06 DIAGNOSIS — Z00.00 ROUTINE GENERAL MEDICAL EXAMINATION AT A HEALTH CARE FACILITY: Primary | ICD-10-CM

## 2019-08-06 DIAGNOSIS — I50.22 CHRONIC SYSTOLIC CONGESTIVE HEART FAILURE (H): ICD-10-CM

## 2019-08-06 DIAGNOSIS — Z96.651 STATUS POST TOTAL RIGHT KNEE REPLACEMENT: ICD-10-CM

## 2019-08-06 LAB — HBA1C MFR BLD: 5.2 % (ref 0–5.6)

## 2019-08-06 PROCEDURE — 82043 UR ALBUMIN QUANTITATIVE: CPT | Performed by: INTERNAL MEDICINE

## 2019-08-06 PROCEDURE — 83036 HEMOGLOBIN GLYCOSYLATED A1C: CPT | Performed by: INTERNAL MEDICINE

## 2019-08-06 PROCEDURE — 99214 OFFICE O/P EST MOD 30 MIN: CPT | Mod: 25 | Performed by: INTERNAL MEDICINE

## 2019-08-06 PROCEDURE — 36415 COLL VENOUS BLD VENIPUNCTURE: CPT | Performed by: INTERNAL MEDICINE

## 2019-08-06 PROCEDURE — G0439 PPPS, SUBSEQ VISIT: HCPCS | Performed by: INTERNAL MEDICINE

## 2019-08-06 PROCEDURE — 80061 LIPID PANEL: CPT | Performed by: INTERNAL MEDICINE

## 2019-08-06 PROCEDURE — 80053 COMPREHEN METABOLIC PANEL: CPT | Performed by: INTERNAL MEDICINE

## 2019-08-06 RX ORDER — LOSARTAN POTASSIUM 50 MG/1
50 TABLET ORAL DAILY
Qty: 90 TABLET | Refills: 3 | Status: SHIPPED | OUTPATIENT
Start: 2019-08-06 | End: 2019-12-02

## 2019-08-06 RX ORDER — ROSUVASTATIN CALCIUM 10 MG/1
TABLET, COATED ORAL
Qty: 45 TABLET | Refills: 3 | Status: SHIPPED | OUTPATIENT
Start: 2019-08-06 | End: 2019-12-02

## 2019-08-06 RX ORDER — SPIRONOLACTONE 25 MG/1
12.5 TABLET ORAL DAILY
Qty: 90 TABLET | Refills: 2 | Status: SHIPPED | OUTPATIENT
Start: 2019-08-06 | End: 2019-12-02

## 2019-08-06 RX ORDER — CARVEDILOL 25 MG/1
TABLET ORAL
Qty: 180 TABLET | Refills: 3 | Status: SHIPPED | OUTPATIENT
Start: 2019-08-06 | End: 2019-12-20

## 2019-08-06 RX ORDER — VENLAFAXINE HYDROCHLORIDE 75 MG/1
CAPSULE, EXTENDED RELEASE ORAL
Qty: 270 CAPSULE | Refills: 3 | Status: SHIPPED | OUTPATIENT
Start: 2019-08-06 | End: 2019-12-20

## 2019-08-06 RX ORDER — ROSUVASTATIN CALCIUM 10 MG/1
TABLET, COATED ORAL
Qty: 45 TABLET | Refills: 0 | OUTPATIENT
Start: 2019-08-06

## 2019-08-06 RX ORDER — CELECOXIB 200 MG/1
200 CAPSULE ORAL DAILY PRN
Qty: 90 CAPSULE | Refills: 3 | Status: SHIPPED | OUTPATIENT
Start: 2019-08-06 | End: 2021-02-05

## 2019-08-06 SDOH — HEALTH STABILITY: MENTAL HEALTH
STRESS IS WHEN SOMEONE FEELS TENSE, NERVOUS, ANXIOUS, OR CAN'T SLEEP AT NIGHT BECAUSE THEIR MIND IS TROUBLED. HOW STRESSED ARE YOU?: TO SOME EXTENT

## 2019-08-06 SDOH — SOCIAL STABILITY: SOCIAL NETWORK: HOW OFTEN DO YOU ATTEND CHURCH OR RELIGIOUS SERVICES?: NEVER

## 2019-08-06 SDOH — ECONOMIC STABILITY: FOOD INSECURITY: WITHIN THE PAST 12 MONTHS, THE FOOD YOU BOUGHT JUST DIDN'T LAST AND YOU DIDN'T HAVE MONEY TO GET MORE.: NEVER TRUE

## 2019-08-06 SDOH — ECONOMIC STABILITY: INCOME INSECURITY: HOW HARD IS IT FOR YOU TO PAY FOR THE VERY BASICS LIKE FOOD, HOUSING, MEDICAL CARE, AND HEATING?: NOT HARD AT ALL

## 2019-08-06 SDOH — HEALTH STABILITY: MENTAL HEALTH: HOW OFTEN DO YOU HAVE A DRINK CONTAINING ALCOHOL?: MONTHLY OR LESS

## 2019-08-06 SDOH — SOCIAL STABILITY: SOCIAL NETWORK: IN A TYPICAL WEEK, HOW MANY TIMES DO YOU TALK ON THE PHONE WITH FAMILY, FRIENDS, OR NEIGHBORS?: TWICE A WEEK

## 2019-08-06 SDOH — ECONOMIC STABILITY: TRANSPORTATION INSECURITY
IN THE PAST 12 MONTHS, HAS LACK OF TRANSPORTATION KEPT YOU FROM MEETINGS, WORK, OR FROM GETTING THINGS NEEDED FOR DAILY LIVING?: NO

## 2019-08-06 SDOH — SOCIAL STABILITY: SOCIAL NETWORK
DO YOU BELONG TO ANY CLUBS OR ORGANIZATIONS SUCH AS CHURCH GROUPS UNIONS, FRATERNAL OR ATHLETIC GROUPS, OR SCHOOL GROUPS?: NO

## 2019-08-06 SDOH — HEALTH STABILITY: PHYSICAL HEALTH: ON AVERAGE, HOW MANY MINUTES DO YOU ENGAGE IN EXERCISE AT THIS LEVEL?: 30 MIN

## 2019-08-06 SDOH — HEALTH STABILITY: MENTAL HEALTH: HOW OFTEN DO YOU HAVE 6 OR MORE DRINKS ON ONE OCCASION?: NEVER

## 2019-08-06 SDOH — SOCIAL STABILITY: SOCIAL NETWORK: ARE YOU MARRIED, WIDOWED, DIVORCED, SEPARATED, NEVER MARRIED, OR LIVING WITH A PARTNER?: MARRIED

## 2019-08-06 SDOH — ECONOMIC STABILITY: FOOD INSECURITY: WITHIN THE PAST 12 MONTHS, YOU WORRIED THAT YOUR FOOD WOULD RUN OUT BEFORE YOU GOT MONEY TO BUY MORE.: NEVER TRUE

## 2019-08-06 SDOH — SOCIAL STABILITY: SOCIAL NETWORK: HOW OFTEN DO YOU GET TOGETHER WITH FRIENDS OR RELATIVES?: ONCE A WEEK

## 2019-08-06 SDOH — HEALTH STABILITY: PHYSICAL HEALTH: ON AVERAGE, HOW MANY DAYS PER WEEK DO YOU ENGAGE IN MODERATE TO STRENUOUS EXERCISE (LIKE A BRISK WALK)?: 4 DAYS

## 2019-08-06 SDOH — ECONOMIC STABILITY: TRANSPORTATION INSECURITY
IN THE PAST 12 MONTHS, HAS THE LACK OF TRANSPORTATION KEPT YOU FROM MEDICAL APPOINTMENTS OR FROM GETTING MEDICATIONS?: NO

## 2019-08-06 SDOH — HEALTH STABILITY: MENTAL HEALTH: HOW MANY STANDARD DRINKS CONTAINING ALCOHOL DO YOU HAVE ON A TYPICAL DAY?: 1 OR 2

## 2019-08-06 SDOH — SOCIAL STABILITY: SOCIAL NETWORK: HOW OFTEN DO YOU ATTENT MEETINGS OF THE CLUB OR ORGANIZATION YOU BELONG TO?: NEVER

## 2019-08-06 ASSESSMENT — ENCOUNTER SYMPTOMS
ABDOMINAL PAIN: 0
EYE PAIN: 0
HEMATOCHEZIA: 0
HEADACHES: 1
DYSURIA: 0
SHORTNESS OF BREATH: 1
DIZZINESS: 0
WEAKNESS: 0
CHILLS: 0
PALPITATIONS: 0
HEMATURIA: 0
PARESTHESIAS: 0
FREQUENCY: 0
SORE THROAT: 0
BREAST MASS: 0
NAUSEA: 0
JOINT SWELLING: 0
HEARTBURN: 0
NERVOUS/ANXIOUS: 1
CONSTIPATION: 0
ARTHRALGIAS: 1
MYALGIAS: 1
DIARRHEA: 0
COUGH: 0
FEVER: 0

## 2019-08-06 ASSESSMENT — ANXIETY QUESTIONNAIRES
5. BEING SO RESTLESS THAT IT IS HARD TO SIT STILL: NOT AT ALL
1. FEELING NERVOUS, ANXIOUS, OR ON EDGE: SEVERAL DAYS
IF YOU CHECKED OFF ANY PROBLEMS ON THIS QUESTIONNAIRE, HOW DIFFICULT HAVE THESE PROBLEMS MADE IT FOR YOU TO DO YOUR WORK, TAKE CARE OF THINGS AT HOME, OR GET ALONG WITH OTHER PEOPLE: SOMEWHAT DIFFICULT
2. NOT BEING ABLE TO STOP OR CONTROL WORRYING: SEVERAL DAYS
7. FEELING AFRAID AS IF SOMETHING AWFUL MIGHT HAPPEN: SEVERAL DAYS
6. BECOMING EASILY ANNOYED OR IRRITABLE: SEVERAL DAYS
GAD7 TOTAL SCORE: 5
3. WORRYING TOO MUCH ABOUT DIFFERENT THINGS: SEVERAL DAYS

## 2019-08-06 ASSESSMENT — MIFFLIN-ST. JEOR: SCORE: 1372.71

## 2019-08-06 ASSESSMENT — PATIENT HEALTH QUESTIONNAIRE - PHQ9
5. POOR APPETITE OR OVEREATING: NOT AT ALL
SUM OF ALL RESPONSES TO PHQ QUESTIONS 1-9: 4

## 2019-08-06 ASSESSMENT — ACTIVITIES OF DAILY LIVING (ADL): CURRENT_FUNCTION: NO ASSISTANCE NEEDED

## 2019-08-06 NOTE — PATIENT INSTRUCTIONS
Go ahead and get the bone testing done so you have all the information when you get a second opinion.  Consider [facility] or [facility] if covered under insurance. Take everything with you (copies of scans and testing).     Due for DEXA (bone density scan) and mammogram.     Preventive Health Recommendations    See your health care provider every year to    Review health changes.     Discuss preventive care.      Review your medicines if your doctor has prescribed any.      You no longer need a yearly Pap test unless you've had an abnormal Pap test in the past 10 years. If you have vaginal symptoms, such as bleeding or discharge, be sure to talk with your provider about a Pap test.      Every 1 to 2 years, have a mammogram.  If you are over 69, talk with your health care provider about whether or not you want to continue having screening mammograms.      Every 10 years, have a colonoscopy. Or, have a yearly FIT test (stool test). These exams will check for colon cancer.       Have a cholesterol test every 5 years, or more often if your doctor advises it.       Have a diabetes test (fasting glucose) every three years. If you are at risk for diabetes, you should have this test more often.       At age 65, have a bone density scan (DEXA) to check for osteoporosis (brittle bone disease).    Shots:    Get a flu shot each year.    Get a tetanus shot every 10 years.    Talk to your doctor about your pneumonia vaccines. There are now two you should receive - Pneumovax (PPSV 23) and Prevnar (PCV 13).    Talk to your pharmacist about the shingles vaccine.    Talk to your doctor about the hepatitis B vaccine.    Nutrition:     Eat at least 5 servings of fruits and vegetables each day.      Eat whole-grain bread, whole-wheat pasta and brown rice instead of white grains and rice.      Get adequate about Calcium and Vitamin D.     Lifestyle    Exercise at least 150 minutes a week (30 minutes a day, 5 days a week). This will  help you control your weight and prevent disease.      Limit alcohol to one drink per day.      No smoking.       Wear sunscreen to prevent skin cancer.       See your dentist twice a year for an exam and cleaning.      See your eye doctor every 1 to 2 years to screen for conditions such as glaucoma, macular degeneration, cataracts, etc.    Personalized Prevention Plan  You are due for the preventive services outlined below.  Your care team is available to assist you in scheduling these services.  If you have already completed any of these items, please share that information with your care team to update in your medical record.    Health Maintenance Due   Topic Date Due     ANNUAL REVIEW OF HM ORDERS  1945     Diabetic Foot Exam  02/16/2017     Osteoporosis Screening  03/20/2017     Eye Exam  08/08/2017     Mammogram  11/17/2018     Kidney Microalbumin Urine Test  02/13/2019     Heart Failure Action Plan  02/16/2019     Annual Wellness Visit  02/20/2019     FALL RISK ASSESSMENT  02/20/2019     A1C Lab  03/26/2019     Depression Assessment  03/26/2019     Basic Metabolic Panel  05/15/2019

## 2019-08-06 NOTE — PROGRESS NOTES
"SUBJECTIVE:   Kristie Jones is a 73 year old female who presents for Preventive Visit.    Patient reports her R front shin pain, not in the knee which has returned and she went to see orthopedist at [facility]. After scans he suggested she get \"bone scan testing\" which she has not completed. She is concerned she needs another knee replacement which would be her 3rd in 6 years. She would like another opinion.   Notes she feels there are still issues with her  who has become \"parinoid with me going to doctors and therapists\". Has been seeing Maryam Jurado for therapy; she is angry about the current climate right now and lack of action on mass shootings in the [place]. Is isolating her a bit that her friends are following people that are \"evil\" and she does not wish to be nice to them.  Using Xanax maybe 1-2x per month if she really cannot sleep.   Seeing cardiologist regularly. She feels good overall physically. Believes the tightness in her chest is due to anxiety and not her heart.     Recently she stopped checking her blood sugars since they were consistent. Denies hypoglycemia.   Finds she sometimes gets paresthesias in her hands when reading at night.   Are you in the first 12 months of your Medicare coverage?  No    Healthy Habits:     In general, how would you rate your overall health?  Good    Frequency of exercise:  2-3 days/week    Duration of exercise:  30-45 minutes    Do you usually eat at least 4 servings of fruit and vegetables a day, include whole grains    & fiber and avoid regularly eating high fat or \"junk\" foods?  No    Taking medications regularly:  Yes    Medication side effects:  None    Ability to successfully perform activities of daily living:  No assistance needed    Home Safety:  No safety concerns identified    Hearing Impairment:  Difficulty understanding speech on the telephone    In the past 6 months, have you been bothered by leaking of urine?  No    In general, how would you rate your " overall mental or emotional health?  Fair      PHQ-2 Total Score: 2    Additional concerns today:  Yes    Do you feel safe in your environment? Yes    Do you have a Health Care Directive? Yes: Advance Directive has been received and scanned.      Fall risk  Fallen 2 or more times in the past year?: No  Any fall with injury in the past year?: No    Cognitive Screening   1) Repeat 3 items (Leader, Season, Table)    2) Clock draw: NORMAL  3) 3 item recall: Recalls 3 objects  Results: NORMAL clock, 1-2 items recalled: COGNITIVE IMPAIRMENT LESS LIKELY    Mini-CogTM Copyright KATLYN Pope. Licensed by the author for use in [facility]; reprinted with permission (jeramy@[facility]). All rights reserved.      Do you have sleep apnea, excessive snoring or daytime drowsiness?: no    Reviewed and updated as needed this visit by clinical staff  Tobacco  Allergies  Meds  Med Hx  Surg Hx  Fam Hx  Soc Hx        Reviewed and updated as needed this visit by Provider        Social History     Tobacco Use     Smoking status: Never Smoker     Smokeless tobacco: Never Used   Substance Use Topics     Alcohol use: Yes     Alcohol/week: 0.0 oz     Frequency: Monthly or less     Drinks per session: 1 or 2     Binge frequency: Never     Comment: One glass of wine per week                 Current providers sharing in care for this patient include:   Patient Care Team:  Christin Diehl MD as PCP - General (Pediatrics)  Pepper Baugh MD as MD (Internal Medicine)  Christin Diehl MD as Assigned PCP    The following health maintenance items are reviewed in Epic and correct as of today:  Health Maintenance   Topic Date Due     ANNUAL REVIEW OF HM ORDERS  1945     DIABETIC FOOT EXAM  02/16/2017     DEXA  03/20/2017     EYE EXAM  08/08/2017     MAMMO SCREENING  11/17/2018     MICROALBUMIN  02/13/2019     HF ACTION PLAN  02/16/2019     MEDICARE ANNUAL WELLNESS VISIT  02/20/2019     FALL RISK ASSESSMENT   02/20/2019     A1C  03/26/2019     PHQ-9  03/26/2019     BMP  05/15/2019     INFLUENZA VACCINE (1) 09/01/2019     ALT  09/25/2019     LIPID  09/25/2019     TSH W/FREE T4 REFLEX  02/13/2020     CBC  05/30/2020     ADVANCE CARE PLANNING  02/20/2023     DTAP/TDAP/TD IMMUNIZATION (4 - Td) 02/20/2028     COLONOSCOPY  04/04/2028     HEPATITIS C SCREENING  Completed     DEPRESSION ACTION PLAN  Completed     MIGRAINE ACTION PLAN  Completed     PNEUMOCOCCAL IMMUNIZATION 65+ LOW/MEDIUM RISK  Completed     ZOSTER IMMUNIZATION  Completed     IPV IMMUNIZATION  Aged Out     MENINGITIS IMMUNIZATION  Aged Out     Lab work is in process  Labs reviewed in EPIC  BP Readings from Last 3 Encounters:   08/06/19 128/58   10/17/18 102/68   10/10/18 102/56    Wt Readings from Last 3 Encounters:   08/06/19 86.2 kg (190 lb)   10/17/18 93.4 kg (206 lb)   10/10/18 92.3 kg (203 lb 6.4 oz)                  Patient Active Problem List   Diagnosis     GENERAL OSTEOARTHROSIS [715.00]     Esophageal reflux     Hyperlipidemia LDL goal <130     LBBB (left bundle branch block)     Advanced directives, counseling/discussion     SUSAN (obstructive sleep apnea)     Idiopathic cardiomyopathy (H)     Cataract     Leg pain     Insomnia     Obesity     S/P total  right knee replacement: 1/7/13     Generalized anxiety disorder     Total knee replacement status     S/P revision of total knee, 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     Major depressive disorder, recurrent episode, mild (H)     ICD (implantable cardioverter-defibrillator), biventricular, in situ     Type 2 diabetes mellitus without complication (H)     Chronic systolic congestive heart failure (H)     Past Surgical History:   Procedure Laterality Date     APPENDECTOMY       ARTHROPLASTY KNEE  1/7/2013    Procedure: ARTHROPLASTY KNEE;  Right Total Knee Arthroplasty       ARTHROPLASTY REVISION KNEE Right 8/26/2015    Procedure: ARTHROPLASTY REVISION KNEE;  Surgeon: Néstor Beth MD;  Location: [facility] OR     ARTHROSCOPY KNEE        bunionectomy left foot       COLONOSCOPY       CORONARY ANGIOGRAPHY ADULT ORDER  2002    normal     CORONARY ANGIOGRAPHY ADULT ORDER  12/7/12    no significant focal narrowing that would benefit from mechanical intervention      HYSTERECTOMY       IMPLANT AUTOMATIC IMPLANTABLE CARDIOVERTER DEFIBRILLATOR  4/30/12     INSERT THORACIC PACEMAKER LEAD EPICARDIAL  5/1/2012    Procedure:INSERT THORACIC PACEMAKER LEAD EPICARDIAL; EPICARDIAL LEAD PLACEMENT; Surgeon:WEST ARECHIGA; Location:[facility] OR     TONSILLECTOMY       TRANSPLANT - corneal lenses      Bilateral for cataracts       Social History     Tobacco Use     Smoking status: Never Smoker     Smokeless tobacco: Never Used   Substance Use Topics     Alcohol use: Yes     Alcohol/week: 0.0 oz     Frequency: Monthly or less     Drinks per session: 1 or 2     Binge frequency: Never     Comment: One glass of wine per week     Family History   Problem Relation Age of Onset     Cancer Father         intraabdominal mass - not colon cancer     C.A.D. Mother      Hypertension Mother      Lipids Mother      Heart Disease Mother      Cancer Daughter 36        brain      Diabetes Paternal Uncle 52     Prostate Cancer Brother      Heart Disease Sister         murmur     Anxiety Disorder Sister      Colon Cancer No family hx of          Current Outpatient Medications   Medication Sig Dispense Refill     acetaminophen 650 MG TABS Take 650 mg by mouth every 6 hours (Patient taking differently: Take 650 mg by mouth every 6 hours PRN) 100 tablet      ALPRAZolam (XANAX) 0.25 MG tablet Take 1 tablet (0.25 mg) by mouth 3 times daily as needed for anxiety 30 tablet 1     aspirin 81 MG tablet Take 81 mg by mouth daily       blood glucose monitoring (ACCU-CHEK FASTCLIX) lancets Use to test blood sugar 1 time daily or as directed. 1 Box 3     blood glucose monitoring (NO BRAND SPECIFIED) test strip Use to test blood sugars 1 times daily or as directed Brand per insurance 100 strip 0      CALCIUM 500 MG OR TABS Takes 3 tabs daily takees total of 1200 mg daily       carvedilol (COREG) 25 MG tablet TAKE 1 TABLET BY MOUTH TWICE DAILY WITH MEALS 180 tablet 0     celecoxib (CELEBREX) 200 MG capsule Take 1 capsule (200 mg) by mouth every morning (Patient taking differently: Take 200 mg by mouth daily as needed ) 90 capsule 3     Cholecalciferol (VITAMIN D) 2000 UNITS tablet Take 1 tablet by mouth daily.       CIPROFLOXACIN PO For dental appointmetns.       clobetasol (TEMOVATE) 0.05 % cream Apply topically 2 times daily       co-enzyme Q-10 50 MG CAPS Take 1 capsule by mouth daily.       cycloSPORINE (RESTASIS) 0.05 % ophthalmic emulsion 1 drop 2 times daily       Ferrous Sulfate (IRON SUPPLEMENT PO) Take 325 mg by mouth daily (with breakfast)       fluticasone (FLONASE) 50 MCG/ACT nasal spray Spray 1-2 sprays into both nostrils daily (Patient not taking: Reported on 10/10/2018) 48 g 3     loratadine (CLARITIN) 10 MG tablet Take 10 mg by mouth daily       losartan (COZAAR) 50 MG tablet TAKE 1 TABLET BY MOUTH ONCE DAILY 90 tablet 3     MULTIVITAMIN TABS   OR 1 qd  0     rosuvastatin (CRESTOR) 10 MG tablet TAKE 1 TABLET BY MOUTH EVERY OTHER DAY 45 tablet 0     spironolactone (ALDACTONE) 25 MG tablet Take 0.5 tablets (12.5 mg) by mouth daily 90 tablet 2     venlafaxine (EFFEXOR-XR) 75 MG 24 hr capsule TAKE 3 CAPSULES BY MOUTH ONCE DAILY 270 capsule 0     Allergies   Allergen Reactions     Prochlorperazine      Other reaction(s): Tardive Dyskinesia     Amoxicillin Hives     Clindamycin Other (See Comments)     Burning sensation in chest     Decadron      flushing     Dexamethasone      Other reaction(s): Erythema     Compazine Anxiety     Mammogram Screening: Mammogram Screening: Patient over age 50, mutual decision to screen reflected in health maintenance.    Review of Systems   Constitutional: Negative for chills and fever.   HENT: Negative for congestion, ear pain, hearing loss and sore throat.   "  Eyes: Negative for pain and visual disturbance.   Respiratory: Positive for shortness of breath. Negative for cough.    Cardiovascular: Positive for chest pain. Negative for palpitations and peripheral edema.   Gastrointestinal: Negative for abdominal pain, constipation, diarrhea, heartburn, hematochezia and nausea.   Breasts:  Negative for tenderness, breast mass and discharge.   Genitourinary: Negative for dysuria, frequency, genital sores, hematuria, pelvic pain, urgency, vaginal bleeding and vaginal discharge.   Musculoskeletal: Positive for arthralgias and myalgias. Negative for joint swelling.   Skin: Negative for rash.   Neurological: Positive for headaches. Negative for dizziness, weakness and paresthesias.   Psychiatric/Behavioral: Negative for mood changes. The patient is nervous/anxious.        This document serves as a record of the services and decisions personally performed and made by Christin Diehl MD. It was created on her behalf by Sulma Moreno, a trained medical scribe. The creation of this document is based the provider's statements to the medical scribe.    Sulma Moreno August 6, 2019 1:22 PM  OBJECTIVE:   /58 (BP Location: Right arm, Patient Position: Chair, Cuff Size: Adult Regular)   Pulse 60   Temp 98.6  F (37  C) (Oral)   Ht 1.651 m (5' 5\")   Wt 86.2 kg (190 lb)   SpO2 99%   BMI 31.62 kg/m   Estimated body mass index is 31.62 kg/m  as calculated from the following:    Height as of this encounter: 1.651 m (5' 5\").    Weight as of this encounter: 86.2 kg (190 lb).  Physical Exam  GENERAL APPEARANCE: alert and no distress  EYES: Eyes grossly normal to inspection, PERRL and conjunctivae and sclerae normal  HENT: ear canals and TM's normal, nose and mouth without ulcers or lesions, oropharynx clear and oral mucous membranes moist  NECK: no adenopathy, no asymmetry, masses, or scars and thyroid normal to palpation  RESP: lungs clear to auscultation - no rales, rhonchi or " wheezes  BREAST: normal without masses, tenderness or nipple discharge and no palpable axillary masses or adenopathy  CV: regular rate and rhythm, normal S1 S2, no S3 or S4, no murmur, click or rub, no peripheral edema. Pacemaker in place left upper chest    ABDOMEN: soft, nontender, and bowel sounds normal  MS: no musculoskeletal defects are noted and gait is age appropriate without ataxia  SKIN: no suspicious lesions or rashes  NEURO: Normal strength and tone, sensory exam grossly normal, mentation intact and speech normal  PSYCH: mentation appears normal and affect normal/bright    Diagnostic Test Results:  Labs reviewed in Epic  No results found for this or any previous visit (from the past 24 hour(s)).    ASSESSMENT / PLAN:   (Z00.00) Routine general medical examination at a health care facility  (primary encounter diagnosis)  -- imm utd   -- mammo due   -- DEXA due   -- colonoscopy utd   -- pap no longer indicated- aged out   -- encouraged regular exercise and balanced diet       (I50.22) Chronic systolic congestive heart failure (H)  -- stable and followed by cardiology   -- last echo 9/2018 EF was 25-30%  -- currently on carvedilol, losartan, spironolactone  --pacemaker in place    (I42.8) Idiopathic cardiomyopathy (H)  -- followed by cardiology; continue to monitor   -- no changes to medications   -- follow up as directed    Plan: carvedilol (COREG) 25 MG tablet, losartan         (COZAAR) 50 MG tablet, spironolactone         (ALDACTONE) 25 MG tablet      (E11.9) Type 2 diabetes mellitus without complication, without long-term current use of insulin (H)  -- A1c and other labs  pending and patient is currently diet controlled   -- on arb, asa and statin   -- advised patient to ensure she is getting yearly diabetic eye exams   -- encouraged regular exercise and balanced diet   Plan: Hemoglobin A1c, Albumin Random Urine         Quantitative with Creat Ratio, Comprehensive         metabolic panel      (F33.0)  Major depressive disorder, recurrent episode, mild (H)  -- PHQ9:4; stable; on effexor  -- encouraged patient to continue with therapist and continue self care measures     (F41.1) Generalized anxiety disorder  -- GAD7:5  -- this seems to be more of an issue for her due to overall current state of [place]   -- encouraged patient to continue talking through these things with her therapist   -- no changes to Effexor at this time  Plan: venlafaxine (EFFEXOR-XR) 75 MG 24 hr capsule      (E78.5) Hyperlipidemia LDL goal <130  -- controlled; continue to monitor  -- recheck labs today; no changes to Crestor   Plan: rosuvastatin (CRESTOR) 10 MG tablet, Lipid         panel reflex to direct LDL Fasting,         Comprehensive metabolic panel      (Z96.651) Status post total right knee replacement  -- discussed current pain with patient and recent opinion with patient from her orthopedist at [facility] along with his plan to get bone scan  -- encouraged patient to get second opinion on pain if she wishes and to complete bone scan prior to appointment to have complete information for second opinion   -- also advised patient to get copy of all her scans to bring to second opinion    -- recommended either [facility] or [place]   -- celebrex prn, no changes   Plan: celecoxib (CELEBREX) 200 MG capsule      (Z12.31) Visit for screening mammogram  -- due for mammogram   Plan: *MA Screening Digital Bilateral      (E89.40) Asymptomatic postsurgical menopause  -- due for DEXA   Plan: DX Hip/Pelvis/Spine      Follow up for annual care or as needed         End of Life Planning:  Patient currently has an advanced directive: Yes.  Practitioner is supportive of decision.    COUNSELING:  Reviewed preventive health counseling, as reflected in patient instructions       Regular exercise       Healthy diet/nutrition       Vision screening       Dental care       Fall risk prevention       Osteoporosis Prevention/Bone Health    Estimated body mass index is  "31.62 kg/m  as calculated from the following:    Height as of this encounter: 1.651 m (5' 5\").    Weight as of this encounter: 86.2 kg (190 lb).    Weight management plan: Discussed healthy diet and exercise guidelines     reports that she has never smoked. She has never used smokeless tobacco.      Appropriate preventive services were discussed with this patient, including applicable screening as appropriate for cardiovascular disease, diabetes, osteopenia/osteoporosis, and glaucoma.  As appropriate for age/gender, discussed screening for colorectal cancer, prostate cancer, breast cancer, and cervical cancer. Checklist reviewing preventive services available has been given to the patient.    Reviewed patients plan of care and provided an AVS. The Basic Care Plan (routine screening as documented in Health Maintenance) for Kristie Mcknight meets the Care Plan requirement. This Care Plan has been established and reviewed with the Patient.    Counseling Resources:  ATP IV Guidelines  Pooled Cohorts Equation Calculator  Breast Cancer Risk Calculator  FRAX Risk Assessment  ICSI Preventive Guidelines  Dietary Guidelines for Americans, 2010  USDA's MyPlate  ASA Prophylaxis  Lung CA Screening      The information in this document, created by the medical scribe for me, accurately reflects the services I personally performed and the decisions made by me. I have reviewed and approved this document for accuracy prior to leaving the patient care area.  Christin Diehl MD  [facility]    Identified Health Risks:  "

## 2019-08-07 ENCOUNTER — ANCILLARY PROCEDURE (OUTPATIENT)
Dept: CARDIOLOGY | Facility: CLINIC | Age: 74
End: 2019-08-07
Attending: INTERNAL MEDICINE
Payer: COMMERCIAL

## 2019-08-07 DIAGNOSIS — Z95.810 ICD (IMPLANTABLE CARDIOVERTER-DEFIBRILLATOR) IN PLACE: ICD-10-CM

## 2019-08-07 LAB
ALBUMIN SERPL-MCNC: 4.2 G/DL (ref 3.4–5)
ALP SERPL-CCNC: 83 U/L (ref 40–150)
ALT SERPL W P-5'-P-CCNC: 30 U/L (ref 0–50)
ANION GAP SERPL CALCULATED.3IONS-SCNC: 6 MMOL/L (ref 3–14)
AST SERPL W P-5'-P-CCNC: 21 U/L (ref 0–45)
BILIRUB SERPL-MCNC: 0.7 MG/DL (ref 0.2–1.3)
BUN SERPL-MCNC: 18 MG/DL (ref 7–30)
CALCIUM SERPL-MCNC: 9.2 MG/DL (ref 8.5–10.1)
CHLORIDE SERPL-SCNC: 106 MMOL/L (ref 94–109)
CHOLEST SERPL-MCNC: 200 MG/DL
CO2 SERPL-SCNC: 27 MMOL/L (ref 20–32)
CREAT SERPL-MCNC: 0.87 MG/DL (ref 0.52–1.04)
CREAT UR-MCNC: 50 MG/DL
GFR SERPL CREATININE-BSD FRML MDRD: 66 ML/MIN/{1.73_M2}
GLUCOSE SERPL-MCNC: 70 MG/DL (ref 70–99)
HDLC SERPL-MCNC: 37 MG/DL
LDLC SERPL CALC-MCNC: 116 MG/DL
MICROALBUMIN UR-MCNC: <5 MG/L
MICROALBUMIN/CREAT UR: NORMAL MG/G CR (ref 0–25)
NONHDLC SERPL-MCNC: 163 MG/DL
POTASSIUM SERPL-SCNC: 4.2 MMOL/L (ref 3.4–5.3)
PROT SERPL-MCNC: 6.9 G/DL (ref 6.8–8.8)
SODIUM SERPL-SCNC: 139 MMOL/L (ref 133–144)
TRIGL SERPL-MCNC: 233 MG/DL

## 2019-08-07 PROCEDURE — 93296 REM INTERROG EVL PM/IDS: CPT | Performed by: INTERNAL MEDICINE

## 2019-08-07 PROCEDURE — 93295 DEV INTERROG REMOTE 1/2/MLT: CPT | Performed by: INTERNAL MEDICINE

## 2019-08-07 ASSESSMENT — ANXIETY QUESTIONNAIRES: GAD7 TOTAL SCORE: 5

## 2019-08-19 ENCOUNTER — ANCILLARY PROCEDURE (OUTPATIENT)
Dept: BONE DENSITY | Facility: CLINIC | Age: 74
End: 2019-08-19
Payer: COMMERCIAL

## 2019-08-19 ENCOUNTER — ANCILLARY PROCEDURE (OUTPATIENT)
Dept: MAMMOGRAPHY | Facility: CLINIC | Age: 74
End: 2019-08-19
Payer: COMMERCIAL

## 2019-08-19 DIAGNOSIS — Z12.31 VISIT FOR SCREENING MAMMOGRAM: ICD-10-CM

## 2019-08-19 DIAGNOSIS — Z78.0 ASYMPTOMATIC POSTMENOPAUSAL STATUS: ICD-10-CM

## 2019-08-19 PROCEDURE — 77067 SCR MAMMO BI INCL CAD: CPT | Mod: TC

## 2019-08-19 PROCEDURE — 77063 BREAST TOMOSYNTHESIS BI: CPT | Mod: TC

## 2019-08-19 PROCEDURE — 77085 DXA BONE DENSITY AXL VRT FX: CPT | Performed by: FAMILY MEDICINE

## 2019-08-24 LAB
MDC_IDC_EPISODE_DTM: NORMAL
MDC_IDC_EPISODE_ID: NORMAL
MDC_IDC_EPISODE_TYPE: NORMAL
MDC_IDC_LEAD_IMPLANT_DT: NORMAL
MDC_IDC_LEAD_LOCATION: NORMAL
MDC_IDC_LEAD_LOCATION_DETAIL_1: NORMAL
MDC_IDC_LEAD_MFG: NORMAL
MDC_IDC_LEAD_MODEL: 4047
MDC_IDC_LEAD_MODEL: 4047
MDC_IDC_LEAD_MODEL: NORMAL
MDC_IDC_LEAD_MODEL: NORMAL
MDC_IDC_LEAD_POLARITY_TYPE: NORMAL
MDC_IDC_LEAD_SERIAL: NORMAL
MDC_IDC_MSMT_BATTERY_DTM: NORMAL
MDC_IDC_MSMT_BATTERY_REMAINING_LONGEVITY: 42 MO
MDC_IDC_MSMT_BATTERY_REMAINING_PERCENTAGE: 72 %
MDC_IDC_MSMT_BATTERY_STATUS: NORMAL
MDC_IDC_MSMT_CAP_CHARGE_DTM: NORMAL
MDC_IDC_MSMT_CAP_CHARGE_TIME: 11.3 S
MDC_IDC_MSMT_CAP_CHARGE_TYPE: NORMAL
MDC_IDC_MSMT_LEADCHNL_LV_IMPEDANCE_VALUE: 373 OHM
MDC_IDC_MSMT_LEADCHNL_RA_IMPEDANCE_VALUE: 589 OHM
MDC_IDC_MSMT_LEADCHNL_RA_LEAD_CHANNEL_STATUS: NORMAL
MDC_IDC_MSMT_LEADCHNL_RV_IMPEDANCE_VALUE: 406 OHM
MDC_IDC_PG_IMPLANT_DTM: NORMAL
MDC_IDC_PG_MFG: NORMAL
MDC_IDC_PG_MODEL: NORMAL
MDC_IDC_PG_SERIAL: NORMAL
MDC_IDC_PG_TYPE: NORMAL
MDC_IDC_SESS_CLINIC_NAME: NORMAL
MDC_IDC_SESS_DTM: NORMAL
MDC_IDC_SESS_TYPE: NORMAL
MDC_IDC_SET_BRADY_AT_MODE_SWITCH_RATE: 170 {BEATS}/MIN
MDC_IDC_SET_BRADY_LOWRATE: 40 {BEATS}/MIN
MDC_IDC_SET_BRADY_MODE: NORMAL
MDC_IDC_SET_CRT_LVRV_DELAY: 0 MS
MDC_IDC_SET_CRT_PACED_CHAMBERS: NORMAL
MDC_IDC_SET_LEADCHNL_LV_PACING_AMPLITUDE: 0.1 V
MDC_IDC_SET_LEADCHNL_LV_PACING_ANODE_ELECTRODE_1: NORMAL
MDC_IDC_SET_LEADCHNL_LV_PACING_ANODE_LOCATION_1: NORMAL
MDC_IDC_SET_LEADCHNL_LV_PACING_CATHODE_ELECTRODE_1: NORMAL
MDC_IDC_SET_LEADCHNL_LV_PACING_CATHODE_LOCATION_1: NORMAL
MDC_IDC_SET_LEADCHNL_LV_PACING_PULSEWIDTH: 0.1 MS
MDC_IDC_SET_LEADCHNL_LV_SENSING_ADAPTATION_MODE: NORMAL
MDC_IDC_SET_LEADCHNL_LV_SENSING_ANODE_ELECTRODE_1: NORMAL
MDC_IDC_SET_LEADCHNL_LV_SENSING_ANODE_LOCATION_1: NORMAL
MDC_IDC_SET_LEADCHNL_LV_SENSING_CATHODE_ELECTRODE_1: NORMAL
MDC_IDC_SET_LEADCHNL_LV_SENSING_CATHODE_LOCATION_1: NORMAL
MDC_IDC_SET_LEADCHNL_LV_SENSING_SENSITIVITY: 1 MV
MDC_IDC_SET_LEADCHNL_RA_PACING_POLARITY: NORMAL
MDC_IDC_SET_LEADCHNL_RA_SENSING_ADAPTATION_MODE: NORMAL
MDC_IDC_SET_LEADCHNL_RA_SENSING_POLARITY: NORMAL
MDC_IDC_SET_LEADCHNL_RA_SENSING_SENSITIVITY: 0.15 MV
MDC_IDC_SET_LEADCHNL_RV_PACING_AMPLITUDE: 2 V
MDC_IDC_SET_LEADCHNL_RV_PACING_POLARITY: NORMAL
MDC_IDC_SET_LEADCHNL_RV_PACING_PULSEWIDTH: 1 MS
MDC_IDC_SET_LEADCHNL_RV_SENSING_ADAPTATION_MODE: NORMAL
MDC_IDC_SET_LEADCHNL_RV_SENSING_POLARITY: NORMAL
MDC_IDC_SET_LEADCHNL_RV_SENSING_SENSITIVITY: 0.6 MV
MDC_IDC_SET_ZONE_DETECTION_INTERVAL: 250 MS
MDC_IDC_SET_ZONE_DETECTION_INTERVAL: 333 MS
MDC_IDC_SET_ZONE_DETECTION_INTERVAL: 375 MS
MDC_IDC_SET_ZONE_TYPE: NORMAL
MDC_IDC_SET_ZONE_VENDOR_TYPE: NORMAL
MDC_IDC_STAT_BRADY_DTM_END: NORMAL
MDC_IDC_STAT_BRADY_DTM_START: NORMAL
MDC_IDC_STAT_BRADY_RA_PERCENT_PACED: 0 %
MDC_IDC_STAT_BRADY_RV_PERCENT_PACED: 0 %
MDC_IDC_STAT_CRT_DTM_END: NORMAL
MDC_IDC_STAT_CRT_DTM_START: NORMAL
MDC_IDC_STAT_CRT_LV_PERCENT_PACED: 0 %
MDC_IDC_STAT_EPISODE_RECENT_COUNT: 0
MDC_IDC_STAT_EPISODE_RECENT_COUNT: 1
MDC_IDC_STAT_EPISODE_RECENT_COUNT_DTM_END: NORMAL
MDC_IDC_STAT_EPISODE_RECENT_COUNT_DTM_START: NORMAL
MDC_IDC_STAT_EPISODE_TYPE: NORMAL
MDC_IDC_STAT_EPISODE_VENDOR_TYPE: NORMAL
MDC_IDC_STAT_TACHYTHERAPY_ATP_DELIVERED_RECENT: 0
MDC_IDC_STAT_TACHYTHERAPY_ATP_DELIVERED_TOTAL: 0
MDC_IDC_STAT_TACHYTHERAPY_RECENT_DTM_END: NORMAL
MDC_IDC_STAT_TACHYTHERAPY_RECENT_DTM_START: NORMAL
MDC_IDC_STAT_TACHYTHERAPY_SHOCKS_ABORTED_RECENT: 0
MDC_IDC_STAT_TACHYTHERAPY_SHOCKS_ABORTED_TOTAL: 0
MDC_IDC_STAT_TACHYTHERAPY_SHOCKS_DELIVERED_RECENT: 0
MDC_IDC_STAT_TACHYTHERAPY_SHOCKS_DELIVERED_TOTAL: 0
MDC_IDC_STAT_TACHYTHERAPY_TOTAL_DTM_END: NORMAL

## 2019-09-13 ENCOUNTER — HOSPITAL ENCOUNTER (OUTPATIENT)
Dept: NUCLEAR MEDICINE | Facility: CLINIC | Age: 74
Setting detail: NUCLEAR MEDICINE
Discharge: HOME | End: 2019-09-13
Attending: ORTHOPAEDIC SURGERY | Admitting: ORTHOPAEDIC SURGERY
Payer: COMMERCIAL

## 2019-09-13 ENCOUNTER — HOSPITAL ENCOUNTER (OUTPATIENT)
Dept: NUCLEAR MEDICINE | Facility: CLINIC | Age: 74
Setting detail: NUCLEAR MEDICINE
End: 2019-09-13
Attending: ORTHOPAEDIC SURGERY
Payer: COMMERCIAL

## 2019-09-13 DIAGNOSIS — M25.569 LATERAL KNEE PAIN, UNSPECIFIED LATERALITY: ICD-10-CM

## 2019-09-13 DIAGNOSIS — Z47.1 AFTERCARE FOLLOWING JOINT REPLACEMENT: ICD-10-CM

## 2019-09-13 PROCEDURE — A9561 TC99M OXIDRONATE: HCPCS | Performed by: ORTHOPAEDIC SURGERY

## 2019-09-13 PROCEDURE — 78315 BONE IMAGING 3 PHASE: CPT

## 2019-09-13 PROCEDURE — 34300033 ZZH RX 343: Performed by: ORTHOPAEDIC SURGERY

## 2019-09-13 RX ADMIN — Medication 26 MILLICURIE: at 11:02

## 2019-10-10 ENCOUNTER — TRANSFERRED RECORDS (OUTPATIENT)
Dept: HEALTH INFORMATION MANAGEMENT | Facility: CLINIC | Age: 74
End: 2019-10-10

## 2019-10-17 ENCOUNTER — HOSPITAL ENCOUNTER (OUTPATIENT)
Dept: CARDIOLOGY | Facility: CLINIC | Age: 74
Discharge: HOME | End: 2019-10-17
Attending: NURSE PRACTITIONER | Admitting: NURSE PRACTITIONER
Payer: COMMERCIAL

## 2019-10-17 DIAGNOSIS — I42.9 IDIOPATHIC CARDIOMYOPATHY (H): ICD-10-CM

## 2019-10-17 PROCEDURE — 93306 TTE W/DOPPLER COMPLETE: CPT | Mod: 26 | Performed by: INTERNAL MEDICINE

## 2019-10-17 PROCEDURE — 40000264 ECHOCARDIOGRAM COMPLETE

## 2019-10-17 PROCEDURE — 25500064 ZZH RX 255 OP 636: Performed by: NURSE PRACTITIONER

## 2019-10-17 RX ADMIN — HUMAN ALBUMIN MICROSPHERES AND PERFLUTREN 3 ML: 10; .22 INJECTION, SOLUTION INTRAVENOUS at 14:16

## 2019-10-24 ENCOUNTER — OFFICE VISIT (OUTPATIENT)
Dept: CARDIOLOGY | Facility: CLINIC | Age: 74
End: 2019-10-24
Payer: COMMERCIAL

## 2019-10-24 VITALS
HEART RATE: 70 BPM | SYSTOLIC BLOOD PRESSURE: 100 MMHG | BODY MASS INDEX: 32.49 KG/M2 | DIASTOLIC BLOOD PRESSURE: 50 MMHG | HEIGHT: 65 IN | WEIGHT: 195 LBS

## 2019-10-24 DIAGNOSIS — I42.9 IDIOPATHIC CARDIOMYOPATHY (H): Primary | ICD-10-CM

## 2019-10-24 PROCEDURE — 99213 OFFICE O/P EST LOW 20 MIN: CPT | Performed by: INTERNAL MEDICINE

## 2019-10-24 ASSESSMENT — MIFFLIN-ST. JEOR: SCORE: 1390.39

## 2019-10-24 NOTE — PROGRESS NOTES
HPI and Plan:   It is as always a pleasure for me to see this delightful 74-year-old lady who I been following since 2002 for idiopathic cardiomyopathy with moderate to severe left ventricular systolic dysfunction.  Her ejection fraction has consistently been been between 20 to 30%.  He has a ICD in place for primary prophylaxis and I am very happy to hear that this device has not fired.  Indeed it has never fired and looking at the most recent device interrogation she did not even need ATP therapy.    It has been a year since I last saw this lady and I am always happy to hear about her trips.  This year she took an extensive trip to [place] with her grandchildren.  Tells me that she had a great time in [facility].  She was able to walk at least 10,000 steps a day.  He does feel little tired in the evenings but she has no PND orthopnea chest discomfort.  If she sits for prolonged periods of time she would get a little dizzy when she stands up but this is a very transient symptom.    By physical examination she has no evidence of congestive heart failure though I do note her blood pressure of 100/50.  I do not think there is any room for medication titration.  She is tolerating all her medications well.  Repeat echocardiography this year shows the same ejection fraction with grade 1 diastolic dysfunction.    Overall I do think she is doing very well.  I will arrange follow-up again in a years time.  Her medications will remain unchanged.    Orders Placed This Encounter   Procedures     Follow-Up with Cardiologist     Echocardiogram Complete       No orders of the defined types were placed in this encounter.      Encounter Diagnosis   Name Primary?     Idiopathic cardiomyopathy (H) Yes       CURRENT MEDICATIONS:  Current Outpatient Medications   Medication Sig Dispense Refill     acetaminophen 650 MG TABS Take 650 mg by mouth every 6 hours (Patient taking differently: Take 650 mg by mouth every 6 hours PRN) 100 tablet       ALPRAZolam (XANAX) 0.25 MG tablet Take 1 tablet (0.25 mg) by mouth 3 times daily as needed for anxiety 30 tablet 1     aspirin 81 MG tablet Take 81 mg by mouth daily       blood glucose monitoring (NO BRAND SPECIFIED) test strip Use to test blood sugars 1 times daily or as directed Brand per insurance 100 strip 0     CALCIUM 500 MG OR TABS Takes 3 tabs daily takees total of 1200 mg daily       carvedilol (COREG) 25 MG tablet TAKE 1 TABLET BY MOUTH TWICE DAILY WITH MEALS 180 tablet 3     celecoxib (CELEBREX) 200 MG capsule Take 1 capsule (200 mg) by mouth daily as needed 90 capsule 3     Cholecalciferol (VITAMIN D) 2000 UNITS tablet Take 1 tablet by mouth daily.       clobetasol (TEMOVATE) 0.05 % cream Apply topically 2 times daily       co-enzyme Q-10 50 MG CAPS Take 1 capsule by mouth daily.       cycloSPORINE (RESTASIS) 0.05 % ophthalmic emulsion 1 drop 2 times daily       loratadine (CLARITIN) 10 MG tablet Take 10 mg by mouth daily       losartan (COZAAR) 50 MG tablet Take 1 tablet (50 mg) by mouth daily 90 tablet 3     MULTIVITAMIN TABS   OR 1 qd  0     rosuvastatin (CRESTOR) 10 MG tablet TAKE 1 TABLET BY MOUTH EVERY OTHER DAY 45 tablet 3     spironolactone (ALDACTONE) 25 MG tablet Take 0.5 tablets (12.5 mg) by mouth daily 90 tablet 2     venlafaxine (EFFEXOR-XR) 75 MG 24 hr capsule TAKE 3 CAPSULES BY MOUTH ONCE DAILY 270 capsule 3     CIPROFLOXACIN PO For dental appointmetns.         ALLERGIES     Allergies   Allergen Reactions     Prochlorperazine      Other reaction(s): Tardive Dyskinesia     Amoxicillin Hives     Clindamycin Other (See Comments)     Burning sensation in chest     Decadron      flushing     Dexamethasone      Other reaction(s): Erythema     Compazine Anxiety       PAST MEDICAL HISTORY:  Past Medical History:   Diagnosis Date     Acute posthemorrhagic anemia 9/11/2015     Anemia      Chronic systolic congestive heart failure (H) 10/10/2018     EF 35%     Fibromyalgia       Gastro-oesophageal reflux disease      GENERAL OSTEOARTHROSIS [715.00] 11/23/2004    knee     Generalized anxiety disorder 9/30/2013     Hyperlipidemia LDL goal <130 2/10/2010    Failed simvastatin- muscle aches      Hypertension     because of the heart     Idiopathic cardiomyopathy (H) 1/19/2012    Dr. Osborne 3/2011 EF 30-35%      Insomnia 1/19/2012     Iron deficiency anemia 8/12/2015     Problem list name updated by automated process. Provider to review     LBBB (left bundle branch block)      Left ventricular systolic dysfunction:EF 35% 2/5/2012    Must stay on diovan per cardiology      Major depressive disorder, recurrent episode, mild (H) 2/17/2016     Migraine 6/21/2005     Problem list name updated by automated process. Provider to review     Nonischemic cardiomyopathy (H)     EF 30-35%, Dr. Osborne [facility] (suspect virus); s/p AICD     NSVT (nonsustained ventricular tachycardia) (H)      Obesity 1/20/2012     SUSAN (obstructive sleep apnea) 01/19/2012    CPAP      Pure hypercholesterolemia      Restless leg syndrome      Type 2 diabetes mellitus without complication (H) 9/24/2016       PAST SURGICAL HISTORY:  Past Surgical History:   Procedure Laterality Date     APPENDECTOMY       ARTHROPLASTY KNEE  1/7/2013    Procedure: ARTHROPLASTY KNEE;  Right Total Knee Arthroplasty       ARTHROPLASTY REVISION KNEE Right 8/26/2015    Procedure: ARTHROPLASTY REVISION KNEE;  Surgeon: Néstor Beth MD;  Location: [facility] OR     ARTHROSCOPY KNEE       bunionectomy left foot       COLONOSCOPY       CORONARY ANGIOGRAPHY ADULT ORDER  2002    normal     CORONARY ANGIOGRAPHY ADULT ORDER  12/7/12    no significant focal narrowing that would benefit from mechanical intervention      HYSTERECTOMY       IMPLANT AUTOMATIC IMPLANTABLE CARDIOVERTER DEFIBRILLATOR  4/30/12     INSERT THORACIC PACEMAKER LEAD EPICARDIAL  5/1/2012    Procedure:INSERT THORACIC PACEMAKER LEAD EPICARDIAL; EPICARDIAL LEAD PLACEMENT; Surgeon:WEST ARECHIGA;  Location:[facility] OR     TONSILLECTOMY       TRANSPLANT - corneal lenses      Bilateral for cataracts       FAMILY HISTORY:  Family History   Problem Relation Age of Onset     Cancer Father         intraabdominal mass - not colon cancer     C.A.D. 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     Employer: [employer] [address]   Social Needs     Financial resource strain: Not hard at all     Food insecurity:     Worry: Never true     Inability: Never true     Transportation needs:     Medical: No     Non-medical: No   Tobacco Use     Smoking status: Never Smoker     Smokeless tobacco: Never Used   Substance and Sexual Activity     Alcohol use: Yes     Alcohol/week: 0.0 standard drinks     Frequency: Monthly or less     Drinks per session: 1 or 2     Binge frequency: Never     Comment: One glass of wine per week     Drug use: No     Sexual activity: Yes     Partners: Male     Birth control/protection: Surgical     Comment: She has had a hysterectomy   Lifestyle     Physical activity:     Days per week: 4 days     Minutes per session: 30 min     Stress: To some extent   Relationships     Social connections:     Talks on phone: Twice a week     Gets together: Once a week     Attends Yarsani service: Never     Active member of club or organization: No     Attends meetings of clubs or organizations: Never     Relationship status:      Intimate partner violence:     Fear  "of current or ex partner: None     Emotionally abused: None     Physically abused: None     Forced sexual activity: None   Other Topics Concern     Parent/sibling w/ CABG, MI or angioplasty before 65F 55M? 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 Multiple ignacio problems, multiple hospitalizations in last 7 years, anxiety        Pt has 2 daughters, 2 step-daughters's and  7 grandchildrens        Youngest daughter Sabi has malignant brain tumor                   Review of Systems:  Skin:  Negative for     Eyes:  Negative for    ENT:  Negative for    Respiratory:  Positive for dyspnea on exertion;sleep apnea;CPAP  Cardiovascular:    Positive for;lightheadedness  Gastroenterology: Negative for    Genitourinary:  not assessed    Musculoskeletal:  Positive for back pain;neck pain;joint pain  Neurologic:  Positive for headaches;numbness or tingling of hands  Psychiatric:  Positive for anxiety  Heme/Lymph/Imm:  Positive for allergies  Endocrine:  Positive for diabetes    Physical Exam:  Vitals: /50   Pulse 70   Ht 1.651 m (5' 5\")   Wt 88.5 kg (195 lb)   BMI 32.45 kg/m      Constitutional:  cooperative;in no acute distress obese      Skin:  warm and dry to the touch   pacemaker incision in the left infraclavicular area was well-healed      Head:  normocephalic        Eyes:  pupils equal and round        Lymph:      ENT:  no pallor or cyanosis, dentition good "        Neck:  JVP normal;no carotid bruit        Respiratory:  clear to auscultation;normal respiratory excursion         Cardiac: regular rhythm;normal S1 and S2   distant heart sounds            pulses full and equal                                        GI:  abdomen soft obese      Extremities and Muscular Skeletal:  no deformities, clubbing, cyanosis, erythema observed;no edema              Neurological:  affect appropriate        Psych:  Alert and Oriented x 3        Recent Lab Results:  LIPID RESULTS:  Lab Results   Component Value Date    CHOL 200 (H) 08/06/2019    HDL 37 (L) 08/06/2019     (H) 08/06/2019    TRIG 233 (H) 08/06/2019    CHOLHDLRATIO 4.7 10/13/2015       LIVER ENZYME RESULTS:  Lab Results   Component Value Date    AST 21 08/06/2019    ALT 30 08/06/2019       CBC RESULTS:  Lab Results   Component Value Date    WBC 3.5 (L) 05/30/2019    RBC 3.88 05/30/2019    HGB 11.9 05/30/2019    HCT 37.1 05/30/2019    MCV 96 05/30/2019    MCH 30.7 05/30/2019    MCHC 32.1 05/30/2019    RDW 12.7 05/30/2019     05/30/2019       BMP RESULTS:  Lab Results   Component Value Date     08/06/2019    POTASSIUM 4.2 08/06/2019    CHLORIDE 106 08/06/2019    CO2 27 08/06/2019    ANIONGAP 6 08/06/2019    GLC 70 08/06/2019    BUN 18 08/06/2019    CR 0.87 08/06/2019    GFRESTIMATED 66 08/06/2019    GFRESTBLACK 77 08/06/2019    WILEY 9.2 08/06/2019        A1C RESULTS:  Lab Results   Component Value Date    A1C 5.2 08/06/2019       INR RESULTS:  Lab Results   Component Value Date    INR 0.88 12/05/2012    INR 0.92 06/26/2012           CC  Christin Diehl MD  [address] [place] [address]

## 2019-10-24 NOTE — LETTER
10/24/2019    Christin Diehl MD  [address] [place] [address]    RE: Kristie Jones       Dear Colleague,    I had the pleasure of seeing Kristie Jones in the [facility].    HPI and Plan:   It is as always a pleasure for me to see this delightful 74-year-old lady who I been following since 2002 for idiopathic cardiomyopathy with moderate to severe left ventricular systolic dysfunction.  Her ejection fraction has consistently been been between 20 to 30%.  He has a ICD in place for primary prophylaxis and I am very happy to hear that this device has not fired.  Indeed it has never fired and looking at the most recent device interrogation she did not even need ATP therapy.    It has been a year since I last saw this lady and I am always happy to hear about her trips.  This year she took an extensive trip to [place] with her grandchildren.  Tells me that she had a great time in [facility].  She was able to walk at least 10,000 steps a day.  He does feel little tired in the evenings but she has no PND orthopnea chest discomfort.  If she sits for prolonged periods of time she would get a little dizzy when she stands up but this is a very transient symptom.    By physical examination she has no evidence of congestive heart failure though I do note her blood pressure of 100/50.  I do not think there is any room for medication titration.  She is tolerating all her medications well.  Repeat echocardiography this year shows the same ejection fraction with grade 1 diastolic dysfunction.    Overall I do think she is doing very well.  I will arrange follow-up again in a years time.  Her medications will remain unchanged.    Orders Placed This Encounter   Procedures     Follow-Up with Cardiologist     Echocardiogram Complete       No orders of the defined types were placed in this encounter.      Encounter Diagnosis   Name Primary?     Idiopathic cardiomyopathy (H) Yes        CURRENT MEDICATIONS:  Current Outpatient Medications   Medication Sig Dispense Refill     acetaminophen 650 MG TABS Take 650 mg by mouth every 6 hours (Patient taking differently: Take 650 mg by mouth every 6 hours PRN) 100 tablet      ALPRAZolam (XANAX) 0.25 MG tablet Take 1 tablet (0.25 mg) by mouth 3 times daily as needed for anxiety 30 tablet 1     aspirin 81 MG tablet Take 81 mg by mouth daily       blood glucose monitoring (NO BRAND SPECIFIED) test strip Use to test blood sugars 1 times daily or as directed Brand per insurance 100 strip 0     CALCIUM 500 MG OR TABS Takes 3 tabs daily takees total of 1200 mg daily       carvedilol (COREG) 25 MG tablet TAKE 1 TABLET BY MOUTH TWICE DAILY WITH MEALS 180 tablet 3     celecoxib (CELEBREX) 200 MG capsule Take 1 capsule (200 mg) by mouth daily as needed 90 capsule 3     Cholecalciferol (VITAMIN D) 2000 UNITS tablet Take 1 tablet by mouth daily.       clobetasol (TEMOVATE) 0.05 % cream Apply topically 2 times daily       co-enzyme Q-10 50 MG CAPS Take 1 capsule by mouth daily.       cycloSPORINE (RESTASIS) 0.05 % ophthalmic emulsion 1 drop 2 times daily       loratadine (CLARITIN) 10 MG tablet Take 10 mg by mouth daily       losartan (COZAAR) 50 MG tablet Take 1 tablet (50 mg) by mouth daily 90 tablet 3     MULTIVITAMIN TABS   OR 1 qd  0     rosuvastatin (CRESTOR) 10 MG tablet TAKE 1 TABLET BY MOUTH EVERY OTHER DAY 45 tablet 3     spironolactone (ALDACTONE) 25 MG tablet Take 0.5 tablets (12.5 mg) by mouth daily 90 tablet 2     venlafaxine (EFFEXOR-XR) 75 MG 24 hr capsule TAKE 3 CAPSULES BY MOUTH ONCE DAILY 270 capsule 3     CIPROFLOXACIN PO For dental appointmetns.         ALLERGIES     Allergies   Allergen Reactions     Prochlorperazine      Other reaction(s): Tardive Dyskinesia     Amoxicillin Hives     Clindamycin Other (See Comments)     Burning sensation in chest     Decadron      flushing     Dexamethasone      Other reaction(s): Erythema     Compazine  Anxiety       PAST MEDICAL HISTORY:  Past Medical History:   Diagnosis Date     Acute posthemorrhagic anemia 9/11/2015     Anemia      Chronic systolic congestive heart failure (H) 10/10/2018     EF 35%     Fibromyalgia      Gastro-oesophageal reflux disease      GENERAL OSTEOARTHROSIS [715.00] 11/23/2004    knee     Generalized anxiety disorder 9/30/2013     Hyperlipidemia LDL goal <130 2/10/2010    Failed simvastatin- muscle aches      Hypertension     because of the heart     Idiopathic cardiomyopathy (H) 1/19/2012    Dr. Osborne 3/2011 EF 30-35%      Insomnia 1/19/2012     Iron deficiency anemia 8/12/2015     Problem list name updated by automated process. Provider to review     LBBB (left bundle branch block)      Left ventricular systolic dysfunction:EF 35% 2/5/2012    Must stay on diovan per cardiology      Major depressive disorder, recurrent episode, mild (H) 2/17/2016     Migraine 6/21/2005     Problem list name updated by automated process. Provider to review     Nonischemic cardiomyopathy (H)     EF 30-35%, Dr. Osborne [facility] (suspect virus); s/p AICD     NSVT (nonsustained ventricular tachycardia) (H)      Obesity 1/20/2012     SUSAN (obstructive sleep apnea) 01/19/2012    CPAP      Pure hypercholesterolemia      Restless leg syndrome      Type 2 diabetes mellitus without complication (H) 9/24/2016       PAST SURGICAL HISTORY:  Past Surgical History:   Procedure Laterality Date     APPENDECTOMY       ARTHROPLASTY KNEE  1/7/2013    Procedure: ARTHROPLASTY KNEE;  Right Total Knee Arthroplasty       ARTHROPLASTY REVISION KNEE Right 8/26/2015    Procedure: ARTHROPLASTY REVISION KNEE;  Surgeon: Néstor Beth MD;  Location: [facility] OR     ARTHROSCOPY KNEE       bunionectomy left foot       COLONOSCOPY       CORONARY ANGIOGRAPHY ADULT ORDER  2002    normal     CORONARY ANGIOGRAPHY ADULT ORDER  12/7/12    no significant focal narrowing that would benefit from mechanical intervention      HYSTERECTOMY       IMPLANT AUTOMATIC  IMPLANTABLE CARDIOVERTER DEFIBRILLATOR  4/30/12     INSERT THORACIC PACEMAKER LEAD EPICARDIAL  5/1/2012    Procedure:INSERT THORACIC PACEMAKER LEAD EPICARDIAL; EPICARDIAL LEAD PLACEMENT; Surgeon:WEST ARECHIGA; Location:[facility] OR     TONSILLECTOMY       TRANSPLANT - corneal lenses      Bilateral for cataracts       FAMILY HISTORY:  Family History   Problem Relation Age of Onset     Cancer Father         intraabdominal mass - not colon cancer     C.A.D. 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     Employer: [employer],[address] [place], [place] [address]   Social Needs     Financial resource strain: Not hard at all     Food insecurity:     Worry: Never true     Inability: Never true     Transportation needs:     Medical: No     Non-medical: No   Tobacco Use     Smoking status: Never Smoker     Smokeless tobacco: Never Used   Substance and Sexual Activity     Alcohol use: Yes     Alcohol/week: 0.0 standard drinks     Frequency: Monthly or less     Drinks per session: 1 or 2     Binge frequency: Never     Comment: One glass of wine per week     Drug use: No     Sexual activity: Yes     Partners: Male     Birth control/protection: Surgical     Comment: She has had a hysterectomy   Lifestyle     Physical activity:     Days per week: 4 days     Minutes per session: 30 min     Stress: To some extent   Relationships     Social connections:     Talks on phone: Twice a week     Gets together:  "Once a week     Attends Hindu service: Never     Active member of club or organization: No     Attends meetings of clubs or organizations: Never     Relationship status:      Intimate partner violence:     Fear of current or ex partner: None     Emotionally abused: None     Physically abused: None     Forced sexual activity: None   Other Topics Concern     Parent/sibling w/ CABG, MI or angioplasty before 65F 55M? 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 Multiple ignacio problems, multiple hospitalizations in last 7 years, anxiety        Pt has 2 daughters, 2 step-daughters's and  7 grandchildrens        Youngest daughter Sabi has malignant brain tumor                   Review of Systems:  Skin:  Negative for     Eyes:  Negative for    ENT:  Negative for    Respiratory:  Positive for dyspnea on exertion;sleep apnea;CPAP  Cardiovascular:    Positive for;lightheadedness  Gastroenterology: Negative for    Genitourinary:  not assessed    Musculoskeletal:  Positive for back pain;neck pain;joint pain  Neurologic:  Positive for headaches;numbness or tingling of hands  Psychiatric:  Positive for anxiety  Heme/Lymph/Imm:  Positive for allergies  Endocrine:  Positive for diabetes    Physical Exam:  Vitals: /50   Pulse 70   Ht 1.651 m (5' 5\")   Wt 88.5 kg (195 lb)   BMI 32.45 kg/m       Constitutional:  cooperative;in no acute distress obese      Skin: "  warm and dry to the touch   pacemaker incision in the left infraclavicular area was well-healed      Head:  normocephalic        Eyes:  pupils equal and round        Lymph:      ENT:  no pallor or cyanosis, dentition good        Neck:  JVP normal;no carotid bruit        Respiratory:  clear to auscultation;normal respiratory excursion         Cardiac: regular rhythm;normal S1 and S2   distant heart sounds            pulses full and equal                                        GI:  abdomen soft obese      Extremities and Muscular Skeletal:  no deformities, clubbing, cyanosis, erythema observed;no edema              Neurological:  affect appropriate        Psych:  Alert and Oriented x 3        Recent Lab Results:  LIPID RESULTS:  Lab Results   Component Value Date    CHOL 200 (H) 08/06/2019    HDL 37 (L) 08/06/2019     (H) 08/06/2019    TRIG 233 (H) 08/06/2019    CHOLHDLRATIO 4.7 10/13/2015       LIVER ENZYME RESULTS:  Lab Results   Component Value Date    AST 21 08/06/2019    ALT 30 08/06/2019       CBC RESULTS:  Lab Results   Component Value Date    WBC 3.5 (L) 05/30/2019    RBC 3.88 05/30/2019    HGB 11.9 05/30/2019    HCT 37.1 05/30/2019    MCV 96 05/30/2019    MCH 30.7 05/30/2019    MCHC 32.1 05/30/2019    RDW 12.7 05/30/2019     05/30/2019       BMP RESULTS:  Lab Results   Component Value Date     08/06/2019    POTASSIUM 4.2 08/06/2019    CHLORIDE 106 08/06/2019    CO2 27 08/06/2019    ANIONGAP 6 08/06/2019    GLC 70 08/06/2019    BUN 18 08/06/2019    CR 0.87 08/06/2019    GFRESTIMATED 66 08/06/2019    GFRESTBLACK 77 08/06/2019    WILEY 9.2 08/06/2019        A1C RESULTS:  Lab Results   Component Value Date    A1C 5.2 08/06/2019       INR RESULTS:  Lab Results   Component Value Date    INR 0.88 12/05/2012    INR 0.92 06/26/2012         Thank you for allowing me to participate in the care of your patient.    Sincerely,     DR TRENT BARBOSA MD     [facility]

## 2019-11-05 ENCOUNTER — HEALTH MAINTENANCE LETTER (OUTPATIENT)
Age: 74
End: 2019-11-05

## 2019-11-07 ENCOUNTER — ANCILLARY PROCEDURE (OUTPATIENT)
Dept: CARDIOLOGY | Facility: CLINIC | Age: 74
End: 2019-11-07
Attending: INTERNAL MEDICINE
Payer: COMMERCIAL

## 2019-11-07 DIAGNOSIS — Z95.810 ICD (IMPLANTABLE CARDIOVERTER-DEFIBRILLATOR) IN PLACE: ICD-10-CM

## 2019-11-07 PROCEDURE — 93296 REM INTERROG EVL PM/IDS: CPT | Performed by: INTERNAL MEDICINE

## 2019-11-07 PROCEDURE — 93295 DEV INTERROG REMOTE 1/2/MLT: CPT | Performed by: INTERNAL MEDICINE

## 2019-11-20 LAB
MDC_IDC_EPISODE_DTM: NORMAL
MDC_IDC_EPISODE_ID: NORMAL
MDC_IDC_EPISODE_TYPE: NORMAL
MDC_IDC_LEAD_IMPLANT_DT: NORMAL
MDC_IDC_LEAD_LOCATION: NORMAL
MDC_IDC_LEAD_LOCATION_DETAIL_1: NORMAL
MDC_IDC_LEAD_MFG: NORMAL
MDC_IDC_LEAD_MODEL: 4047
MDC_IDC_LEAD_MODEL: 4047
MDC_IDC_LEAD_MODEL: NORMAL
MDC_IDC_LEAD_MODEL: NORMAL
MDC_IDC_LEAD_POLARITY_TYPE: NORMAL
MDC_IDC_LEAD_SERIAL: NORMAL
MDC_IDC_MSMT_BATTERY_DTM: NORMAL
MDC_IDC_MSMT_BATTERY_REMAINING_LONGEVITY: 42 MO
MDC_IDC_MSMT_BATTERY_REMAINING_PERCENTAGE: 68 %
MDC_IDC_MSMT_BATTERY_STATUS: NORMAL
MDC_IDC_MSMT_CAP_CHARGE_DTM: NORMAL
MDC_IDC_MSMT_CAP_CHARGE_TIME: 11.4 S
MDC_IDC_MSMT_CAP_CHARGE_TYPE: NORMAL
MDC_IDC_MSMT_LEADCHNL_LV_IMPEDANCE_VALUE: 382 OHM
MDC_IDC_MSMT_LEADCHNL_LV_PACING_THRESHOLD_AMPLITUDE: 1.3 V
MDC_IDC_MSMT_LEADCHNL_LV_PACING_THRESHOLD_PULSEWIDTH: 1 MS
MDC_IDC_MSMT_LEADCHNL_RA_IMPEDANCE_VALUE: 602 OHM
MDC_IDC_MSMT_LEADCHNL_RA_LEAD_CHANNEL_STATUS: NORMAL
MDC_IDC_MSMT_LEADCHNL_RA_PACING_THRESHOLD_AMPLITUDE: 0.1 V
MDC_IDC_MSMT_LEADCHNL_RA_PACING_THRESHOLD_PULSEWIDTH: 1 MS
MDC_IDC_MSMT_LEADCHNL_RV_IMPEDANCE_VALUE: 397 OHM
MDC_IDC_MSMT_LEADCHNL_RV_PACING_THRESHOLD_AMPLITUDE: 0.6 V
MDC_IDC_MSMT_LEADCHNL_RV_PACING_THRESHOLD_PULSEWIDTH: 1 MS
MDC_IDC_PG_IMPLANT_DTM: NORMAL
MDC_IDC_PG_MFG: NORMAL
MDC_IDC_PG_MODEL: NORMAL
MDC_IDC_PG_SERIAL: NORMAL
MDC_IDC_PG_TYPE: NORMAL
MDC_IDC_SESS_CLINIC_NAME: NORMAL
MDC_IDC_SESS_DTM: NORMAL
MDC_IDC_SESS_TYPE: NORMAL
MDC_IDC_SET_BRADY_AT_MODE_SWITCH_RATE: 170 {BEATS}/MIN
MDC_IDC_SET_BRADY_LOWRATE: 40 {BEATS}/MIN
MDC_IDC_SET_BRADY_MODE: NORMAL
MDC_IDC_SET_CRT_LVRV_DELAY: 0 MS
MDC_IDC_SET_CRT_PACED_CHAMBERS: NORMAL
MDC_IDC_SET_LEADCHNL_LV_PACING_AMPLITUDE: 0.1 V
MDC_IDC_SET_LEADCHNL_LV_PACING_ANODE_ELECTRODE_1: NORMAL
MDC_IDC_SET_LEADCHNL_LV_PACING_ANODE_LOCATION_1: NORMAL
MDC_IDC_SET_LEADCHNL_LV_PACING_CATHODE_ELECTRODE_1: NORMAL
MDC_IDC_SET_LEADCHNL_LV_PACING_CATHODE_LOCATION_1: NORMAL
MDC_IDC_SET_LEADCHNL_LV_PACING_PULSEWIDTH: 0.1 MS
MDC_IDC_SET_LEADCHNL_LV_SENSING_ADAPTATION_MODE: NORMAL
MDC_IDC_SET_LEADCHNL_LV_SENSING_ANODE_ELECTRODE_1: NORMAL
MDC_IDC_SET_LEADCHNL_LV_SENSING_ANODE_LOCATION_1: NORMAL
MDC_IDC_SET_LEADCHNL_LV_SENSING_CATHODE_ELECTRODE_1: NORMAL
MDC_IDC_SET_LEADCHNL_LV_SENSING_CATHODE_LOCATION_1: NORMAL
MDC_IDC_SET_LEADCHNL_LV_SENSING_SENSITIVITY: 1 MV
MDC_IDC_SET_LEADCHNL_RA_PACING_POLARITY: NORMAL
MDC_IDC_SET_LEADCHNL_RA_SENSING_ADAPTATION_MODE: NORMAL
MDC_IDC_SET_LEADCHNL_RA_SENSING_POLARITY: NORMAL
MDC_IDC_SET_LEADCHNL_RA_SENSING_SENSITIVITY: 0.15 MV
MDC_IDC_SET_LEADCHNL_RV_PACING_AMPLITUDE: 2 V
MDC_IDC_SET_LEADCHNL_RV_PACING_POLARITY: NORMAL
MDC_IDC_SET_LEADCHNL_RV_PACING_PULSEWIDTH: 1 MS
MDC_IDC_SET_LEADCHNL_RV_SENSING_ADAPTATION_MODE: NORMAL
MDC_IDC_SET_LEADCHNL_RV_SENSING_POLARITY: NORMAL
MDC_IDC_SET_LEADCHNL_RV_SENSING_SENSITIVITY: 0.6 MV
MDC_IDC_SET_ZONE_DETECTION_INTERVAL: 250 MS
MDC_IDC_SET_ZONE_DETECTION_INTERVAL: 333 MS
MDC_IDC_SET_ZONE_DETECTION_INTERVAL: 375 MS
MDC_IDC_SET_ZONE_TYPE: NORMAL
MDC_IDC_SET_ZONE_VENDOR_TYPE: NORMAL
MDC_IDC_STAT_BRADY_DTM_END: NORMAL
MDC_IDC_STAT_BRADY_DTM_START: NORMAL
MDC_IDC_STAT_BRADY_RA_PERCENT_PACED: 0 %
MDC_IDC_STAT_BRADY_RV_PERCENT_PACED: 0 %
MDC_IDC_STAT_CRT_DTM_END: NORMAL
MDC_IDC_STAT_CRT_DTM_START: NORMAL
MDC_IDC_STAT_CRT_LV_PERCENT_PACED: 0 %
MDC_IDC_STAT_EPISODE_RECENT_COUNT: 0
MDC_IDC_STAT_EPISODE_RECENT_COUNT: 2
MDC_IDC_STAT_EPISODE_RECENT_COUNT_DTM_END: NORMAL
MDC_IDC_STAT_EPISODE_RECENT_COUNT_DTM_START: NORMAL
MDC_IDC_STAT_EPISODE_TYPE: NORMAL
MDC_IDC_STAT_EPISODE_VENDOR_TYPE: NORMAL
MDC_IDC_STAT_TACHYTHERAPY_ATP_DELIVERED_RECENT: 0
MDC_IDC_STAT_TACHYTHERAPY_ATP_DELIVERED_TOTAL: 0
MDC_IDC_STAT_TACHYTHERAPY_RECENT_DTM_END: NORMAL
MDC_IDC_STAT_TACHYTHERAPY_RECENT_DTM_START: NORMAL
MDC_IDC_STAT_TACHYTHERAPY_SHOCKS_ABORTED_RECENT: 0
MDC_IDC_STAT_TACHYTHERAPY_SHOCKS_ABORTED_TOTAL: 0
MDC_IDC_STAT_TACHYTHERAPY_SHOCKS_DELIVERED_RECENT: 0
MDC_IDC_STAT_TACHYTHERAPY_SHOCKS_DELIVERED_TOTAL: 0
MDC_IDC_STAT_TACHYTHERAPY_TOTAL_DTM_END: NORMAL

## 2019-11-21 ENCOUNTER — TRANSFERRED RECORDS (OUTPATIENT)
Dept: HEALTH INFORMATION MANAGEMENT | Facility: CLINIC | Age: 74
End: 2019-11-21

## 2019-11-22 DIAGNOSIS — I42.9 IDIOPATHIC CARDIOMYOPATHY (H): ICD-10-CM

## 2019-11-22 DIAGNOSIS — E78.5 HYPERLIPIDEMIA LDL GOAL <130: ICD-10-CM

## 2019-11-22 RX ORDER — SPIRONOLACTONE 25 MG/1
12.5 TABLET ORAL DAILY
Qty: 90 TABLET | Refills: 2 | Status: CANCELLED | OUTPATIENT
Start: 2019-11-22

## 2019-11-22 RX ORDER — LOSARTAN POTASSIUM 50 MG/1
50 TABLET ORAL DAILY
Qty: 90 TABLET | Refills: 3 | Status: CANCELLED | OUTPATIENT
Start: 2019-11-22

## 2019-11-22 RX ORDER — ROSUVASTATIN CALCIUM 10 MG/1
TABLET, COATED ORAL
Qty: 45 TABLET | Refills: 3 | Status: CANCELLED | OUTPATIENT
Start: 2019-11-22

## 2019-11-22 NOTE — TELEPHONE ENCOUNTER
Per Epic, there are still two 90 day refills available for all 3 medications requested.  The refills were sent to [facility] on 8/6/19 and the refill orders are for [facility].    LVM for the patient relaying this information and recommending that if she wants to transfer the medication refills to [facility] she should call them and have them transferred from [facility].  Provided clinic # prn questions.       Refills unnecessary; declined.  F/u prn. Susan Malik RN November 22, 2019 2:20 PM

## 2019-11-22 NOTE — TELEPHONE ENCOUNTER
Pending Prescriptions:                       Disp   Refills    spironolactone (ALDACTONE) 25 MG tablet   90 tab*2            Sig: Take 0.5 tablets (12.5 mg) by mouth daily    rosuvastatin (CRESTOR) 10 MG tablet       45 tab*3            Sig: TAKE 1 TABLET BY MOUTH EVERY OTHER DAY    losartan (COZAAR) 50 MG tablet            90 tab*3            Sig: Take 1 tablet (50 mg) by mouth daily    Pt called requesting these Rx's, states PCP was supposed to refill them before leaving the clinic. Please contact the pt with further questions/concerns.    Winter Vazquez on 11/22/2019 at 12:43 PM

## 2019-11-29 DIAGNOSIS — I42.9 IDIOPATHIC CARDIOMYOPATHY (H): ICD-10-CM

## 2019-11-29 DIAGNOSIS — E78.5 HYPERLIPIDEMIA LDL GOAL <130: ICD-10-CM

## 2019-12-02 RX ORDER — SPIRONOLACTONE 25 MG/1
12.5 TABLET ORAL DAILY
Qty: 90 TABLET | Refills: 2 | Status: SHIPPED | OUTPATIENT
Start: 2019-12-02 | End: 2019-12-20

## 2019-12-02 RX ORDER — ROSUVASTATIN CALCIUM 10 MG/1
TABLET, COATED ORAL
Qty: 45 TABLET | Refills: 3 | Status: SHIPPED | OUTPATIENT
Start: 2019-12-02 | End: 2019-12-20

## 2019-12-02 RX ORDER — LOSARTAN POTASSIUM 50 MG/1
50 TABLET ORAL DAILY
Qty: 90 TABLET | Refills: 3 | Status: SHIPPED | OUTPATIENT
Start: 2019-12-02 | End: 2019-12-20

## 2019-12-02 NOTE — TELEPHONE ENCOUNTER
Prescriptions approved per [facility] Refill Protocol.  Escribed to [facility].    Norma Cotton RN

## 2019-12-16 ENCOUNTER — TRANSFERRED RECORDS (OUTPATIENT)
Dept: HEALTH INFORMATION MANAGEMENT | Facility: CLINIC | Age: 74
End: 2019-12-16

## 2019-12-19 ENCOUNTER — TRANSFERRED RECORDS (OUTPATIENT)
Dept: HEALTH INFORMATION MANAGEMENT | Facility: CLINIC | Age: 74
End: 2019-12-19

## 2019-12-20 ENCOUNTER — DOCUMENTATION ONLY (OUTPATIENT)
Dept: CARDIOLOGY | Facility: CLINIC | Age: 74
End: 2019-12-20

## 2019-12-20 DIAGNOSIS — E78.5 HYPERLIPIDEMIA LDL GOAL <130: ICD-10-CM

## 2019-12-20 DIAGNOSIS — F41.1 GENERALIZED ANXIETY DISORDER: ICD-10-CM

## 2019-12-20 DIAGNOSIS — L30.9 ECZEMA, UNSPECIFIED TYPE: ICD-10-CM

## 2019-12-20 DIAGNOSIS — I42.9 IDIOPATHIC CARDIOMYOPATHY (H): ICD-10-CM

## 2019-12-20 RX ORDER — CYCLOSPORINE 0.5 MG/ML
1 EMULSION OPHTHALMIC 2 TIMES DAILY
Status: CANCELLED | OUTPATIENT
Start: 2019-12-20

## 2019-12-20 RX ORDER — VENLAFAXINE HYDROCHLORIDE 75 MG/1
CAPSULE, EXTENDED RELEASE ORAL
Qty: 270 CAPSULE | Refills: 3 | Status: SHIPPED | OUTPATIENT
Start: 2019-12-20 | End: 2021-02-05

## 2019-12-20 RX ORDER — LOSARTAN POTASSIUM 50 MG/1
50 TABLET ORAL DAILY
Qty: 90 TABLET | Refills: 3 | Status: SHIPPED | OUTPATIENT
Start: 2019-12-20 | End: 2021-01-11

## 2019-12-20 RX ORDER — ROSUVASTATIN CALCIUM 10 MG/1
TABLET, COATED ORAL
Qty: 45 TABLET | Refills: 3 | Status: SHIPPED | OUTPATIENT
Start: 2019-12-20 | End: 2021-01-15

## 2019-12-20 RX ORDER — ALPRAZOLAM 0.25 MG
0.25 TABLET ORAL 2 TIMES DAILY PRN
Qty: 30 TABLET | Refills: 1 | Status: SHIPPED | OUTPATIENT
Start: 2019-12-20 | End: 2022-10-12

## 2019-12-20 RX ORDER — CARVEDILOL 25 MG/1
TABLET ORAL
Qty: 180 TABLET | Refills: 3 | Status: SHIPPED | OUTPATIENT
Start: 2019-12-20 | End: 2021-02-02

## 2019-12-20 RX ORDER — SPIRONOLACTONE 25 MG/1
12.5 TABLET ORAL DAILY
Qty: 90 TABLET | Refills: 2 | Status: SHIPPED | OUTPATIENT
Start: 2019-12-20 | End: 2021-02-05

## 2019-12-20 RX ORDER — CLOBETASOL PROPIONATE 0.5 MG/G
CREAM TOPICAL
Qty: 45 G | Refills: 1 | Status: SHIPPED | OUTPATIENT
Start: 2019-12-20 | End: 2022-03-22

## 2019-12-20 RX ORDER — CLOBETASOL PROPIONATE 0.5 MG/G
CREAM TOPICAL 2 TIMES DAILY
Status: CANCELLED | OUTPATIENT
Start: 2019-12-20

## 2019-12-20 NOTE — TELEPHONE ENCOUNTER
I called and spoke to the pharmacy and the rx has been cancelled.   Emily Lakhani on 12/20/2019 at 2:52 PM

## 2019-12-20 NOTE — TELEPHONE ENCOUNTER
Patient dropped off note to have all prescriptions renewed to [facility] for one year, has been attempting to get these renewed since 11-22-19, patient would like this done ASAP.

## 2019-12-20 NOTE — TELEPHONE ENCOUNTER
"Patient came to clinic with a detailed letter explaining that she had requested refills on all of her medications- states she called back and was told by Teri that they were all faxed to [facility].  Upon further review the medications were sent to [facility].  patient would like this expedited asap as she has been dealing with this for over a month now.    Medications that patient is requesting were yariel'd up by Constance Gil MA    Requested Prescriptions   Pending Prescriptions Disp Refills     ALPRAZolam (XANAX) 0.25 MG tablet 30 tablet 1     Sig: Take 1 tablet (0.25 mg) by mouth 3 times daily as needed for anxiety       There is no refill protocol information for this order        carvedilol (COREG) 25 MG tablet 180 tablet 3     Sig: TAKE 1 TABLET BY MOUTH TWICE DAILY WITH MEALS       Beta-Blockers Protocol Failed - 2019  1:09 PM        Failed - Recent (12 mo) or future (30 days) visit within the authorizing provider's specialty     Patient has had an office visit with the authorizing provider or a provider within the authorizing providers department within the previous 12 mos or has a future within next 30 days. See \"Patient Info\" tab in inbasket, or \"Choose Columns\" in Meds & Orders section of the refill encounter.              Failed - Medication is active on med list        Passed - Blood pressure under 140/90 in past 12 months     BP Readings from Last 3 Encounters:   10/24/19 100/50   19 128/58   10/17/18 102/68                 Passed - Patient is age 6 or older        clobetasol (TEMOVATE) 0.05 % external cream       Sig: Apply topically 2 times daily       There is no refill protocol information for this order        cycloSPORINE (RESTASIS) 0.05 % ophthalmic emulsion       Si drop 2 times daily       There is no refill protocol information for this order        losartan (COZAAR) 50 MG tablet 90 tablet 3     Sig: Take 1 tablet (50 mg) by mouth daily       Angiotensin-II Receptors Failed " "- 12/20/2019  1:09 PM        Failed - Recent (12 mo) or future (30 days) visit within the authorizing provider's specialty     Patient has had an office visit with the authorizing provider or a provider within the authorizing providers department within the previous 12 mos or has a future within next 30 days. See \"Patient Info\" tab in inbasket, or \"Choose Columns\" in Meds & Orders section of the refill encounter.              Failed - Medication is active on med list        Passed - Last blood pressure under 140/90 in past 12 months     BP Readings from Last 3 Encounters:   10/24/19 100/50   08/06/19 128/58   10/17/18 102/68                 Passed - Patient is age 18 or older        Passed - No active pregnancy on record        Passed - Normal serum creatinine on file in past 12 months     Recent Labs   Lab Test 08/06/19  1423   CR 0.87             Passed - Normal serum potassium on file in past 12 months     Recent Labs   Lab Test 08/06/19  1423   POTASSIUM 4.2                    Passed - No positive pregnancy test in past 12 months        rosuvastatin (CRESTOR) 10 MG tablet 45 tablet 3     Sig: TAKE 1 TABLET BY MOUTH EVERY OTHER DAY       Statins Protocol Failed - 12/20/2019  1:09 PM        Failed - Recent (12 mo) or future (30 days) visit within the authorizing provider's specialty     Patient has had an office visit with the authorizing provider or a provider within the authorizing providers department within the previous 12 mos or has a future within next 30 days. See \"Patient Info\" tab in inbasket, or \"Choose Columns\" in Meds & Orders section of the refill encounter.              Failed - Medication is active on med list        Passed - LDL on file in past 12 months     Recent Labs   Lab Test 08/06/19  1423   *             Passed - No abnormal creatine kinase in past 12 months     Recent Labs   Lab Test 02/04/15  1519   CKT 99                Passed - Patient is age 18 or older        Passed - No active " "pregnancy on record        Passed - No positive pregnancy test in past 12 months        spironolactone (ALDACTONE) 25 MG tablet 90 tablet 2     Sig: Take 0.5 tablets (12.5 mg) by mouth daily       Diuretics (Including Combos) Protocol Failed - 12/20/2019  1:09 PM        Failed - Recent (12 mo) or future (30 days) visit within the authorizing provider's specialty     Patient has had an office visit with the authorizing provider or a provider within the authorizing providers department within the previous 12 mos or has a future within next 30 days. See \"Patient Info\" tab in inbasket, or \"Choose Columns\" in Meds & Orders section of the refill encounter.              Failed - Medication is active on med list        Passed - Blood pressure under 140/90 in past 12 months     BP Readings from Last 3 Encounters:   10/24/19 100/50   08/06/19 128/58   10/17/18 102/68                 Passed - Patient is age 18 or older        Passed - No active pregancy on record        Passed - Normal serum creatinine on file in past 12 months     Recent Labs   Lab Test 08/06/19  1423   CR 0.87              Passed - Normal serum potassium on file in past 12 months     Recent Labs   Lab Test 08/06/19  1423   POTASSIUM 4.2                    Passed - Normal serum sodium on file in past 12 months     Recent Labs   Lab Test 08/06/19  1423                 Passed - No positive pregnancy test in past 12 months        venlafaxine (EFFEXOR-XR) 75 MG 24 hr capsule 270 capsule 3     Sig: TAKE 3 CAPSULES BY MOUTH ONCE DAILY       Serotonin-Norepinephrine Reuptake Inhibitors  Failed - 12/20/2019  1:09 PM        Failed - Recent (12 mo) or future (30 days) visit within the authorizing provider's specialty     Patient has had an office visit with the authorizing provider or a provider within the authorizing providers department within the previous 12 mos or has a future within next 30 days. See \"Patient Info\" tab in inbasket, or \"Choose Columns\" in " Meds & Orders section of the refill encounter.              Failed - Medication is active on med list        Passed - Blood pressure under 140/90 in past 12 months     BP Readings from Last 3 Encounters:   10/24/19 100/50   08/06/19 128/58   10/17/18 102/68                 Passed - Patient is age 18 or older        Passed - No active pregnancy on record        Passed - Normal serum creatinine on file in past 12 months     Recent Labs   Lab Test 08/06/19  1423   CR 0.87             Passed - No positive pregnancy test in past 12 months

## 2019-12-20 NOTE — TELEPHONE ENCOUNTER
I refilled medications  Please call [facility] and cancel previous prescription for alprazolam. I reviewed  and last filled this prescription January 2019  Cyclosporine (Restasis) should come from ophthalmology, otherwise I refilled all other medications  It looks like Dr. Diehl wanted to her to follow up in August for next annual exam. She was using alprazolam sparingly so ok to f/u as previously advised assuming conditions stable

## 2020-01-02 ENCOUNTER — TELEPHONE (OUTPATIENT)
Dept: PEDIATRICS | Facility: CLINIC | Age: 75
End: 2020-01-02

## 2020-01-02 NOTE — TELEPHONE ENCOUNTER
Our goal is to have forms completed with 72 hours, however some forms may require a visit or additional information.    Who is the form from?: Patient  Where the form came from: form was mailed in  What clinic location was the form placed at?: [facility]  Where the form was placed: Given to physician  What number is listed as a contact on the form?: [phone]    Phone call message- patient request for a letter, form or note:    Date needed: as soon as possible  Please mail to [facility] [address] [facility] [id number]  Has the patient signed a consent form for release of information? Not Applicable    Additional comments: Pt would like a call when form is mailed out    Call taken on 1/2/2020 at 2:23 PM by Raissa Carrero    Type of letter, form or note: medical

## 2020-02-06 ENCOUNTER — TRANSFERRED RECORDS (OUTPATIENT)
Dept: HEALTH INFORMATION MANAGEMENT | Facility: CLINIC | Age: 75
End: 2020-02-06

## 2020-02-12 ENCOUNTER — ANCILLARY PROCEDURE (OUTPATIENT)
Dept: CARDIOLOGY | Facility: CLINIC | Age: 75
End: 2020-02-12
Attending: INTERNAL MEDICINE
Payer: COMMERCIAL

## 2020-02-12 DIAGNOSIS — Z95.810 ICD (IMPLANTABLE CARDIOVERTER-DEFIBRILLATOR) IN PLACE: ICD-10-CM

## 2020-02-12 PROCEDURE — 93284 PRGRMG EVAL IMPLANTABLE DFB: CPT | Performed by: INTERNAL MEDICINE

## 2020-02-12 NOTE — PROGRESS NOTES
[employer] CRT-D Device Check  Patient seen in clinic for device evaluation and iterative programming.   (CRT pacing is OFF since 10/2016 due to low BiV pacing percentage from microdislodged atrial lead, and narrow underlying QRS)  : <1 %    Mode: VVI 40    Underlying Rhythm: SR 70s    Heart Rate: Stable with good variability     Sensing: WNL    Pacing Threshold: WNL    Impedance: WNL    Battery Status: 3 years     Device Site: Well healed      Ventricular Arrhythmia: 0    ATP: 0    Shocks: 0    Setting Change: 0    Care Plan: Follow up in May with remote device check. Patient is due to see Dr. Osborne in October.  JAIMIE, RN       I have reviewed and interpreted the device interrogation, settings, programming and nurse's summary. The device is functioning within normal device parameters. I agree with the current findings, assessment and plan.

## 2020-02-14 LAB
MDC_IDC_LEAD_IMPLANT_DT: NORMAL
MDC_IDC_LEAD_LOCATION: NORMAL
MDC_IDC_LEAD_LOCATION_DETAIL_1: NORMAL
MDC_IDC_LEAD_MFG: NORMAL
MDC_IDC_LEAD_MODEL: 4047
MDC_IDC_LEAD_MODEL: 4047
MDC_IDC_LEAD_MODEL: NORMAL
MDC_IDC_LEAD_MODEL: NORMAL
MDC_IDC_LEAD_POLARITY_TYPE: NORMAL
MDC_IDC_LEAD_SERIAL: NORMAL
MDC_IDC_MSMT_BATTERY_STATUS: NORMAL
MDC_IDC_MSMT_CAP_CHARGE_DTM: NORMAL
MDC_IDC_MSMT_CAP_CHARGE_ENERGY: 0 J
MDC_IDC_MSMT_CAP_CHARGE_TIME: 0 S
MDC_IDC_MSMT_CAP_CHARGE_TIME: 11.61
MDC_IDC_MSMT_CAP_CHARGE_TYPE: NORMAL
MDC_IDC_MSMT_CAP_CHARGE_TYPE: NORMAL
MDC_IDC_MSMT_LEADCHNL_LV_IMPEDANCE_VALUE: 393 OHM
MDC_IDC_MSMT_LEADCHNL_LV_PACING_THRESHOLD_AMPLITUDE: 5.5 V
MDC_IDC_MSMT_LEADCHNL_LV_PACING_THRESHOLD_PULSEWIDTH: 0.1 MS
MDC_IDC_MSMT_LEADCHNL_LV_SENSING_INTR_AMPL: 14.1 MV
MDC_IDC_MSMT_LEADCHNL_RA_IMPEDANCE_VALUE: 561 OHM
MDC_IDC_MSMT_LEADCHNL_RA_PACING_THRESHOLD_AMPLITUDE: 0.1 V
MDC_IDC_MSMT_LEADCHNL_RA_PACING_THRESHOLD_PULSEWIDTH: 1 MS
MDC_IDC_MSMT_LEADCHNL_RA_SENSING_INTR_AMPL: 0.5 MV
MDC_IDC_MSMT_LEADCHNL_RV_IMPEDANCE_VALUE: 411 OHM
MDC_IDC_MSMT_LEADCHNL_RV_PACING_THRESHOLD_AMPLITUDE: 0.8 V
MDC_IDC_MSMT_LEADCHNL_RV_PACING_THRESHOLD_PULSEWIDTH: 1 MS
MDC_IDC_MSMT_LEADCHNL_RV_SENSING_INTR_AMPL: 14.6 MV
MDC_IDC_PG_IMPLANT_DTM: NORMAL
MDC_IDC_PG_MFG: NORMAL
MDC_IDC_PG_MODEL: NORMAL
MDC_IDC_PG_SERIAL: NORMAL
MDC_IDC_PG_TYPE: NORMAL
MDC_IDC_SESS_CLINIC_NAME: NORMAL
MDC_IDC_SESS_DTM: NORMAL
MDC_IDC_SESS_TYPE: NORMAL
MDC_IDC_SET_BRADY_AT_MODE_SWITCH_MODE: NORMAL
MDC_IDC_SET_BRADY_LOWRATE: 40 {BEATS}/MIN
MDC_IDC_SET_BRADY_MODE: NORMAL
MDC_IDC_SET_BRADY_PAV_DELAY_HIGH: 160 MS
MDC_IDC_SET_BRADY_PAV_DELAY_LOW: 160 MS
MDC_IDC_SET_BRADY_SAV_DELAY_LOW: 110 MS
MDC_IDC_SET_CRT_LVRV_DELAY: 0 MS
MDC_IDC_SET_CRT_PACED_CHAMBERS: NORMAL
MDC_IDC_SET_LEADCHNL_LV_PACING_AMPLITUDE: 0.1 V
MDC_IDC_SET_LEADCHNL_LV_PACING_ANODE_ELECTRODE_1: NORMAL
MDC_IDC_SET_LEADCHNL_LV_PACING_ANODE_LOCATION_1: NORMAL
MDC_IDC_SET_LEADCHNL_LV_PACING_CAPTURE_MODE: NORMAL
MDC_IDC_SET_LEADCHNL_LV_PACING_CATHODE_ELECTRODE_1: NORMAL
MDC_IDC_SET_LEADCHNL_LV_PACING_CATHODE_LOCATION_1: NORMAL
MDC_IDC_SET_LEADCHNL_LV_PACING_POLARITY: NORMAL
MDC_IDC_SET_LEADCHNL_LV_PACING_PULSEWIDTH: 0.1 MS
MDC_IDC_SET_LEADCHNL_LV_SENSING_ADAPTATION_MODE: NORMAL
MDC_IDC_SET_LEADCHNL_LV_SENSING_ANODE_ELECTRODE_1: NORMAL
MDC_IDC_SET_LEADCHNL_LV_SENSING_ANODE_LOCATION_1: NORMAL
MDC_IDC_SET_LEADCHNL_LV_SENSING_CATHODE_ELECTRODE_1: NORMAL
MDC_IDC_SET_LEADCHNL_LV_SENSING_CATHODE_LOCATION_1: NORMAL
MDC_IDC_SET_LEADCHNL_LV_SENSING_POLARITY: NORMAL
MDC_IDC_SET_LEADCHNL_LV_SENSING_SENSITIVITY: 1 MV
MDC_IDC_SET_LEADCHNL_RA_PACING_ANODE_ELECTRODE_1: NORMAL
MDC_IDC_SET_LEADCHNL_RA_PACING_ANODE_LOCATION_1: NORMAL
MDC_IDC_SET_LEADCHNL_RA_PACING_CATHODE_ELECTRODE_1: NORMAL
MDC_IDC_SET_LEADCHNL_RA_PACING_CATHODE_LOCATION_1: NORMAL
MDC_IDC_SET_LEADCHNL_RA_PACING_POLARITY: NORMAL
MDC_IDC_SET_LEADCHNL_RA_SENSING_ADAPTATION_MODE: NORMAL
MDC_IDC_SET_LEADCHNL_RA_SENSING_ANODE_ELECTRODE_1: NORMAL
MDC_IDC_SET_LEADCHNL_RA_SENSING_ANODE_LOCATION_1: NORMAL
MDC_IDC_SET_LEADCHNL_RA_SENSING_CATHODE_ELECTRODE_1: NORMAL
MDC_IDC_SET_LEADCHNL_RA_SENSING_CATHODE_LOCATION_1: NORMAL
MDC_IDC_SET_LEADCHNL_RA_SENSING_POLARITY: NORMAL
MDC_IDC_SET_LEADCHNL_RA_SENSING_SENSITIVITY: 0.15 MV
MDC_IDC_SET_LEADCHNL_RV_PACING_AMPLITUDE: 2 V
MDC_IDC_SET_LEADCHNL_RV_PACING_ANODE_ELECTRODE_1: NORMAL
MDC_IDC_SET_LEADCHNL_RV_PACING_ANODE_LOCATION_1: NORMAL
MDC_IDC_SET_LEADCHNL_RV_PACING_CAPTURE_MODE: NORMAL
MDC_IDC_SET_LEADCHNL_RV_PACING_CATHODE_ELECTRODE_1: NORMAL
MDC_IDC_SET_LEADCHNL_RV_PACING_CATHODE_LOCATION_1: NORMAL
MDC_IDC_SET_LEADCHNL_RV_PACING_POLARITY: NORMAL
MDC_IDC_SET_LEADCHNL_RV_PACING_PULSEWIDTH: 1 MS
MDC_IDC_SET_LEADCHNL_RV_SENSING_ADAPTATION_MODE: NORMAL
MDC_IDC_SET_LEADCHNL_RV_SENSING_ANODE_ELECTRODE_1: NORMAL
MDC_IDC_SET_LEADCHNL_RV_SENSING_ANODE_LOCATION_1: NORMAL
MDC_IDC_SET_LEADCHNL_RV_SENSING_CATHODE_ELECTRODE_1: NORMAL
MDC_IDC_SET_LEADCHNL_RV_SENSING_CATHODE_LOCATION_1: NORMAL
MDC_IDC_SET_LEADCHNL_RV_SENSING_POLARITY: NORMAL
MDC_IDC_SET_LEADCHNL_RV_SENSING_SENSITIVITY: 0.6 MV
MDC_IDC_SET_ZONE_DETECTION_INTERVAL: 250 MS
MDC_IDC_SET_ZONE_DETECTION_INTERVAL: 333 MS
MDC_IDC_SET_ZONE_DETECTION_INTERVAL: 375 MS
MDC_IDC_SET_ZONE_TYPE: NORMAL
MDC_IDC_SET_ZONE_VENDOR_TYPE: NORMAL
MDC_IDC_STAT_EPISODE_RECENT_COUNT: 0
MDC_IDC_STAT_EPISODE_RECENT_COUNT: 0
MDC_IDC_STAT_EPISODE_RECENT_COUNT: 2
MDC_IDC_STAT_EPISODE_RECENT_COUNT_DTM_END: NORMAL
MDC_IDC_STAT_EPISODE_RECENT_COUNT_DTM_START: NORMAL
MDC_IDC_STAT_EPISODE_TOTAL_COUNT: 0
MDC_IDC_STAT_EPISODE_TOTAL_COUNT: 0
MDC_IDC_STAT_EPISODE_TOTAL_COUNT: 22
MDC_IDC_STAT_EPISODE_TOTAL_COUNT_DTM_END: NORMAL
MDC_IDC_STAT_EPISODE_TYPE: NORMAL
MDC_IDC_STAT_EPISODE_VENDOR_TYPE: NORMAL
MDC_IDC_STAT_TACHYTHERAPY_ATP_DELIVERED_RECENT: 0
MDC_IDC_STAT_TACHYTHERAPY_ATP_DELIVERED_TOTAL: 0
MDC_IDC_STAT_TACHYTHERAPY_RECENT_DTM_END: NORMAL
MDC_IDC_STAT_TACHYTHERAPY_RECENT_DTM_START: NORMAL
MDC_IDC_STAT_TACHYTHERAPY_SHOCKS_ABORTED_RECENT: 0
MDC_IDC_STAT_TACHYTHERAPY_SHOCKS_ABORTED_TOTAL: 0
MDC_IDC_STAT_TACHYTHERAPY_SHOCKS_DELIVERED_RECENT: 0
MDC_IDC_STAT_TACHYTHERAPY_SHOCKS_DELIVERED_TOTAL: 0
MDC_IDC_STAT_TACHYTHERAPY_TOTAL_DTM_END: NORMAL

## 2020-02-16 ENCOUNTER — HEALTH MAINTENANCE LETTER (OUTPATIENT)
Age: 75
End: 2020-02-16

## 2020-03-10 ENCOUNTER — OFFICE VISIT (OUTPATIENT)
Dept: PEDIATRICS | Facility: CLINIC | Age: 75
End: 2020-03-10
Payer: COMMERCIAL

## 2020-03-10 VITALS
WEIGHT: 204.7 LBS | HEART RATE: 77 BPM | DIASTOLIC BLOOD PRESSURE: 60 MMHG | HEIGHT: 64 IN | OXYGEN SATURATION: 94 % | BODY MASS INDEX: 34.95 KG/M2 | SYSTOLIC BLOOD PRESSURE: 100 MMHG | TEMPERATURE: 98.2 F

## 2020-03-10 DIAGNOSIS — E11.9 TYPE 2 DIABETES MELLITUS WITHOUT COMPLICATION, WITHOUT LONG-TERM CURRENT USE OF INSULIN (H): ICD-10-CM

## 2020-03-10 DIAGNOSIS — F51.04 PSYCHOPHYSIOLOGICAL INSOMNIA: Primary | ICD-10-CM

## 2020-03-10 DIAGNOSIS — I42.9 IDIOPATHIC CARDIOMYOPATHY (H): ICD-10-CM

## 2020-03-10 DIAGNOSIS — E66.01 MORBID OBESITY (H): ICD-10-CM

## 2020-03-10 DIAGNOSIS — F33.0 MAJOR DEPRESSIVE DISORDER, RECURRENT EPISODE, MILD (H): ICD-10-CM

## 2020-03-10 LAB — HBA1C MFR BLD: 6 % (ref 0–5.6)

## 2020-03-10 PROCEDURE — 99214 OFFICE O/P EST MOD 30 MIN: CPT | Performed by: NURSE PRACTITIONER

## 2020-03-10 PROCEDURE — 83036 HEMOGLOBIN GLYCOSYLATED A1C: CPT | Performed by: NURSE PRACTITIONER

## 2020-03-10 PROCEDURE — 80048 BASIC METABOLIC PNL TOTAL CA: CPT | Performed by: NURSE PRACTITIONER

## 2020-03-10 PROCEDURE — 36415 COLL VENOUS BLD VENIPUNCTURE: CPT | Performed by: NURSE PRACTITIONER

## 2020-03-10 RX ORDER — AMITRIPTYLINE HYDROCHLORIDE 10 MG/1
10-20 TABLET ORAL AT BEDTIME
Qty: 60 TABLET | Refills: 1 | Status: SHIPPED | OUTPATIENT
Start: 2020-03-10 | End: 2020-05-13

## 2020-03-10 ASSESSMENT — MIFFLIN-ST. JEOR: SCORE: 1418.51

## 2020-03-10 ASSESSMENT — ANXIETY QUESTIONNAIRES
3. WORRYING TOO MUCH ABOUT DIFFERENT THINGS: SEVERAL DAYS
1. FEELING NERVOUS, ANXIOUS, OR ON EDGE: SEVERAL DAYS
7. FEELING AFRAID AS IF SOMETHING AWFUL MIGHT HAPPEN: SEVERAL DAYS
IF YOU CHECKED OFF ANY PROBLEMS ON THIS QUESTIONNAIRE, HOW DIFFICULT HAVE THESE PROBLEMS MADE IT FOR YOU TO DO YOUR WORK, TAKE CARE OF THINGS AT HOME, OR GET ALONG WITH OTHER PEOPLE: SOMEWHAT DIFFICULT
6. BECOMING EASILY ANNOYED OR IRRITABLE: NOT AT ALL
5. BEING SO RESTLESS THAT IT IS HARD TO SIT STILL: NOT AT ALL
2. NOT BEING ABLE TO STOP OR CONTROL WORRYING: MORE THAN HALF THE DAYS
GAD7 TOTAL SCORE: 5

## 2020-03-10 ASSESSMENT — PATIENT HEALTH QUESTIONNAIRE - PHQ9
5. POOR APPETITE OR OVEREATING: NOT AT ALL
SUM OF ALL RESPONSES TO PHQ QUESTIONS 1-9: 6

## 2020-03-10 NOTE — PATIENT INSTRUCTIONS
[address] [facility] 
 
 
 [address] [facility] 
[phone] Patient: Conrad Goodell MRN: [id number] IWV: About your hospitalization You were admitted on:  N/A You last received care in the:  [facility] LABOR & DELIVERY You were discharged on:  May 30, 2018 Why you were hospitalized Your primary diagnosis was:  Not on File Follow-up Information Follow up With Details Comments Contact Info Roya Almaguer MD On 2018 1 pm  [address] 100[address] [facility] 
[phone] None   None ([phone]) Patient stated that they have no PCP Your Scheduled Appointments   1:00 PM EDT  
ESTABLISHED PATIENT with Roya Almaguer MD  
[facility] ([address]) 320 [facility] [address] [place] [address]  
[phone] Discharge Orders None A check mackenzie indicates which time of day the medication should be taken. My Medications ASK your doctor about these medications Instructions Each Dose to Equal  
 Morning Noon Evening Bedtime  
 famotidine 20 mg tablet Commonly known as:  PEPCID Your last dose was: Your next dose is: Take 1 Tab by mouth two (2) times a day. 20 mg Discharge Instructions Pregnancy Precautions: Care Instructions Your Care Instructions There is no sure way to prevent labor before your due date ( labor) or to prevent most other pregnancy problems. But there are things you can do to increase your chances of a healthy pregnancy. Go to your appointments, follow your doctor's advice, and take good care of yourself. Eat well, and exercise (if your doctor agrees). And make sure to drink plenty of water. Follow-up care is a key part of your treatment and safety.  Be sure to make Stop taking ibuprofen PM     Take 1 tablet amitriptyline around 1030 when you get into bed. If this dose is not effective, you can take 2 tablets    Try to cut back on taking Xanax    When you have the right side neck tension come on - try rubbing on IcyHot or topical Lidocaine 4% which you can buy over the counter    Follow up in 4-6 weeks. You can do an E-visit for this to follow up on the amitriptyline         and go to all appointments, and call your doctor if you are having problems. It's also a good idea to know your test results and keep a list of the medicines you take. How can you care for yourself at home? · Make sure you go to your prenatal appointments. At each visit, your doctor will check your blood pressure. Your doctor will also check to see if you have protein in your urine. High blood pressure and protein in urine are signs of preeclampsia. This condition can be dangerous for you and your baby. · Drink plenty of fluids, enough so that your urine is light yellow or clear like water. Dehydration can cause contractions. If you have kidney, heart, or liver disease and have to limit fluids, talk with your doctor before you increase the amount of fluids you drink. · Tell your doctor right away if you notice any symptoms of an infection, such as: ¨ Burning when you urinate. ¨ A foul-smelling discharge from your vagina. ¨ Vaginal itching. ¨ Unexplained fever. ¨ Unusual pain or soreness in your uterus or lower belly. · Eat a balanced diet. Include plenty of foods that are high in calcium and iron. ¨ Foods high in calcium include milk, cheese, yogurt, almonds, and broccoli. ¨ Foods high in iron include red meat, shellfish, poultry, eggs, beans, raisins, whole-grain bread, and leafy green vegetables. · Do not smoke. If you need help quitting, talk to your doctor about stop-smoking programs and medicines. These can increase your chances of quitting for good. · Do not drink alcohol or use illegal drugs. · Follow your doctor's directions about activity. Your doctor will let you know how much, if any, exercise you can do. · Ask your doctor if you can have sex. If you are at risk for early labor, your doctor may ask you to not have sex. · Take care to prevent falls. During pregnancy, your joints are loose, and your balance is off.  Sports such as bicycling, skiing, or in-line skating can increase your risk of falling. And don't ride horses or motorcycles, dive, water ski, scuba dive, or parachute jump while you are pregnant. · Avoid getting very hot. Do not use saunas or hot tubs. Avoid staying out in the sun in hot weather for long periods. Take acetaminophen (Tylenol) to lower a high fever. · Do not take any over-the-counter or herbal medicines or supplements without talking to your doctor or pharmacist first. 
When should you call for help? Call 911 anytime you think you may need emergency care. For example, call if: 
? · You passed out (lost consciousness). ? · You have severe vaginal bleeding. ? · You have severe pain in your belly or pelvis. ? · You have had fluid gushing or leaking from your vagina and you know or think the umbilical cord is bulging into your vagina. If this happens, immediately get down on your knees so your rear end (buttocks) is higher than your head. This will decrease the pressure on the cord until help arrives. ?Call your doctor now or seek immediate medical care if: 
? · You have signs of preeclampsia, such as: 
¨ Sudden swelling of your face, hands, or feet. ¨ New vision problems (such as dimness or blurring). ¨ A severe headache. ? · You have any vaginal bleeding. ? · You have belly pain or cramping. ? · You have a fever. ? · You have had regular contractions (with or without pain) for an hour. This means that you have 8 or more within 1 hour or 4 or more in 20 minutes after you change your position and drink fluids. ? · You have a sudden release of fluid from your vagina. ? · You have low back pain or pelvic pressure that does not go away. ? · You notice that your baby has stopped moving or is moving much less than normal. ? Watch closely for changes in your health, and be sure to contact your doctor if you have any problems. Where can you learn more? Go to http://saida-magda.info/. Enter [id number] in the search box to learn more about \"Pregnancy Precautions: Care Instructions. \" Current as of: 2017 Content Version: 11.4 © 1456-3148 Yapmo. Care instructions adapted under license by [employer] (which disclaims liability or warranty for this information). If you have questions about a medical condition or this instruction, always ask your healthcare professional. [address] any warranty or liability for your use of this information. Weeks 32 to 34 of Your Pregnancy: Care Instructions Your Care Instructions During the last few weeks of your pregnancy, you may have more aches and pains. It's important to rest when you can. Your growing baby is putting more pressure on your bladder. So you may need to urinate more often. Hemorrhoids are also common. These are painful, itchy veins in the rectal area. In the 36th week, most women have a test for group B streptococcus (GBS). GBS is a common bacteria that can live in the vagina and rectum. It can make your baby sick after birth. If you test positive, you will get antibiotics during labor. These will keep your baby from getting the bacteria. You may want to talk with your doctor about banking your baby's umbilical cord blood. This is the blood left in the cord after birth. If you want to save this blood, you must arrange it ahead of time. You can't decide at the last minute. If you haven't already had the Tdap shot during this pregnancy, talk to your doctor about getting it. It will help protect your  against pertussis infection. Follow-up care is a key part of your treatment and safety. Be sure to make and go to all appointments, and call your doctor if you are having problems. It's also a good idea to know your test results and keep a list of the medicines you take. How can you care for yourself at home? Ease hemorrhoids · Get more liquids, fruits, vegetables, and fiber in your diet. This will help keep your stools soft. · Avoid sitting for too long. Lie on your left side several times a day. · Clean yourself with soft, moist toilet paper. Or you can use witch hazel pads or personal hygiene pads. · If you are uncomfortable, try ice packs. Or you can sit in a warm sitz bath. Do these for 20 minutes at a time, as needed. · Use hydrocortisone cream for pain and itching. Two examples are Anusol and Preparation H Hydrocortisone. · Ask your doctor about taking an over-the-counter stool softener. Consider breastfeeding · Experts recommend that women breastfeed for 1 year or longer. Breast milk is the perfect food for babies. · Breast milk is easier for babies to digest than formula. And it is always available, just the right temperature, and free. · In general, babies who are  are healthier than formula-fed babies. ¨  babies are less likely to get ear infections, colds, diarrhea, and pneumonia. ¨  babies who are fed only breast milk are less likely to get asthma and allergies. ¨  babies are less likely to be obese. ¨  babies are less likely to get diabetes or heart disease. · Women who breastfeed have less bleeding after the birth. Their uteruses also shrink back faster. · Some women who breastfeed lose weight faster. Making milk burns calories. · Breastfeeding can lower your risk of breast cancer, ovarian cancer, and osteoporosis. Decide about circumcision for boys · As you make this decision, it may help to think about your personal, Caodaism, and family traditions. You get to decide if you will keep your son's penis natural or if he will be circumcised. · If you decide that you would like to have your baby circumcised, talk with your doctor. You can share your concerns about pain. And you can discuss your preferences for anesthesia. Where can you learn more? Go to http://saida-magda.info/. Enter O192 in the search box to learn more about \"Weeks 32 to 34 of Your Pregnancy: Care Instructions. \" Current as of: March 16, 2017 Content Version: 11.4 © 0344-8143 SmartSignal. Care instructions adapted under license by [employer] (which disclaims liability or warranty for this information). If you have questions about a medical condition or this instruction, always ask your healthcare professional. [place] [address] any warranty or liability for your use of this information. Counting Your Baby's Kicks: Care Instructions Your Care Instructions Counting your baby's kicks is one way your doctor can tell that your baby is healthy. Most women-especially in a first pregnancy-feel their baby move for the first time between 16 and 22 weeks. The movement may feel like flutters rather than kicks. Your baby may move more at certain times of the day. When you are active, you may notice less kicking than when you are resting. At your prenatal visits, your doctor will ask whether the baby is active. In your last trimester, your doctor may ask you to count the number of times you feel your baby move. Follow-up care is a key part of your treatment and safety. Be sure to make and go to all appointments, and call your doctor if you are having problems. It's also a good idea to know your test results and keep a list of the medicines you take. How do you count fetal kicks? · A common method of checking your baby's movement is to count the number of kicks or moves you feel in 1 hour. Ten movements (such as kicks, flutters, or rolls) in 1 hour are normal. Some doctors suggest that you count in the morning until you get to 10 movements. Then you can quit for that day and start again the next day. · Pick your baby's most active time of day to count. This may be any time from morning to evening. · If you do not feel 10 movements in an hour, your baby may be sleeping. Wait for the next hour and count again. When should you call for help? Call your doctor now or seek immediate medical care if: 
? · You noticed that your baby has stopped moving or is moving much less than normal. ? Watch closely for changes in your health, and be sure to contact your doctor if you have any problems. Where can you learn more? Go to http://saida-magda.info/. Enter V299 in the search box to learn more about \"Counting Your Baby's Kicks: Care Instructions. \" Current as of: March 16, 2017 Content Version: 11.4 © 3864-7756 Phone2Action. Care instructions adapted under license by [employer] Technology (which disclaims liability or warranty for this information). If you have questions about a medical condition or this instruction, always ask your healthcare professional. [place] [address] any warranty or liability for your use of this information. Lightheadedness or Faintness: Care Instructions Your Care Instructions Lightheadedness is a feeling that you are about to faint or \"pass out. \" You do not feel as if you or your surroundings are moving. It is different from vertigo, which is the feeling that you or things around you are spinning or tilting. Lightheadedness usually goes away or gets better when you lie down. If lightheadedness gets worse, it can lead to a fainting spell. It is common to feel lightheaded from time to time. Lightheadedness usually is not caused by a serious problem. It often is caused by a short-lasting drop in blood pressure and blood flow to your head that occurs when you get up too quickly from a seated or lying position. Follow-up care is a key part of your treatment and safety. Be sure to make and go to all appointments, and call your doctor if you are having problems.  It's also a good idea to know your test results and keep a list of the medicines you take. How can you care for yourself at home? · Lie down for 1 or 2 minutes when you feel lightheaded. After lying down, sit up slowly and remain sitting for 1 to 2 minutes before slowly standing up. · Avoid movements, positions, or activities that have made you lightheaded in the past. 
· Get plenty of rest, especially if you have a cold or flu, which can cause lightheadedness. · Make sure you drink plenty of fluids, especially if you have a fever or have been sweating. · Do not drive or put yourself and others in danger while you feel lightheaded. When should you call for help? Call 911 anytime you think you may need emergency care. For example, call if: 
? · You have symptoms of a stroke. These may include: 
¨ Sudden numbness, tingling, weakness, or loss of movement in your face, arm, or leg, especially on only one side of your body. ¨ Sudden vision changes. ¨ Sudden trouble speaking. ¨ Sudden confusion or trouble understanding simple statements. ¨ Sudden problems with walking or balance. ¨ A sudden, severe headache that is different from past headaches. ? · You have symptoms of a heart attack. These may include: ¨ Chest pain or pressure, or a strange feeling in the chest. 
¨ Sweating. ¨ Shortness of breath. ¨ Nausea or vomiting. ¨ Pain, pressure, or a strange feeling in the back, neck, jaw, or upper belly or in one or both shoulders or arms. ¨ Lightheadedness or sudden weakness. ¨ A fast or irregular heartbeat. After you call 911, the  may tell you to chew 1 adult-strength or 2 to 4 low-dose aspirin. Wait for an ambulance. Do not try to drive yourself. ? Watch closely for changes in your health, and be sure to contact your doctor if: 
? · Your lightheadedness gets worse or does not get better with home care. Where can you learn more? Go to http://saida-magda.info/.  
Enter E077 in the search box to learn more about \"Lightheadedness or Faintness: Care Instructions. \" Current as of: March 20, 2017 Content Version: 11.4 © 7958-7452 Abakan. Care instructions adapted under license by Hoppit (which disclaims liability or warranty for this information). If you have questions about a medical condition or this instruction, always ask your healthcare professional. [place] [address] any warranty or liability for your use of this information. Introducing [facility]! [address] introduces Shutl patient portal. Now you can access parts of your medical record, email your doctor's office, and request medication refills online. 1. In your internet browser, go to [email or web address] 2. Click on the First Time User? Click Here link in the Sign In box. You will see the New Member Sign Up page. 3. Enter your Shutl Access Code exactly as it appears below. You will not need to use this code after youve completed the sign-up process. If you do not sign up before the expiration date, you must request a new code. · Shutl Access Code: [email or web address] Expires: 8/13/2018 10:42 AM 
 
4. Enter the last four digits of your Social Security Number (xxxx) and Date of Birth (mm/dd/yyyy) as indicated and click Submit. You will be taken to the next sign-up page. 5. Create a Shutl ID. This will be your Shutl login ID and cannot be changed, so think of one that is secure and easy to remember. 6. Create a Shutl password. You can change your password at any time. 7. Enter your Password Reset Question and Answer. This can be used at a later time if you forget your password. 8. Enter your e-mail address. You will receive e-mail notification when new information is available in [address] 9. Click Sign Up. You can now view and download portions of your medical record. 10. Click the Download Summary menu link to download a portable copy of your medical information. If you have questions, please visit the Frequently Asked Questions section of the MyChart website. Remember, [employer] is NOT to be used for urgent needs. For medical emergencies, dial 911. Now available from your iPhone and Android! Introducing Yonathan Connor As a [facility] patient, I wanted to make you aware of our electronic visit tool called Yonathan Connor. Teofilo joiz 24/7 allows you to connect within minutes with a medical provider 24 hours a day, seven days a week via a mobile device or tablet or logging into a secure website from your computer. You can access Yonathan Connor from anywhere in the [place]. A virtual visit might be right for you when you have a simple condition and feel like you just dont want to get out of bed, or cant get away from work for an appointment, when your regular [facility] provider is not available (evenings, weekends or holidays), or when youre out of town and need minor care. Electronic visits cost only $49 and if the [facility] 24/7 provider determines a prescription is needed to treat your condition, one can be electronically transmitted to a nearby pharmacy*. Please take a moment to enroll today if you have not already done so. The enrollment process is free and takes just a few minutes. To enroll, please download the Daio 24/7 justina to your tablet or phone, or visit [email or web address] to enroll on your computer. And, as an [address] [place] [address] patient with a [employer] account, the results of your visits will be scanned into your electronic medical record and your primary care provider will be able to view the scanned results. We urge you to continue to see your regular [facility] provider for your ongoing medical care.   And while your primary care provider may not be the one available when you seek a Yonathan Connor virtual visit, the peace of mind you get from getting a real diagnosis real time can be priceless. For more information on Yonathan Connor, view our Frequently Asked Questions (FAQs) at [email or web address]. Sincerely, 
 
Juliana Augustine MD 
Chief Medical Officer [employer] *:  certain medications cannot be prescribed via Yonathan Connor Providers Seen During Your Hospitalization Provider Specialty Primary office phone Sydnie Barraza MD Obstetrics & Gynecology [phone] Your Primary Care Physician (PCP) Primary Care Physician Office Phone Office Fax NONE ** None ** ** None ** You are allergic to the following Allergen Reactions Vancomycin Hives Recent Documentation Height Weight BMI OB Status Smoking Status 1.676 m 79.8 kg 28.41 kg/m2 Pregnant Never Smoker Emergency Contacts Name Discharge Info Relation Home Work Mobile AdrielVignesh DISCHARGE CAREGIVER [3] Spouse [3] [phone] TomiVignesh DISCHARGE CAREGIVER [3] Spouse [3]   [phone] Patient Belongings The following personal items are in your possession at time of discharge: 
                             
 
  
  
 Please provide this summary of care documentation to your next provider. Signatures-by signing, you are acknowledging that this After Visit Summary has been reviewed with you and you have received a copy. Patient Signature:  ____________________________________________________________ Date:  ____________________________________________________________  
  
Mery Velez Provider Signature:  ____________________________________________________________ Date:  ____________________________________________________________

## 2020-03-10 NOTE — PROGRESS NOTES
Subjective     Kristie Roxanna Jones is a 74 year old female who presents to clinic today for the following health issues:    HPI   Depression and Anxiety Follow-Up    How are you doing with your depression since your last visit? No change    How are you doing with your anxiety since your last visit?  Worsened     Are you having other symptoms that might be associated with depression or anxiety? Yes:  having some pain in upper chest neck area that is intermitant     Have you had a significant life event? OTHER: family issues     Do you have any concerns with your use of alcohol or other drugs? No    Social History     Tobacco Use     Smoking status: Never Smoker     Smokeless tobacco: Never Used   Substance Use Topics     Alcohol use: Yes     Alcohol/week: 0.0 standard drinks     Frequency: Monthly or less     Drinks per session: 1 or 2     Binge frequency: Never     Comment: One glass of wine per week     Drug use: No     PHQ 2/20/2018 9/26/2018 8/6/2019   PHQ-9 Total Score 0 2 4   Q9: Thoughts of better off dead/self-harm past 2 weeks Not at all Not at all Not at all     TOD-7 SCORE 2/20/2018 9/26/2018 8/6/2019   Total Score - - -   Total Score 0 5 5         Suicide Assessment Five-step Evaluation and Treatment (SAFE-T)      How many servings of fruits and vegetables do you eat daily?  2-3    On average, how many sweetened beverages do you drink each day (Examples: soda, juice, sweet tea, etc.  Do NOT count diet or artificially sweetened beverages)?   0    How many days per week do you exercise enough to make your heart beat faster? 4    How many minutes a day do you exercise enough to make your heart beat faster? 30 - 60    How many days per week do you miss taking your medication? 0    Headaches      Duration: had hx of migraines, went away after hysterectomy     Description  Location: shoulders and neck and goes up the back of her head  Character: sharp pain, squeezing pain  Frequency:  2 times a month   Duration:   See above    Intensity:  moderate    Accompanying signs and symptoms:    Precipitating or Alleviating factors:  Nausea/vomiting: no  Dizziness: no  Weakness or numbness: no  Visual changes: none  Fever: no   Sinus or URI symptoms YES- gets a dry cough at night    History  Head trauma: no  Family history of migraines: YES- her father and sister  Previous tests for headaches: no  Neurologist evaluations: no  Able to do daily activities when headache present: YES  Wake with headaches: YES  Daily pain medication use: no  Any changes in: family     Precipitating or Alleviating factors (light/sound/sleep/caffeine): massage helps    Therapies tried and outcome: xanax and Ibuprofen PM (Advil, Motrin)    Outcome - they help  Frequent/daily pain medication use: YES    She follows with cardiology for idiopathic cardiomyopathy  Last ECHO 10/17/19  EF 25-30%    Daughter has brain cancer, diagnosed 8 years ago  Currently doing well  Her granddaughter tried committing suicide in November  Overdosed    She feels like depression and anxiety are well controlled during the day  She keeps herself busy  Goes to bed at 1030 and reads until midnight. Can't fall asleep  When she closes her eyes she thinks about her granddaughter overdosing  She discussed this with her therapist  She has a prescription for alprazolam and has been taking this more frequently  Takes for anxiety, to help with sleep and also for headaches  Will take when she feels the right sided neck tension start which usually turns into a headache  Reports trying muscle relaxants in the past which caused too much drowsiness  Does not want to take d/t side effects  She is aware of side effects and potential for dependence with benzodiazepines but also states they work for her and does not think .25 mg is a high dose  Her  takes 1 mg qid for PTSD        Patient Active Problem List   Diagnosis     GENERAL OSTEOARTHROSIS [715.00]     Esophageal reflux     Hyperlipidemia  LDL goal <130     LBBB (left bundle branch block)     Advanced directives, counseling/discussion     SUSAN (obstructive sleep apnea)     Idiopathic cardiomyopathy (H)     Cataract     Leg pain     Insomnia     Obesity     S/P total  right knee replacement: 1/7/13     Generalized anxiety disorder     Total knee replacement status     S/P revision of total knee, 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     Major depressive disorder, recurrent episode, mild (H)     ICD (implantable cardioverter-defibrillator), biventricular, in situ     Type 2 diabetes mellitus without complication (H)     Chronic systolic congestive heart failure (H)     Morbid obesity (H)     Past Surgical History:   Procedure Laterality Date     APPENDECTOMY       ARTHROPLASTY KNEE  1/7/2013    Procedure: ARTHROPLASTY KNEE;  Right Total Knee Arthroplasty       ARTHROPLASTY REVISION KNEE Right 8/26/2015    Procedure: ARTHROPLASTY REVISION KNEE;  Surgeon: Néstor eBth MD;  Location: [facility] OR     ARTHROSCOPY KNEE       bunionectomy left foot       COLONOSCOPY       CORONARY ANGIOGRAPHY ADULT ORDER  2002    normal     CORONARY ANGIOGRAPHY ADULT ORDER  12/7/12    no significant focal narrowing that would benefit from mechanical intervention      HYSTERECTOMY       IMPLANT AUTOMATIC IMPLANTABLE CARDIOVERTER DEFIBRILLATOR  4/30/12     INSERT THORACIC PACEMAKER LEAD EPICARDIAL  5/1/2012    Procedure:INSERT THORACIC PACEMAKER LEAD EPICARDIAL; EPICARDIAL LEAD PLACEMENT; Surgeon:WEST ARECHIGA; Location:[facility]     TONSILLECTOMY       TRANSPLANT - corneal lenses      Bilateral for cataracts       Social History     Tobacco Use     Smoking status: Never Smoker     Smokeless tobacco: Never Used   Substance Use Topics     Alcohol use: Yes     Alcohol/week: 0.0 standard drinks     Frequency: Monthly or less     Drinks per session: 1 or 2     Binge frequency: Never     Comment: One glass of wine per week     Family History   Problem Relation Age of Onset     Cancer Father          intraabdominal mass - not colon cancer     C.A.D. Mother      Hypertension Mother      Lipids Mother      Heart Disease Mother      Cancer Daughter 36        brain      Diabetes Paternal Uncle 52     Prostate Cancer Brother      Heart Disease Sister         murmur     Anxiety Disorder Sister      Colon Cancer No family hx of          Current Outpatient Medications   Medication Sig Dispense Refill     acetaminophen 650 MG TABS Take 650 mg by mouth every 6 hours (Patient taking differently: Take 650 mg by mouth every 6 hours PRN) 100 tablet      ALPRAZolam (XANAX) 0.25 MG tablet Take 1 tablet (0.25 mg) by mouth 2 times daily as needed for anxiety 30 tablet 1     amitriptyline (ELAVIL) 10 MG tablet Take 1-2 tablets (10-20 mg) by mouth At Bedtime 60 tablet 1     aspirin 81 MG tablet Take 81 mg by mouth daily       CALCIUM 500 MG OR TABS Takes 3 tabs daily takees total of 1200 mg daily       carvedilol (COREG) 25 MG tablet TAKE 1 TABLET BY MOUTH TWICE DAILY WITH MEALS 180 tablet 3     celecoxib (CELEBREX) 200 MG capsule Take 1 capsule (200 mg) by mouth daily as needed 90 capsule 3     Cholecalciferol (VITAMIN D) 2000 UNITS tablet Take 1 tablet by mouth daily.       CIPROFLOXACIN PO For dental appointmetns.       clobetasol (TEMOVATE) 0.05 % external cream Apply thin layer to bilateral hand twice daily for 2 weeks 45 g 1     co-enzyme Q-10 50 MG CAPS Take 1 capsule by mouth daily.       loratadine (CLARITIN) 10 MG tablet Take 10 mg by mouth daily       losartan (COZAAR) 50 MG tablet Take 1 tablet (50 mg) by mouth daily 90 tablet 3     MULTIVITAMIN TABS   OR 1 qd  0     rosuvastatin (CRESTOR) 10 MG tablet TAKE 1 TABLET BY MOUTH EVERY OTHER DAY 45 tablet 3     spironolactone (ALDACTONE) 25 MG tablet Take 0.5 tablets (12.5 mg) by mouth daily 90 tablet 2     venlafaxine (EFFEXOR-XR) 75 MG 24 hr capsule TAKE 3 CAPSULES BY MOUTH ONCE DAILY 270 capsule 3     blood glucose monitoring (NO BRAND SPECIFIED) test strip Use to test  "blood sugars 1 times daily or as directed Brand per insurance 100 strip 0     BP Readings from Last 3 Encounters:   03/10/20 100/60   10/24/19 100/50   08/06/19 128/58    Wt Readings from Last 3 Encounters:   03/10/20 92.9 kg (204 lb 11.2 oz)   10/24/19 88.5 kg (195 lb)   08/06/19 86.2 kg (190 lb)                    Reviewed and updated as needed this visit by Provider         Review of Systems   ROS COMP: Constitutional, HEENT, cardiovascular, pulmonary, gi and gu systems are negative, except as otherwise noted.      Objective    /60 (BP Location: Right arm, Patient Position: Chair, Cuff Size: Adult Regular)   Pulse 77   Temp 98.2  F (36.8  C) (Oral)   Ht 1.626 m (5' 4\")   Wt 92.9 kg (204 lb 11.2 oz)   SpO2 94%   BMI 35.14 kg/m    Body mass index is 35.14 kg/m .  Physical Exam   GENERAL: healthy, alert and no distress  RESP: lungs clear to auscultation - no rales, rhonchi or wheezes  CV: regular rate and rhythm, normal S1 S2, no S3 or S4, no murmur, click or rub, no peripheral edema and peripheral pulses strong  ABDOMEN: soft, nontender, no hepatosplenomegaly, no masses and bowel sounds normal  PSYCH: mentation appears normal, affect normal/bright    Diagnostic Test Results:  Labs reviewed in Epic        Assessment & Plan     1. Major depressive disorder, recurrent episode, mild (H)  - Stable. Continue with same medications    2. Idiopathic cardiomyopathy (H)  - Follows with cardiology. She experiences infrequent, intermittent pain which feels like a band across the chest. This is not new. Has discussed with cardiology. Exercises three times weekly and tolerates without chest pain or dyspnea    3. Type 2 diabetes mellitus without complication, without long-term current use of insulin (H)  - Well controlled through diet  - Hemoglobin A1c  - Basic metabolic panel  (Ca, Cl, CO2, Creat, Gluc, K, Na, BUN)    4. Morbid obesity (H)  - Continue with exercise. Discussed reduction of processed sugars    5. " "Psychophysiological insomnia  - Triggered by insomnia. Currently taking ibuprofen PM and advised not to take. Will start TCA which can be helpful for headaches and sleep. Discussed risks and benefits. Reviewed sleep hygiene. Recommend reducing use of alprazolam. She will try but also states that the medication works for her and she is wary of trying other medications. Continue to monitor usage  - amitriptyline (ELAVIL) 10 MG tablet; Take 1-2 tablets (10-20 mg) by mouth At Bedtime  Dispense: 60 tablet; Refill: 1     BMI:   Estimated body mass index is 35.14 kg/m  as calculated from the following:    Height as of this encounter: 1.626 m (5' 4\").    Weight as of this encounter: 92.9 kg (204 lb 11.2 oz).   Weight management plan: Discussed healthy diet and exercise guidelines        Patient Instructions   Stop taking ibuprofen PM     Take 1 tablet amitriptyline around 1030 when you get into bed. If this dose is not effective, you can take 2 tablets    Try to cut back on taking Xanax    When you have the right side neck tension come on - try rubbing on IcyHot or topical Lidocaine 4% which you can buy over the counter    Follow up in 4-6 weeks. You can do an E-visit for this to follow up on the amitriptyline            No follow-ups on file.    WINNIE Poe [facility] [place]    "

## 2020-03-11 DIAGNOSIS — I42.9 IDIOPATHIC CARDIOMYOPATHY (H): Primary | ICD-10-CM

## 2020-03-11 LAB
ANION GAP SERPL CALCULATED.3IONS-SCNC: 6 MMOL/L (ref 3–14)
BUN SERPL-MCNC: 19 MG/DL (ref 7–30)
CALCIUM SERPL-MCNC: 9.4 MG/DL (ref 8.5–10.1)
CHLORIDE SERPL-SCNC: 105 MMOL/L (ref 94–109)
CO2 SERPL-SCNC: 26 MMOL/L (ref 20–32)
CREAT SERPL-MCNC: 1.13 MG/DL (ref 0.52–1.04)
GFR SERPL CREATININE-BSD FRML MDRD: 48 ML/MIN/{1.73_M2}
GLUCOSE SERPL-MCNC: 88 MG/DL (ref 70–99)
POTASSIUM SERPL-SCNC: 4.4 MMOL/L (ref 3.4–5.3)
SODIUM SERPL-SCNC: 137 MMOL/L (ref 133–144)

## 2020-03-11 ASSESSMENT — ANXIETY QUESTIONNAIRES: GAD7 TOTAL SCORE: 5

## 2020-03-11 NOTE — RESULT ENCOUNTER NOTE
Kristie Jones,    Your lab results have been released to [employer]   Your kidney function has declined slightly since last check. Make sure you are staying well hydrated. Please schedule a lab only appointment in 1 month to recheck. Your A1c is 6% which is still great but is a slight increase from previous.   Please call the clinic if you have any concerns [phone]    WINNIE Poe [facility]

## 2020-05-14 ENCOUNTER — ANCILLARY PROCEDURE (OUTPATIENT)
Dept: CARDIOLOGY | Facility: CLINIC | Age: 75
End: 2020-05-14
Attending: INTERNAL MEDICINE
Payer: COMMERCIAL

## 2020-05-14 DIAGNOSIS — I42.9 IDIOPATHIC CARDIOMYOPATHY (H): ICD-10-CM

## 2020-05-14 DIAGNOSIS — Z95.810 ICD (IMPLANTABLE CARDIOVERTER-DEFIBRILLATOR) IN PLACE: Primary | ICD-10-CM

## 2020-05-14 DIAGNOSIS — Z95.810 ICD (IMPLANTABLE CARDIOVERTER-DEFIBRILLATOR) IN PLACE: ICD-10-CM

## 2020-05-14 PROCEDURE — 93296 REM INTERROG EVL PM/IDS: CPT | Performed by: INTERNAL MEDICINE

## 2020-05-14 PROCEDURE — 93295 DEV INTERROG REMOTE 1/2/MLT: CPT | Performed by: INTERNAL MEDICINE

## 2020-05-15 LAB
MDC_IDC_EPISODE_DTM: NORMAL
MDC_IDC_EPISODE_ID: NORMAL
MDC_IDC_EPISODE_TYPE: NORMAL
MDC_IDC_LEAD_IMPLANT_DT: NORMAL
MDC_IDC_LEAD_LOCATION: NORMAL
MDC_IDC_LEAD_LOCATION_DETAIL_1: NORMAL
MDC_IDC_LEAD_MFG: NORMAL
MDC_IDC_LEAD_MODEL: 4047
MDC_IDC_LEAD_MODEL: 4047
MDC_IDC_LEAD_MODEL: NORMAL
MDC_IDC_LEAD_MODEL: NORMAL
MDC_IDC_LEAD_POLARITY_TYPE: NORMAL
MDC_IDC_LEAD_SERIAL: NORMAL
MDC_IDC_MSMT_BATTERY_DTM: NORMAL
MDC_IDC_MSMT_BATTERY_REMAINING_LONGEVITY: 36 MO
MDC_IDC_MSMT_BATTERY_REMAINING_PERCENTAGE: 58 %
MDC_IDC_MSMT_BATTERY_STATUS: NORMAL
MDC_IDC_MSMT_CAP_CHARGE_DTM: NORMAL
MDC_IDC_MSMT_CAP_CHARGE_TIME: 11.8 S
MDC_IDC_MSMT_CAP_CHARGE_TYPE: NORMAL
MDC_IDC_MSMT_LEADCHNL_LV_IMPEDANCE_VALUE: 377 OHM
MDC_IDC_MSMT_LEADCHNL_LV_PACING_THRESHOLD_AMPLITUDE: 5.5 V
MDC_IDC_MSMT_LEADCHNL_LV_PACING_THRESHOLD_PULSEWIDTH: 0.1 MS
MDC_IDC_MSMT_LEADCHNL_RA_IMPEDANCE_VALUE: 513 OHM
MDC_IDC_MSMT_LEADCHNL_RA_LEAD_CHANNEL_STATUS: NORMAL
MDC_IDC_MSMT_LEADCHNL_RA_PACING_THRESHOLD_AMPLITUDE: 0.1 V
MDC_IDC_MSMT_LEADCHNL_RA_PACING_THRESHOLD_PULSEWIDTH: 1 MS
MDC_IDC_MSMT_LEADCHNL_RV_IMPEDANCE_VALUE: 405 OHM
MDC_IDC_MSMT_LEADCHNL_RV_PACING_THRESHOLD_AMPLITUDE: 0.8 V
MDC_IDC_MSMT_LEADCHNL_RV_PACING_THRESHOLD_PULSEWIDTH: 1 MS
MDC_IDC_PG_IMPLANT_DTM: NORMAL
MDC_IDC_PG_MFG: NORMAL
MDC_IDC_PG_MODEL: NORMAL
MDC_IDC_PG_SERIAL: NORMAL
MDC_IDC_PG_TYPE: NORMAL
MDC_IDC_SESS_CLINIC_NAME: NORMAL
MDC_IDC_SESS_DTM: NORMAL
MDC_IDC_SESS_TYPE: NORMAL
MDC_IDC_SET_BRADY_AT_MODE_SWITCH_RATE: 170 {BEATS}/MIN
MDC_IDC_SET_BRADY_LOWRATE: 40 {BEATS}/MIN
MDC_IDC_SET_BRADY_MODE: NORMAL
MDC_IDC_SET_CRT_LVRV_DELAY: 0 MS
MDC_IDC_SET_CRT_PACED_CHAMBERS: NORMAL
MDC_IDC_SET_LEADCHNL_LV_PACING_AMPLITUDE: 0.1 V
MDC_IDC_SET_LEADCHNL_LV_PACING_ANODE_ELECTRODE_1: NORMAL
MDC_IDC_SET_LEADCHNL_LV_PACING_ANODE_LOCATION_1: NORMAL
MDC_IDC_SET_LEADCHNL_LV_PACING_CATHODE_ELECTRODE_1: NORMAL
MDC_IDC_SET_LEADCHNL_LV_PACING_CATHODE_LOCATION_1: NORMAL
MDC_IDC_SET_LEADCHNL_LV_PACING_PULSEWIDTH: 0.1 MS
MDC_IDC_SET_LEADCHNL_LV_SENSING_ADAPTATION_MODE: NORMAL
MDC_IDC_SET_LEADCHNL_LV_SENSING_ANODE_ELECTRODE_1: NORMAL
MDC_IDC_SET_LEADCHNL_LV_SENSING_ANODE_LOCATION_1: NORMAL
MDC_IDC_SET_LEADCHNL_LV_SENSING_CATHODE_ELECTRODE_1: NORMAL
MDC_IDC_SET_LEADCHNL_LV_SENSING_CATHODE_LOCATION_1: NORMAL
MDC_IDC_SET_LEADCHNL_LV_SENSING_SENSITIVITY: 1 MV
MDC_IDC_SET_LEADCHNL_RA_PACING_POLARITY: NORMAL
MDC_IDC_SET_LEADCHNL_RA_SENSING_ADAPTATION_MODE: NORMAL
MDC_IDC_SET_LEADCHNL_RA_SENSING_POLARITY: NORMAL
MDC_IDC_SET_LEADCHNL_RA_SENSING_SENSITIVITY: 0.15 MV
MDC_IDC_SET_LEADCHNL_RV_PACING_AMPLITUDE: 2 V
MDC_IDC_SET_LEADCHNL_RV_PACING_POLARITY: NORMAL
MDC_IDC_SET_LEADCHNL_RV_PACING_PULSEWIDTH: 1 MS
MDC_IDC_SET_LEADCHNL_RV_SENSING_ADAPTATION_MODE: NORMAL
MDC_IDC_SET_LEADCHNL_RV_SENSING_POLARITY: NORMAL
MDC_IDC_SET_LEADCHNL_RV_SENSING_SENSITIVITY: 0.6 MV
MDC_IDC_SET_ZONE_DETECTION_INTERVAL: 250 MS
MDC_IDC_SET_ZONE_DETECTION_INTERVAL: 333 MS
MDC_IDC_SET_ZONE_DETECTION_INTERVAL: 375 MS
MDC_IDC_SET_ZONE_TYPE: NORMAL
MDC_IDC_SET_ZONE_VENDOR_TYPE: NORMAL
MDC_IDC_STAT_BRADY_DTM_END: NORMAL
MDC_IDC_STAT_BRADY_DTM_START: NORMAL
MDC_IDC_STAT_BRADY_RA_PERCENT_PACED: 0 %
MDC_IDC_STAT_BRADY_RV_PERCENT_PACED: 0 %
MDC_IDC_STAT_CRT_DTM_END: NORMAL
MDC_IDC_STAT_CRT_DTM_START: NORMAL
MDC_IDC_STAT_CRT_LV_PERCENT_PACED: 0 %
MDC_IDC_STAT_EPISODE_RECENT_COUNT: 0
MDC_IDC_STAT_EPISODE_RECENT_COUNT_DTM_END: NORMAL
MDC_IDC_STAT_EPISODE_RECENT_COUNT_DTM_START: NORMAL
MDC_IDC_STAT_EPISODE_TYPE: NORMAL
MDC_IDC_STAT_EPISODE_VENDOR_TYPE: NORMAL
MDC_IDC_STAT_TACHYTHERAPY_ATP_DELIVERED_RECENT: 0
MDC_IDC_STAT_TACHYTHERAPY_ATP_DELIVERED_TOTAL: 0
MDC_IDC_STAT_TACHYTHERAPY_RECENT_DTM_END: NORMAL
MDC_IDC_STAT_TACHYTHERAPY_RECENT_DTM_START: NORMAL
MDC_IDC_STAT_TACHYTHERAPY_SHOCKS_ABORTED_RECENT: 0
MDC_IDC_STAT_TACHYTHERAPY_SHOCKS_ABORTED_TOTAL: 0
MDC_IDC_STAT_TACHYTHERAPY_SHOCKS_DELIVERED_RECENT: 0
MDC_IDC_STAT_TACHYTHERAPY_SHOCKS_DELIVERED_TOTAL: 0
MDC_IDC_STAT_TACHYTHERAPY_TOTAL_DTM_END: NORMAL

## 2020-05-19 ENCOUNTER — VIRTUAL VISIT (OUTPATIENT)
Dept: PEDIATRICS | Facility: CLINIC | Age: 75
End: 2020-05-19
Payer: COMMERCIAL

## 2020-05-19 DIAGNOSIS — F51.04 PSYCHOPHYSIOLOGICAL INSOMNIA: ICD-10-CM

## 2020-05-19 PROCEDURE — 99213 OFFICE O/P EST LOW 20 MIN: CPT | Mod: 95 | Performed by: NURSE PRACTITIONER

## 2020-05-19 RX ORDER — AMITRIPTYLINE HYDROCHLORIDE 10 MG/1
10 TABLET ORAL AT BEDTIME
Qty: 90 TABLET | Refills: 3 | Status: SHIPPED | OUTPATIENT
Start: 2020-05-19 | End: 2021-02-05

## 2020-05-19 NOTE — PROGRESS NOTES
"Kristie Jones is a 74 year old female who is being evaluated via a billable telephone visit.      The patient has been notified of following:     \"This telephone visit will be conducted via a call between you and your physician/provider. We have found that certain health care needs can be provided without the need for a physical exam.  This service lets us provide the care you need with a short phone conversation.  If a prescription is necessary we can send it directly to your pharmacy.  If lab work is needed we can place an order for that and you can then stop by our lab to have the test done at a later time.    Telephone visits are billed at different rates depending on your insurance coverage. During this emergency period, for some insurers they may be billed the same as an in-person visit.  Please reach out to your insurance provider with any questions.    If during the course of the call the physician/provider feels a telephone visit is not appropriate, you will not be charged for this service.\"    Patient has given verbal consent for Telephone visit?  Yes    What phone number would you like to be contacted at? [phone]    How would you like to obtain your AVS? MyChart    Subjective     Kristie Jones is a 74 year old female who presents via phone visit today for the following health issues:    HPI  Medication Followup of Amitriptyline     Taking Medication as prescribed: yes    Side Effects:  Gets light headed sometimes    Medication Helping Symptoms:  yes       I last saw her in clinic 3/10/20 and started on low dose amitriptyline for insomnia  She is taking 10 mg at bedtime and finds this dose effective  Denies morning drowsiness   Has some transient lightheadedness with position changes. Not sure if this is new since starting the medication   Happens a few times a week when standing. States she stands slowly and then resolves  Otherwise, no obvious SE from the medication  She does not check BP at " home  Last BMP in clinic showed slight reduction in kidney function  I had recommended increasing fluid intake and rechecking lab which she did not do  She drinks approximately 7-8, 8 ounce glasses daily       Patient Active Problem List   Diagnosis     GENERAL OSTEOARTHROSIS [715.00]     Esophageal reflux     Hyperlipidemia LDL goal <130     LBBB (left bundle branch block)     Advanced directives, counseling/discussion     SUSAN (obstructive sleep apnea)     Idiopathic cardiomyopathy (H)     Cataract     Leg pain     Insomnia     Obesity     S/P total  right knee replacement: 1/7/13     Generalized anxiety disorder     Total knee replacement status     S/P revision of total knee, 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     Major depressive disorder, recurrent episode, mild (H)     ICD (implantable cardioverter-defibrillator), biventricular, in situ     Type 2 diabetes mellitus without complication (H)     Chronic systolic congestive heart failure (H)     Morbid obesity (H)     Past Surgical History:   Procedure Laterality Date     APPENDECTOMY       ARTHROPLASTY KNEE  1/7/2013    Procedure: ARTHROPLASTY KNEE;  Right Total Knee Arthroplasty       ARTHROPLASTY REVISION KNEE Right 8/26/2015    Procedure: ARTHROPLASTY REVISION KNEE;  Surgeon: Néstor Beth MD;  Location: [facility] OR     ARTHROSCOPY KNEE       bunionectomy left foot       COLONOSCOPY       CORONARY ANGIOGRAPHY ADULT ORDER  2002    normal     CORONARY ANGIOGRAPHY ADULT ORDER  12/7/12    no significant focal narrowing that would benefit from mechanical intervention      HYSTERECTOMY       IMPLANT AUTOMATIC IMPLANTABLE CARDIOVERTER DEFIBRILLATOR  4/30/12     INSERT THORACIC PACEMAKER LEAD EPICARDIAL  5/1/2012    Procedure:INSERT THORACIC PACEMAKER LEAD EPICARDIAL; EPICARDIAL LEAD PLACEMENT; Surgeon:WEST ARECHIGA; Location:[facility]     TONSILLECTOMY       TRANSPLANT - corneal lenses      Bilateral for cataracts       Social History     Tobacco Use     Smoking status: Never Smoker      Smokeless tobacco: Never Used   Substance Use Topics     Alcohol use: Yes     Alcohol/week: 0.0 standard drinks     Frequency: Monthly or less     Drinks per session: 1 or 2     Binge frequency: Never     Family History   Problem Relation Age of Onset     Cancer Father         intraabdominal mass - not colon cancer     C.A.D. Mother      Hypertension Mother      Lipids Mother      Heart Disease Mother      Cancer Daughter 36        brain      Diabetes Paternal Uncle 52     Prostate Cancer Brother      Heart Disease Sister         murmur     Anxiety Disorder Sister      Colon Cancer No family hx of          Current Outpatient Medications   Medication Sig Dispense Refill     acetaminophen 650 MG TABS Take 650 mg by mouth every 6 hours (Patient taking differently: Take 650 mg by mouth every 6 hours PRN) 100 tablet      ALPRAZolam (XANAX) 0.25 MG tablet Take 1 tablet (0.25 mg) by mouth 2 times daily as needed for anxiety 30 tablet 1     amitriptyline (ELAVIL) 10 MG tablet Take 1 tablet (10 mg) by mouth At Bedtime 90 tablet 3     aspirin 81 MG tablet Take 81 mg by mouth daily       CALCIUM 500 MG OR TABS Takes 3 tabs daily takees total of 1200 mg daily       carvedilol (COREG) 25 MG tablet TAKE 1 TABLET BY MOUTH TWICE DAILY WITH MEALS 180 tablet 3     celecoxib (CELEBREX) 200 MG capsule Take 1 capsule (200 mg) by mouth daily as needed 90 capsule 3     Cholecalciferol (VITAMIN D) 2000 UNITS tablet Take 1 tablet by mouth daily.       CIPROFLOXACIN PO For dental appointmetns.       clobetasol (TEMOVATE) 0.05 % external cream Apply thin layer to bilateral hand twice daily for 2 weeks 45 g 1     co-enzyme Q-10 50 MG CAPS Take 1 capsule by mouth daily.       loratadine (CLARITIN) 10 MG tablet Take 10 mg by mouth daily       losartan (COZAAR) 50 MG tablet Take 1 tablet (50 mg) by mouth daily 90 tablet 3     MULTIVITAMIN TABS   OR 1 qd  0     rosuvastatin (CRESTOR) 10 MG tablet TAKE 1 TABLET BY MOUTH EVERY OTHER DAY 45  tablet 3     spironolactone (ALDACTONE) 25 MG tablet Take 0.5 tablets (12.5 mg) by mouth daily 90 tablet 2     venlafaxine (EFFEXOR-XR) 75 MG 24 hr capsule TAKE 3 CAPSULES BY MOUTH ONCE DAILY 270 capsule 3     blood glucose monitoring (NO BRAND SPECIFIED) test strip Use to test blood sugars 1 times daily or as directed Brand per insurance 100 strip 0       Reviewed and updated as needed this visit by Provider         Review of Systems   Constitutional, HEENT, cardiovascular, pulmonary, gi and gu systems are negative, except as otherwise noted.       Objective   Reported vitals:  There were no vitals taken for this visit.   healthy, alert and no distress  PSYCH: Alert and oriented times 3; coherent speech, normal   rate and volume, able to articulate logical thoughts, able   to abstract reason, no tangential thoughts, no hallucinations   or delusions  Her affect is normal and pleasant  RESP: No cough, no audible wheezing, able to talk in full sentences  Remainder of exam unable to be completed due to telephone visits    Diagnostic Test Results:  Labs reviewed in Epic        Assessment/Plan:  1. Psychophysiological insomnia  - She has been sleeping better since starting medication. Caution with TCA in elderly, continue to monitor efficacy and tolerability. I was previously concerned she was not adequately hydrated d/t decline in renal function. Could be contributing to transient lightheadedness. Recommend increasing fluid intake. She will schedule for lab only appointment. Discussed option to change medication or continue at same dose. She would like to continue with dose dose as she is happy to be sleeping better. Hopefully symptom improves with adequate hydration. Otherwise, will need to revisit medication change in future  - amitriptyline (ELAVIL) 10 MG tablet; Take 1 tablet (10 mg) by mouth At Bedtime  Dispense: 90 tablet; Refill: 3    Return in about 2 weeks (around 6/2/2020) for Lab Work.      Phone call  duration:  13 minutes    WINNIE Poe CNP

## 2020-06-03 DIAGNOSIS — I42.9 IDIOPATHIC CARDIOMYOPATHY (H): ICD-10-CM

## 2020-06-03 LAB
ANION GAP SERPL CALCULATED.3IONS-SCNC: 7 MMOL/L (ref 3–14)
BUN SERPL-MCNC: 16 MG/DL (ref 7–30)
CALCIUM SERPL-MCNC: 8.8 MG/DL (ref 8.5–10.1)
CHLORIDE SERPL-SCNC: 106 MMOL/L (ref 94–109)
CO2 SERPL-SCNC: 24 MMOL/L (ref 20–32)
CREAT SERPL-MCNC: 1.01 MG/DL (ref 0.52–1.04)
GFR SERPL CREATININE-BSD FRML MDRD: 55 ML/MIN/{1.73_M2}
GLUCOSE SERPL-MCNC: 137 MG/DL (ref 70–99)
POTASSIUM SERPL-SCNC: 4.3 MMOL/L (ref 3.4–5.3)
SODIUM SERPL-SCNC: 137 MMOL/L (ref 133–144)

## 2020-06-03 PROCEDURE — 36415 COLL VENOUS BLD VENIPUNCTURE: CPT | Performed by: NURSE PRACTITIONER

## 2020-06-03 PROCEDURE — 80048 BASIC METABOLIC PNL TOTAL CA: CPT | Performed by: NURSE PRACTITIONER

## 2020-06-03 NOTE — RESULT ENCOUNTER NOTE
Your kidney function has returned to baseline.  Make sure you are staying well hydrated.  Julianne Thornton, CNP

## 2020-06-26 ENCOUNTER — MYC MEDICAL ADVICE (OUTPATIENT)
Dept: PEDIATRICS | Facility: CLINIC | Age: 75
End: 2020-06-26

## 2020-06-26 DIAGNOSIS — Z96.659 STATUS POST TOTAL KNEE REPLACEMENT, UNSPECIFIED LATERALITY: Primary | ICD-10-CM

## 2020-06-26 RX ORDER — CIPROFLOXACIN 500 MG/1
500 TABLET, FILM COATED ORAL 2 TIMES DAILY
Qty: 9 TABLET | Refills: 0 | Status: SHIPPED | OUTPATIENT
Start: 2020-06-26 | End: 2020-06-30

## 2020-06-26 NOTE — TELEPHONE ENCOUNTER
Please review patient mychart message and advise.    Patient requesting Antibiotic for dental procedures. Pended possible prescription. Please review/sign if appropriate.    Thanks!  Shawnee SALAS RN, BSN

## 2020-06-30 DIAGNOSIS — Z96.659 STATUS POST TOTAL KNEE REPLACEMENT, UNSPECIFIED LATERALITY: ICD-10-CM

## 2020-06-30 RX ORDER — CIPROFLOXACIN 500 MG/1
TABLET, FILM COATED ORAL
Qty: 3 TABLET | Refills: 2 | Status: SHIPPED | OUTPATIENT
Start: 2020-06-30 | End: 2020-07-20

## 2020-07-20 ENCOUNTER — MYC MEDICAL ADVICE (OUTPATIENT)
Dept: PEDIATRICS | Facility: CLINIC | Age: 75
End: 2020-07-20

## 2020-07-20 DIAGNOSIS — Z96.659 STATUS POST TOTAL KNEE REPLACEMENT, UNSPECIFIED LATERALITY: ICD-10-CM

## 2020-07-20 RX ORDER — CIPROFLOXACIN 500 MG/1
TABLET, FILM COATED ORAL
Qty: 9 TABLET | Refills: 1 | Status: ON HOLD | OUTPATIENT
Start: 2020-07-20 | End: 2020-12-27

## 2020-08-13 ENCOUNTER — ANCILLARY PROCEDURE (OUTPATIENT)
Dept: CARDIOLOGY | Facility: CLINIC | Age: 75
End: 2020-08-13
Attending: INTERNAL MEDICINE
Payer: COMMERCIAL

## 2020-08-13 DIAGNOSIS — Z95.810 ICD (IMPLANTABLE CARDIOVERTER-DEFIBRILLATOR) IN PLACE: ICD-10-CM

## 2020-08-13 DIAGNOSIS — I42.9 IDIOPATHIC CARDIOMYOPATHY (H): ICD-10-CM

## 2020-08-13 PROCEDURE — 93295 DEV INTERROG REMOTE 1/2/MLT: CPT | Performed by: INTERNAL MEDICINE

## 2020-08-13 PROCEDURE — 93296 REM INTERROG EVL PM/IDS: CPT | Performed by: INTERNAL MEDICINE

## 2020-08-19 LAB
MDC_IDC_EPISODE_DTM: NORMAL
MDC_IDC_EPISODE_ID: NORMAL
MDC_IDC_EPISODE_TYPE: NORMAL
MDC_IDC_LEAD_IMPLANT_DT: NORMAL
MDC_IDC_LEAD_LOCATION: NORMAL
MDC_IDC_LEAD_LOCATION_DETAIL_1: NORMAL
MDC_IDC_LEAD_MFG: NORMAL
MDC_IDC_LEAD_MODEL: 4047
MDC_IDC_LEAD_MODEL: 4047
MDC_IDC_LEAD_MODEL: NORMAL
MDC_IDC_LEAD_MODEL: NORMAL
MDC_IDC_LEAD_POLARITY_TYPE: NORMAL
MDC_IDC_LEAD_SERIAL: NORMAL
MDC_IDC_MSMT_BATTERY_DTM: NORMAL
MDC_IDC_MSMT_BATTERY_REMAINING_LONGEVITY: 30 MO
MDC_IDC_MSMT_BATTERY_REMAINING_PERCENTAGE: 52 %
MDC_IDC_MSMT_BATTERY_STATUS: NORMAL
MDC_IDC_MSMT_CAP_CHARGE_DTM: NORMAL
MDC_IDC_MSMT_CAP_CHARGE_TIME: 12 S
MDC_IDC_MSMT_CAP_CHARGE_TYPE: NORMAL
MDC_IDC_MSMT_LEADCHNL_LV_IMPEDANCE_VALUE: 370 OHM
MDC_IDC_MSMT_LEADCHNL_LV_PACING_THRESHOLD_AMPLITUDE: 5.5 V
MDC_IDC_MSMT_LEADCHNL_LV_PACING_THRESHOLD_PULSEWIDTH: 0.1 MS
MDC_IDC_MSMT_LEADCHNL_RA_IMPEDANCE_VALUE: 494 OHM
MDC_IDC_MSMT_LEADCHNL_RA_PACING_THRESHOLD_AMPLITUDE: 0.1 V
MDC_IDC_MSMT_LEADCHNL_RA_PACING_THRESHOLD_PULSEWIDTH: 1 MS
MDC_IDC_MSMT_LEADCHNL_RV_IMPEDANCE_VALUE: 408 OHM
MDC_IDC_MSMT_LEADCHNL_RV_PACING_THRESHOLD_AMPLITUDE: 0.8 V
MDC_IDC_MSMT_LEADCHNL_RV_PACING_THRESHOLD_PULSEWIDTH: 1 MS
MDC_IDC_PG_IMPLANT_DTM: NORMAL
MDC_IDC_PG_MFG: NORMAL
MDC_IDC_PG_MODEL: NORMAL
MDC_IDC_PG_SERIAL: NORMAL
MDC_IDC_PG_TYPE: NORMAL
MDC_IDC_SESS_CLINIC_NAME: NORMAL
MDC_IDC_SESS_DTM: NORMAL
MDC_IDC_SESS_TYPE: NORMAL
MDC_IDC_SET_BRADY_AT_MODE_SWITCH_RATE: 170 {BEATS}/MIN
MDC_IDC_SET_BRADY_LOWRATE: 40 {BEATS}/MIN
MDC_IDC_SET_BRADY_MODE: NORMAL
MDC_IDC_SET_CRT_LVRV_DELAY: 0 MS
MDC_IDC_SET_CRT_PACED_CHAMBERS: NORMAL
MDC_IDC_SET_LEADCHNL_LV_PACING_AMPLITUDE: 0.1 V
MDC_IDC_SET_LEADCHNL_LV_PACING_PULSEWIDTH: 0.1 MS
MDC_IDC_SET_LEADCHNL_LV_SENSING_ADAPTATION_MODE: NORMAL
MDC_IDC_SET_LEADCHNL_LV_SENSING_SENSITIVITY: 1 MV
MDC_IDC_SET_LEADCHNL_RA_PACING_POLARITY: NORMAL
MDC_IDC_SET_LEADCHNL_RA_SENSING_ADAPTATION_MODE: NORMAL
MDC_IDC_SET_LEADCHNL_RA_SENSING_POLARITY: NORMAL
MDC_IDC_SET_LEADCHNL_RA_SENSING_SENSITIVITY: 0.15 MV
MDC_IDC_SET_LEADCHNL_RV_PACING_AMPLITUDE: 2 V
MDC_IDC_SET_LEADCHNL_RV_PACING_POLARITY: NORMAL
MDC_IDC_SET_LEADCHNL_RV_PACING_PULSEWIDTH: 1 MS
MDC_IDC_SET_LEADCHNL_RV_SENSING_ADAPTATION_MODE: NORMAL
MDC_IDC_SET_LEADCHNL_RV_SENSING_POLARITY: NORMAL
MDC_IDC_SET_LEADCHNL_RV_SENSING_SENSITIVITY: 0.6 MV
MDC_IDC_SET_ZONE_DETECTION_INTERVAL: 250 MS
MDC_IDC_SET_ZONE_DETECTION_INTERVAL: 333 MS
MDC_IDC_SET_ZONE_DETECTION_INTERVAL: 375 MS
MDC_IDC_SET_ZONE_TYPE: NORMAL
MDC_IDC_SET_ZONE_VENDOR_TYPE: NORMAL
MDC_IDC_STAT_BRADY_DTM_END: NORMAL
MDC_IDC_STAT_BRADY_DTM_START: NORMAL
MDC_IDC_STAT_BRADY_RA_PERCENT_PACED: 0 %
MDC_IDC_STAT_BRADY_RV_PERCENT_PACED: 0 %
MDC_IDC_STAT_CRT_DTM_END: NORMAL
MDC_IDC_STAT_CRT_DTM_START: NORMAL
MDC_IDC_STAT_CRT_LV_PERCENT_PACED: 0 %
MDC_IDC_STAT_EPISODE_RECENT_COUNT: 0
MDC_IDC_STAT_EPISODE_RECENT_COUNT_DTM_END: NORMAL
MDC_IDC_STAT_EPISODE_RECENT_COUNT_DTM_START: NORMAL
MDC_IDC_STAT_EPISODE_TYPE: NORMAL
MDC_IDC_STAT_EPISODE_VENDOR_TYPE: NORMAL
MDC_IDC_STAT_TACHYTHERAPY_ATP_DELIVERED_RECENT: 0
MDC_IDC_STAT_TACHYTHERAPY_ATP_DELIVERED_TOTAL: 0
MDC_IDC_STAT_TACHYTHERAPY_RECENT_DTM_END: NORMAL
MDC_IDC_STAT_TACHYTHERAPY_RECENT_DTM_START: NORMAL
MDC_IDC_STAT_TACHYTHERAPY_SHOCKS_ABORTED_RECENT: 0
MDC_IDC_STAT_TACHYTHERAPY_SHOCKS_ABORTED_TOTAL: 0
MDC_IDC_STAT_TACHYTHERAPY_SHOCKS_DELIVERED_RECENT: 0
MDC_IDC_STAT_TACHYTHERAPY_SHOCKS_DELIVERED_TOTAL: 0
MDC_IDC_STAT_TACHYTHERAPY_TOTAL_DTM_END: NORMAL

## 2020-10-13 ENCOUNTER — ANCILLARY PROCEDURE (OUTPATIENT)
Dept: MAMMOGRAPHY | Facility: CLINIC | Age: 75
End: 2020-10-13
Attending: NURSE PRACTITIONER
Payer: COMMERCIAL

## 2020-10-13 DIAGNOSIS — Z12.31 VISIT FOR SCREENING MAMMOGRAM: ICD-10-CM

## 2020-10-13 PROCEDURE — 77067 SCR MAMMO BI INCL CAD: CPT | Performed by: RADIOLOGY

## 2020-10-13 PROCEDURE — 77063 BREAST TOMOSYNTHESIS BI: CPT | Performed by: RADIOLOGY

## 2020-10-30 ENCOUNTER — TELEPHONE (OUTPATIENT)
Dept: PEDIATRICS | Facility: CLINIC | Age: 75
End: 2020-10-30

## 2020-10-30 NOTE — LETTER
[facility]  [address]  [place] [address]  [phone]      December 15, 2020    Kristie Jones                                                                                                                                                       [address]  [place] [address]              Dear Kristie Mcknight,    After review of your chart it appears that you are due for your annual wellness visit. We are now able to do these visit by video visit.  Please call us at [phone] to schedule an appointment.    Thank you for your time,  Martha FARLEY LPN

## 2020-10-30 NOTE — TELEPHONE ENCOUNTER
Patient Quality Outreach      Summary:    Patient is due/failing the following:   Annual wellness, date due: now    Type of outreach:    Sent [employer] message.    Questions for provider review:    None                                                                                   **Start Working phrase here:**       Patient has the following on her problem list/HM: None                                                Martha Whitfield LPN       Chart routed to self.

## 2020-11-12 ENCOUNTER — ANCILLARY PROCEDURE (OUTPATIENT)
Dept: CARDIOLOGY | Facility: CLINIC | Age: 75
End: 2020-11-12
Attending: INTERNAL MEDICINE
Payer: COMMERCIAL

## 2020-11-12 DIAGNOSIS — Z95.810 ICD (IMPLANTABLE CARDIOVERTER-DEFIBRILLATOR) IN PLACE: Primary | ICD-10-CM

## 2020-11-12 DIAGNOSIS — I42.9 IDIOPATHIC CARDIOMYOPATHY (H): ICD-10-CM

## 2020-11-12 PROCEDURE — 93296 REM INTERROG EVL PM/IDS: CPT | Performed by: INTERNAL MEDICINE

## 2020-11-12 PROCEDURE — 93295 DEV INTERROG REMOTE 1/2/MLT: CPT | Performed by: INTERNAL MEDICINE

## 2020-11-13 ENCOUNTER — NURSE TRIAGE (OUTPATIENT)
Dept: PEDIATRICS | Facility: CLINIC | Age: 75
End: 2020-11-13
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
## 2020-11-22 ENCOUNTER — HEALTH MAINTENANCE LETTER (OUTPATIENT)
Age: 75
End: 2020-11-22

## 2020-12-01 LAB
MDC_IDC_EPISODE_DTM: NORMAL
MDC_IDC_EPISODE_ID: NORMAL
MDC_IDC_EPISODE_TYPE: NORMAL
MDC_IDC_LEAD_IMPLANT_DT: NORMAL
MDC_IDC_LEAD_LOCATION: NORMAL
MDC_IDC_LEAD_LOCATION_DETAIL_1: NORMAL
MDC_IDC_LEAD_MFG: NORMAL
MDC_IDC_LEAD_MODEL: 4047
MDC_IDC_LEAD_MODEL: 4047
MDC_IDC_LEAD_MODEL: NORMAL
MDC_IDC_LEAD_MODEL: NORMAL
MDC_IDC_LEAD_POLARITY_TYPE: NORMAL
MDC_IDC_LEAD_SERIAL: NORMAL
MDC_IDC_MSMT_BATTERY_DTM: NORMAL
MDC_IDC_MSMT_BATTERY_REMAINING_LONGEVITY: 30 MO
MDC_IDC_MSMT_BATTERY_REMAINING_PERCENTAGE: 50 %
MDC_IDC_MSMT_BATTERY_STATUS: NORMAL
MDC_IDC_MSMT_CAP_CHARGE_DTM: NORMAL
MDC_IDC_MSMT_CAP_CHARGE_TIME: 12.1 S
MDC_IDC_MSMT_CAP_CHARGE_TYPE: NORMAL
MDC_IDC_MSMT_LEADCHNL_LV_IMPEDANCE_VALUE: 362 OHM
MDC_IDC_MSMT_LEADCHNL_LV_PACING_THRESHOLD_AMPLITUDE: 5.5 V
MDC_IDC_MSMT_LEADCHNL_LV_PACING_THRESHOLD_PULSEWIDTH: 0.1 MS
MDC_IDC_MSMT_LEADCHNL_RA_IMPEDANCE_VALUE: 547 OHM
MDC_IDC_MSMT_LEADCHNL_RA_PACING_THRESHOLD_AMPLITUDE: 0.1 V
MDC_IDC_MSMT_LEADCHNL_RA_PACING_THRESHOLD_PULSEWIDTH: 1 MS
MDC_IDC_MSMT_LEADCHNL_RV_IMPEDANCE_VALUE: 434 OHM
MDC_IDC_MSMT_LEADCHNL_RV_PACING_THRESHOLD_AMPLITUDE: 0.8 V
MDC_IDC_MSMT_LEADCHNL_RV_PACING_THRESHOLD_PULSEWIDTH: 1 MS
MDC_IDC_PG_IMPLANT_DTM: NORMAL
MDC_IDC_PG_MFG: NORMAL
MDC_IDC_PG_MODEL: NORMAL
MDC_IDC_PG_SERIAL: NORMAL
MDC_IDC_PG_TYPE: NORMAL
MDC_IDC_SESS_CLINIC_NAME: NORMAL
MDC_IDC_SESS_DTM: NORMAL
MDC_IDC_SESS_TYPE: NORMAL
MDC_IDC_SET_BRADY_AT_MODE_SWITCH_RATE: 170 {BEATS}/MIN
MDC_IDC_SET_BRADY_LOWRATE: 40 {BEATS}/MIN
MDC_IDC_SET_BRADY_MODE: NORMAL
MDC_IDC_SET_CRT_LVRV_DELAY: 0 MS
MDC_IDC_SET_CRT_PACED_CHAMBERS: NORMAL
MDC_IDC_SET_LEADCHNL_LV_PACING_AMPLITUDE: 0.1 V
MDC_IDC_SET_LEADCHNL_LV_PACING_PULSEWIDTH: 0.1 MS
MDC_IDC_SET_LEADCHNL_LV_SENSING_ADAPTATION_MODE: NORMAL
MDC_IDC_SET_LEADCHNL_LV_SENSING_SENSITIVITY: 1 MV
MDC_IDC_SET_LEADCHNL_RA_PACING_POLARITY: NORMAL
MDC_IDC_SET_LEADCHNL_RA_SENSING_ADAPTATION_MODE: NORMAL
MDC_IDC_SET_LEADCHNL_RA_SENSING_POLARITY: NORMAL
MDC_IDC_SET_LEADCHNL_RA_SENSING_SENSITIVITY: 0.15 MV
MDC_IDC_SET_LEADCHNL_RV_PACING_AMPLITUDE: 2 V
MDC_IDC_SET_LEADCHNL_RV_PACING_POLARITY: NORMAL
MDC_IDC_SET_LEADCHNL_RV_PACING_PULSEWIDTH: 1 MS
MDC_IDC_SET_LEADCHNL_RV_SENSING_ADAPTATION_MODE: NORMAL
MDC_IDC_SET_LEADCHNL_RV_SENSING_POLARITY: NORMAL
MDC_IDC_SET_LEADCHNL_RV_SENSING_SENSITIVITY: 0.6 MV
MDC_IDC_SET_ZONE_DETECTION_INTERVAL: 250 MS
MDC_IDC_SET_ZONE_DETECTION_INTERVAL: 333 MS
MDC_IDC_SET_ZONE_DETECTION_INTERVAL: 375 MS
MDC_IDC_SET_ZONE_TYPE: NORMAL
MDC_IDC_SET_ZONE_VENDOR_TYPE: NORMAL
MDC_IDC_STAT_BRADY_DTM_END: NORMAL
MDC_IDC_STAT_BRADY_DTM_START: NORMAL
MDC_IDC_STAT_BRADY_RA_PERCENT_PACED: 0 %
MDC_IDC_STAT_BRADY_RV_PERCENT_PACED: 0 %
MDC_IDC_STAT_CRT_DTM_END: NORMAL
MDC_IDC_STAT_CRT_DTM_START: NORMAL
MDC_IDC_STAT_CRT_LV_PERCENT_PACED: 0 %
MDC_IDC_STAT_EPISODE_RECENT_COUNT: 0
MDC_IDC_STAT_EPISODE_RECENT_COUNT_DTM_END: NORMAL
MDC_IDC_STAT_EPISODE_RECENT_COUNT_DTM_START: NORMAL
MDC_IDC_STAT_EPISODE_TYPE: NORMAL
MDC_IDC_STAT_EPISODE_VENDOR_TYPE: NORMAL
MDC_IDC_STAT_TACHYTHERAPY_ATP_DELIVERED_RECENT: 0
MDC_IDC_STAT_TACHYTHERAPY_ATP_DELIVERED_TOTAL: 0
MDC_IDC_STAT_TACHYTHERAPY_RECENT_DTM_END: NORMAL
MDC_IDC_STAT_TACHYTHERAPY_RECENT_DTM_START: NORMAL
MDC_IDC_STAT_TACHYTHERAPY_SHOCKS_ABORTED_RECENT: 0
MDC_IDC_STAT_TACHYTHERAPY_SHOCKS_ABORTED_TOTAL: 0
MDC_IDC_STAT_TACHYTHERAPY_SHOCKS_DELIVERED_RECENT: 0
MDC_IDC_STAT_TACHYTHERAPY_SHOCKS_DELIVERED_TOTAL: 0
MDC_IDC_STAT_TACHYTHERAPY_TOTAL_DTM_END: NORMAL

## 2020-12-15 NOTE — TELEPHONE ENCOUNTER
Patient Quality Outreach 2nd Attempt      Summary:    Type of outreach:    Sent letter.    Next Steps:  Reach out within 90 days via Phone.    Max number of attempts reached: Yes. Will try again in 90 days if patient still on fail list.    Questions for provider review:    None                                                                                                                    Martha Whitfield LPN       Chart routed to closed.

## 2020-12-26 ENCOUNTER — APPOINTMENT (OUTPATIENT)
Dept: CT IMAGING | Facility: CLINIC | Age: 75
End: 2020-12-26
Attending: EMERGENCY MEDICINE
Payer: COMMERCIAL

## 2020-12-26 ENCOUNTER — APPOINTMENT (OUTPATIENT)
Dept: GENERAL RADIOLOGY | Facility: CLINIC | Age: 75
End: 2020-12-26
Attending: EMERGENCY MEDICINE
Payer: COMMERCIAL

## 2020-12-26 ENCOUNTER — OFFICE VISIT (OUTPATIENT)
Dept: URGENT CARE | Facility: URGENT CARE | Age: 75
End: 2020-12-26
Payer: COMMERCIAL

## 2020-12-26 ENCOUNTER — HOSPITAL ENCOUNTER (OUTPATIENT)
Facility: CLINIC | Age: 75
Setting detail: OBSERVATION
Discharge: HOME | End: 2020-12-27
Attending: EMERGENCY MEDICINE | Admitting: INTERNAL MEDICINE
Payer: COMMERCIAL

## 2020-12-26 VITALS
HEART RATE: 78 BPM | DIASTOLIC BLOOD PRESSURE: 58 MMHG | RESPIRATION RATE: 20 BRPM | TEMPERATURE: 98.8 F | SYSTOLIC BLOOD PRESSURE: 100 MMHG | OXYGEN SATURATION: 98 %

## 2020-12-26 DIAGNOSIS — R07.89 CHEST TIGHTNESS: Primary | ICD-10-CM

## 2020-12-26 DIAGNOSIS — I42.9 IDIOPATHIC CARDIOMYOPATHY (H): ICD-10-CM

## 2020-12-26 DIAGNOSIS — R07.9 CHEST PAIN, UNSPECIFIED TYPE: ICD-10-CM

## 2020-12-26 LAB
ALBUMIN SERPL-MCNC: 4.1 G/DL (ref 3.4–5)
ALP SERPL-CCNC: 100 U/L (ref 40–150)
ALT SERPL W P-5'-P-CCNC: 32 U/L (ref 0–50)
ANION GAP SERPL CALCULATED.3IONS-SCNC: 3 MMOL/L (ref 3–14)
AST SERPL W P-5'-P-CCNC: 28 U/L (ref 0–45)
BASOPHILS # BLD AUTO: 0.1 10E9/L (ref 0–0.2)
BASOPHILS NFR BLD AUTO: 0.9 %
BILIRUB SERPL-MCNC: 0.7 MG/DL (ref 0.2–1.3)
BUN SERPL-MCNC: 11 MG/DL (ref 7–30)
CALCIUM SERPL-MCNC: 10.9 MG/DL (ref 8.5–10.1)
CHLORIDE SERPL-SCNC: 106 MMOL/L (ref 94–109)
CO2 SERPL-SCNC: 30 MMOL/L (ref 20–32)
CREAT SERPL-MCNC: 0.91 MG/DL (ref 0.52–1.04)
D DIMER PPP FEU-MCNC: 0.6 UG/ML FEU (ref 0–0.5)
DIFFERENTIAL METHOD BLD: NORMAL
EOSINOPHIL # BLD AUTO: 0.2 10E9/L (ref 0–0.7)
EOSINOPHIL NFR BLD AUTO: 3 %
ERYTHROCYTE [DISTWIDTH] IN BLOOD BY AUTOMATED COUNT: 12.5 % (ref 10–15)
GFR SERPL CREATININE-BSD FRML MDRD: 62 ML/MIN/{1.73_M2}
GLUCOSE SERPL-MCNC: 98 MG/DL (ref 70–99)
HCT VFR BLD AUTO: 38.5 % (ref 35–47)
HGB BLD-MCNC: 12.8 G/DL (ref 11.7–15.7)
IMM GRANULOCYTES # BLD: 0.1 10E9/L (ref 0–0.4)
IMM GRANULOCYTES NFR BLD: 1.9 %
INR PPP: 0.99 (ref 0.86–1.14)
LABORATORY COMMENT REPORT: NORMAL
LYMPHOCYTES # BLD AUTO: 1.4 10E9/L (ref 0.8–5.3)
LYMPHOCYTES NFR BLD AUTO: 24.6 %
MCH RBC QN AUTO: 31.2 PG (ref 26.5–33)
MCHC RBC AUTO-ENTMCNC: 33.2 G/DL (ref 31.5–36.5)
MCV RBC AUTO: 94 FL (ref 78–100)
MONOCYTES # BLD AUTO: 0.5 10E9/L (ref 0–1.3)
MONOCYTES NFR BLD AUTO: 9.4 %
NEUTROPHILS # BLD AUTO: 3.4 10E9/L (ref 1.6–8.3)
NEUTROPHILS NFR BLD AUTO: 60.2 %
NRBC # BLD AUTO: 0 10*3/UL
NRBC BLD AUTO-RTO: 0 /100
NT-PROBNP SERPL-MCNC: 587 PG/ML (ref 0–900)
PLATELET # BLD AUTO: 184 10E9/L (ref 150–450)
POTASSIUM SERPL-SCNC: 4 MMOL/L (ref 3.4–5.3)
PROT SERPL-MCNC: 7.4 G/DL (ref 6.8–8.8)
RBC # BLD AUTO: 4.1 10E12/L (ref 3.8–5.2)
SARS-COV-2 RNA SPEC QL NAA+PROBE: NEGATIVE
SODIUM SERPL-SCNC: 139 MMOL/L (ref 133–144)
SPECIMEN SOURCE: NORMAL
TROPONIN I SERPL-MCNC: <0.015 UG/L (ref 0–0.04)
TROPONIN I SERPL-MCNC: <0.015 UG/L (ref 0–0.04)
WBC # BLD AUTO: 5.7 10E9/L (ref 4–11)

## 2020-12-26 PROCEDURE — 93000 ELECTROCARDIOGRAM COMPLETE: CPT | Performed by: FAMILY MEDICINE

## 2020-12-26 PROCEDURE — 83880 ASSAY OF NATRIURETIC PEPTIDE: CPT | Performed by: EMERGENCY MEDICINE

## 2020-12-26 PROCEDURE — 84484 ASSAY OF TROPONIN QUANT: CPT | Performed by: EMERGENCY MEDICINE

## 2020-12-26 PROCEDURE — 71046 X-RAY EXAM CHEST 2 VIEWS: CPT

## 2020-12-26 PROCEDURE — C9803 HOPD COVID-19 SPEC COLLECT: HCPCS

## 2020-12-26 PROCEDURE — 85379 FIBRIN DEGRADATION QUANT: CPT | Performed by: EMERGENCY MEDICINE

## 2020-12-26 PROCEDURE — 80053 COMPREHEN METABOLIC PANEL: CPT | Performed by: EMERGENCY MEDICINE

## 2020-12-26 PROCEDURE — 85610 PROTHROMBIN TIME: CPT | Performed by: EMERGENCY MEDICINE

## 2020-12-26 PROCEDURE — G0378 HOSPITAL OBSERVATION PER HR: HCPCS

## 2020-12-26 PROCEDURE — 93005 ELECTROCARDIOGRAM TRACING: CPT

## 2020-12-26 PROCEDURE — 99285 EMERGENCY DEPT VISIT HI MDM: CPT | Mod: 25

## 2020-12-26 PROCEDURE — 250N000009 HC RX 250: Performed by: EMERGENCY MEDICINE

## 2020-12-26 PROCEDURE — 84484 ASSAY OF TROPONIN QUANT: CPT | Performed by: PHYSICIAN ASSISTANT

## 2020-12-26 PROCEDURE — 87635 SARS-COV-2 COVID-19 AMP PRB: CPT | Performed by: EMERGENCY MEDICINE

## 2020-12-26 PROCEDURE — 250N000013 HC RX MED GY IP 250 OP 250 PS 637: Performed by: PHYSICIAN ASSISTANT

## 2020-12-26 PROCEDURE — 250N000011 HC RX IP 250 OP 636: Performed by: EMERGENCY MEDICINE

## 2020-12-26 PROCEDURE — 36415 COLL VENOUS BLD VENIPUNCTURE: CPT | Performed by: PHYSICIAN ASSISTANT

## 2020-12-26 PROCEDURE — 85025 COMPLETE CBC W/AUTO DIFF WBC: CPT | Performed by: EMERGENCY MEDICINE

## 2020-12-26 PROCEDURE — 99220 PR INITIAL OBSERVATION CARE,LEVEL III: CPT | Performed by: PHYSICIAN ASSISTANT

## 2020-12-26 PROCEDURE — 71275 CT ANGIOGRAPHY CHEST: CPT

## 2020-12-26 PROCEDURE — 99215 OFFICE O/P EST HI 40 MIN: CPT | Performed by: FAMILY MEDICINE

## 2020-12-26 RX ORDER — ASPIRIN 81 MG/1
162 TABLET, CHEWABLE ORAL ONCE
Status: COMPLETED | OUTPATIENT
Start: 2020-12-26 | End: 2020-12-26

## 2020-12-26 RX ORDER — ACETAMINOPHEN 650 MG/1
650 SUPPOSITORY RECTAL EVERY 4 HOURS PRN
Status: DISCONTINUED | OUTPATIENT
Start: 2020-12-26 | End: 2020-12-27 | Stop reason: HOSPADM

## 2020-12-26 RX ORDER — IOPAMIDOL 755 MG/ML
500 INJECTION, SOLUTION INTRAVASCULAR ONCE
Status: COMPLETED | OUTPATIENT
Start: 2020-12-26 | End: 2020-12-26

## 2020-12-26 RX ORDER — ACETAMINOPHEN 325 MG/1
650 TABLET ORAL EVERY 4 HOURS PRN
Status: DISCONTINUED | OUTPATIENT
Start: 2020-12-26 | End: 2020-12-27 | Stop reason: HOSPADM

## 2020-12-26 RX ORDER — AMITRIPTYLINE HYDROCHLORIDE 10 MG/1
10-20 TABLET ORAL
Status: DISCONTINUED | OUTPATIENT
Start: 2020-12-26 | End: 2020-12-27 | Stop reason: HOSPADM

## 2020-12-26 RX ORDER — NITROGLYCERIN 0.4 MG/1
0.4 TABLET SUBLINGUAL EVERY 5 MIN PRN
Status: DISCONTINUED | OUTPATIENT
Start: 2020-12-26 | End: 2020-12-27 | Stop reason: HOSPADM

## 2020-12-26 RX ORDER — ASPIRIN 81 MG/1
81 TABLET ORAL DAILY
Status: DISCONTINUED | OUTPATIENT
Start: 2020-12-27 | End: 2020-12-27 | Stop reason: HOSPADM

## 2020-12-26 RX ORDER — CARVEDILOL 25 MG/1
25 TABLET ORAL 2 TIMES DAILY WITH MEALS
Status: DISCONTINUED | OUTPATIENT
Start: 2020-12-27 | End: 2020-12-27 | Stop reason: HOSPADM

## 2020-12-26 RX ORDER — LIDOCAINE 40 MG/G
CREAM TOPICAL
Status: DISCONTINUED | OUTPATIENT
Start: 2020-12-26 | End: 2020-12-27 | Stop reason: HOSPADM

## 2020-12-26 RX ORDER — ASPIRIN 81 MG/1
81 TABLET ORAL DAILY
Status: DISCONTINUED | OUTPATIENT
Start: 2020-12-26 | End: 2020-12-26

## 2020-12-26 RX ORDER — LOSARTAN POTASSIUM 50 MG/1
50 TABLET ORAL DAILY
Status: DISCONTINUED | OUTPATIENT
Start: 2020-12-26 | End: 2020-12-27 | Stop reason: HOSPADM

## 2020-12-26 RX ORDER — MAGNESIUM HYDROXIDE/ALUMINUM HYDROXICE/SIMETHICONE 120; 1200; 1200 MG/30ML; MG/30ML; MG/30ML
30 SUSPENSION ORAL EVERY 4 HOURS PRN
Status: DISCONTINUED | OUTPATIENT
Start: 2020-12-26 | End: 2020-12-27 | Stop reason: HOSPADM

## 2020-12-26 RX ADMIN — IOPAMIDOL 54 ML: 755 INJECTION, SOLUTION INTRAVENOUS at 19:45

## 2020-12-26 RX ADMIN — SODIUM CHLORIDE 75 ML: 9 INJECTION, SOLUTION INTRAVENOUS at 19:45

## 2020-12-26 RX ADMIN — AMITRIPTYLINE HYDROCHLORIDE 10 MG: 10 TABLET, FILM COATED ORAL at 23:38

## 2020-12-26 RX ADMIN — ASPIRIN 81 MG CHEWABLE TABLET 162 MG: 81 TABLET CHEWABLE at 23:30

## 2020-12-26 ASSESSMENT — ENCOUNTER SYMPTOMS
CHEST TIGHTNESS: 1
FEVER: 0
SHORTNESS OF BREATH: 0
COUGH: 0

## 2020-12-26 ASSESSMENT — MIFFLIN-ST. JEOR: SCORE: 1412.4

## 2020-12-26 NOTE — PROGRESS NOTES
Subjective     Kristie Jones is a 75 year old female who presents to clinic today for the following health issues:    HPI         Concern -   Onset: 2 days  Description: chest tightness, sob, nausea, left lower rib cage pain, some headache   Intensity: moderate  Progression of Symptoms:  fluctuating   Accompanying Signs & Symptoms: no fever, chills, palpitations, sore throat, cough   Previous history of similar problem: cardiomyopathy, lost 2 family member passed away within last 1 week, defibrillator in situ, placed in 2012  Therapies tried and outcome: none  Last echocardiogram in 2019, EF 25-30%      Review of Systems   Constitutional, HEENT, cardiovascular, pulmonary, GI, , musculoskeletal, neuro, skin, endocrine and psych systems are negative, except as otherwise noted.      Objective    /58   Pulse 78   Temp 98.8  F (37.1  C) (Tympanic)   Resp 20   SpO2 98%   There is no height or weight on file to calculate BMI.  Physical Exam   GENERAL: alert and no distress  EYES: Eyes grossly normal to inspection, PERRL and conjunctivae and sclerae normal  NECK: no adenopathy, no asymmetry, masses, or scars and thyroid normal to palpation  RESP: lungs clear to auscultation - no rales, rhonchi or wheezes  CV: regular rate and rhythm, normal S1 S2, no S3 or S4, no murmur, click or rub, no peripheral edema and peripheral pulses strong  ABDOMEN: soft, nontender, no hepatosplenomegaly, no masses and bowel sounds normal  MS: no gross musculoskeletal defects noted, no edema  NEURO: Normal strength and tone, mentation intact and speech normal  PSYCH: mentation appears normal, affect normal/bright      Assessment & Plan     Chest tightness  75-year-old female presented with chest tightness along with shortness of breath which she has been experiencing for last 2 days or so.  Past medical history significant for multiple comorbidities including idiopathic cardiomyopathy.  Physical examination unremarkable.  ECG showed  left bundle branch block, chronic.  Defibrillator in situ, placed in 2012.  Last echocardiogram in 2019 showed ejection fraction 25 to 30% only.  Differentials discussed in detail including acute coronary syndrome.  Needs further evaluation to delineate a specific diagnosis.  Suggested to go ER for further evaluation and management.  Patient understood and in agreement with our plan.  All questions answered.  - EKG 12-lead complete w/read - Clinics    Idiopathic cardiomyopathy (H)  As above          Higinio Call MD  [facility] URGENT C[facility] [place]

## 2020-12-27 ENCOUNTER — APPOINTMENT (OUTPATIENT)
Dept: CARDIOLOGY | Facility: CLINIC | Age: 75
End: 2020-12-27
Attending: PHYSICIAN ASSISTANT
Payer: COMMERCIAL

## 2020-12-27 VITALS
SYSTOLIC BLOOD PRESSURE: 107 MMHG | BODY MASS INDEX: 34.89 KG/M2 | HEART RATE: 80 BPM | OXYGEN SATURATION: 94 % | TEMPERATURE: 98.6 F | RESPIRATION RATE: 16 BRPM | DIASTOLIC BLOOD PRESSURE: 64 MMHG | HEIGHT: 64 IN | WEIGHT: 204.4 LBS

## 2020-12-27 LAB
INTERPRETATION ECG - MUSE: NORMAL
TROPONIN I SERPL-MCNC: <0.015 UG/L (ref 0–0.04)

## 2020-12-27 PROCEDURE — 99217 PR OBSERVATION CARE DISCHARGE: CPT | Performed by: PHYSICIAN ASSISTANT

## 2020-12-27 PROCEDURE — G0378 HOSPITAL OBSERVATION PER HR: HCPCS

## 2020-12-27 PROCEDURE — 999N000208 ECHOCARDIOGRAM COMPLETE

## 2020-12-27 PROCEDURE — 99207 PR CDG-CODE CATEGORY CHANGED: CPT | Performed by: PHYSICIAN ASSISTANT

## 2020-12-27 PROCEDURE — 250N000013 HC RX MED GY IP 250 OP 250 PS 637: Performed by: PHYSICIAN ASSISTANT

## 2020-12-27 PROCEDURE — 36415 COLL VENOUS BLD VENIPUNCTURE: CPT | Performed by: PHYSICIAN ASSISTANT

## 2020-12-27 PROCEDURE — 84484 ASSAY OF TROPONIN QUANT: CPT | Performed by: PHYSICIAN ASSISTANT

## 2020-12-27 PROCEDURE — 255N000002 HC RX 255 OP 636: Performed by: INTERNAL MEDICINE

## 2020-12-27 PROCEDURE — 93306 TTE W/DOPPLER COMPLETE: CPT | Mod: 26 | Performed by: INTERNAL MEDICINE

## 2020-12-27 RX ORDER — CIPROFLOXACIN HCL 100 MG
100 TABLET ORAL DAILY PRN
COMMUNITY
End: 2022-07-29

## 2020-12-27 RX ORDER — ACETAMINOPHEN 500 MG
1000 TABLET ORAL DAILY PRN
COMMUNITY

## 2020-12-27 RX ADMIN — ASPIRIN 81 MG: 81 TABLET ORAL at 08:33

## 2020-12-27 RX ADMIN — OMEPRAZOLE 20 MG: 20 CAPSULE, DELAYED RELEASE ORAL at 07:23

## 2020-12-27 RX ADMIN — AMITRIPTYLINE HYDROCHLORIDE 10 MG: 10 TABLET, FILM COATED ORAL at 01:27

## 2020-12-27 RX ADMIN — HUMAN ALBUMIN MICROSPHERES AND PERFLUTREN 3 ML: 10; .22 INJECTION, SOLUTION INTRAVENOUS at 09:36

## 2020-12-27 NOTE — DISCHARGE SUMMARY
[facility]  Hospitalist Discharge Summary      Date of Admission:  12/26/2020  Date of Discharge:  12/27/2020  Discharging Provider: Steffany Beth PA-C      Discharge Diagnoses   Atypical chest pain  Nonischemic cardiomyopathy with systolic dysfunction  Hypertension  Hyperlipidemia  Anxiety/depression    Follow-ups Needed After Discharge   Follow-up Appointments     Follow-up and recommended labs and tests       Follow up with primary care provider, Julianne Thornton, within 7 days, for   hospital follow- up. No follow up labs or test are needed.             Discharge Disposition   Discharged to home  Condition at discharge: Stable    Hospital Course   Kristie Roxanna Jones is a 75 year old female with a PMH significant for idiopathic cardiomyopathy with moderate to severe left ventricular systolic dysfunction, ICD placement, sleep apnea, anxiety/depression, hypertension and hyperlipidemia who presented with nonexertional intermittent chest discomfort. EKG showed a left bundle branch block which was old and troponin was undetectable.  D-dimer was 0.6 so CT PE study was performed which was negative but did show interstitial edema with small pleural effusions.  She was admitted to the observation unit for further monitoring.    Echocardiogram showed systolic dysfunction with EF of 32%.  There was severe septal, anterior, and inferior hypokinesis.  Mild decrease in RV dysfunction and RVSP of 39. Discussed results with cardiology and they recommended no further interventions. Results similar to prior echocardiogram in 2019. Chest pain had improved by discharge. No concerns on telemetry.     Consultations This Hospital Stay   None    Code Status   Full Code    Time Spent on this Encounter   I, Steffany Beth PA-C, personally saw the patient today and spent less than or equal to 30 minutes discharging this patient.       Steffany Beth PA-C  [facility] OBSERVATION DEPT  [address]  [facility] [address]  Phone: [phone]  ______________________________________________________________________    Physical Exam   Vital Signs: Temp: 98.4  F (36.9  C) Temp src: Oral BP: 106/55 Pulse: 68   Resp: 16 SpO2: 96 % O2 Device: None (Room air)    Weight: 204 lbs 6.4 oz  Constitutional:Awake, alert, cooperative, no apparent distress  Respiratory: Clear to auscultation bilaterally, no crackles or wheezing  Cardiovascular: Regular rate and rhythm, normal S1 and S2, and no murmur noted  GI: Normal bowel sounds, soft, non-distended, non-tender  Skin/Integumen: No rashes, no cyanosis, no edema       Primary Care Physician   Julianne Thornton    Discharge Orders      Reason for your hospital stay    Admitted with chest pain for further evaluation. Cardiac workup showed no acute pathologies.  Troponins negative. Echocardiogram showed no significant changes. There was some mild decrease in RV function. Chest pain improved and discharged in stable condition.     Follow-up and recommended labs and tests     Follow up with primary care provider, Julianne Thornton, within 7 days, for hospital follow- up. No follow up labs or test are needed.     Activity    Your activity upon discharge: activity as tolerated     Diet    Follow this diet upon discharge:       Combination Diet Low Saturated Fat Na <2400mg Diet       Significant Results and Procedures   Most Recent 3 CBC's:  Recent Labs   Lab Test 12/26/20  1840 05/30/19  1134 11/09/17  1149   WBC 5.7 3.5* 9.5   HGB 12.8 11.9 12.2   MCV 94 96 97    166 179     Most Recent 3 BMP's:  Recent Labs   Lab Test 12/26/20  1840 06/03/20  0950 03/10/20  1842    137 137   POTASSIUM 4.0 4.3 4.4   CHLORIDE 106 106 105   CO2 30 24 26   BUN 11 16 19   CR 0.91 1.01 1.13*   ANIONGAP 3 7 6   WILEY 10.9* 8.8 9.4   GLC 98 137* 88       Discharge Medications   Current Discharge Medication List      CONTINUE these medications which have NOT CHANGED    Details    acetaminophen (TYLENOL) 500 MG tablet Take 1,000 mg by mouth daily as needed for mild pain      ALPRAZolam (XANAX) 0.25 MG tablet Take 1 tablet (0.25 mg) by mouth 2 times daily as needed for anxiety  Qty: 30 tablet, Refills: 1    Associated Diagnoses: Generalized anxiety disorder      amitriptyline (ELAVIL) 10 MG tablet Take 1 tablet (10 mg) by mouth At Bedtime  Qty: 90 tablet, Refills: 3    Associated Diagnoses: Psychophysiological insomnia      aspirin 81 MG tablet Take 81 mg by mouth daily      CALCIUM 500 MG OR TABS Takes 3 tabs daily takees total of 1200 mg daily      carvedilol (COREG) 25 MG tablet TAKE 1 TABLET BY MOUTH TWICE DAILY WITH MEALS  Qty: 180 tablet, Refills: 3    Associated Diagnoses: Idiopathic cardiomyopathy (H)      celecoxib (CELEBREX) 200 MG capsule Take 1 capsule (200 mg) by mouth daily as needed  Qty: 90 capsule, Refills: 3    Associated Diagnoses: Status post total right knee replacement      Cholecalciferol (VITAMIN D) 2000 UNITS tablet Take 1 tablet by mouth daily.    Associated Diagnoses: Obesity, unspecified      ciprofloxacin (CIPRO) 100 MG tablet Take 100 mg by mouth daily as needed For dental appointments      clobetasol (TEMOVATE) 0.05 % external cream Apply thin layer to bilateral hand twice daily for 2 weeks  Qty: 45 g, Refills: 1    Associated Diagnoses: Eczema, unspecified type      co-enzyme Q-10 50 MG CAPS Take 1 capsule by mouth daily.      lifitegrast (XIIDRA) 5 % opthalmic solution Place 1 drop into both eyes 2 times daily      loratadine (CLARITIN) 10 MG tablet Take 10 mg by mouth daily      losartan (COZAAR) 50 MG tablet Take 1 tablet (50 mg) by mouth daily  Qty: 90 tablet, Refills: 3    Associated Diagnoses: Idiopathic cardiomyopathy (H)      Multiple Vitamins-Minerals (MULTIVITAMIN ADULT PO) Take 1 tablet by mouth daily      rosuvastatin (CRESTOR) 10 MG tablet TAKE 1 TABLET BY MOUTH EVERY OTHER DAY  Qty: 45 tablet, Refills: 3    Associated Diagnoses: Hyperlipidemia  LDL goal <130      spironolactone (ALDACTONE) 25 MG tablet Take 0.5 tablets (12.5 mg) by mouth daily  Qty: 90 tablet, Refills: 2    Associated Diagnoses: Idiopathic cardiomyopathy (H)      venlafaxine (EFFEXOR-XR) 75 MG 24 hr capsule TAKE 3 CAPSULES BY MOUTH ONCE DAILY  Qty: 270 capsule, Refills: 3    Associated Diagnoses: Generalized anxiety disorder      blood glucose monitoring (NO BRAND SPECIFIED) test strip Use to test blood sugars 1 times daily or as directed Brand per insurance  Qty: 100 strip, Refills: 0    Associated Diagnoses: Type 2 diabetes mellitus without complication, without long-term current use of insulin (H)           Allergies   Allergies   Allergen Reactions     Prochlorperazine      Other reaction(s): Tardive Dyskinesia     Amoxicillin Hives     Clindamycin Other (See Comments)     Burning sensation in chest     Decadron      flushing     Dexamethasone      Other reaction(s): Erythema     Compazine Anxiety

## 2020-12-27 NOTE — ED NOTES
[facility]  ED Nurse Handoff Report    Kristie Jones is a 75 year old female   ED Chief complaint: Chest Pain  . ED Diagnosis:   Final diagnoses:   Chest pain, unspecified type     Allergies:   Allergies   Allergen Reactions     Prochlorperazine      Other reaction(s): Tardive Dyskinesia     Amoxicillin Hives     Clindamycin Other (See Comments)     Burning sensation in chest     Decadron      flushing     Dexamethasone      Other reaction(s): Erythema     Compazine Anxiety       Code Status: Full Code  Activity level - Baseline/Home:  Independent. Activity Level - Current:   Independent. Lift room needed: No. Bariatric: No   Needed: No   Isolation: Yes. Infection: Not Applicable.     Vital Signs:   Vitals:    12/26/20 2030 12/26/20 2045 12/26/20 2100 12/26/20 2115   BP: 126/60 117/63 124/54 123/59   Pulse: 75 80 73 74   Resp:       Temp:       TempSrc:       SpO2: 97% 96% 95% 93%   Weight:           Cardiac Rhythm:  ,   Cardiac  Cardiac Rhythm: Normal sinus rhythm  Pain level:    Patient confused: No. Patient Falls Risk: No.   Elimination Status: Has voided   Patient Report - Initial Complaint: Chest pain. Focused Assessment: Intermittent chest pain, left sided under breast. Non reproducable  Tests Performed: labs, CT. Abnormal Results:   CT Chest Pulmonary Embolism w Contrast   Final Result   IMPRESSION:   1.  No pulmonary embolus, aortic dissection, or aneurysm.   2.  Markedly enlarged heart with coronary artery disease and atherosclerotic vascular disease.   3.  Interstitial pulmonary edema with small bilateral pleural effusions. Recommend correlation to exclude superimposed infection.   4.  Cholelithiasis.      XR Chest 2 Views   Final Result   IMPRESSION: Stable left chest wall pacer/defibrillator. Mild cardiomegaly. No evidence for CHF or pneumonia. No pleural effusion or pneumothorax.        Labs Ordered and Resulted from Time of ED Arrival Up to the Time of Departure from the ED    COMPREHENSIVE METABOLIC PANEL - Abnormal; Notable for the following components:       Result Value    Calcium 10.9 (*)     All other components within normal limits   D DIMER QUANTITATIVE - Abnormal; Notable for the following components:    D Dimer 0.6 (*)     All other components within normal limits   CBC WITH PLATELETS DIFFERENTIAL   TROPONIN I   INR   NT PROBNP INPATIENT   SARS-COV-2 (COVID-19) VIRUS RT-PCR   PULSE OXIMETRY NURSING   CARDIAC CONTINUOUS MONITORING   PERIPHERAL IV CATHETER   PATIENT CARE ORDER     .   Treatments provided: Monitor, see above  Family Comments: NA  OBS brochure/video discussed/provided to patient:  Yes  ED Medications:   Medications   iopamidol (ISOVUE-370) solution 500 mL (54 mLs Intravenous Given 12/26/20 1945)   sodium chloride 0.9 % bag 500mL for CT scan flush use (75 mLs Intravenous Given 12/26/20 1945)     Drips infusing:  No  For the majority of the shift, the patient's behavior Green. Interventions performed were NA.    Sepsis treatment initiated: No     Patient tested for COVID 19 prior to admission: YES    ED Nurse Name/Phone Number: Sarah Ervin RN,   9:42 PM    RECEIVING UNIT ED HANDOFF REVIEW    Above ED Nurse Handoff Report was reviewed: Yes  Reviewed by: Robi Gasapr RN on December 26, 2020 at 9:50 PM

## 2020-12-27 NOTE — PLAN OF CARE
Patient's After Visit Summary was reviewed with patient. Patient verbalized understanding of After Visit Summary, recommended follow up and was given an opportunity to ask questions.   Discharge medications sent home with patient/family: No, Not applicable   Discharged with other: Self.    IV access removed. All personal belongings returned. Remote telemetry box removed and returned to cardiac unit. Escorted to exit via wheelchair with staff.     OBSERVATION patient END time: 11:58 AM

## 2020-12-27 NOTE — ED TRIAGE NOTES
"Pt arrives to the ED due to chest tightness across upper chest that has been intermittent. Pt states sometimes pain will be located under left breast. Pt has h/o of cardiac myopathy and states that her EJ fraction is 25-30%. Some associated dizziness. States she has SOB occasionally at baseline so that is not new for her. Denies nausea. Pt states \"I just think I am really stressed out\".   "

## 2020-12-27 NOTE — H&P
History and Physical     Kristie Jones MRN# [id number]   YOB: 1945 Age: 75 year old      Date of Admission:  12/26/2020    Primary care provider: Julianne Thornton          Assessment and Plan:   Kristie Jones is a 75 year old female with a PMH significant for idiopathic cardiomyopathy with moderate to severe left ventricular systolic dysfunction, ICD placement, sleep apnea, anxiety/depression, hypertension and hyperlipidemia who presents with nonexertional intermittent chest discomfort.    Patient was discussed with Dr. Garza, who was provider in ED. Chart review of ED work up was reviewed as well as chart review of Care Everywhere, previous visits and admissions.     #Atypical nonexertional chest tightness  2-day history of intermittent tightness across her chest that does not radiate and is not associated with increased shortness of breath, diaphoresis or nausea.  Vital signs are unremarkable, EKG shows a left bundle branch block which is not new and troponin is undetectable.  D-dimer was 0.6 so CT PE study was performed which was negative but did show interstitial edema with small pleural effusions but she has not hypoxic or tachypneic.  She walks daily without chest pain and admits to increased epigastric discomfort with burping and increased stress in her life.  Pain to me does not sound ischemic and we are unable to do a walking stress test due to left bundle branch block.  -Monitor on telemetry  -Trend troponins  -Continue beta-blocker, statin and baby aspirin  -Echocardiogram  -Echocardiogram shows new wall motion abnormalities could consider keeping for stress test on Monday  -We will start PPI given epigastric discomfort with palpation as well as increased burping    #History of nonischemic cardiomyopathy with systolic dysfunction  #ICD placement  Patient follows with Dr. Osborne and was diagnosed with idiopathic cardiomyopathy in 2002 with moderate to severe left ventricular  systolic dysfunction.  EF is consistently 20 to 30%.  She does not describe increased shortness of breath, orthopnea or lower leg swelling.  BNP is within normal limits.  -Continue spironolactone    #Hypertension  -Continue losartan    #HLP  -Continue statin    #Anxiety/depression  -Continue Amitriptyline    #Sleep apnea  - is bringing in CPAP       Social: No concerns  Code: Discussed with patient and they have chosen full code  VTE prophylaxis: PCDs  Disposition: Observation                    Chief Complaint:   Non exertional intermittent chest discomfort         History of Present Illness:   Kristie Jones is a 75 year old female who presents with 2-day history of intermittent chest tightness.  She describes intermittent nonexertional episodes of tightness that runs across the top of her chest occurring mostly in the evenings.  This is not associated with increased shortness of breath, nausea or diaphoresis.  This discomfort does not radiate into the jaw or into the arm.  Shortness of breath is at its baseline without any new cough or fever.  She also has intermittent discomfort in her left lateral chest wall that occurs in the evening.  This is happened to her before with previous work-up that has been unremarkable.  She admits that she has been under an increased amount of stress recently with 2 friends passing away and sick family members.  She does walk daily without chest discomfort, takes all of her medications as prescribed and has not started anything new.  She does not smoke cigarettes, drink alcohol daily and denies black/bloody stools.  She has noted increased burping and epigastric discomfort.             Past Medical History:     Past Medical History:   Diagnosis Date     Acute posthemorrhagic anemia 9/11/2015     Anemia      Chronic systolic congestive heart failure (H) 10/10/2018     EF 35%     Fibromyalgia      Gastro-oesophageal reflux disease      GENERAL OSTEOARTHROSIS [715.00]  11/23/2004    knee     Generalized anxiety disorder 9/30/2013     Hyperlipidemia LDL goal <130 2/10/2010    Failed simvastatin- muscle aches      Hypertension     because of the heart     Idiopathic cardiomyopathy (H) 1/19/2012    Dr. Osborne 3/2011 EF 30-35%      Insomnia 1/19/2012     Iron deficiency anemia 8/12/2015     Problem list name updated by automated process. Provider to review     LBBB (left bundle branch block)      Left ventricular systolic dysfunction:EF 35% 2/5/2012    Must stay on diovan per cardiology      Major depressive disorder, recurrent episode, mild (H) 2/17/2016     Migraine 6/21/2005     Problem list name updated by automated process. Provider to review     Nonischemic cardiomyopathy (H)     EF 30-35%, Dr. Osborne [facility] (suspect virus); s/p AICD     NSVT (nonsustained ventricular tachycardia) (H)      Obesity 1/20/2012     SUSAN (obstructive sleep apnea) 01/19/2012    CPAP      Pure hypercholesterolemia      Restless leg syndrome      Type 2 diabetes mellitus without complication (H) 9/24/2016               Past Surgical History:     Past Surgical History:   Procedure Laterality Date     APPENDECTOMY       ARTHROPLASTY KNEE  1/7/2013    Procedure: ARTHROPLASTY KNEE;  Right Total Knee Arthroplasty       ARTHROPLASTY REVISION KNEE Right 8/26/2015    Procedure: ARTHROPLASTY REVISION KNEE;  Surgeon: Néstor Beth MD;  Location: [facility] OR     ARTHROSCOPY KNEE       bunionectomy left foot       COLONOSCOPY       CORONARY ANGIOGRAPHY ADULT ORDER  2002    normal     CORONARY ANGIOGRAPHY ADULT ORDER  12/7/12    no significant focal narrowing that would benefit from mechanical intervention      HYSTERECTOMY       IMPLANT AUTOMATIC IMPLANTABLE CARDIOVERTER DEFIBRILLATOR  4/30/12     INSERT THORACIC PACEMAKER LEAD EPICARDIAL  5/1/2012    Procedure:INSERT THORACIC PACEMAKER LEAD EPICARDIAL; EPICARDIAL LEAD PLACEMENT; Surgeon:WEST ARECHIGA; Location:[facility] OR     TONSILLECTOMY       TRANSPLANT - corneal lenses       Bilateral for cataracts               Social History:     Social History     Socioeconomic History     Marital status:      Spouse name: Not on file     Number of children: 2     Years of education: 15     Highest education level: Associate degree: academic program   Occupational History     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     Employer: [employer],[id number]   Social Needs     Financial resource strain: Not hard at all     Food insecurity     Worry: Never true     Inability: Never true     Transportation needs     Medical: No     Non-medical: No   Tobacco Use     Smoking status: Never Smoker     Smokeless tobacco: Never Used   Substance and Sexual Activity     Alcohol use: Yes     Alcohol/week: 0.0 standard drinks     Frequency: Monthly or less     Drinks per session: 1 or 2     Binge frequency: Never     Drug use: No     Sexual activity: Not Currently     Partners: Male     Birth control/protection: Surgical     Comment: She has had a hysterectomy   Lifestyle     Physical activity     Days per week: 4 days     Minutes per session: 30 min     Stress: To some extent   Relationships     Social connections     Talks on phone: Twice a week     Gets together: Once a week     Attends Voodoo service: Never     Active member of club or organization: No     Attends meetings of clubs or organizations: Never     Relationship status:      Intimate partner violence     Fear of current or ex partner: Not on file     Emotionally abused: Not on file     Physically abused: Not on file     Forced sexual activity: Not on file   Other Topics Concern     Parent/sibling w/ CABG, MI or angioplasty before 65F 55M? 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 Multiple ignacio problems, multiple hospitalizations in last 7 years, anxiety        Pt has 2 daughters, 2 step-daughters's and  7 grandchildrens        Youngest daughter Sabi has malignant brain tumor                           Family History:     Family History   Problem Relation Age of Onset     Cancer Father         intraabdominal mass - not colon cancer     C.A.D. Mother      Hypertension Mother      Lipids Mother      Heart Disease Mother      Cancer Daughter 36        brain      Diabetes Paternal Uncle 52     Prostate Cancer Brother      Heart Disease Sister         murmur     Anxiety Disorder Sister      Colon Cancer No family hx of               Allergies:      Allergies   Allergen Reactions     Prochlorperazine      Other reaction(s): Tardive Dyskinesia     Amoxicillin Hives     Clindamycin Other (See Comments)     Burning sensation in chest     Decadron      flushing     Dexamethasone      Other reaction(s): Erythema     Compazine Anxiety               Medications:     Prior to Admission medications    Medication Sig Last Dose Taking? Auth Provider   acetaminophen 650 MG TABS Take 650 mg by mouth every 6 hours  Patient taking differently: Take 650 mg by mouth every 6 hours PRN   Patsy Worrell PA   ALPRAZolam (XANAX) 0.25 MG tablet Take 1 tablet (0.25 mg) by mouth 2 times daily as needed for anxiety   Julianne Thornton APRN CNP   amitriptyline (ELAVIL) 10 MG tablet Take 1 tablet (10 mg) by mouth At Bedtime   Julianne Thornton APRN CNP   aspirin 81 MG tablet Take 81 mg by mouth daily   Reported, Patient   blood glucose monitoring (NO BRAND SPECIFIED) test  strip Use to test blood sugars 1 times daily or as directed Brand per insurance   Christin Diehl MD   CALCIUM 500 MG OR TABS Takes 3 tabs daily takees total of 1200 mg daily   Juana Washington MD   carvedilol (COREG) 25 MG tablet TAKE 1 TABLET BY MOUTH TWICE DAILY WITH MEALS   Julianne Thornton APRN CNP   celecoxib (CELEBREX) 200 MG capsule Take 1 capsule (200 mg) by mouth daily as needed   Christin Diehl MD   Cholecalciferol (VITAMIN D) 2000 UNITS tablet Take 1 tablet by mouth daily.   Reported, Patient   ciprofloxacin (CIPRO) 500 MG tablet Take 1 tablet by mouth two times daily the day of the procedure and one tablet the day after the procedure   Julianne Thornton APRN CNP   CIPROFLOXACIN PO For dental appointmetns.   Reported, Patient   clobetasol (TEMOVATE) 0.05 % external cream Apply thin layer to bilateral hand twice daily for 2 weeks   Julianne Thornton APRN CNP   co-enzyme Q-10 50 MG CAPS Take 1 capsule by mouth daily.   Pepper Baugh MD   loratadine (CLARITIN) 10 MG tablet Take 10 mg by mouth daily   Reported, Patient   losartan (COZAAR) 50 MG tablet Take 1 tablet (50 mg) by mouth daily   Julianne Thornton APRN CNP   MULTIVITAMIN TABS   OR 1 qd   Pepper Baugh MD   rosuvastatin (CRESTOR) 10 MG tablet TAKE 1 TABLET BY MOUTH EVERY OTHER DAY   Julianne Thornton APRN CNP   spironolactone (ALDACTONE) 25 MG tablet Take 0.5 tablets (12.5 mg) by mouth daily   Julianne Thornton APRN CNP   venlafaxine (EFFEXOR-XR) 75 MG 24 hr capsule TAKE 3 CAPSULES BY MOUTH ONCE DAILY   Julianne Thornton APRN CNP              Review of Systems:   A Comprehensive greater than 10 system review of systems was carried out.  Pertinent positives and negatives are noted above.  Otherwise negative for contributory information.            Physical Exam:   Blood pressure 123/80, pulse 77, temperature 97  F (36.1  C), temperature source Temporal, resp. rate 20,  weight 91.6 kg (201 lb 15.1 oz), SpO2 99 %, not currently breastfeeding.  Exam:  GENERAL:  Comfortable.  PSYCH: pleasant, oriented, No acute distress.  HEENT:  PERRLA. Normal conjunctiva, normal hearing, nasal mucosa and Oropharynx are normal.  NECK:  Supple, no neck vein distention, adenopathy or bruits, normal thyroid.  HEART:  Normal S1, S2 with no murmur, no pericardial rub, gallops or S3 or S4.  LUNGS:  Clear to auscultation, normal Respiratory effort. No wheezing, rales or ronchi.  ABDOMEN:  Soft, no hepatosplenomegaly, normal bowel sounds. Tender epigastrium, non distended.   EXTREMITIES:  No pedal edema, +2 pulses bilateral and equal.  SKIN:  Dry to touch, No rash, wound or ulcerations.  NEUROLOGIC:  CN 2-12 grossly intact,  sensation is intact with no focal deficits.               Data:     Recent Labs   Lab 12/26/20 1840   WBC 5.7   HGB 12.8   HCT 38.5   MCV 94        Recent Labs   Lab 12/26/20 1840      POTASSIUM 4.0   CHLORIDE 106   CO2 30   ANIONGAP 3   GLC 98   BUN 11   CR 0.91   GFRESTIMATED 62   GFRESTBLACK 72   WILEY 10.9*   PROTTOTAL 7.4   ALBUMIN 4.1   BILITOTAL 0.7   ALKPHOS 100   AST 28   ALT 32     Recent Labs   Lab 12/26/20 1840   NTBNPI 587     Recent Labs   Lab 12/26/20 1840   DD 0.6*     Recent Labs   Lab 12/26/20 1840   TROPI <0.015         Recent Results (from the past 24 hour(s))   XR Chest 2 Views    Narrative    EXAM: XR CHEST 2 VW  LOCATION: [facility]  DATE/TIME: 12/26/2020 6:52 PM    INDICATION: Shortness of breath  COMPARISON: 03/04/2016      Impression    IMPRESSION: Stable left chest wall pacer/defibrillator. Mild cardiomegaly. No evidence for CHF or pneumonia. No pleural effusion or pneumothorax.   CT Chest Pulmonary Embolism w Contrast    Narrative    EXAM: CT CHEST PULMONARY EMBOLISM W CONTRAST  LOCATION: [facility]  DATE/TIME: 12/26/2020 7:41 PM    INDICATION: Shortness of breath.  COMPARISON: None.  TECHNIQUE: CT chest pulmonary  angiogram during arterial phase injection of IV contrast. Multiplanar reformats and MIP reconstructions were performed. Dose reduction techniques were used.   CONTRAST: 54 mL Isovue-370.    FINDINGS:  ANGIOGRAM CHEST: Pulmonary arteries are normal caliber and negative for pulmonary emboli. Thoracic aorta is negative for dissection. No CT evidence of right heart strain.    LUNGS AND PLEURA: Small bilateral pleural effusions. Mild interstitial thickening, correlate for pulmonary embolus.    MEDIASTINUM/AXILLAE: Moderately enlarged heart. No pericardial effusion. No hilar or mediastinal lymphadenopathy. Atherosclerotic calcifications of the aorta and coronary arteries. Left-sided pacer/AICD.    UPPER ABDOMEN: Cholelithiasis.    MUSCULOSKELETAL: Normal.      Impression    IMPRESSION:  1.  No pulmonary embolus, aortic dissection, or aneurysm.  2.  Markedly enlarged heart with coronary artery disease and atherosclerotic vascular disease.  3.  Interstitial pulmonary edema with small bilateral pleural effusions. Recommend correlation to exclude superimposed infection.  4.  Cholelithiasis.         Yulia Beth PA-C

## 2020-12-27 NOTE — PROGRESS NOTES
ROOM # 230    Living Situation (if not independent, order SW consult): Independent  Facility name:  : [facility]    Activity level at baseline: Independent  Activity level on admit: Indepedent      Patient registered to observation; given Patient Bill of Rights; given the opportunity to ask questions about observation status and their plan of care.  Patient has been oriented to the observation room, bathroom and call light is in place.    Discussed discharge goals and expectations with patient/family.

## 2020-12-27 NOTE — PHARMACY-ADMISSION MEDICATION HISTORY
Admission medication history interview status for this patient is complete. See [facility] admission navigator for allergy information, prior to admission medications and immunization status.     Medication history interview done via telephone during Covid-19 pandemic, indicate source(s): Patient  Medication history resources (including written lists, pill bottles, clinic record): care everywhere  Pharmacy: [facility] [address] [facility]    Changes made to PTA medication list:  Added: xiidra  Deleted: nothing  Changed: tylenol dosing, added strength to cipro prn dental appts    Actions taken by pharmacist (provider contacted, etc):None     Additional medication history information:None    Medication reconciliation/reorder completed by provider prior to medication history?  Y   (Y/N)     Prior to Admission medications    Medication Sig Last Dose Taking? Auth Provider   acetaminophen (TYLENOL) 500 MG tablet Take 1,000 mg by mouth daily as needed for mild pain 12/25/2020 Yes Unknown, Entered By History   ALPRAZolam (XANAX) 0.25 MG tablet Take 1 tablet (0.25 mg) by mouth 2 times daily as needed for anxiety Past Week at 2 doses Yes Julianne Thornton APRN CNP   amitriptyline (ELAVIL) 10 MG tablet Take 1 tablet (10 mg) by mouth At Bedtime 12/26/2020 at pm Yes Julianne Thornton APRN CNP   aspirin 81 MG tablet Take 81 mg by mouth daily 12/27/2020 at am Yes Reported, Patient   CALCIUM 500 MG OR TABS Takes 3 tabs daily takees total of 1200 mg daily 12/26/2020 at am Yes Juana Washington MD   carvedilol (COREG) 25 MG tablet TAKE 1 TABLET BY MOUTH TWICE DAILY WITH MEALS 12/26/2020 at am Yes Julianne Thornton APRN CNP   celecoxib (CELEBREX) 200 MG capsule Take 1 capsule (200 mg) by mouth daily as needed Past Month at Unknown time Yes Christin Diehl MD   Cholecalciferol (VITAMIN D) 2000 UNITS tablet Take 1 tablet by mouth daily. 12/26/2020 at am Yes Reported, Patient   ciprofloxacin (CIPRO) 100 MG  tablet Take 100 mg by mouth daily as needed For dental appointments  Yes Unknown, Entered By History   ciprofloxacin (CIPRO) 500 MG tablet Take 1 tablet by mouth two times daily the day of the procedure and one tablet the day after the procedure Past Month at Unknown time Yes Julianne Thornton APRN CNP   clobetasol (TEMOVATE) 0.05 % external cream Apply thin layer to bilateral hand twice daily for 2 weeks Past Month at Unknown time Yes Julianne Thornton APRN CNP   co-enzyme Q-10 50 MG CAPS Take 1 capsule by mouth daily. 12/26/2020 at am Yes Pepper Baugh MD   lifitegrast (XIIDRA) 5 % opthalmic solution Place 1 drop into both eyes 2 times daily 12/26/2020 at am Yes Unknown, Entered By History   loratadine (CLARITIN) 10 MG tablet Take 10 mg by mouth daily 12/25/2020 at pm Yes Reported, Patient   losartan (COZAAR) 50 MG tablet Take 1 tablet (50 mg) by mouth daily 12/25/2020 at pm Yes Julianne Thornton APRN CNP   Multiple Vitamins-Minerals (MULTIVITAMIN ADULT PO) Take 1 tablet by mouth daily 12/26/2020 at am Yes Unknown, Entered By History   rosuvastatin (CRESTOR) 10 MG tablet TAKE 1 TABLET BY MOUTH EVERY OTHER DAY 12/25/2020 Yes Julianne Thornton APRN CNP   spironolactone (ALDACTONE) 25 MG tablet Take 0.5 tablets (12.5 mg) by mouth daily 12/26/2020 at am Yes Julianne Thornton APRN CNP   venlafaxine (EFFEXOR-XR) 75 MG 24 hr capsule TAKE 3 CAPSULES BY MOUTH ONCE DAILY 12/26/2020 at am Yes Julianne Thornton APRN CNP   blood glucose monitoring (NO BRAND SPECIFIED) test strip Use to test blood sugars 1 times daily or as directed Brand per insurance   Christin Diehl MD

## 2020-12-27 NOTE — PLAN OF CARE
PRIMARY DIAGNOSIS: CHEST PAIN  OUTPATIENT/OBSERVATION GOALS TO BE MET BEFORE DISCHARGE:  1. Ruled out acute coronary syndrome (negative or stable Troponin):  Yes  2. Pain Status: Pain free.  3. Appropriate provocative testing performed: Yes  - Stress Test Procedure: Unsure when can be completed  - Interpretation of cardiac rhythm per telemetry tech: SR    4. Cleared by Consultants (if applicable):N/A  5. Return to near baseline physical activity: Yes  Discharge Planner Nurse   Safe discharge environment identified: Yes  Barriers to discharge: Yes       Entered by: Robi Gaspar 12/27/2020 12:09 AM     Please review provider order for any additional goals.   Nurse to notify provider when observation goals have been met and patient is ready for discharge.

## 2020-12-27 NOTE — PLAN OF CARE
PRIMARY DIAGNOSIS: CHEST PAIN  OUTPATIENT/OBSERVATION GOALS TO BE MET BEFORE DISCHARGE:  1. Ruled out acute coronary syndrome (negative or stable Troponin):  Yes  2. Pain Status: Pain free.  3. Appropriate provocative testing performed: No  - Stress Test Procedure: N/A  - Interpretation of cardiac rhythm per telemetry tech: SR    4. Cleared by Consultants (if applicable):N/A  5. Return to near baseline physical activity: Yes  Discharge Planner Nurse   Safe discharge environment identified: Yes  Barriers to discharge: Yes       Entered by: Robi Gaspar 12/27/2020 4:10 AM     Please review provider order for any additional goals.   Nurse to notify provider when observation goals have been met and patient is ready for discharge.  Patient denies chest pain. Resting on and off throughout night. Vital signs stable. Alert/oriented x4. Up in room independently.

## 2020-12-27 NOTE — ED PROVIDER NOTES
History     Chief Complaint:  Chest Pain    HPI   Kristie Roxanna Jones is a 75 year old female with a complex past medical history including hyperlipidemia, hypertension, type II diabetes, idiopathic cardiomyopathy, chronic systolic congestive heart failure, left ventricular systolic dysfunction and ICD in place followed by Dr. Osborne of cardiology who presents from urgent care with chest pain. Patient reports that she developed intermittent chest tightness with associated midsternal chest pain and pain under her left breast 2-3 days ago. Symptoms mostly come at night, occasionally while walking, but her current episode has been going on for the last 3 hours or so. It developed while sitting and is currently rated 1.5/10. She has had the pain under her left breast intermittently for quite some time, but it is coming and going with her new chest tightness. No fevers, respiratory symptoms or known COVID exposures. She does endorse a lot of recent stressors. Her ICD has never gone off, and she has had no issues with it. No blood thinners except a daily baby aspirin.     Echocardiogram Complete; 10/17/2019   The visual ejection fraction is estimated at 25-30%.  The left ventricle is moderately dilated.  There is severe global hypokinesia of the left ventricle.  The right ventricular systolic pressure is approximated at 30mmHg plus the right atrial pressure.  The inferior vena cava is normal.  The study was technically difficult. Compared to the prior study dated 9/4/2018, there have been no changes.    Allergies:  Prochlorperazine   Amoxicillin   Clindamycin   Decadron  Dexamethasone      Medications:    Xanax  Elavil   Aspirin, 81 mg   Coreg  Celebrex   Cozaar  Aldactone  Effexor   Crestor     Past Medical History:     Chronic systolic congestive heart failure    Depression   Fibromyalgia   Gastro-oesophageal reflux disease   General osteoarthrosis   Generalized anxiety disorder   Hyperlipidemia   Hypertension   Idiopathic  cardiomyopathy    Insomnia   Iron deficiency anemia   LBBB (left bundle branch block)   Left ventricular systolic dysfunction   Migraine   NSVT (nonsustained ventricular tachycardia)   SUSAN (obstructive sleep apnea)   Restless leg syndrome   Type 2 diabetes mellitus    Past Surgical History:    Appendectomy  Arthroplasty knee and revision, right  Bunionectomy, left  Coronary angiography x2   Hysterectomy  Implant automatic implantable cardioverter defibrillator   Insert thoracic pacemaker lead epicardial   Tonsillectomy  Transplant corneal lenses, bilateral   Blepharoplasty   Subtalar arthroereisis     Family History:    Father: intraabdominal cancer  Mother: heart disease, hypertension, hyperlipidemia   Daughter: brain cancer   Brother: prostate cancer  Sister: heart murmur, anxiety     Social History:  Patient presented alone. PCP: Julianne Thornton  . Denies smoking and alcohol.    Review of Systems   Constitutional: Negative for fever.   Respiratory: Positive for chest tightness. Negative for cough and shortness of breath.    Cardiovascular: Positive for chest pain.   All other systems reviewed and are negative.    Physical Exam     Patient Vitals for the past 24 hrs:   BP Temp Temp src Pulse Resp SpO2 Weight   12/26/20 2200 119/59 -- -- 75 -- 91 % --   12/26/20 2145 119/53 -- -- 80 -- 93 % --   12/26/20 2130 120/52 -- -- 73 -- 91 % --   12/26/20 2115 123/59 -- -- 74 -- 93 % --   12/26/20 2100 124/54 -- -- 73 -- 95 % --   12/26/20 2045 117/63 -- -- 80 -- 96 % --   12/26/20 2030 126/60 -- -- 75 -- 97 % --   12/26/20 2015 116/60 -- -- 71 -- 95 % --   12/26/20 2000 122/59 -- -- 72 -- 95 % --   12/26/20 1804 123/80 97  F (36.1  C) Temporal 77 20 99 % 91.6 kg (201 lb 15.1 oz)     Physical Exam  Constitutional:       Appearance: She is well-developed.   HENT:      Right Ear: External ear normal.      Left Ear: External ear normal.      Mouth/Throat:      Mouth: Mucous membranes are moist.      Pharynx: Oropharynx  is clear. No oropharyngeal exudate or posterior oropharyngeal erythema.   Eyes:      General: No scleral icterus.     Extraocular Movements: Extraocular movements intact.      Conjunctiva/sclera: Conjunctivae normal.      Pupils: Pupils are equal, round, and reactive to light.   Neck:      Musculoskeletal: Normal range of motion and neck supple.      Vascular: No JVD.   Cardiovascular:      Rate and Rhythm: Normal rate and regular rhythm.      Pulses: Normal pulses.      Heart sounds: Normal heart sounds. No murmur. No friction rub. No gallop.    Pulmonary:      Effort: Pulmonary effort is normal. No respiratory distress.      Breath sounds: Normal breath sounds. No wheezing or rales.   Chest:      Chest wall: No tenderness.   Abdominal:      General: Bowel sounds are normal. There is no distension.      Palpations: Abdomen is soft. There is no mass.      Tenderness: There is no abdominal tenderness.   Musculoskeletal: Normal range of motion.   Skin:     General: Skin is warm and dry.      Capillary Refill: Capillary refill takes less than 2 seconds.      Findings: No rash.   Neurological:      Mental Status: She is alert.       Emergency Department Course     ECG:  ECG taken at 1812  Normal sinus rhythm  Possible left atrial enlargement   Left bundle branch block   Abnormal ECG  Rate 79 bpm. NJ interval 186 ms. QRS duration 156 ms. QT/QTc 444/509 ms. P-R-T axes 51 60 -14.    Imaging:  CT Chest Pulmonary Embolism w Contrast  1.  No pulmonary embolus, aortic dissection, or aneurysm.  2.  Markedly enlarged heart with coronary artery disease and atherosclerotic vascular disease.  3.  Interstitial pulmonary edema with small bilateral pleural effusions. Recommend correlation to exclude superimposed infection.  4.  Cholelithiasis.  Reading per radiology     XR Chest 2 Views  Stable left chest wall pacer/defibrillator. Mild cardiomegaly. No evidence for CHF or pneumonia. No pleural effusion or pneumothorax.  Reading per  radiology     Laboratory:  CBC: WBC 5.7, HGB 12.8,   CMP: Calcium 10.9(H) o/w WNL (Creatinine 0.91)  D dimer quantitative: 0.6(H)   BNP: 587   INR: 0.99   Troponin (Collected 1840): <0.015      Asymptomatic SARS-CoV-2 COVID-19 virus (Coronavirus) by PCR: Negative     Emergency Department Course:  Reviewed:  I reviewed the patient's nursing notes, vitals, past medical records and Care Everywhere.     Assessments:  (1828) I performed an exam of the patient as documented above.   (1915) Rechecked and updated. She is feeling fine.     Consults:   (2037) D/W hospitalist service, Yulia Beth PA-C for Dr. Berry.     Disposition:  The patient was admitted to the hospital under the care of Dr. Berry.     Impression & Plan     Medical Decision Making:  Kristie Jones is a 75 year old female who presents with 2-3 days of tightness in her chest and chest pain on the left side. She does have multiple risk factors given her cardiomyopathy. Her d dimer was elevated therefore she did get a CT scan showing coronary artery disease and possibly some pleural fluid. Her BNP is within normal range, and I do not think she presents as CHF exacerbation at this point. She does have some atypical features in her story, however given her multiple risk factors, I recommended at least observation admission for serial troponins and further monitoring. She would like to proceed with that plan, and patient was admitted to observation under Dr. Berry.     Covid-19  Kristie Jones was evaluated during a global COVID-19 pandemic, which necessitated consideration that the patient might be at risk for infection with the SARS-CoV-2 virus that causes COVID-19. Applicable protocols for evaluation were followed during the patient's care. COVID-19 was considered as part of the patient's evaluation. The plan for testing is: a test was obtained during this visit.    Diagnosis:    ICD-10-CM    1. Chest pain, unspecified type  R07.9  Asymptomatic SARS-CoV-2 COVID-19 Virus (Coronavirus) by PCR     Scribe Disclosure:  I, Melany Fernandes, am serving as a scribe at 6:39 PM on 12/26/2020 to document services personally performed by Mehreen Garza MD based on my observations and the provider's statements to me.       [facility] EMERGENCY DEPT       Mehreen Garza MD  12/26/20 2152

## 2021-01-01 ENCOUNTER — TRANSFERRED RECORDS (OUTPATIENT)
Dept: HEALTH INFORMATION MANAGEMENT | Facility: CLINIC | Age: 76
End: 2021-01-01
Payer: COMMERCIAL

## 2021-01-01 LAB — RETINOPATHY: NORMAL

## 2021-01-07 ENCOUNTER — TELEPHONE (OUTPATIENT)
Dept: PEDIATRICS | Facility: CLINIC | Age: 76
End: 2021-01-07

## 2021-01-07 ENCOUNTER — MYC MEDICAL ADVICE (OUTPATIENT)
Dept: PEDIATRICS | Facility: CLINIC | Age: 76
End: 2021-01-07

## 2021-01-07 DIAGNOSIS — R93.89 ABNORMAL FINDINGS ON DIAGNOSTIC IMAGING OF OTHER SPECIFIED BODY STRUCTURES: ICD-10-CM

## 2021-01-07 DIAGNOSIS — I45.89 OTHER SPECIFIED CONDUCTION DISORDERS: ICD-10-CM

## 2021-01-07 DIAGNOSIS — R79.9 ABNORMAL FINDING OF BLOOD CHEMISTRY, UNSPECIFIED: ICD-10-CM

## 2021-01-07 DIAGNOSIS — I50.20 SYSTOLIC CONGESTIVE HEART FAILURE, UNSPECIFIED HF CHRONICITY (H): ICD-10-CM

## 2021-01-07 DIAGNOSIS — R42 DIZZINESS: Primary | ICD-10-CM

## 2021-01-07 NOTE — TELEPHONE ENCOUNTER
Patient Quality Outreach      Summary:    Patient has the following on her problem list/HM: None    Patient is due/failing the following:   Annual wellness, date due: now    Type of outreach:    Sent VBOX message.    Questions for provider review:    None                                                                                                                                     Martha Whitfield LPN       Chart routed to self.

## 2021-01-08 NOTE — TELEPHONE ENCOUNTER
Can she come to annual exam fasting for labs?  Can discuss lightheadedness at her visit. I know she had this before. Wonder if its from amitritpyline. Can she stop taking? And make sure she's well hydrated. Can discuss other med options for sleep at physical

## 2021-01-11 NOTE — TELEPHONE ENCOUNTER
The pt was calling back and she would like to speak to her care team further. She can come into the appointment fasting but she is wondering what other lab work if any she needs. Thank you.   Emily Lakhani on 1/11/2021 at 3:22 PM

## 2021-01-12 ENCOUNTER — MYC MEDICAL ADVICE (OUTPATIENT)
Dept: PEDIATRICS | Facility: CLINIC | Age: 76
End: 2021-01-12

## 2021-01-12 NOTE — TELEPHONE ENCOUNTER
1st attempt l/m and the pt is scheduled for an appointment and aware via my chart.   Emily Lakhani on 1/12/2021 at 2:31 PM

## 2021-01-12 NOTE — TELEPHONE ENCOUNTER
TC: Please call patient and assist with scheduling her medicare annual wellness visit.  Thanks!  Shawnee SALAS RN, BSN

## 2021-01-14 DIAGNOSIS — E78.5 HYPERLIPIDEMIA LDL GOAL <130: ICD-10-CM

## 2021-01-15 RX ORDER — ROSUVASTATIN CALCIUM 10 MG/1
TABLET, COATED ORAL
Qty: 45 TABLET | Refills: 0 | Status: SHIPPED | OUTPATIENT
Start: 2021-01-15 | End: 2021-02-05

## 2021-01-15 NOTE — TELEPHONE ENCOUNTER
Called pt in separate encounter and answered her questions about labs. Closing this encounter.    Bernabe Gonzales, EMT at 1:53 PM on January 15, 2021   [facility] Health Guide   [phone]

## 2021-01-15 NOTE — TELEPHONE ENCOUNTER
Routing refill request to provider for review/approval because:  Labs not current:  Lipids    Visit is up to date. RN will issue laura refill. Please advise on labs.     Cristel Fagan RN   [facility] -- Triage Nurse

## 2021-01-15 NOTE — TELEPHONE ENCOUNTER
Called pt. No answer, LVM to call clinic back.    If pt calls back:  Ask her what her questions about labs are / what questions she has    Bernabe Gonzales, EMT at 9:16 AM on January 15, 2021   [facility] Health Guide   [phone]

## 2021-01-15 NOTE — TELEPHONE ENCOUNTER
Lab orders placed, please let her know. i'm not sure if she has other questions. Emily notes she wants to talk to care team. Will forward to KITA

## 2021-01-15 NOTE — TELEPHONE ENCOUNTER
Called pt. Scheduled fasting labs on 1/25/2021. Pt commented that she had sent a [employer] message and would have appreciated a response via [employer] with answers to her questions. Informed pt that documentation showed that we had tried to reach out to her. Answered her questions about which labs were ordered. Pt has no further questions at this time.    Encouraged pt to reach out with further questions or concerns. Pt verbalized understanding.    Bernabe Gonzales, EMT at 1:43 PM on January 15, 2021   [facility] Health Guide   [phone]

## 2021-01-25 DIAGNOSIS — R42 DIZZINESS: ICD-10-CM

## 2021-01-25 DIAGNOSIS — I45.89 OTHER SPECIFIED CONDUCTION DISORDERS: ICD-10-CM

## 2021-01-25 DIAGNOSIS — R93.89 ABNORMAL FINDINGS ON DIAGNOSTIC IMAGING OF OTHER SPECIFIED BODY STRUCTURES: ICD-10-CM

## 2021-01-25 DIAGNOSIS — R79.9 ABNORMAL FINDING OF BLOOD CHEMISTRY, UNSPECIFIED: ICD-10-CM

## 2021-01-25 DIAGNOSIS — I50.20 SYSTOLIC CONGESTIVE HEART FAILURE, UNSPECIFIED HF CHRONICITY (H): ICD-10-CM

## 2021-01-25 LAB
CHOLEST SERPL-MCNC: 170 MG/DL
CREAT UR-MCNC: 156 MG/DL
HBA1C MFR BLD: 6.1 % (ref 0–5.6)
HDLC SERPL-MCNC: 39 MG/DL
IRON SATN MFR SERPL: 25 % (ref 15–46)
IRON SERPL-MCNC: 88 UG/DL (ref 35–180)
LDLC SERPL CALC-MCNC: 80 MG/DL
MICROALBUMIN UR-MCNC: 26 MG/L
MICROALBUMIN/CREAT UR: 16.35 MG/G CR (ref 0–25)
NONHDLC SERPL-MCNC: 131 MG/DL
TIBC SERPL-MCNC: 356 UG/DL (ref 240–430)
TRIGL SERPL-MCNC: 253 MG/DL
TSH SERPL DL<=0.005 MIU/L-ACNC: 1.95 MU/L (ref 0.4–4)

## 2021-01-25 PROCEDURE — 36415 COLL VENOUS BLD VENIPUNCTURE: CPT | Performed by: NURSE PRACTITIONER

## 2021-01-25 PROCEDURE — 83540 ASSAY OF IRON: CPT | Performed by: NURSE PRACTITIONER

## 2021-01-25 PROCEDURE — 80061 LIPID PANEL: CPT | Performed by: NURSE PRACTITIONER

## 2021-01-25 PROCEDURE — 83550 IRON BINDING TEST: CPT | Performed by: NURSE PRACTITIONER

## 2021-01-25 PROCEDURE — 84443 ASSAY THYROID STIM HORMONE: CPT | Performed by: NURSE PRACTITIONER

## 2021-01-25 PROCEDURE — 82043 UR ALBUMIN QUANTITATIVE: CPT | Performed by: NURSE PRACTITIONER

## 2021-01-25 PROCEDURE — 83036 HEMOGLOBIN GLYCOSYLATED A1C: CPT | Performed by: NURSE PRACTITIONER

## 2021-01-30 ENCOUNTER — MYC MEDICAL ADVICE (OUTPATIENT)
Dept: PEDIATRICS | Facility: CLINIC | Age: 76
End: 2021-01-30

## 2021-01-30 DIAGNOSIS — I42.9 IDIOPATHIC CARDIOMYOPATHY (H): ICD-10-CM

## 2021-02-02 RX ORDER — CARVEDILOL 25 MG/1
TABLET ORAL
Qty: 180 TABLET | Refills: 0 | Status: SHIPPED | OUTPATIENT
Start: 2021-02-02 | End: 2021-02-05

## 2021-02-02 ASSESSMENT — ENCOUNTER SYMPTOMS
HEARTBURN: 1
EYE PAIN: 0
BREAST MASS: 0
PALPITATIONS: 0
CONSTIPATION: 0
FEVER: 0
HEMATOCHEZIA: 0
DIARRHEA: 0
HEMATURIA: 0
HEADACHES: 1
PARESTHESIAS: 0
WEAKNESS: 0
NAUSEA: 1
ARTHRALGIAS: 1
DYSURIA: 0
ABDOMINAL PAIN: 0
COUGH: 1
DIZZINESS: 0
NERVOUS/ANXIOUS: 1
JOINT SWELLING: 0
SHORTNESS OF BREATH: 1
SORE THROAT: 0
MYALGIAS: 1
CHILLS: 0
FREQUENCY: 0

## 2021-02-02 ASSESSMENT — ACTIVITIES OF DAILY LIVING (ADL): CURRENT_FUNCTION: NO ASSISTANCE NEEDED

## 2021-02-02 NOTE — TELEPHONE ENCOUNTER
Prescription approved per [facility] Refill Protocol.    Next 5 appointments (look out 90 days)    Feb 05, 2021  8:25 AM  (Arrive by 8:00 AM)  Annual Wellness Visit with WINNIE Marx CNP [facility] [address] [place] [id number] [phone]

## 2021-02-05 ENCOUNTER — OFFICE VISIT (OUTPATIENT)
Dept: PEDIATRICS | Facility: CLINIC | Age: 76
End: 2021-02-05
Payer: COMMERCIAL

## 2021-02-05 VITALS
BODY MASS INDEX: 35.12 KG/M2 | TEMPERATURE: 97.9 F | DIASTOLIC BLOOD PRESSURE: 60 MMHG | HEART RATE: 70 BPM | WEIGHT: 205.7 LBS | OXYGEN SATURATION: 97 % | SYSTOLIC BLOOD PRESSURE: 106 MMHG | HEIGHT: 64 IN

## 2021-02-05 DIAGNOSIS — I42.9 IDIOPATHIC CARDIOMYOPATHY (H): ICD-10-CM

## 2021-02-05 DIAGNOSIS — E66.01 MORBID OBESITY (H): ICD-10-CM

## 2021-02-05 DIAGNOSIS — Z00.01 ENCOUNTER FOR PREVENTATIVE ADULT HEALTH CARE EXAM WITH ABNORMAL FINDINGS: Primary | ICD-10-CM

## 2021-02-05 DIAGNOSIS — G47.00 INSOMNIA, UNSPECIFIED TYPE: ICD-10-CM

## 2021-02-05 DIAGNOSIS — E55.9 VITAMIN D DEFICIENCY: ICD-10-CM

## 2021-02-05 DIAGNOSIS — R53.83 FATIGUE, UNSPECIFIED TYPE: ICD-10-CM

## 2021-02-05 DIAGNOSIS — E78.5 HYPERLIPIDEMIA LDL GOAL <130: ICD-10-CM

## 2021-02-05 DIAGNOSIS — I42.9 CARDIOMYOPATHY, UNSPECIFIED TYPE (H): ICD-10-CM

## 2021-02-05 DIAGNOSIS — E11.9 TYPE 2 DIABETES MELLITUS WITHOUT COMPLICATION, WITHOUT LONG-TERM CURRENT USE OF INSULIN (H): ICD-10-CM

## 2021-02-05 DIAGNOSIS — F33.0 MAJOR DEPRESSIVE DISORDER, RECURRENT EPISODE, MILD (H): ICD-10-CM

## 2021-02-05 DIAGNOSIS — F41.1 GENERALIZED ANXIETY DISORDER: ICD-10-CM

## 2021-02-05 LAB
ANION GAP SERPL CALCULATED.3IONS-SCNC: 8 MMOL/L (ref 3–14)
BUN SERPL-MCNC: 16 MG/DL (ref 7–30)
CALCIUM SERPL-MCNC: 9.1 MG/DL (ref 8.5–10.1)
CHLORIDE SERPL-SCNC: 108 MMOL/L (ref 94–109)
CO2 SERPL-SCNC: 23 MMOL/L (ref 20–32)
CREAT SERPL-MCNC: 0.87 MG/DL (ref 0.52–1.04)
DEPRECATED CALCIDIOL+CALCIFEROL SERPL-MC: 54 UG/L (ref 20–75)
ERYTHROCYTE [DISTWIDTH] IN BLOOD BY AUTOMATED COUNT: 12.5 % (ref 10–15)
GFR SERPL CREATININE-BSD FRML MDRD: 66 ML/MIN/{1.73_M2}
GLUCOSE SERPL-MCNC: 163 MG/DL (ref 70–99)
HCT VFR BLD AUTO: 37 % (ref 35–47)
HGB BLD-MCNC: 12 G/DL (ref 11.7–15.7)
MCH RBC QN AUTO: 31.1 PG (ref 26.5–33)
MCHC RBC AUTO-ENTMCNC: 32.4 G/DL (ref 31.5–36.5)
MCV RBC AUTO: 96 FL (ref 78–100)
PLATELET # BLD AUTO: 159 10E9/L (ref 150–450)
POTASSIUM SERPL-SCNC: 4 MMOL/L (ref 3.4–5.3)
RBC # BLD AUTO: 3.86 10E12/L (ref 3.8–5.2)
SODIUM SERPL-SCNC: 139 MMOL/L (ref 133–144)
TSH SERPL DL<=0.005 MIU/L-ACNC: 2.21 MU/L (ref 0.4–4)
VIT B12 SERPL-MCNC: 453 PG/ML (ref 193–986)
WBC # BLD AUTO: 4 10E9/L (ref 4–11)

## 2021-02-05 PROCEDURE — 80048 BASIC METABOLIC PNL TOTAL CA: CPT | Performed by: NURSE PRACTITIONER

## 2021-02-05 PROCEDURE — 82607 VITAMIN B-12: CPT | Performed by: NURSE PRACTITIONER

## 2021-02-05 PROCEDURE — 82306 VITAMIN D 25 HYDROXY: CPT | Performed by: NURSE PRACTITIONER

## 2021-02-05 PROCEDURE — 84443 ASSAY THYROID STIM HORMONE: CPT | Performed by: NURSE PRACTITIONER

## 2021-02-05 PROCEDURE — 85027 COMPLETE CBC AUTOMATED: CPT | Performed by: NURSE PRACTITIONER

## 2021-02-05 PROCEDURE — 99214 OFFICE O/P EST MOD 30 MIN: CPT | Mod: 25 | Performed by: NURSE PRACTITIONER

## 2021-02-05 PROCEDURE — 99397 PER PM REEVAL EST PAT 65+ YR: CPT | Performed by: NURSE PRACTITIONER

## 2021-02-05 PROCEDURE — 36415 COLL VENOUS BLD VENIPUNCTURE: CPT | Performed by: NURSE PRACTITIONER

## 2021-02-05 RX ORDER — CARVEDILOL 25 MG/1
TABLET ORAL
Qty: 180 TABLET | Refills: 3 | Status: SHIPPED | OUTPATIENT
Start: 2021-02-05 | End: 2022-05-16

## 2021-02-05 RX ORDER — MIRTAZAPINE 7.5 MG/1
TABLET, FILM COATED ORAL
Qty: 60 TABLET | Refills: 3 | Status: SHIPPED | OUTPATIENT
Start: 2021-02-05 | End: 2021-07-15

## 2021-02-05 RX ORDER — VENLAFAXINE HYDROCHLORIDE 75 MG/1
CAPSULE, EXTENDED RELEASE ORAL
Qty: 270 CAPSULE | Refills: 3 | Status: SHIPPED | OUTPATIENT
Start: 2021-02-05 | End: 2022-02-22

## 2021-02-05 RX ORDER — ROSUVASTATIN CALCIUM 10 MG/1
TABLET, COATED ORAL
Qty: 45 TABLET | Refills: 3 | Status: SHIPPED | OUTPATIENT
Start: 2021-02-05 | End: 2022-03-22

## 2021-02-05 RX ORDER — LOSARTAN POTASSIUM 50 MG/1
50 TABLET ORAL DAILY
Qty: 90 TABLET | Refills: 3 | Status: SHIPPED | OUTPATIENT
Start: 2021-02-05 | End: 2022-05-17

## 2021-02-05 RX ORDER — SPIRONOLACTONE 25 MG/1
12.5 TABLET ORAL DAILY
Qty: 90 TABLET | Refills: 3 | Status: SHIPPED | OUTPATIENT
Start: 2021-02-05 | End: 2021-05-05

## 2021-02-05 ASSESSMENT — ENCOUNTER SYMPTOMS
FEVER: 0
WEAKNESS: 0
BREAST MASS: 0
FREQUENCY: 0
COUGH: 1
DIZZINESS: 0
ARTHRALGIAS: 1
HEARTBURN: 1
EYE PAIN: 0
HEMATOCHEZIA: 0
NAUSEA: 1
HEMATURIA: 0
DIARRHEA: 0
ABDOMINAL PAIN: 0
NERVOUS/ANXIOUS: 1
PALPITATIONS: 0
CHILLS: 0
DYSURIA: 0
MYALGIAS: 1
CONSTIPATION: 0
SHORTNESS OF BREATH: 1
PARESTHESIAS: 0
HEADACHES: 1
JOINT SWELLING: 0
SORE THROAT: 0

## 2021-02-05 ASSESSMENT — MIFFLIN-ST. JEOR: SCORE: 1422.02

## 2021-02-05 ASSESSMENT — PATIENT HEALTH QUESTIONNAIRE - PHQ9: SUM OF ALL RESPONSES TO PHQ QUESTIONS 1-9: 2

## 2021-02-05 ASSESSMENT — ACTIVITIES OF DAILY LIVING (ADL): CURRENT_FUNCTION: NO ASSISTANCE NEEDED

## 2021-02-05 NOTE — PATIENT INSTRUCTIONS
Schedule your annual visit with your cardiologist    Be mindful of position changes. If bending down, stand up slowly. Try to squat with your knees instead of bending forward    Take 20 mg omeprazole daily (first thing in the morning) for 2 months. Then switch to famotidine (Pepcid). You can buy these over the counter. If your heart burn symptoms return when you switch to pepcid, let me know. Or if you can't control them with omeprazole, let me know    Take 1 tablet Remeron 30 minutes before bedtime. Can go up to 2 tablets as needed.   Send me a [employer] update in about a month

## 2021-02-08 NOTE — RESULT ENCOUNTER NOTE
Your labs look great!  Vitamin B12 and D are within normal ranges. Glucose and triglycerides are normal for a non-fasting labs.   Thyroid lab is within normal range.  Please schedule follow up with cardiology and update me in about a month with your sleep and the new sleep medication.  If you continue to have poor sleep, or feeling foggy, please schedule a follow up visit with my partner, Dinora David DNP. This can be a virtual visit.  Julianne Thornton, CNP

## 2021-02-25 NOTE — TELEPHONE ENCOUNTER
Patient Quality Outreach 2nd Attempt      Summary:    Type of outreach:    pt had her px on 2/5/21. .bk    Next Steps:  Reach out within 90 days via .    Max number of attempts reached: Yes. Will try again in 90 days if patient still on fail list.    Questions for provider review:    None                                                                                                                    Martha Whitfield LPN       Chart routed to closed.

## 2021-03-05 ENCOUNTER — OFFICE VISIT (OUTPATIENT)
Dept: CARDIOLOGY | Facility: CLINIC | Age: 76
End: 2021-03-05
Payer: COMMERCIAL

## 2021-03-05 VITALS
HEART RATE: 69 BPM | WEIGHT: 206.2 LBS | SYSTOLIC BLOOD PRESSURE: 105 MMHG | DIASTOLIC BLOOD PRESSURE: 64 MMHG | BODY MASS INDEX: 34.35 KG/M2 | HEIGHT: 65 IN

## 2021-03-05 DIAGNOSIS — I42.9 CARDIOMYOPATHY, UNSPECIFIED TYPE (H): ICD-10-CM

## 2021-03-05 PROCEDURE — 99213 OFFICE O/P EST LOW 20 MIN: CPT | Performed by: INTERNAL MEDICINE

## 2021-03-05 ASSESSMENT — MIFFLIN-ST. JEOR: SCORE: 1436.2

## 2021-03-05 NOTE — LETTER
3/5/2021    Julianne BARRYMatt Thornton, APRN CNP  [address] [facility] [facility] [address]    RE: Kristie Albarrane Karen       Dear Colleague,    I had the pleasure of seeing Kristie Mcknight Karen in the [facility].    HPI and Plan:   See dictation    Orders Placed This Encounter   Procedures     Follow-Up with Cardiologist     Echocardiogram Complete       No orders of the defined types were placed in this encounter.      Encounter Diagnosis   Name Primary?     Cardiomyopathy, unspecified type (H)        CURRENT MEDICATIONS:  Current Outpatient Medications   Medication Sig Dispense Refill     acetaminophen (TYLENOL) 500 MG tablet Take 1,000 mg by mouth daily as needed for mild pain       ALPRAZolam (XANAX) 0.25 MG tablet Take 1 tablet (0.25 mg) by mouth 2 times daily as needed for anxiety 30 tablet 1     aspirin 81 MG tablet Take 81 mg by mouth daily       CALCIUM 500 MG OR TABS Takes 3 tabs daily takees total of 1200 mg daily       carvedilol (COREG) 25 MG tablet TAKE 1 TABLET BY MOUTH TWICE DAILY WITH MEALS 180 tablet 3     Cholecalciferol (VITAMIN D) 2000 UNITS tablet Take 1 tablet by mouth daily.       ciprofloxacin (CIPRO) 100 MG tablet Take 100 mg by mouth daily as needed For dental appointments       clobetasol (TEMOVATE) 0.05 % external cream Apply thin layer to bilateral hand twice daily for 2 weeks 45 g 1     co-enzyme Q-10 50 MG CAPS Take 1 capsule by mouth daily.       lifitegrast (XIIDRA) 5 % opthalmic solution Place 1 drop into both eyes 2 times daily       loratadine (CLARITIN) 10 MG tablet Take 10 mg by mouth daily       losartan (COZAAR) 50 MG tablet Take 1 tablet (50 mg) by mouth daily 90 tablet 3     mirtazapine (REMERON) 7.5 MG tablet Take 1-2 tablets 30 minutes before bedtime 60 tablet 3     Multiple Vitamins-Minerals (MULTIVITAMIN ADULT PO) Take 1 tablet by mouth daily       rosuvastatin (CRESTOR) 10 MG tablet TAKE 1 TABLET EVERY OTHER DAY 45 tablet 3      spironolactone (ALDACTONE) 25 MG tablet Take 0.5 tablets (12.5 mg) by mouth daily 90 tablet 3     venlafaxine (EFFEXOR-XR) 75 MG 24 hr capsule TAKE 3 CAPSULES BY MOUTH ONCE DAILY 270 capsule 3       ALLERGIES     Allergies   Allergen Reactions     Prochlorperazine      Other reaction(s): Tardive Dyskinesia     Amoxicillin Hives     Clindamycin Other (See Comments)     Burning sensation in chest     Decadron      flushing     Dexamethasone      Other reaction(s): Erythema     Compazine Anxiety       PAST MEDICAL HISTORY:  Past Medical History:   Diagnosis Date     Acute posthemorrhagic anemia 9/11/2015     Anemia      Chronic systolic congestive heart failure (H) 10/10/2018     EF 35%     Fibromyalgia      Gastro-oesophageal reflux disease      GENERAL OSTEOARTHROSIS [715.00] 11/23/2004    knee     Generalized anxiety disorder 9/30/2013     Hyperlipidemia LDL goal <130 2/10/2010    Failed simvastatin- muscle aches      Hypertension     because of the heart     Idiopathic cardiomyopathy (H) 1/19/2012    Dr. Osborne 3/2011 EF 30-35%      Insomnia 1/19/2012     Iron deficiency anemia 8/12/2015     Problem list name updated by automated process. Provider to review     LBBB (left bundle branch block)      Left ventricular systolic dysfunction:EF 35% 2/5/2012    Must stay on diovan per cardiology      Major depressive disorder, recurrent episode, mild (H) 2/17/2016     Migraine 6/21/2005     Problem list name updated by automated process. Provider to review     Nonischemic cardiomyopathy (H)     EF 30-35%, Dr. Osborne [facility] (suspect virus); s/p AICD     NSVT (nonsustained ventricular tachycardia) (H)      Obesity 1/20/2012     SUSAN (obstructive sleep apnea) 01/19/2012    CPAP      Pure hypercholesterolemia      Restless leg syndrome      Type 2 diabetes mellitus without complication (H) 9/24/2016       PAST SURGICAL HISTORY:  Past Surgical History:   Procedure Laterality Date     APPENDECTOMY       ARTHROPLASTY KNEE  1/7/2013     Procedure: ARTHROPLASTY KNEE;  Right Total Knee Arthroplasty       ARTHROPLASTY REVISION KNEE Right 8/26/2015    Procedure: ARTHROPLASTY REVISION KNEE;  Surgeon: Néstor Beth MD;  Location: [facility] OR     ARTHROSCOPY KNEE       bunionectomy left foot       COLONOSCOPY       CORONARY ANGIOGRAPHY ADULT ORDER  2002    normal     CORONARY ANGIOGRAPHY ADULT ORDER  12/7/12    no significant focal narrowing that would benefit from mechanical intervention      HYSTERECTOMY       IMPLANT AUTOMATIC IMPLANTABLE CARDIOVERTER DEFIBRILLATOR  4/30/12     INSERT THORACIC PACEMAKER LEAD EPICARDIAL  5/1/2012    Procedure:INSERT THORACIC PACEMAKER LEAD EPICARDIAL; EPICARDIAL LEAD PLACEMENT; Surgeon:WEST ARECHIGA; Location:[facility] OR     TONSILLECTOMY       TRANSPLANT - corneal lenses      Bilateral for cataracts       FAMILY HISTORY:  Family History   Problem Relation Age of Onset     Cancer Father         intraabdominal mass - not colon cancer     C.A.D. 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     Employer: [employer],[address] [place], [place] [address]   Social Needs     Financial resource strain: Not hard at all     Food insecurity     Worry: Never true     Inability: Never true     Transportation needs     Medical: No     Non-medical: No   Tobacco Use     Smoking status: Never Smoker     Smokeless tobacco: Never Used   Substance and Sexual Activity     Alcohol use: Yes     Alcohol/week: 0.0 standard  drinks     Frequency: Monthly or less     Drinks per session: 1 or 2     Binge frequency: Never     Drug use: No     Sexual activity: Not Currently     Partners: Male     Birth control/protection: Surgical     Comment: She has had a hysterectomy   Lifestyle     Physical activity     Days per week: 4 days     Minutes per session: 30 min     Stress: To some extent   Relationships     Social connections     Talks on phone: Twice a week     Gets together: Once a week     Attends Taoism service: Never     Active member of club or organization: No     Attends meetings of clubs or organizations: Never     Relationship status:      Intimate partner violence     Fear of current or ex partner: None     Emotionally abused: None     Physically abused: None     Forced sexual activity: None   Other Topics Concern     Parent/sibling w/ CABG, MI or angioplasty before 65F 55M? 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 Multiple ignacio problems, multiple hospitalizations in last 7 years, anxiety        Pt has 2 daughters, 2 step-daughters's and  7 grandchildrens        Youngest daughter Sabi has malignant brain tumor                   Review of Systems:  Skin:  Positive for scaling   Eyes:  Negative    ENT:  Negative    Respiratory:  Positive for    Cardiovascular:  Negative;palpitations;chest pain;dizziness;syncope or near-syncope;cyanosis;edema;fatigue Positive  "for;lightheadedness  Gastroenterology: Negative    Genitourinary:  Negative    Musculoskeletal:  Positive for back pain  Neurologic:  Negative    Psychiatric:  Positive for anxiety  Heme/Lymph/Imm:  Positive for allergies  Endocrine:  Negative      Physical Exam:  Vitals: /64   Pulse 69   Ht 1.651 m (5' 5\")   Wt 93.5 kg (206 lb 3.2 oz)   BMI 34.31 kg/m      Constitutional:  cooperative;in no acute distress obese      Skin:  warm and dry to the touch   pacemaker incision in the left infraclavicular area was well-healed      Head:  normocephalic        Eyes:  pupils equal and round        Lymph:      ENT:  no pallor or cyanosis, dentition good        Neck:  JVP normal;no carotid bruit        Respiratory:  clear to auscultation;normal respiratory excursion         Cardiac: regular rhythm;normal S1 and S2   distant heart sounds            pulses full and equal                                        GI:  abdomen soft obese      Extremities and Muscular Skeletal:  no deformities, clubbing, cyanosis, erythema observed;no edema              Neurological:  affect appropriate        Psych:  Alert and Oriented x 3        Recent Lab Results:  LIPID RESULTS:  Lab Results   Component Value Date    CHOL 170 01/25/2021    HDL 39 (L) 01/25/2021    LDL 80 01/25/2021    TRIG 253 (H) 01/25/2021    CHOLHDLRATIO 4.7 10/13/2015       LIVER ENZYME RESULTS:  Lab Results   Component Value Date    AST 28 12/26/2020    ALT 32 12/26/2020       CBC RESULTS:  Lab Results   Component Value Date    WBC 4.0 02/05/2021    RBC 3.86 02/05/2021    HGB 12.0 02/05/2021    HCT 37.0 02/05/2021    MCV 96 02/05/2021    MCH 31.1 02/05/2021    MCHC 32.4 02/05/2021    RDW 12.5 02/05/2021     02/05/2021       BMP RESULTS:  Lab Results   Component Value Date     02/05/2021    POTASSIUM 4.0 02/05/2021    CHLORIDE 108 02/05/2021    CO2 23 02/05/2021    ANIONGAP 8 02/05/2021     (H) 02/05/2021    BUN 16 02/05/2021    CR 0.87 02/05/2021 "    GFRESTIMATED 66 02/05/2021    GFRESTBLACK 76 02/05/2021    WILEY 9.1 02/05/2021        A1C RESULTS:  Lab Results   Component Value Date    A1C 6.1 (H) 01/25/2021       INR RESULTS:  Lab Results   Component Value Date    INR 0.99 12/26/2020    INR 0.88 12/05/2012     Thank you for allowing me to participate in the care of your patient.      Sincerely,     DR TRENT BARBOSA MD     [facility]    cc:   WINNIE Marx CNP  [address] [place] [address]

## 2021-03-05 NOTE — PROGRESS NOTES
HPI and Plan:   See dictation    Orders Placed This Encounter   Procedures     Follow-Up with Cardiologist     Echocardiogram Complete       No orders of the defined types were placed in this encounter.      Encounter Diagnosis   Name Primary?     Cardiomyopathy, unspecified type (H)        CURRENT MEDICATIONS:  Current Outpatient Medications   Medication Sig Dispense Refill     acetaminophen (TYLENOL) 500 MG tablet Take 1,000 mg by mouth daily as needed for mild pain       ALPRAZolam (XANAX) 0.25 MG tablet Take 1 tablet (0.25 mg) by mouth 2 times daily as needed for anxiety 30 tablet 1     aspirin 81 MG tablet Take 81 mg by mouth daily       CALCIUM 500 MG OR TABS Takes 3 tabs daily takees total of 1200 mg daily       carvedilol (COREG) 25 MG tablet TAKE 1 TABLET BY MOUTH TWICE DAILY WITH MEALS 180 tablet 3     Cholecalciferol (VITAMIN D) 2000 UNITS tablet Take 1 tablet by mouth daily.       ciprofloxacin (CIPRO) 100 MG tablet Take 100 mg by mouth daily as needed For dental appointments       clobetasol (TEMOVATE) 0.05 % external cream Apply thin layer to bilateral hand twice daily for 2 weeks 45 g 1     co-enzyme Q-10 50 MG CAPS Take 1 capsule by mouth daily.       lifitegrast (XIIDRA) 5 % opthalmic solution Place 1 drop into both eyes 2 times daily       loratadine (CLARITIN) 10 MG tablet Take 10 mg by mouth daily       losartan (COZAAR) 50 MG tablet Take 1 tablet (50 mg) by mouth daily 90 tablet 3     mirtazapine (REMERON) 7.5 MG tablet Take 1-2 tablets 30 minutes before bedtime 60 tablet 3     Multiple Vitamins-Minerals (MULTIVITAMIN ADULT PO) Take 1 tablet by mouth daily       rosuvastatin (CRESTOR) 10 MG tablet TAKE 1 TABLET EVERY OTHER DAY 45 tablet 3     spironolactone (ALDACTONE) 25 MG tablet Take 0.5 tablets (12.5 mg) by mouth daily 90 tablet 3     venlafaxine (EFFEXOR-XR) 75 MG 24 hr capsule TAKE 3 CAPSULES BY MOUTH ONCE DAILY 270 capsule 3       ALLERGIES     Allergies   Allergen Reactions      Prochlorperazine      Other reaction(s): Tardive Dyskinesia     Amoxicillin Hives     Clindamycin Other (See Comments)     Burning sensation in chest     Decadron      flushing     Dexamethasone      Other reaction(s): Erythema     Compazine Anxiety       PAST MEDICAL HISTORY:  Past Medical History:   Diagnosis Date     Acute posthemorrhagic anemia 9/11/2015     Anemia      Chronic systolic congestive heart failure (H) 10/10/2018     EF 35%     Fibromyalgia      Gastro-oesophageal reflux disease      GENERAL OSTEOARTHROSIS [715.00] 11/23/2004    knee     Generalized anxiety disorder 9/30/2013     Hyperlipidemia LDL goal <130 2/10/2010    Failed simvastatin- muscle aches      Hypertension     because of the heart     Idiopathic cardiomyopathy (H) 1/19/2012    Dr. Osborne 3/2011 EF 30-35%      Insomnia 1/19/2012     Iron deficiency anemia 8/12/2015     Problem list name updated by automated process. Provider to review     LBBB (left bundle branch block)      Left ventricular systolic dysfunction:EF 35% 2/5/2012    Must stay on diovan per cardiology      Major depressive disorder, recurrent episode, mild (H) 2/17/2016     Migraine 6/21/2005     Problem list name updated by automated process. Provider to review     Nonischemic cardiomyopathy (H)     EF 30-35%, Dr. Osborne [facility] (suspect virus); s/p AICD     NSVT (nonsustained ventricular tachycardia) (H)      Obesity 1/20/2012     SUSAN (obstructive sleep apnea) 01/19/2012    CPAP      Pure hypercholesterolemia      Restless leg syndrome      Type 2 diabetes mellitus without complication (H) 9/24/2016       PAST SURGICAL HISTORY:  Past Surgical History:   Procedure Laterality Date     APPENDECTOMY       ARTHROPLASTY KNEE  1/7/2013    Procedure: ARTHROPLASTY KNEE;  Right Total Knee Arthroplasty       ARTHROPLASTY REVISION KNEE Right 8/26/2015    Procedure: ARTHROPLASTY REVISION KNEE;  Surgeon: Néstor Beth MD;  Location: [facility] OR     ARTHROSCOPY KNEE       bunionectomy left foot        COLONOSCOPY       CORONARY ANGIOGRAPHY ADULT ORDER  2002    normal     CORONARY ANGIOGRAPHY ADULT ORDER  12/7/12    no significant focal narrowing that would benefit from mechanical intervention      HYSTERECTOMY       IMPLANT AUTOMATIC IMPLANTABLE CARDIOVERTER DEFIBRILLATOR  4/30/12     INSERT THORACIC PACEMAKER LEAD EPICARDIAL  5/1/2012    Procedure:INSERT THORACIC PACEMAKER LEAD EPICARDIAL; EPICARDIAL LEAD PLACEMENT; Surgeon:WEST ARECHIGA; Location:[facility] OR     TONSILLECTOMY       TRANSPLANT - corneal lenses      Bilateral for cataracts       FAMILY HISTORY:  Family History   Problem Relation Age of Onset     Cancer Father         intraabdominal mass - not colon cancer     C.A.D. 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     Employer: [employer],[address] [place], [place] [address]   Social Needs     Financial resource strain: Not hard at all     Food insecurity     Worry: Never true     Inability: Never true     Transportation needs     Medical: No     Non-medical: No   Tobacco Use     Smoking status: Never Smoker     Smokeless tobacco: Never Used   Substance and Sexual Activity     Alcohol use: Yes     Alcohol/week: 0.0 standard drinks     Frequency: Monthly or less     Drinks per session: 1 or 2     Binge frequency: Never     Drug use: No     Sexual activity: Not Currently     Partners: Male     Birth control/protection: Surgical     Comment: She has had a hysterectomy    Lifestyle     Physical activity     Days per week: 4 days     Minutes per session: 30 min     Stress: To some extent   Relationships     Social connections     Talks on phone: Twice a week     Gets together: Once a week     Attends [employer] service: Never     Active member of club or organization: No     Attends meetings of clubs or organizations: Never     Relationship status:      Intimate partner violence     Fear of current or ex partner: None     Emotionally abused: None     Physically abused: None     Forced sexual activity: None   Other Topics Concern     Parent/sibling w/ CABG, MI or angioplasty before 65F 55M? 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 Multiple ignacio problems, multiple hospitalizations in last 7 years, anxiety        Pt has 2 daughters, 2 step-daughters's and  7 grandchildrens        Youngest daughter Sabi has malignant brain tumor                   Review of Systems:  Skin:  Positive for scaling   Eyes:  Negative    ENT:  Negative    Respiratory:  Positive for    Cardiovascular:  Negative;palpitations;chest pain;dizziness;syncope or near-syncope;cyanosis;edema;fatigue Positive for;lightheadedness  Gastroenterology: Negative    Genitourinary:  Negative    Musculoskeletal:  Positive for back pain  Neurologic:  Negative    Psychiatric:  Positive for anxiety  Heme/Lymph/Imm:  Positive for allergies  Endocrine:  Negative   "    Physical Exam:  Vitals: /64   Pulse 69   Ht 1.651 m (5' 5\")   Wt 93.5 kg (206 lb 3.2 oz)   BMI 34.31 kg/m      Constitutional:  cooperative;in no acute distress obese      Skin:  warm and dry to the touch   pacemaker incision in the left infraclavicular area was well-healed      Head:  normocephalic        Eyes:  pupils equal and round        Lymph:      ENT:  no pallor or cyanosis, dentition good        Neck:  JVP normal;no carotid bruit        Respiratory:  clear to auscultation;normal respiratory excursion         Cardiac: regular rhythm;normal S1 and S2   distant heart sounds            pulses full and equal                                        GI:  abdomen soft obese      Extremities and Muscular Skeletal:  no deformities, clubbing, cyanosis, erythema observed;no edema              Neurological:  affect appropriate        Psych:  Alert and Oriented x 3        Recent Lab Results:  LIPID RESULTS:  Lab Results   Component Value Date    CHOL 170 01/25/2021    HDL 39 (L) 01/25/2021    LDL 80 01/25/2021    TRIG 253 (H) 01/25/2021    CHOLHDLRATIO 4.7 10/13/2015       LIVER ENZYME RESULTS:  Lab Results   Component Value Date    AST 28 12/26/2020    ALT 32 12/26/2020       CBC RESULTS:  Lab Results   Component Value Date    WBC 4.0 02/05/2021    RBC 3.86 02/05/2021    HGB 12.0 02/05/2021    HCT 37.0 02/05/2021    MCV 96 02/05/2021    MCH 31.1 02/05/2021    MCHC 32.4 02/05/2021    RDW 12.5 02/05/2021     02/05/2021       BMP RESULTS:  Lab Results   Component Value Date     02/05/2021    POTASSIUM 4.0 02/05/2021    CHLORIDE 108 02/05/2021    CO2 23 02/05/2021    ANIONGAP 8 02/05/2021     (H) 02/05/2021    BUN 16 02/05/2021    CR 0.87 02/05/2021    GFRESTIMATED 66 02/05/2021    GFRESTBLACK 76 02/05/2021    WILEY 9.1 02/05/2021        A1C RESULTS:  Lab Results   Component Value Date    A1C 6.1 (H) 01/25/2021       INR RESULTS:  Lab Results   Component Value Date    INR 0.99 12/26/2020    " INR 0.88 12/05/2012           CC  Julianne Thornton, APRN CNP  [address] [place] [address]

## 2021-03-05 NOTE — PROGRESS NOTES
Service Date: 2021      HISTORY OF PRESENT ILLNESS:  It is always a pleasure for me to follow up with Ms. Mckeon who has nonischemic cardiomyopathy with moderate to severe left ventricular systolic dysfunction.  She also has a left bundle branch block at baseline.  I have been following this lady since .  She has an ICD in place for primary prophylaxis.  I am very happy to see that device interrogation towards the end of last year has not shown any significant arrhythmias.  The device has not discharged.      This lady has gained some weight during the COVID pandemic, but continues to feel quite well from a cardiac point of view.  She walks every day.  She has no significant exertional shortness of breath.  She has no PND, orthopnea, chest pains, dizzy spells or syncopal episodes.      Cardiac examination reveals no evidence at all of CHF.        All medications are well tolerated, though as always, her blood pressure is on the low side.  Today, it is 105/64.      She was assessed briefly the emergency room at [facility] with atypical chest discomfort.  Echocardiogram performed at that time showed no major changes compared with previously.  Troponin levels were normal.  I think she is quite right in that that episode of chest discomfort may be related to stress.      IMPRESSION:  I think this lady is doing well.  I will keep her medications unchanged.  I look forward to seeing her again in a year's time for further followup.         TRENT BARBOSA MD, FACC             D: 2021   T: 2021   MT: FREDDIE      Name:     LEONID MCKEON   MRN:      [id number]        Account:      [id number]   :      1945           Service Date: 2021      Document: [id number]

## 2021-04-23 DIAGNOSIS — I42.9 IDIOPATHIC CARDIOMYOPATHY (H): ICD-10-CM

## 2021-04-23 RX ORDER — SPIRONOLACTONE 25 MG/1
TABLET ORAL
Qty: 90 TABLET | Refills: 3 | OUTPATIENT
Start: 2021-04-23

## 2021-04-29 ENCOUNTER — TRANSFERRED RECORDS (OUTPATIENT)
Dept: HEALTH INFORMATION MANAGEMENT | Facility: CLINIC | Age: 76
End: 2021-04-29

## 2021-05-11 ENCOUNTER — TRANSFERRED RECORDS (OUTPATIENT)
Dept: HEALTH INFORMATION MANAGEMENT | Facility: CLINIC | Age: 76
End: 2021-05-11

## 2021-06-02 ENCOUNTER — ANCILLARY PROCEDURE (OUTPATIENT)
Dept: CARDIOLOGY | Facility: CLINIC | Age: 76
End: 2021-06-02
Attending: INTERNAL MEDICINE
Payer: COMMERCIAL

## 2021-06-02 DIAGNOSIS — I42.9 IDIOPATHIC CARDIOMYOPATHY (H): ICD-10-CM

## 2021-06-02 DIAGNOSIS — Z95.810 ICD (IMPLANTABLE CARDIOVERTER-DEFIBRILLATOR) IN PLACE: ICD-10-CM

## 2021-06-14 ENCOUNTER — TRANSFERRED RECORDS (OUTPATIENT)
Dept: HEALTH INFORMATION MANAGEMENT | Facility: CLINIC | Age: 76
End: 2021-06-14

## 2021-06-15 ENCOUNTER — OFFICE VISIT (OUTPATIENT)
Dept: PEDIATRICS | Facility: CLINIC | Age: 76
End: 2021-06-15
Payer: COMMERCIAL

## 2021-06-15 ENCOUNTER — ANESTHESIA EVENT (OUTPATIENT)
Dept: SURGERY | Facility: CLINIC | Age: 76
End: 2021-06-15
Payer: COMMERCIAL

## 2021-06-15 ENCOUNTER — MEDICAL CORRESPONDENCE (OUTPATIENT)
Dept: HEALTH INFORMATION MANAGEMENT | Facility: CLINIC | Age: 76
End: 2021-06-15

## 2021-06-15 VITALS
WEIGHT: 205.4 LBS | RESPIRATION RATE: 12 BRPM | HEIGHT: 65 IN | BODY MASS INDEX: 34.22 KG/M2 | DIASTOLIC BLOOD PRESSURE: 50 MMHG | TEMPERATURE: 98.2 F | SYSTOLIC BLOOD PRESSURE: 90 MMHG | HEART RATE: 69 BPM | OXYGEN SATURATION: 97 %

## 2021-06-15 DIAGNOSIS — Z11.59 ENCOUNTER FOR SCREENING FOR OTHER VIRAL DISEASES: ICD-10-CM

## 2021-06-15 DIAGNOSIS — Z01.818 PREOP GENERAL PHYSICAL EXAM: Primary | ICD-10-CM

## 2021-06-15 DIAGNOSIS — S62.632A CLOSED DISPLACED FRACTURE OF DISTAL PHALANX OF RIGHT MIDDLE FINGER, INITIAL ENCOUNTER: ICD-10-CM

## 2021-06-15 DIAGNOSIS — R68.81 EARLY SATIETY: ICD-10-CM

## 2021-06-15 PROCEDURE — 93000 ELECTROCARDIOGRAM COMPLETE: CPT | Performed by: NURSE PRACTITIONER

## 2021-06-15 PROCEDURE — 99214 OFFICE O/P EST MOD 30 MIN: CPT | Performed by: NURSE PRACTITIONER

## 2021-06-15 ASSESSMENT — MIFFLIN-ST. JEOR: SCORE: 1432.57

## 2021-06-15 NOTE — PROGRESS NOTES
[facility]  [address]  [address]  [place] [address]  Phone: [phone]  Fax: [phone]  Primary Provider: Essence Thornton  Pre-op Performing Provider: ESSENCE THORNTON      PREOPERATIVE EVALUATION:  Today's date: 6/15/2021    Kristie Jones is a 75 year old female who presents for a preoperative evaluation.    Surgical Information:  Surgery/Procedure: repair fx middle finger right hand  Surgery Location: [facility]  Surgeon: Dr. Camejo  Surgery Date: 6/16/21  Time of Surgery: 10:00  Where patient plans to recover: At home with family  Fax number for surgical facility: [phone]    Type of Anesthesia Anticipated: Local with MAC    Assessment & Plan     The proposed surgical procedure is considered INTERMEDIATE risk.    Preop general physical exam  - EKG 12-lead complete w/read - Clinics    Early satiety  - Increase to 40 mg daily and complete endoscopy  - GASTROENTEROLOGY ADULT REF PROCEDURE ONLY; Future    Closed displaced fracture of distal phalanx of right middle finger, initial encounter       Implanted Device:   - Type of device: ICD Patient advised to bring device information on day of surgery.      Risks and Recommendations:  The patient has the following additional risks and recommendations for perioperative complications:  Obstructive Sleep Apnea: bring cpap     Hold spironolactone day of surgery    RECOMMENDATION:  APPROVAL GIVEN to proceed with proposed procedure, without further diagnostic evaluation.        Subjective     HPI related to upcoming procedure: finger fracture    Preop Questions 6/15/2021   1. Have you ever had a heart attack or stroke? No   2. Have you ever had surgery on your heart or blood vessels, such as a stent placement, a coronary artery bypass, or surgery on an artery in your head, neck, heart, or legs? YES - nonischemic cardiomyopathy s/p ICD    3. Do you have chest pain with activity? No   4.  Do you have a history of  heart failure? YES - cardiomyopathy   5. Do you currently have a cold, bronchitis or symptoms of other infection? No   6. Do you have a cough, shortness of breath, or wheezing? No   7. Do you or anyone in your family have previous history of blood clots? No   8. Do you or does anyone in your family have a serious bleeding problem such as prolonged bleeding following surgeries or cuts? No   9. Have you ever had problems with anemia or been told to take iron pills? YES - Not currently on iron. Hgb 12.0 Feb 2021   10. Have you had any abnormal blood loss such as black, tarry or bloody stools, or abnormal vaginal bleeding? No   11. Have you ever had a blood transfusion? UNKNOWN    12. Are you willing to have a blood transfusion if it is medically needed before, during, or after your surgery? Yes   13. Have you or any of your relatives ever had problems with anesthesia? No   14. Do you have sleep apnea, excessive snoring or daytime drowsiness? YES - wears cpap   14a. Do you have a CPAP machine? Yes   15. Do you have any artifical heart valves or other implanted medical devices like a pacemaker, defibrillator, or continuous glucose monitor? YES - ICD   15a. What type of device do you have? pacemaker, defibullator   15b. Name of the clinic that manages your device:  Dr. India kemp   16. Do you have artificial joints? YES - knee   17. Are you allergic to latex? No       Health Care Directive:  Patient has a Health Care Directive on file      Preoperative Review of :   reviewed - no record of controlled substances prescribed.      Diabetes well controlled. A1c 6.1%  Follows with cardiology for ICM. EF 32%  Took prilosec for 8 weeks earlier this year then transitioned to famotidine. Symptoms returned with H2RA so switched back to prilosec 20 mg. Symptoms improved but still struggles with bloating, early satiety. No weight loss or night sweats  Feels down. Has not seen therapist  Review of  Systems  CONSTITUTIONAL: NEGATIVE for fever, chills, change in weight  INTEGUMENTARY/SKIN: NEGATIVE for worrisome rashes, moles or lesions  EYES: NEGATIVE for vision changes or irritation  ENT/MOUTH: NEGATIVE for ear, mouth and throat problems  RESP: NEGATIVE for significant cough or SOB  BREAST: NEGATIVE for masses, tenderness or discharge  CV: NEGATIVE for chest pain, palpitations or peripheral edema  GI: POSITIVE as above  : NEGATIVE for frequency, dysuria, or hematuria  MUSCULOSKELETAL: POSITIVE for finger fracture as above  NEURO: NEGATIVE for weakness, dizziness or paresthesias  ENDOCRINE: NEGATIVE for temperature intolerance, skin/hair changes  HEME: NEGATIVE for bleeding problems  PSYCHIATRIC: NEGATIVE for changes in mood or affect    Patient Active Problem List    Diagnosis Date Noted     Chronic systolic congestive heart failure (H) 10/10/2018     Priority: High       EF 35%       Idiopathic cardiomyopathy (H) 01/19/2012     Priority: High     Dr. 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 08/31/2015     Priority: Medium     Total knee replacement status 08/26/2015     Priority: Medium     Generalized anxiety disorder 09/30/2013     Priority: Medium     ICD (implantable cardioverter-defibrillator), biventricular, in situ 04/30/2012     Priority: Medium     SUSAN (obstructive sleep apnea) 01/19/2012     Priority: Medium     Cataract 01/19/2012     Priority: Medium     S.p removal 2011  Utility update for deleted IMO code  Imo  Update utility       Insomnia 01/19/2012     Priority: Medium     Esophageal reflux 02/04/2008     Priority: Medium     S/P total  right knee replacement: 1/7/13 01/11/2013     Priority: Low     Obesity 01/20/2012     Priority: Low     Leg pain 01/19/2012     Priority: Low     At night, controlled by lyrica.        Advanced directives, counseling/discussion 07/01/2011     Priority: Low     Advance Directive Problem List Overview:   Name Relationship Phone    Primary Health Care Agent            Alternative Health Care Agent          Patient states has Advance Directive and will bring in a copy to clinic. 7/1/2011        GENERAL OSTEOARTHROSIS [715.00] 11/23/2004     Priority: Low     knee        Past Medical History:   Diagnosis Date     Acute posthemorrhagic anemia 9/11/2015     Anemia      Chronic systolic congestive heart failure (H) 10/10/2018     EF 35%     Fibromyalgia      Gastro-oesophageal reflux disease      GENERAL OSTEOARTHROSIS [715.00] 11/23/2004    knee     Generalized anxiety disorder 9/30/2013     Hyperlipidemia LDL goal <130 2/10/2010    Failed simvastatin- muscle aches      Hypertension     because of the heart     Idiopathic cardiomyopathy (H) 1/19/2012    Dr. Osborne 3/2011 EF 30-35%      Insomnia 1/19/2012     Iron deficiency anemia 8/12/2015     Problem list name updated by automated process. Provider to review     LBBB (left bundle branch block)      Left ventricular systolic dysfunction:EF 35% 2/5/2012    Must stay on diovan per cardiology      Major depressive disorder, recurrent episode, mild (H) 2/17/2016     Migraine 6/21/2005     Problem list name updated by automated process. Provider to review     Nonischemic cardiomyopathy (H)     EF 30-35%, Dr. Osborne [facility] (suspect virus); s/p AICD     NSVT (nonsustained ventricular tachycardia) (H)      Obesity 1/20/2012     SUSAN (obstructive sleep apnea) 01/19/2012    CPAP      Pure hypercholesterolemia      Restless leg syndrome      Type 2 diabetes  mellitus without complication (H) 9/24/2016     Past Surgical History:   Procedure Laterality Date     APPENDECTOMY       ARTHROPLASTY KNEE  1/7/2013    Procedure: ARTHROPLASTY KNEE;  Right Total Knee Arthroplasty       ARTHROPLASTY REVISION KNEE Right 8/26/2015    Procedure: ARTHROPLASTY REVISION KNEE;  Surgeon: Néstor Beth MD;  Location: [facility] OR     ARTHROSCOPY KNEE       bunionectomy left foot       COLONOSCOPY       CORONARY ANGIOGRAPHY ADULT ORDER  2002    normal     CORONARY ANGIOGRAPHY ADULT ORDER  12/7/12    no significant focal narrowing that would benefit from mechanical intervention      HYSTERECTOMY       IMPLANT AUTOMATIC IMPLANTABLE CARDIOVERTER DEFIBRILLATOR  4/30/12     INSERT THORACIC PACEMAKER LEAD EPICARDIAL  5/1/2012    Procedure:INSERT THORACIC PACEMAKER LEAD EPICARDIAL; EPICARDIAL LEAD PLACEMENT; Surgeon:WEST ARECHIGA; Location:[facility] OR     TONSILLECTOMY       TRANSPLANT - corneal lenses      Bilateral for cataracts     Current Outpatient Medications   Medication Sig Dispense Refill     acetaminophen (TYLENOL) 500 MG tablet Take 1,000 mg by mouth daily as needed for mild pain       ALPRAZolam (XANAX) 0.25 MG tablet Take 1 tablet (0.25 mg) by mouth 2 times daily as needed for anxiety 30 tablet 1     aspirin 81 MG tablet Take 81 mg by mouth daily       CALCIUM 500 MG OR TABS Takes 3 tabs daily takees total of 1200 mg daily       carvedilol (COREG) 25 MG tablet TAKE 1 TABLET BY MOUTH TWICE DAILY WITH MEALS 180 tablet 3     Cholecalciferol (VITAMIN D) 2000 UNITS tablet Take 1 tablet by mouth daily.       ciprofloxacin (CIPRO) 100 MG tablet Take 100 mg by mouth daily as needed For dental appointments       clobetasol (TEMOVATE) 0.05 % external cream Apply thin layer to bilateral hand twice daily for 2 weeks 45 g 1     co-enzyme Q-10 50 MG CAPS Take 1 capsule by mouth daily.       lifitegrast (XIIDRA) 5 % opthalmic solution Place 1 drop into both eyes 2 times daily       loratadine  "(CLARITIN) 10 MG tablet Take 10 mg by mouth daily       losartan (COZAAR) 50 MG tablet Take 1 tablet (50 mg) by mouth daily 90 tablet 3     mirtazapine (REMERON) 7.5 MG tablet Take 1-2 tablets 30 minutes before bedtime 60 tablet 3     Multiple Vitamins-Minerals (MULTIVITAMIN ADULT PO) Take 1 tablet by mouth daily       rosuvastatin (CRESTOR) 10 MG tablet TAKE 1 TABLET EVERY OTHER DAY 45 tablet 3     spironolactone (ALDACTONE) 25 MG tablet Take 0.5 tablets (12.5 mg) by mouth daily 90 tablet 3     venlafaxine (EFFEXOR-XR) 75 MG 24 hr capsule TAKE 3 CAPSULES BY MOUTH ONCE DAILY 270 capsule 3       Allergies   Allergen Reactions     Prochlorperazine      Other reaction(s): Tardive Dyskinesia     Amoxicillin Hives     Clindamycin Other (See Comments)     Burning sensation in chest     Decadron      flushing     Dexamethasone      Other reaction(s): Erythema     Compazine Anxiety        Social History     Tobacco Use     Smoking status: Never Smoker     Smokeless tobacco: Never Used   Substance Use Topics     Alcohol use: Yes     Alcohol/week: 0.0 standard drinks     Frequency: Monthly or less     Drinks per session: 1 or 2     Binge frequency: Never     Family History   Problem Relation Age of Onset     Cancer Father         intraabdominal mass - not colon cancer     C.A.D. Mother      Hypertension Mother      Lipids Mother      Heart Disease Mother      Cancer Daughter 36        brain      Diabetes Paternal Uncle 52     Prostate Cancer Brother      Heart Disease Sister         murmur     Anxiety Disorder Sister      Colon Cancer No family hx of      History   Drug Use No         Objective     BP 90/50 (BP Location: Right arm, Patient Position: Chair, Cuff Size: Adult Regular)   Pulse 69   Temp 98.2  F (36.8  C) (Tympanic)   Resp 12   Ht 1.651 m (5' 5\")   Wt 93.2 kg (205 lb 6.4 oz)   SpO2 97%   BMI 34.18 kg/m      Physical Exam    GENERAL APPEARANCE: healthy, alert and no distress     EYES: EOMI, PERRL     HENT: " ear canals and TM's normal and nose and mouth without ulcers or lesions     NECK: no adenopathy, no asymmetry, masses, or scars and thyroid normal to palpation     RESP: lungs clear to auscultation - no rales, rhonchi or wheezes     CV: regular rates and rhythm, normal S1 S2, no S3 or S4 and no murmur, click or rub     ABDOMEN:  soft, nontender, no HSM or masses and bowel sounds normal     MS: extremities normal- no gross deformities noted, no evidence of inflammation in joints, FROM in all extremities.     SKIN: no suspicious lesions or rashes     NEURO: Normal strength and tone, sensory exam grossly normal, mentation intact and speech normal     PSYCH: mentation appears normal. and affect normal/bright     LYMPHATICS: No cervical adenopathy    Recent Labs   Lab Test 02/05/21  0908 01/25/21  0854 12/26/20  1840 03/10/20  1842 03/10/20  1842   HGB 12.0  --  12.8  --   --      --  184  --   --    INR  --   --  0.99  --   --      --  139   < > 137   POTASSIUM 4.0  --  4.0   < > 4.4   CR 0.87  --  0.91   < > 1.13*   A1C  --  6.1*  --   --  6.0*    < > = values in this interval not displayed.        Diagnostics:  No labs were ordered during this visit.   ECG: sinus rhythm, low voltage in limb leads    Revised Cardiac Risk Index (RCRI):  The patient has the following serious cardiovascular risks for perioperative complications:   - Congestive Heart Failure (pulmonary edema, PND, s3 omid, CXR with pulmonary congestion, basilar rales) = 1 point     RCRI Interpretation: 1 point: Class II (low risk - 0.9% complication rate)           Signed Electronically by: WINNIE Poe CNP  Copy of this evaluation report is provided to requesting physician.

## 2021-06-15 NOTE — H&P (VIEW-ONLY)
[facility]  [address]  [address]  [place] [address]  Phone: [phone]  Fax: [phone]  Primary Provider: Essence Thornton  Pre-op Performing Provider: ESSENCE THORNTON      PREOPERATIVE EVALUATION:  Today's date: 6/15/2021    Kristie Jones is a 75 year old female who presents for a preoperative evaluation.    Surgical Information:  Surgery/Procedure: repair fx middle finger right hand  Surgery Location: [facility] [place]  Surgeon: Dr. Camejo  Surgery Date: 6/16/21  Time of Surgery: 10:00  Where patient plans to recover: At home with family  Fax number for surgical facility: [phone]    Type of Anesthesia Anticipated: Local with MAC    Assessment & Plan     The proposed surgical procedure is considered INTERMEDIATE risk.    Preop general physical exam  - EKG 12-lead complete w/read - Clinics    Early satiety  - Increase to 40 mg daily and complete endoscopy  - GASTROENTEROLOGY ADULT REF PROCEDURE ONLY; Future    Closed displaced fracture of distal phalanx of right middle finger, initial encounter       Implanted Device:   - Type of device: ICD Patient advised to bring device information on day of surgery.      Risks and Recommendations:  The patient has the following additional risks and recommendations for perioperative complications:  Obstructive Sleep Apnea: bring cpap     Hold spironolactone day of surgery    RECOMMENDATION:  APPROVAL GIVEN to proceed with proposed procedure, without further diagnostic evaluation.        Subjective     HPI related to upcoming procedure: finger fracture    Preop Questions 6/15/2021   1. Have you ever had a heart attack or stroke? No   2. Have you ever had surgery on your heart or blood vessels, such as a stent placement, a coronary artery bypass, or surgery on an artery in your head, neck, heart, or legs? YES - nonischemic cardiomyopathy s/p ICD    3. Do you have chest pain with activity? No   4.  Do you have a history of  heart failure? YES - cardiomyopathy   5. Do you currently have a cold, bronchitis or symptoms of other infection? No   6. Do you have a cough, shortness of breath, or wheezing? No   7. Do you or anyone in your family have previous history of blood clots? No   8. Do you or does anyone in your family have a serious bleeding problem such as prolonged bleeding following surgeries or cuts? No   9. Have you ever had problems with anemia or been told to take iron pills? YES - Not currently on iron. Hgb 12.0 Feb 2021   10. Have you had any abnormal blood loss such as black, tarry or bloody stools, or abnormal vaginal bleeding? No   11. Have you ever had a blood transfusion? UNKNOWN    12. Are you willing to have a blood transfusion if it is medically needed before, during, or after your surgery? Yes   13. Have you or any of your relatives ever had problems with anesthesia? No   14. Do you have sleep apnea, excessive snoring or daytime drowsiness? YES - wears cpap   14a. Do you have a CPAP machine? Yes   15. Do you have any artifical heart valves or other implanted medical devices like a pacemaker, defibrillator, or continuous glucose monitor? YES - ICD   15a. What type of device do you have? pacemaker, defibullator   15b. Name of the clinic that manages your device:  Dr. India kemp   16. Do you have artificial joints? YES - knee   17. Are you allergic to latex? No       Health Care Directive:  Patient has a Health Care Directive on file      Preoperative Review of :   reviewed - no record of controlled substances prescribed.      Diabetes well controlled. A1c 6.1%  Follows with cardiology for ICM. EF 32%  Took prilosec for 8 weeks earlier this year then transitioned to famotidine. Symptoms returned with H2RA so switched back to prilosec 20 mg. Symptoms improved but still struggles with bloating, early satiety. No weight loss or night sweats  Feels down. Has not seen therapist  Review of  Systems  CONSTITUTIONAL: NEGATIVE for fever, chills, change in weight  INTEGUMENTARY/SKIN: NEGATIVE for worrisome rashes, moles or lesions  EYES: NEGATIVE for vision changes or irritation  ENT/MOUTH: NEGATIVE for ear, mouth and throat problems  RESP: NEGATIVE for significant cough or SOB  BREAST: NEGATIVE for masses, tenderness or discharge  CV: NEGATIVE for chest pain, palpitations or peripheral edema  GI: POSITIVE as above  : NEGATIVE for frequency, dysuria, or hematuria  MUSCULOSKELETAL: POSITIVE for finger fracture as above  NEURO: NEGATIVE for weakness, dizziness or paresthesias  ENDOCRINE: NEGATIVE for temperature intolerance, skin/hair changes  HEME: NEGATIVE for bleeding problems  PSYCHIATRIC: NEGATIVE for changes in mood or affect    Patient Active Problem List    Diagnosis Date Noted     Chronic systolic congestive heart failure (H) 10/10/2018     Priority: High       EF 35%       Idiopathic cardiomyopathy (H) 01/19/2012     Priority: High     Dr. 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 08/31/2015     Priority: Medium     Total knee replacement status 08/26/2015     Priority: Medium     Generalized anxiety disorder 09/30/2013     Priority: Medium     ICD (implantable cardioverter-defibrillator), biventricular, in situ 04/30/2012     Priority: Medium     SUSAN (obstructive sleep apnea) 01/19/2012     Priority: Medium     Cataract 01/19/2012     Priority: Medium     S.p removal 2011  Utility update for deleted IMO code  Imo  Update utility       Insomnia 01/19/2012     Priority: Medium     Esophageal reflux 02/04/2008     Priority: Medium     S/P total  right knee replacement: 1/7/13 01/11/2013     Priority: Low     Obesity 01/20/2012     Priority: Low     Leg pain 01/19/2012     Priority: Low     At night, controlled by lyrica.        Advanced directives, counseling/discussion 07/01/2011     Priority: Low     Advance Directive Problem List Overview:   Name Relationship Phone    Primary Health Care Agent            Alternative Health Care Agent          Patient states has Advance Directive and will bring in a copy to clinic. 7/1/2011        GENERAL OSTEOARTHROSIS [715.00] 11/23/2004     Priority: Low     knee        Past Medical History:   Diagnosis Date     Acute posthemorrhagic anemia 9/11/2015     Anemia      Chronic systolic congestive heart failure (H) 10/10/2018     EF 35%     Fibromyalgia      Gastro-oesophageal reflux disease      GENERAL OSTEOARTHROSIS [715.00] 11/23/2004    knee     Generalized anxiety disorder 9/30/2013     Hyperlipidemia LDL goal <130 2/10/2010    Failed simvastatin- muscle aches      Hypertension     because of the heart     Idiopathic cardiomyopathy (H) 1/19/2012    Dr. Osborne 3/2011 EF 30-35%      Insomnia 1/19/2012     Iron deficiency anemia 8/12/2015     Problem list name updated by automated process. Provider to review     LBBB (left bundle branch block)      Left ventricular systolic dysfunction:EF 35% 2/5/2012    Must stay on diovan per cardiology      Major depressive disorder, recurrent episode, mild (H) 2/17/2016     Migraine 6/21/2005     Problem list name updated by automated process. Provider to review     Nonischemic cardiomyopathy (H)     EF 30-35%, Dr. Osborne [facility] (suspect virus); s/p AICD     NSVT (nonsustained ventricular tachycardia) (H)      Obesity 1/20/2012     SUSAN (obstructive sleep apnea) 01/19/2012    CPAP      Pure hypercholesterolemia      Restless leg syndrome      Type 2 diabetes  mellitus without complication (H) 9/24/2016     Past Surgical History:   Procedure Laterality Date     APPENDECTOMY       ARTHROPLASTY KNEE  1/7/2013    Procedure: ARTHROPLASTY KNEE;  Right Total Knee Arthroplasty       ARTHROPLASTY REVISION KNEE Right 8/26/2015    Procedure: ARTHROPLASTY REVISION KNEE;  Surgeon: Néstor Beth MD;  Location: [facility] OR     ARTHROSCOPY KNEE       bunionectomy left foot       COLONOSCOPY       CORONARY ANGIOGRAPHY ADULT ORDER  2002    normal     CORONARY ANGIOGRAPHY ADULT ORDER  12/7/12    no significant focal narrowing that would benefit from mechanical intervention      HYSTERECTOMY       IMPLANT AUTOMATIC IMPLANTABLE CARDIOVERTER DEFIBRILLATOR  4/30/12     INSERT THORACIC PACEMAKER LEAD EPICARDIAL  5/1/2012    Procedure:INSERT THORACIC PACEMAKER LEAD EPICARDIAL; EPICARDIAL LEAD PLACEMENT; Surgeon:WEST ARECHIGA; Location:[facility] OR     TONSILLECTOMY       TRANSPLANT - corneal lenses      Bilateral for cataracts     Current Outpatient Medications   Medication Sig Dispense Refill     acetaminophen (TYLENOL) 500 MG tablet Take 1,000 mg by mouth daily as needed for mild pain       ALPRAZolam (XANAX) 0.25 MG tablet Take 1 tablet (0.25 mg) by mouth 2 times daily as needed for anxiety 30 tablet 1     aspirin 81 MG tablet Take 81 mg by mouth daily       CALCIUM 500 MG OR TABS Takes 3 tabs daily takees total of 1200 mg daily       carvedilol (COREG) 25 MG tablet TAKE 1 TABLET BY MOUTH TWICE DAILY WITH MEALS 180 tablet 3     Cholecalciferol (VITAMIN D) 2000 UNITS tablet Take 1 tablet by mouth daily.       ciprofloxacin (CIPRO) 100 MG tablet Take 100 mg by mouth daily as needed For dental appointments       clobetasol (TEMOVATE) 0.05 % external cream Apply thin layer to bilateral hand twice daily for 2 weeks 45 g 1     co-enzyme Q-10 50 MG CAPS Take 1 capsule by mouth daily.       lifitegrast (XIIDRA) 5 % opthalmic solution Place 1 drop into both eyes 2 times daily       loratadine  "(CLARITIN) 10 MG tablet Take 10 mg by mouth daily       losartan (COZAAR) 50 MG tablet Take 1 tablet (50 mg) by mouth daily 90 tablet 3     mirtazapine (REMERON) 7.5 MG tablet Take 1-2 tablets 30 minutes before bedtime 60 tablet 3     Multiple Vitamins-Minerals (MULTIVITAMIN ADULT PO) Take 1 tablet by mouth daily       rosuvastatin (CRESTOR) 10 MG tablet TAKE 1 TABLET EVERY OTHER DAY 45 tablet 3     spironolactone (ALDACTONE) 25 MG tablet Take 0.5 tablets (12.5 mg) by mouth daily 90 tablet 3     venlafaxine (EFFEXOR-XR) 75 MG 24 hr capsule TAKE 3 CAPSULES BY MOUTH ONCE DAILY 270 capsule 3       Allergies   Allergen Reactions     Prochlorperazine      Other reaction(s): Tardive Dyskinesia     Amoxicillin Hives     Clindamycin Other (See Comments)     Burning sensation in chest     Decadron      flushing     Dexamethasone      Other reaction(s): Erythema     Compazine Anxiety        Social History     Tobacco Use     Smoking status: Never Smoker     Smokeless tobacco: Never Used   Substance Use Topics     Alcohol use: Yes     Alcohol/week: 0.0 standard drinks     Frequency: Monthly or less     Drinks per session: 1 or 2     Binge frequency: Never     Family History   Problem Relation Age of Onset     Cancer Father         intraabdominal mass - not colon cancer     C.A.D. Mother      Hypertension Mother      Lipids Mother      Heart Disease Mother      Cancer Daughter 36        brain      Diabetes Paternal Uncle 52     Prostate Cancer Brother      Heart Disease Sister         murmur     Anxiety Disorder Sister      Colon Cancer No family hx of      History   Drug Use No         Objective     BP 90/50 (BP Location: Right arm, Patient Position: Chair, Cuff Size: Adult Regular)   Pulse 69   Temp 98.2  F (36.8  C) (Tympanic)   Resp 12   Ht 1.651 m (5' 5\")   Wt 93.2 kg (205 lb 6.4 oz)   SpO2 97%   BMI 34.18 kg/m      Physical Exam    GENERAL APPEARANCE: healthy, alert and no distress     EYES: EOMI, PERRL     HENT: " ear canals and TM's normal and nose and mouth without ulcers or lesions     NECK: no adenopathy, no asymmetry, masses, or scars and thyroid normal to palpation     RESP: lungs clear to auscultation - no rales, rhonchi or wheezes     CV: regular rates and rhythm, normal S1 S2, no S3 or S4 and no murmur, click or rub     ABDOMEN:  soft, nontender, no HSM or masses and bowel sounds normal     MS: extremities normal- no gross deformities noted, no evidence of inflammation in joints, FROM in all extremities.     SKIN: no suspicious lesions or rashes     NEURO: Normal strength and tone, sensory exam grossly normal, mentation intact and speech normal     PSYCH: mentation appears normal. and affect normal/bright     LYMPHATICS: No cervical adenopathy    Recent Labs   Lab Test 02/05/21  0908 01/25/21  0854 12/26/20  1840 03/10/20  1842 03/10/20  1842   HGB 12.0  --  12.8  --   --      --  184  --   --    INR  --   --  0.99  --   --      --  139   < > 137   POTASSIUM 4.0  --  4.0   < > 4.4   CR 0.87  --  0.91   < > 1.13*   A1C  --  6.1*  --   --  6.0*    < > = values in this interval not displayed.        Diagnostics:  No labs were ordered during this visit.   ECG: sinus rhythm, low voltage in limb leads    Revised Cardiac Risk Index (RCRI):  The patient has the following serious cardiovascular risks for perioperative complications:   - Congestive Heart Failure (pulmonary edema, PND, s3 omid, CXR with pulmonary congestion, basilar rales) = 1 point     RCRI Interpretation: 1 point: Class II (low risk - 0.9% complication rate)           Signed Electronically by: WINNIE Poe CNP  Copy of this evaluation report is provided to requesting physician.

## 2021-06-15 NOTE — PATIENT INSTRUCTIONS
Do not take spironolactone day or surgery  Can resume taking next day  Take carvedilol, losartan morning of surgery  Hold aspirin tonight    I ordered an endoscopy. You will receive a call to schedule    Schedule a virtual visit or in person after endoscopy to talk about your symptoms     Schedule visit with your therapist  Preparing for Your Surgery  Getting started  A nurse will call you to review your health history and instructions. They will give you an arrival time based on your scheduled surgery time.  Please be ready to share the following:    Your doctor's clinic name and phone number    Your medical, surgical and anesthesia history    A list of allergies and sensitivities    A list of medicines, including herbal treatments and over-the-counter drugs    Whether the patient has a legal guardian (ask how to send us the papers in advance)  If you have a child who's having surgery, please ask for a copy of Preparing for Your Child's Surgery.    Preparing for surgery    Within 30 days of surgery: Have a pre-op exam (sometimes called an H&P, or History and Physical). This can be done at a clinic or pre-operative center.  ? If you're having a , you may not need this exam. Talk to your care team    At your pre-op exam, talk to your care team about all medicines you take. If you need to stop any medicines before surgery, ask when to start taking them again.  ? We do this for your safety. Many medicines can make you bleed too much during surgery. Some change how well surgery (anesthesia) drugs work.    Call your insurance company to let them know you're having surgery. (If you don't have insurance, call [phone].)    Call your clinic if there's any change in your health. This includes signs of a cold or flu (sore throat, runny nose, cough, rash, fever). It also includes a scrape or scratch near the surgery site.    If you have questions on the day of surgery, call your hospital or surgery center.  Eating  and drinking guidelines  For your safety: Unless your surgeon tells you otherwise, follow the guidelines below.    Eat and drink as usual until 8 hours before surgery. After that, no food or milk.    Drink clear liquids until 2 hours before surgery. These are liquids you can see through, like water, Gatorade and Propel Water. You may also have black coffee and tea (no cream or milk).    Nothing by mouth within 2 hours of surgery. This includes gum, candy and breath mints.    If you drink, stop drinking alcohol the night before surgery.    If your care team tells you to take medicine on the morning of surgery, it's okay to take it with a sip of water.  Preventing infection    Shower or bathe the night before and morning of your surgery. Follow the instructions your clinic gave you. (If no instructions, use regular soap.)    Don't shave or clip hair near your surgery site. We'll remove the hair if needed.    Don't smoke or vape the morning of surgery. You may chew nicotine gum up to 2 hours before surgery. A nicotine patch is okay.  ? Note: Some surgeries require you to completely quit smoking and nicotine. Check with your surgeon.    Your care team will make every effort to keep you safe from infection. We will:  ? Clean our hands often with soap and water (or an alcohol-based hand rub).  ? Clean the skin at your surgery site with a special soap that kills germs.  ? Give you a special gown to keep you warm. (Cold raises the risk of infection.)  ? Wear special hair covers, masks, gowns and gloves during surgery.  ? Give antibiotic medicine, if prescribed. Not all surgeries need antibiotics.  What to bring on the day of surgery    Photo ID and insurance card    Copy of your health care directive, if you have one    Glasses and hearing aides (bring cases)  ? You can't wear contacts during surgery    Inhaler and eye drops, if you use them (tell us about these when you arrive)    CPAP machine or breathing device, if you  use them    A few personal items, if spending the night    If you have . . .  ? A pacemaker or ICD (cardiac defibrillator): Bring the ID card.  ? An implanted stimulator: Bring the remote control.  ? A legal guardian: Bring a copy of the certified (court-stamped) guardianship papers.  Please remove any jewelry, including body piercings. Leave jewelry and other valuables at home.  If you're going home the day of surgery  Important: If you don't follow the rules below, we must cancel your surgery.     Arrange for someone to drive you home after surgery. You may not drive, take a taxi or take public transportation by yourself (unless you'll have local anesthesia only).    Arrange for a responsible adult to stay with you overnight. If you don't, we may keep you in the hospital overnight, and you may need to pay the costs yourself.  Questions?   If you have any questions for your care team, list them here: _________________________________________________________________________________________________________________________________________________________________________________________________________________________________________________________________________________________________________________________  For informational purposes only. Not to replace the advice of your health care provider. Copyright   2003, 2019 [facility]. All rights reserved. Clinically reviewed by Leta Villa MD. [employer] 420060 - REV 4/20.

## 2021-06-16 DIAGNOSIS — Z11.59 ENCOUNTER FOR SCREENING FOR OTHER VIRAL DISEASES: ICD-10-CM

## 2021-06-16 LAB
LABORATORY COMMENT REPORT: NORMAL
SARS-COV-2 RNA RESP QL NAA+PROBE: NEGATIVE
SARS-COV-2 RNA RESP QL NAA+PROBE: NORMAL
SPECIMEN SOURCE: NORMAL
SPECIMEN SOURCE: NORMAL

## 2021-06-16 PROCEDURE — U0005 INFEC AGEN DETEC AMPLI PROBE: HCPCS | Performed by: ORTHOPAEDIC SURGERY

## 2021-06-16 PROCEDURE — U0003 INFECTIOUS AGENT DETECTION BY NUCLEIC ACID (DNA OR RNA); SEVERE ACUTE RESPIRATORY SYNDROME CORONAVIRUS 2 (SARS-COV-2) (CORONAVIRUS DISEASE [COVID-19]), AMPLIFIED PROBE TECHNIQUE, MAKING USE OF HIGH THROUGHPUT TECHNOLOGIES AS DESCRIBED BY CMS-2020-01-R: HCPCS | Performed by: ORTHOPAEDIC SURGERY

## 2021-06-17 ENCOUNTER — ANESTHESIA (OUTPATIENT)
Dept: SURGERY | Facility: CLINIC | Age: 76
End: 2021-06-17
Payer: COMMERCIAL

## 2021-06-17 ENCOUNTER — HOSPITAL ENCOUNTER (OUTPATIENT)
Facility: CLINIC | Age: 76
Discharge: HOME | End: 2021-06-17
Attending: ORTHOPAEDIC SURGERY | Admitting: ORTHOPAEDIC SURGERY
Payer: COMMERCIAL

## 2021-06-17 ENCOUNTER — APPOINTMENT (OUTPATIENT)
Dept: GENERAL RADIOLOGY | Facility: CLINIC | Age: 76
End: 2021-06-17
Attending: ORTHOPAEDIC SURGERY
Payer: COMMERCIAL

## 2021-06-17 VITALS
RESPIRATION RATE: 16 BRPM | DIASTOLIC BLOOD PRESSURE: 65 MMHG | BODY MASS INDEX: 33.99 KG/M2 | OXYGEN SATURATION: 98 % | SYSTOLIC BLOOD PRESSURE: 106 MMHG | WEIGHT: 204 LBS | HEART RATE: 80 BPM | TEMPERATURE: 98 F | HEIGHT: 65 IN

## 2021-06-17 DIAGNOSIS — G89.18 POSTOPERATIVE PAIN: Primary | ICD-10-CM

## 2021-06-17 LAB
CREAT SERPL-MCNC: 0.99 MG/DL (ref 0.52–1.04)
GFR SERPL CREATININE-BSD FRML MDRD: 56 ML/MIN/{1.73_M2}
GLUCOSE BLDC GLUCOMTR-MCNC: 144 MG/DL (ref 70–99)
POTASSIUM SERPL-SCNC: 4.2 MMOL/L (ref 3.4–5.3)

## 2021-06-17 PROCEDURE — 272N000001 HC OR GENERAL SUPPLY STERILE: Performed by: ORTHOPAEDIC SURGERY

## 2021-06-17 PROCEDURE — 999N000141 HC STATISTIC PRE-PROCEDURE NURSING ASSESSMENT: Performed by: ORTHOPAEDIC SURGERY

## 2021-06-17 PROCEDURE — 370N000017 HC ANESTHESIA TECHNICAL FEE, PER MIN: Performed by: ORTHOPAEDIC SURGERY

## 2021-06-17 PROCEDURE — 250N000009 HC RX 250: Performed by: NURSE ANESTHETIST, CERTIFIED REGISTERED

## 2021-06-17 PROCEDURE — 710N000012 HC RECOVERY PHASE 2, PER MINUTE: Performed by: ORTHOPAEDIC SURGERY

## 2021-06-17 PROCEDURE — 84132 ASSAY OF SERUM POTASSIUM: CPT | Performed by: ANESTHESIOLOGY

## 2021-06-17 PROCEDURE — 82962 GLUCOSE BLOOD TEST: CPT

## 2021-06-17 PROCEDURE — 250N000011 HC RX IP 250 OP 636: Performed by: NURSE ANESTHETIST, CERTIFIED REGISTERED

## 2021-06-17 PROCEDURE — 36415 COLL VENOUS BLD VENIPUNCTURE: CPT | Performed by: ANESTHESIOLOGY

## 2021-06-17 PROCEDURE — 258N000003 HC RX IP 258 OP 636: Performed by: NURSE ANESTHETIST, CERTIFIED REGISTERED

## 2021-06-17 PROCEDURE — 250N000011 HC RX IP 250 OP 636: Performed by: ORTHOPAEDIC SURGERY

## 2021-06-17 PROCEDURE — 360N000082 HC SURGERY LEVEL 2 W/ FLUORO, PER MIN: Performed by: ORTHOPAEDIC SURGERY

## 2021-06-17 PROCEDURE — 999N000179 XR SURGERY CARM FLUORO LESS THAN 5 MIN W STILLS: Mod: TC

## 2021-06-17 PROCEDURE — 82565 ASSAY OF CREATININE: CPT | Performed by: ANESTHESIOLOGY

## 2021-06-17 PROCEDURE — 250N000009 HC RX 250: Performed by: ORTHOPAEDIC SURGERY

## 2021-06-17 DEVICE — IMPLANTABLE DEVICE: Type: IMPLANTABLE DEVICE | Site: FINGER | Status: FUNCTIONAL

## 2021-06-17 RX ORDER — NALOXONE HYDROCHLORIDE 0.4 MG/ML
0.4 INJECTION, SOLUTION INTRAMUSCULAR; INTRAVENOUS; SUBCUTANEOUS
Status: DISCONTINUED | OUTPATIENT
Start: 2021-06-17 | End: 2021-06-17 | Stop reason: HOSPADM

## 2021-06-17 RX ORDER — SODIUM CHLORIDE, SODIUM LACTATE, POTASSIUM CHLORIDE, CALCIUM CHLORIDE 600; 310; 30; 20 MG/100ML; MG/100ML; MG/100ML; MG/100ML
INJECTION, SOLUTION INTRAVENOUS CONTINUOUS PRN
Status: DISCONTINUED | OUTPATIENT
Start: 2021-06-17 | End: 2021-06-17

## 2021-06-17 RX ORDER — HYDROCODONE BITARTRATE AND ACETAMINOPHEN 5; 325 MG/1; MG/1
1-2 TABLET ORAL EVERY 4 HOURS PRN
Qty: 15 TABLET | Refills: 0 | Status: SHIPPED | OUTPATIENT
Start: 2021-06-17 | End: 2021-07-15

## 2021-06-17 RX ORDER — FENTANYL CITRATE 50 UG/ML
25-50 INJECTION, SOLUTION INTRAMUSCULAR; INTRAVENOUS
Status: DISCONTINUED | OUTPATIENT
Start: 2021-06-17 | End: 2021-06-17 | Stop reason: HOSPADM

## 2021-06-17 RX ORDER — METOCLOPRAMIDE 10 MG/1
10 TABLET ORAL EVERY 6 HOURS PRN
Status: DISCONTINUED | OUTPATIENT
Start: 2021-06-17 | End: 2021-06-17 | Stop reason: HOSPADM

## 2021-06-17 RX ORDER — LIDOCAINE HYDROCHLORIDE 20 MG/ML
INJECTION, SOLUTION INFILTRATION; PERINEURAL PRN
Status: DISCONTINUED | OUTPATIENT
Start: 2021-06-17 | End: 2021-06-17

## 2021-06-17 RX ORDER — HYDRALAZINE HYDROCHLORIDE 20 MG/ML
2.5-5 INJECTION INTRAMUSCULAR; INTRAVENOUS EVERY 10 MIN PRN
Status: DISCONTINUED | OUTPATIENT
Start: 2021-06-17 | End: 2021-06-17 | Stop reason: HOSPADM

## 2021-06-17 RX ORDER — METOCLOPRAMIDE HYDROCHLORIDE 5 MG/ML
10 INJECTION INTRAMUSCULAR; INTRAVENOUS EVERY 6 HOURS PRN
Status: DISCONTINUED | OUTPATIENT
Start: 2021-06-17 | End: 2021-06-17 | Stop reason: HOSPADM

## 2021-06-17 RX ORDER — NALOXONE HYDROCHLORIDE 0.4 MG/ML
0.2 INJECTION, SOLUTION INTRAMUSCULAR; INTRAVENOUS; SUBCUTANEOUS
Status: DISCONTINUED | OUTPATIENT
Start: 2021-06-17 | End: 2021-06-17 | Stop reason: HOSPADM

## 2021-06-17 RX ORDER — ONDANSETRON 2 MG/ML
4 INJECTION INTRAMUSCULAR; INTRAVENOUS EVERY 30 MIN PRN
Status: DISCONTINUED | OUTPATIENT
Start: 2021-06-17 | End: 2021-06-17 | Stop reason: HOSPADM

## 2021-06-17 RX ORDER — BUPIVACAINE HYDROCHLORIDE 5 MG/ML
INJECTION, SOLUTION PERINEURAL PRN
Status: DISCONTINUED | OUTPATIENT
Start: 2021-06-17 | End: 2021-06-17 | Stop reason: HOSPADM

## 2021-06-17 RX ORDER — LIDOCAINE 40 MG/G
CREAM TOPICAL
Status: DISCONTINUED | OUTPATIENT
Start: 2021-06-17 | End: 2021-06-17 | Stop reason: HOSPADM

## 2021-06-17 RX ORDER — CEFAZOLIN SODIUM 2 G/100ML
2 INJECTION, SOLUTION INTRAVENOUS SEE ADMIN INSTRUCTIONS
Status: DISCONTINUED | OUTPATIENT
Start: 2021-06-17 | End: 2021-06-17 | Stop reason: HOSPADM

## 2021-06-17 RX ORDER — FENTANYL CITRATE 50 UG/ML
INJECTION, SOLUTION INTRAMUSCULAR; INTRAVENOUS PRN
Status: DISCONTINUED | OUTPATIENT
Start: 2021-06-17 | End: 2021-06-17

## 2021-06-17 RX ORDER — MEPERIDINE HYDROCHLORIDE 25 MG/ML
12.5 INJECTION INTRAMUSCULAR; INTRAVENOUS; SUBCUTANEOUS
Status: DISCONTINUED | OUTPATIENT
Start: 2021-06-17 | End: 2021-06-17 | Stop reason: HOSPADM

## 2021-06-17 RX ORDER — CEFAZOLIN SODIUM 2 G/100ML
2 INJECTION, SOLUTION INTRAVENOUS
Status: COMPLETED | OUTPATIENT
Start: 2021-06-17 | End: 2021-06-17

## 2021-06-17 RX ORDER — HYDROMORPHONE HYDROCHLORIDE 1 MG/ML
.3-.5 INJECTION, SOLUTION INTRAMUSCULAR; INTRAVENOUS; SUBCUTANEOUS EVERY 10 MIN PRN
Status: DISCONTINUED | OUTPATIENT
Start: 2021-06-17 | End: 2021-06-17 | Stop reason: HOSPADM

## 2021-06-17 RX ORDER — SODIUM CHLORIDE, SODIUM LACTATE, POTASSIUM CHLORIDE, CALCIUM CHLORIDE 600; 310; 30; 20 MG/100ML; MG/100ML; MG/100ML; MG/100ML
INJECTION, SOLUTION INTRAVENOUS CONTINUOUS
Status: DISCONTINUED | OUTPATIENT
Start: 2021-06-17 | End: 2021-06-17 | Stop reason: HOSPADM

## 2021-06-17 RX ORDER — PROPOFOL 10 MG/ML
INJECTION, EMULSION INTRAVENOUS CONTINUOUS PRN
Status: DISCONTINUED | OUTPATIENT
Start: 2021-06-17 | End: 2021-06-17

## 2021-06-17 RX ORDER — ONDANSETRON 4 MG/1
4 TABLET, ORALLY DISINTEGRATING ORAL EVERY 30 MIN PRN
Status: DISCONTINUED | OUTPATIENT
Start: 2021-06-17 | End: 2021-06-17 | Stop reason: HOSPADM

## 2021-06-17 RX ORDER — DIMENHYDRINATE 50 MG/ML
25 INJECTION, SOLUTION INTRAMUSCULAR; INTRAVENOUS
Status: DISCONTINUED | OUTPATIENT
Start: 2021-06-17 | End: 2021-06-17 | Stop reason: HOSPADM

## 2021-06-17 RX ADMIN — SODIUM CHLORIDE, POTASSIUM CHLORIDE, SODIUM LACTATE AND CALCIUM CHLORIDE: 600; 310; 30; 20 INJECTION, SOLUTION INTRAVENOUS at 11:28

## 2021-06-17 RX ADMIN — CEFAZOLIN SODIUM 2 G: 2 INJECTION, SOLUTION INTRAVENOUS at 11:32

## 2021-06-17 RX ADMIN — PROPOFOL 50 MCG/KG/MIN: 10 INJECTION, EMULSION INTRAVENOUS at 11:32

## 2021-06-17 RX ADMIN — LIDOCAINE HYDROCHLORIDE 30 MG: 20 INJECTION, SOLUTION INFILTRATION; PERINEURAL at 11:32

## 2021-06-17 RX ADMIN — FENTANYL CITRATE 100 MCG: 50 INJECTION, SOLUTION INTRAMUSCULAR; INTRAVENOUS at 11:33

## 2021-06-17 ASSESSMENT — MIFFLIN-ST. JEOR: SCORE: 1426.22

## 2021-06-17 ASSESSMENT — NEW YORK HEART ASSOCIATION (NYHA) CLASSIFICATION: NYHA FUNCTIONAL CLASS: III

## 2021-06-17 NOTE — ANESTHESIA PREPROCEDURE EVALUATION
Anesthesia Pre-Procedure Evaluation    Patient: Kristie Jones   MRN: [id number] : 1945        Preoperative Diagnosis: Finger fracture [S62.609A]   Procedure : Procedure(s):  Closed reduction possible open pinning right long finger distal interphalangeal joint fracture     Past Medical History:   Diagnosis Date     Acute posthemorrhagic anemia 2015     Anemia      Chronic systolic congestive heart failure (H) 10/10/2018     EF 35%     Fibromyalgia      Gastro-oesophageal reflux disease      GENERAL OSTEOARTHROSIS [715.00] 2004    knee     Generalized anxiety disorder 2013     Hyperlipidemia LDL goal <130 2/10/2010    Failed simvastatin- muscle aches      Hypertension     because of the heart     Idiopathic cardiomyopathy (H) 2012    Dr. Osborne 3/2011 EF 30-35%      Insomnia 2012     Iron deficiency anemia 2015     Problem list name updated by automated process. Provider to review     LBBB (left bundle branch block)      Left ventricular systolic dysfunction:EF 35% 2012    Must stay on diovan per cardiology      Major depressive disorder, recurrent episode, mild (H) 2016     Migraine 2005     Problem list name updated by automated process. Provider to review     Nonischemic cardiomyopathy (H)     EF 30-35%, Dr. Osborne [facility] (suspect virus); s/p AICD     NSVT (nonsustained ventricular tachycardia) (H)      Obesity 2012     SUSAN (obstructive sleep apnea) 2012    CPAP      Pure hypercholesterolemia      Restless leg syndrome      Type 2 diabetes mellitus without complication (H) 2016      Past Surgical History:   Procedure Laterality Date     APPENDECTOMY       ARTHROPLASTY KNEE  2013    Procedure: ARTHROPLASTY KNEE;  Right Total Knee Arthroplasty       ARTHROPLASTY REVISION KNEE Right 2015    Procedure: ARTHROPLASTY REVISION KNEE;  Surgeon: Néstor Beth MD;  Location: [facility] OR     ARTHROSCOPY KNEE       bunionectomy left foot       COLONOSCOPY        CORONARY ANGIOGRAPHY ADULT ORDER  2002    normal     CORONARY ANGIOGRAPHY ADULT ORDER  12/7/12    no significant focal narrowing that would benefit from mechanical intervention      HYSTERECTOMY       IMPLANT AUTOMATIC IMPLANTABLE CARDIOVERTER DEFIBRILLATOR  4/30/12     INSERT THORACIC PACEMAKER LEAD EPICARDIAL  5/1/2012    Procedure:INSERT THORACIC PACEMAKER LEAD EPICARDIAL; EPICARDIAL LEAD PLACEMENT; Surgeon:WEST ARECHIGA; Location:[facility]     TONSILLECTOMY       TRANSPLANT - corneal lenses      Bilateral for cataracts      Allergies   Allergen Reactions     Corticosteroids Other (See Comments)     flush up through chest and face, decadron  flushing  Other reaction(s): Erythema     Phenothiazines Anxiety and GI Disturbance     anxiety attack, compazine  Other reaction(s): Tardive Dyskinesia     Prochlorperazine      Other reaction(s): Tardive Dyskinesia     Amoxicillin Hives     Clindamycin Other (See Comments)     Burning sensation in chest     Decadron      flushing     Dexamethasone      Other reaction(s): Erythema     Compazine Anxiety      Social History     Tobacco Use     Smoking status: Never Smoker     Smokeless tobacco: Never Used   Substance Use Topics     Alcohol use: Yes     Alcohol/week: 0.0 standard drinks     Frequency: Monthly or less     Drinks per session: 1 or 2     Binge frequency: Never     Comment: rarely      Wt Readings from Last 1 Encounters:   06/17/21 92.5 kg (204 lb)        Anesthesia Evaluation            ROS/MED HX  ENT/Pulmonary:     (+) sleep apnea, uses CPAP,     Neurologic:  - neg neurologic ROS     Cardiovascular: Comment: Chronic systolic congestive heart failure    ICD (implantable cardioverter-defibrillator), biventricular, in situ    (+) hypertension-----CHF NYHA classification: III. ICD     METS/Exercise Tolerance:     Hematologic: Comments: Lab Test        02/05/21 12/26/20 05/30/19                       0908          1840          1134          WBC           4.0          5.7          3.5*          HGB          12.0         12.8         11.9          MCV          96           94           96            PLT          159          184          166           INR           --          0.99          --            Lab Test        02/05/21 12/26/20 06/03/20                       0908          1840          0950          NA           139          139          137           POTASSIUM    4.0          4.0          4.3           CHLORIDE     108          106          106           CO2          23           30           24            BUN          16           11           16            CR           0.87         0.91         1.01          ANIONGAP     8            3            7             WILEY          9.1          10.9*        8.8           GLC          163*         98           137*           - neg hematologic  ROS     Musculoskeletal: Comment:  right long finger distal interphalangeal joint fracture (Right Hand)      GI/Hepatic:     (+) GERD, Asymptomatic on medication,     Renal/Genitourinary:  - neg Renal ROS     Endo:     (+) type II DM, Not using insulin, - not using insulin pump. Obesity,     Psychiatric/Substance Use:     (+) psychiatric history anxiety and depression     Infectious Disease:  - neg infectious disease ROS     Malignancy:  - neg malignancy ROS     Other:  - neg other ROS          Physical Exam    Airway        Mallampati: II   TM distance: > 3 FB   Neck ROM: full   Mouth opening: > 3 cm    Respiratory Devices and Support         Dental  no notable dental history         Cardiovascular   cardiovascular exam normal          Pulmonary   pulmonary exam normal                OUTSIDE LABS:  CBC:   Lab Results   Component Value Date    WBC 4.0 02/05/2021    WBC 5.7 12/26/2020    HGB 12.0 02/05/2021    HGB 12.8 12/26/2020    HCT 37.0 02/05/2021    HCT 38.5 12/26/2020     02/05/2021     12/26/2020     BMP:   Lab Results   Component Value Date      02/05/2021     12/26/2020    POTASSIUM 4.0 02/05/2021    POTASSIUM 4.0 12/26/2020    CHLORIDE 108 02/05/2021    CHLORIDE 106 12/26/2020    CO2 23 02/05/2021    CO2 30 12/26/2020    BUN 16 02/05/2021    BUN 11 12/26/2020    CR 0.87 02/05/2021    CR 0.91 12/26/2020     (H) 02/05/2021    GLC 98 12/26/2020     COAGS:   Lab Results   Component Value Date    PTT 30 12/05/2012    INR 0.99 12/26/2020     POC:   Lab Results   Component Value Date     (H) 06/17/2021     HEPATIC:   Lab Results   Component Value Date    ALBUMIN 4.1 12/26/2020    PROTTOTAL 7.4 12/26/2020    ALT 32 12/26/2020    AST 28 12/26/2020    ALKPHOS 100 12/26/2020    BILITOTAL 0.7 12/26/2020     OTHER:   Lab Results   Component Value Date    A1C 6.1 (H) 01/25/2021    WILEY 9.1 02/05/2021    LIPASE 241 03/04/2016    TSH 2.21 02/05/2021    T4 0.78 02/13/2018    CRP <2.9 05/30/2019    SED 8 05/30/2019       Anesthesia Plan    ASA Status:  3      Anesthesia Type: MAC.     - Reason for MAC: immobility needed   Induction: Propofol, Intravenous.   Maintenance: TIVA.        Consents    Anesthesia Plan(s) and associated risks, benefits, and realistic alternatives discussed. Questions answered and patient/representative(s) expressed understanding.     - Discussed with:  Patient      - Extended Intubation/Ventilatory Support Discussed: No.      - Patient is DNR/DNI Status: No    Use of blood products discussed: Yes.     - Discussed with: Patient.     - Consented: consented to blood products            Reason for refusal: other.     Postoperative Care    Pain management: IV analgesics.   PONV prophylaxis: Ondansetron (or other 5HT-3), Dexamethasone or Solumedrol     Comments:                Ciaran Cazares MD

## 2021-06-17 NOTE — ANESTHESIA CARE TRANSFER NOTE
Patient: Kristie A Linhart    Procedure(s):  Closed reduction, pinning right long finger distal interphalangeal joint fracture    Diagnosis: Finger fracture [S62.608O]  Diagnosis Additional Information: No value filed.    Anesthesia Type:   MAC     Note:    Oropharynx: oropharynx clear of all foreign objects and spontaneously breathing  Level of Consciousness: awake  Oxygen Supplementation: room air    Independent Airway: airway patency satisfactory and stable  Dentition: dentition unchanged  Vital Signs Stable: post-procedure vital signs reviewed and stable  Report to RN Given: handoff report given  Patient transferred to: Phase II    Handoff Report: Identifed the Patient, Identified the Reponsible Provider, Reviewed the pertinent medical history, Discussed the surgical course, Reviewed Intra-OP anesthesia mangement and issues during anesthesia, Set expectations for post-procedure period and Allowed opportunity for questions and acknowledgement of understanding      Vitals: (Last set prior to Anesthesia Care Transfer)  CRNA VITALS  6/17/2021 1129 - 6/17/2021 1224      6/17/2021             NIBP:  96/56    Pulse:  76    NIBP Mean:  65    SpO2:  93 %        Electronically Signed By: WINNIE Low CRNA  June 17, 2021  12:24 PM

## 2021-06-17 NOTE — DISCHARGE INSTRUCTIONS
GENERAL ANESTHESIA OR SEDATION ADULT DISCHARGE INSTRUCTIONS   SPECIAL PRECAUTIONS FOR 24 HOURS AFTER SURGERY    IT IS NOT UNUSUAL TO FEEL LIGHT-HEADED OR FAINT, UP TO 24 HOURS AFTER SURGERY OR WHILE TAKING PAIN MEDICATION.  IF YOU HAVE THESE SYMPTOMS; SIT FOR A FEW MINUTES BEFORE STANDING AND HAVE SOMEONE ASSIST YOU WHEN YOU GET UP TO WALK OR USE THE BATHROOM.    YOU SHOULD REST AND RELAX FOR THE NEXT 24 HOURS AND YOU MUST MAKE ARRANGEMENTS TO HAVE SOMEONE STAY WITH YOU FOR AT LEAST 24 HOURS AFTER YOUR DISCHARGE.  AVOID HAZARDOUS AND STRENUOUS ACTIVITIES.  DO NOT MAKE IMPORTANT DECISIONS FOR 24 HOURS.    DO NOT DRIVE ANY VEHICLE OR OPERATE MECHANICAL EQUIPMENT FOR 24 HOURS FOLLOWING THE END OF YOUR SURGERY.  EVEN THOUGH YOU MAY FEEL NORMAL, YOUR REACTIONS MAY BE AFFECTED BY THE MEDICATION YOU HAVE RECEIVED.    DO NOT DRINK ALCOHOLIC BEVERAGES FOR 24 HOURS FOLLOWING YOUR SURGERY.    DRINK CLEAR LIQUIDS (APPLE JUICE, GINGER ALE, 7-UP, BROTH, ETC.).  PROGRESS TO YOUR REGULAR DIET AS YOU FEEL ABLE.    YOU MAY HAVE A DRY MOUTH, A SORE THROAT, MUSCLES ACHES OR TROUBLE SLEEPING.  THESE SHOULD GO AWAY AFTER 24 HOURS.    CALL YOUR DOCTOR FOR ANY OF THE FOLLOWING:  SIGNS OF INFECTION (FEVER, GROWING TENDERNESS AT THE SURGERY SITE, A LARGE AMOUNT OF DRAINAGE OR BLEEDING, SEVERE PAIN, FOUL-SMELLING DRAINAGE, REDNESS OR SWELLING.    IT HAS BEEN OVER 8 TO 10 HOURS SINCE SURGERY AND YOU ARE STILL NOT ABLE TO URINATE (PASS WATER).       DR. TRINA SILVA M.D.             CLINIC PHONE NUMBER:  [phone]

## 2021-06-17 NOTE — ANESTHESIA POSTPROCEDURE EVALUATION
[facility]    Procedure(s):  Closed reduction, pinning right long finger distal interphalangeal joint fracture    Diagnosis:Finger fracture [S62.609A]  Diagnosis Additional Information: No value filed.    Anesthesia Type:  MAC    Note:     Postop Pain Control: Uneventful            Sign Out: Well controlled pain   PONV: No   Neuro/Psych: Uneventful            Sign Out: Acceptable/Baseline neuro status   Airway/Respiratory: Uneventful            Sign Out: Acceptable/Baseline resp. status   CV/Hemodynamics: Uneventful            Sign Out: Acceptable CV status; No obvious hypovolemia; No obvious fluid overload   Other NRE: NONE   DID A NON-ROUTINE EVENT OCCUR? No           Last vitals:  Vitals:    06/17/21 0932 06/17/21 1205 06/17/21 1243   BP: 113/62 103/56 106/65   Pulse:   80   Resp: 12 16 16   Temp: 97.3  F (36.3  C) 97.5  F (36.4  C) 98  F (36.7  C)   SpO2: 97% 97% 98%       Last vitals prior to Anesthesia Care Transfer:  CRNA VITALS  6/17/2021 1129 - 6/17/2021 1229      6/17/2021             NIBP:  96/56    Pulse:  76    NIBP Mean:  65    SpO2:  93 %          Electronically Signed By: Ciaran Cazares MD  June 17, 2021  3:23 PM

## 2021-06-19 NOTE — OP NOTE
Procedure Date: 2021    DATE OF OPERATION:  2021.    PREOPERATIVE DIAGNOSIS:  Intra-articular DIP joint fracture, right long finger.    POSTOPERATIVE DIAGNOSIS:  Intraarticular DIP joint fracture, right long finger.    PROCEDURE PERFORMED:  Percutaneous reduction with percutaneous pinning of right long finger intra-articular DIP joint fracture.    SURGEON:  Joseluis Green MD    ANESTHESIA:  Digital block with sedation.    PREOPERATIVE HISTORY:  A 75-year-old woman with a history of a fracture involving the DIP joint with significant intra-articular involvement joint incongruency presents at this time for operative management.    DESCRIPTION OF PROCEDURE:  After obtaining informed consent, was brought to the operating room and placed on the operating table in supine position.  IV sedation and IV antibiotics were given.  Digital block was performed.  The right long finger right upper extremity was prepped and draped sterilely.  C-arm imaging was used to assess the fracture.  Manipulation was performed.  Dental pick was placed into the dorsal fragment as traction was applied.  The dorsal fragment was then reduced and keyed into its anatomic location.  AP, lateral and multiple oblique images then verified an anatomic reduction was achieved as it was held in place. An 0.035 wire was advanced down the distal phalanx across the fracture and into the middle phalanx.  A second 0.028 wire was then placed as a dorsal buttress pin.  Anatomic reduction was maintained.  Concentric reduction of the joint.  C-arm imaging verified good position of the pins and anatomic reduction.  Pins were cut just outside the skin.  A sterile dressing and gentle compression bandage applied.  She tolerated the procedure well, no complications.    Joseluis Delong MD        D: 2021   T: 2021   MT:     Name:     LEONID MCKEONMatt  MRN:      [id number]        Account:        [id number]   :      1945            Procedure Date: 06/17/2021     Document: [id number]

## 2021-06-30 DIAGNOSIS — Z11.59 ENCOUNTER FOR SCREENING FOR OTHER VIRAL DISEASES: ICD-10-CM

## 2021-07-07 DIAGNOSIS — Z11.59 ENCOUNTER FOR SCREENING FOR OTHER VIRAL DISEASES: ICD-10-CM

## 2021-07-07 PROCEDURE — U0005 INFEC AGEN DETEC AMPLI PROBE: HCPCS | Performed by: INTERNAL MEDICINE

## 2021-07-07 PROCEDURE — U0003 INFECTIOUS AGENT DETECTION BY NUCLEIC ACID (DNA OR RNA); SEVERE ACUTE RESPIRATORY SYNDROME CORONAVIRUS 2 (SARS-COV-2) (CORONAVIRUS DISEASE [COVID-19]), AMPLIFIED PROBE TECHNIQUE, MAKING USE OF HIGH THROUGHPUT TECHNOLOGIES AS DESCRIBED BY CMS-2020-01-R: HCPCS | Performed by: INTERNAL MEDICINE

## 2021-07-09 ENCOUNTER — HOSPITAL ENCOUNTER (OUTPATIENT)
Facility: CLINIC | Age: 76
Discharge: HOME | End: 2021-07-09
Attending: INTERNAL MEDICINE | Admitting: INTERNAL MEDICINE
Payer: COMMERCIAL

## 2021-07-09 VITALS
SYSTOLIC BLOOD PRESSURE: 118 MMHG | HEART RATE: 73 BPM | OXYGEN SATURATION: 94 % | RESPIRATION RATE: 16 BRPM | DIASTOLIC BLOOD PRESSURE: 62 MMHG

## 2021-07-09 LAB — UPPER GI ENDOSCOPY: NORMAL

## 2021-07-09 PROCEDURE — 250N000011 HC RX IP 250 OP 636: Performed by: INTERNAL MEDICINE

## 2021-07-09 PROCEDURE — 43239 EGD BIOPSY SINGLE/MULTIPLE: CPT | Performed by: INTERNAL MEDICINE

## 2021-07-09 PROCEDURE — 999N000099 HC STATISTIC MODERATE SEDATION < 10 MIN: Performed by: INTERNAL MEDICINE

## 2021-07-09 PROCEDURE — 88305 TISSUE EXAM BY PATHOLOGIST: CPT | Mod: TC | Performed by: INTERNAL MEDICINE

## 2021-07-09 PROCEDURE — 250N000009 HC RX 250: Performed by: INTERNAL MEDICINE

## 2021-07-09 PROCEDURE — 88305 TISSUE EXAM BY PATHOLOGIST: CPT | Mod: 26 | Performed by: PATHOLOGY

## 2021-07-09 RX ORDER — NALOXONE HYDROCHLORIDE 0.4 MG/ML
0.2 INJECTION, SOLUTION INTRAMUSCULAR; INTRAVENOUS; SUBCUTANEOUS
Status: DISCONTINUED | OUTPATIENT
Start: 2021-07-09 | End: 2021-07-09 | Stop reason: HOSPADM

## 2021-07-09 RX ORDER — NALOXONE HYDROCHLORIDE 0.4 MG/ML
0.4 INJECTION, SOLUTION INTRAMUSCULAR; INTRAVENOUS; SUBCUTANEOUS
Status: DISCONTINUED | OUTPATIENT
Start: 2021-07-09 | End: 2021-07-09 | Stop reason: HOSPADM

## 2021-07-09 RX ORDER — ONDANSETRON 2 MG/ML
4 INJECTION INTRAMUSCULAR; INTRAVENOUS
Status: DISCONTINUED | OUTPATIENT
Start: 2021-07-09 | End: 2021-07-09 | Stop reason: HOSPADM

## 2021-07-09 RX ORDER — LIDOCAINE 40 MG/G
CREAM TOPICAL
Status: DISCONTINUED | OUTPATIENT
Start: 2021-07-09 | End: 2021-07-09 | Stop reason: HOSPADM

## 2021-07-09 RX ORDER — FLUMAZENIL 0.1 MG/ML
0.2 INJECTION, SOLUTION INTRAVENOUS
Status: DISCONTINUED | OUTPATIENT
Start: 2021-07-09 | End: 2021-07-09 | Stop reason: HOSPADM

## 2021-07-09 RX ORDER — ONDANSETRON 4 MG/1
4 TABLET, ORALLY DISINTEGRATING ORAL EVERY 6 HOURS PRN
Status: DISCONTINUED | OUTPATIENT
Start: 2021-07-09 | End: 2021-07-09 | Stop reason: HOSPADM

## 2021-07-09 RX ORDER — FENTANYL CITRATE 50 UG/ML
INJECTION, SOLUTION INTRAMUSCULAR; INTRAVENOUS PRN
Status: COMPLETED | OUTPATIENT
Start: 2021-07-09 | End: 2021-07-09

## 2021-07-09 RX ORDER — ONDANSETRON 2 MG/ML
4 INJECTION INTRAMUSCULAR; INTRAVENOUS EVERY 6 HOURS PRN
Status: DISCONTINUED | OUTPATIENT
Start: 2021-07-09 | End: 2021-07-09 | Stop reason: HOSPADM

## 2021-07-09 RX ADMIN — MIDAZOLAM 1 MG: 1 INJECTION INTRAMUSCULAR; INTRAVENOUS at 13:42

## 2021-07-09 RX ADMIN — FENTANYL CITRATE 50 MCG: 50 INJECTION, SOLUTION INTRAMUSCULAR; INTRAVENOUS at 13:42

## 2021-07-09 RX ADMIN — TOPICAL ANESTHETIC 1 EACH: 200 SPRAY DENTAL; PERIODONTAL at 13:43

## 2021-07-09 NOTE — H&P
Pre-Endoscopy History and Physical     Kristie MARYANN Jones MRN# [id number]   YOB: 1945 Age: 75 year old     Date of Procedure: 7/9/2021  Primary care provider: Julianne Thornton  Type of Endoscopy: Gastroscopy with possible biopsy, possible dilation  Reason for Procedure: reflux  Type of Anesthesia Anticipated: Conscious Sedation    HPI:    Kristie is a 75 year old female who will be undergoing the above procedure.      A history and physical has been performed. The patient's medications and allergies have been reviewed. The risks and benefits of the procedure and the sedation options and risks were discussed with the patient.  All questions were answered and informed consent was obtained.      She denies a personal or family history of anesthesia complications or bleeding disorders.     Patient Active Problem List   Diagnosis     GENERAL OSTEOARTHROSIS [715.00]     Esophageal reflux     Hyperlipidemia LDL goal <130     LBBB (left bundle branch block)     Advanced directives, counseling/discussion     SUSAN (obstructive sleep apnea)     Idiopathic cardiomyopathy (H)     Cataract     Leg pain     Insomnia     Obesity     S/P total  right knee replacement: 1/7/13     Generalized anxiety disorder     Total knee replacement status     S/P revision of total knee, 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     Major depressive disorder, recurrent episode, mild (H)     ICD (implantable cardioverter-defibrillator), biventricular, in situ     Type 2 diabetes mellitus without complication (H)     Chronic systolic congestive heart failure (H)     Morbid obesity (H)     Chest pain, unspecified type        Past Medical History:   Diagnosis Date     Acute posthemorrhagic anemia 9/11/2015     Anemia      Chronic systolic congestive heart failure (H) 10/10/2018     EF 35%     Fibromyalgia      Gastro-oesophageal reflux disease      GENERAL OSTEOARTHROSIS [715.00] 11/23/2004    knee     Generalized anxiety disorder 9/30/2013     Hyperlipidemia LDL goal  <130 2/10/2010    Failed simvastatin- muscle aches      Hypertension     because of the heart     Idiopathic cardiomyopathy (H) 1/19/2012    Dr. Osborne 3/2011 EF 30-35%      Insomnia 1/19/2012     Iron deficiency anemia 8/12/2015     Problem list name updated by automated process. Provider to review     LBBB (left bundle branch block)      Left ventricular systolic dysfunction:EF 35% 2/5/2012    Must stay on diovan per cardiology      Major depressive disorder, recurrent episode, mild (H) 2/17/2016     Migraine 6/21/2005     Problem list name updated by automated process. Provider to review     Nonischemic cardiomyopathy (H)     EF 30-35%, Dr. Osborne [facility] (suspect virus); s/p AICD     NSVT (nonsustained ventricular tachycardia) (H)      Obesity 1/20/2012     SUSAN (obstructive sleep apnea) 01/19/2012    CPAP      Pure hypercholesterolemia      Restless leg syndrome      Type 2 diabetes mellitus without complication (H) 9/24/2016        Past Surgical History:   Procedure Laterality Date     APPENDECTOMY       ARTHROPLASTY KNEE  1/7/2013    Procedure: ARTHROPLASTY KNEE;  Right Total Knee Arthroplasty       ARTHROPLASTY REVISION KNEE Right 8/26/2015    Procedure: ARTHROPLASTY REVISION KNEE;  Surgeon: Néstor Beth MD;  Location: [facility] OR     ARTHROSCOPY KNEE       bunionectomy left foot       CLOSED REDUCTION, PERCUTANEOUS PINNING FINGER, COMBINED Right 6/17/2021    Procedure: Closed reduction, pinning right long finger distal interphalangeal joint fracture;  Surgeon: Joseluis Delong MD;  Location: [facility] OR     COLONOSCOPY       CORONARY ANGIOGRAPHY ADULT ORDER  2002    normal     CORONARY ANGIOGRAPHY ADULT ORDER  12/7/12    no significant focal narrowing that would benefit from mechanical intervention      HYSTERECTOMY       IMPLANT AUTOMATIC IMPLANTABLE CARDIOVERTER DEFIBRILLATOR  4/30/12     INSERT THORACIC PACEMAKER LEAD EPICARDIAL  5/1/2012    Procedure:INSERT THORACIC PACEMAKER LEAD EPICARDIAL; EPICARDIAL LEAD PLACEMENT;  Surgeon:WEST ARECHIGA; Location:[facility] OR     TONSILLECTOMY       TRANSPLANT - corneal lenses      Bilateral for cataracts       Social History     Tobacco Use     Smoking status: Never Smoker     Smokeless tobacco: Never Used   Substance Use Topics     Alcohol use: Yes     Alcohol/week: 0.0 standard drinks     Frequency: Monthly or less     Drinks per session: 1 or 2     Binge frequency: Never     Comment: rarely       Family History   Problem Relation Age of Onset     Cancer Father         intraabdominal mass - not colon cancer     C.A.D. Mother      Hypertension Mother      Lipids Mother      Heart Disease Mother      Cancer Daughter 36        brain      Diabetes Paternal Uncle 52     Prostate Cancer Brother      Heart Disease Sister         murmur     Anxiety Disorder Sister      Colon Cancer No family hx of        Prior to Admission medications    Medication Sig Start Date End Date Taking? Authorizing Provider   acetaminophen (TYLENOL) 500 MG tablet Take 1,000 mg by mouth daily as needed for mild pain   Yes Unknown, Entered By History   ALPRAZolam (XANAX) 0.25 MG tablet Take 1 tablet (0.25 mg) by mouth 2 times daily as needed for anxiety 12/20/19  Yes Julianne Thornton APRN CNP   aspirin 81 MG tablet Take 81 mg by mouth daily   Yes Reported, Patient   CALCIUM 500 MG OR TABS Takes 3 tabs daily takees total of 1200 mg daily 1/7/08  Yes Juana Washington MD   carvedilol (COREG) 25 MG tablet TAKE 1 TABLET BY MOUTH TWICE DAILY WITH MEALS 2/5/21  Yes Julianne Thornton APRN CNP   Cholecalciferol (VITAMIN D) 2000 UNITS tablet Take 1 tablet by mouth daily.   Yes Reported, Patient   co-enzyme Q-10 50 MG CAPS Take 1 capsule by mouth daily. 1/27/11  Yes Pepper Baugh MD   lifitegrast (XIIDRA) 5 % opthalmic solution Place 1 drop into both eyes 2 times daily   Yes Unknown, Entered By History   loratadine (CLARITIN) 10 MG tablet Take 10 mg by mouth daily   Yes Reported, Patient   losartan (COZAAR)  50 MG tablet Take 1 tablet (50 mg) by mouth daily 2/5/21  Yes Julianne Thornton APRN CNP   mirtazapine (REMERON) 7.5 MG tablet Take 1-2 tablets 30 minutes before bedtime 2/5/21  Yes Julianne Thornton APRN CNP   Multiple Vitamins-Minerals (MULTIVITAMIN ADULT PO) Take 1 tablet by mouth daily   Yes Unknown, Entered By History   omeprazole (PRILOSEC) 20 MG DR capsule Take 1 capsule (20 mg) by mouth daily 6/15/21  Yes Julianne Thornton APRN CNP   rosuvastatin (CRESTOR) 10 MG tablet TAKE 1 TABLET EVERY OTHER DAY 2/5/21  Yes Julianne Thornton APRN CNP   spironolactone (ALDACTONE) 25 MG tablet Take 0.5 tablets (12.5 mg) by mouth daily 5/5/21  Yes Julianne Thornton APRN CNP   venlafaxine (EFFEXOR-XR) 75 MG 24 hr capsule TAKE 3 CAPSULES BY MOUTH ONCE DAILY 2/5/21  Yes Julianne Thornton APRN CNP   ciprofloxacin (CIPRO) 100 MG tablet Take 100 mg by mouth daily as needed For dental appointments    Unknown, Entered By History   clobetasol (TEMOVATE) 0.05 % external cream Apply thin layer to bilateral hand twice daily for 2 weeks 12/20/19   Julianne Thornton APRN CNP   HYDROcodone-acetaminophen (NORCO) 5-325 MG tablet Take 1-2 tablets by mouth every 4 hours as needed for moderate to severe pain 6/17/21   Joseluis Delong MD       Allergies   Allergen Reactions     Corticosteroids Other (See Comments)     flush up through chest and face, decadron  flushing  Other reaction(s): Erythema     Phenothiazines Anxiety and GI Disturbance     anxiety attack, compazine  Other reaction(s): Tardive Dyskinesia     Prochlorperazine      Other reaction(s): Tardive Dyskinesia     Amoxicillin Hives     Clindamycin Other (See Comments)     Burning sensation in chest     Decadron      flushing     Dexamethasone      Other reaction(s): Erythema     Compazine Anxiety        REVIEW OF SYSTEMS:   5 point ROS negative except as noted above in HPI, including Gen., Resp., CV, GI &  system review.    PHYSICAL  "EXAM:   There were no vitals taken for this visit. Estimated body mass index is 33.95 kg/m  as calculated from the following:    Height as of 6/17/21: 1.651 m (5' 5\").    Weight as of 6/17/21: 92.5 kg (204 lb).   GENERAL APPEARANCE: alert, and oriented  MENTAL STATUS: alert  AIRWAY EXAM: Mallampatti Class I (visualization of the soft palate, fauces, uvula, anterior and posterior pillars)  RESP: lungs clear to auscultation - no rales, rhonchi or wheezes  CV: regular rates and rhythm  DIAGNOSTICS:    Not indicated    IMPRESSION   ASA Class 2 - Mild systemic disease    PLAN:   Plan for Gastroscopy with possible biopsy, possible dilation. We discussed the risks, benefits and alternatives and the patient wished to proceed.    The above has been forwarded to the consulting provider.      Signed Electronically by: Travis Harrington MD  July 9, 2021          "

## 2021-07-09 NOTE — DISCHARGE INSTRUCTIONS
"  Tips to Control Acid Reflux  To control acid reflux, you ll need to make some basic diet and lifestyle changes. The simple steps outlined below may be all you ll need to relieve discomfort.   Watch What You Eat      Avoid fatty foods and spicy foods.    Eat fewer acidic foods, such as citrus and tomato-based foods. These can increase symptoms.     Limit drinking alcohol, caffeine, and fizzy beverages. All increase acid reflux.    Try limiting chocolate, peppermint, and spearmint. These can worsen acid reflux in some people.    Watch When You Eat    Avoid lying down for 3 hours after eating.    Do not snack before going to bed.    Raise Your Head  Raising your head and upper body by 4\" to 6\" helps limit reflux when you re lying down. Put blocks under the head of the bed frame to raise it.                    [id number] [facility], [address] [place] [address], [place] [address]. All rights reserved. This information is not intended as a substitute for professional medical care. Always follow your healthcare professional's instructions.    [employer]'s pamphlet also given  "

## 2021-07-14 LAB — COPATH REPORT: NORMAL

## 2021-07-15 ENCOUNTER — MYC MEDICAL ADVICE (OUTPATIENT)
Dept: PEDIATRICS | Facility: CLINIC | Age: 76
End: 2021-07-15

## 2021-07-15 DIAGNOSIS — G47.00 INSOMNIA, UNSPECIFIED TYPE: ICD-10-CM

## 2021-07-15 RX ORDER — MIRTAZAPINE 7.5 MG/1
TABLET, FILM COATED ORAL
Qty: 60 TABLET | Refills: 1 | Status: SHIPPED | OUTPATIENT
Start: 2021-07-15 | End: 2021-12-20

## 2021-09-01 ENCOUNTER — ANCILLARY PROCEDURE (OUTPATIENT)
Dept: CARDIOLOGY | Facility: CLINIC | Age: 76
End: 2021-09-01
Attending: INTERNAL MEDICINE
Payer: COMMERCIAL

## 2021-09-01 DIAGNOSIS — Z95.810 ICD (IMPLANTABLE CARDIOVERTER-DEFIBRILLATOR) IN PLACE: ICD-10-CM

## 2021-09-01 DIAGNOSIS — I42.9 IDIOPATHIC CARDIOMYOPATHY (H): ICD-10-CM

## 2021-09-01 PROCEDURE — 93295 DEV INTERROG REMOTE 1/2/MLT: CPT | Performed by: INTERNAL MEDICINE

## 2021-09-01 PROCEDURE — 93296 REM INTERROG EVL PM/IDS: CPT | Performed by: INTERNAL MEDICINE

## 2021-09-14 LAB
MDC_IDC_EPISODE_DTM: NORMAL
MDC_IDC_EPISODE_ID: NORMAL
MDC_IDC_EPISODE_TYPE: NORMAL
MDC_IDC_LEAD_IMPLANT_DT: NORMAL
MDC_IDC_LEAD_LOCATION: NORMAL
MDC_IDC_LEAD_LOCATION_DETAIL_1: NORMAL
MDC_IDC_LEAD_MFG: NORMAL
MDC_IDC_LEAD_MODEL: 4047
MDC_IDC_LEAD_MODEL: 4047
MDC_IDC_LEAD_MODEL: NORMAL
MDC_IDC_LEAD_MODEL: NORMAL
MDC_IDC_LEAD_POLARITY_TYPE: NORMAL
MDC_IDC_LEAD_SERIAL: NORMAL
MDC_IDC_MSMT_BATTERY_DTM: NORMAL
MDC_IDC_MSMT_BATTERY_REMAINING_LONGEVITY: 18 MO
MDC_IDC_MSMT_BATTERY_REMAINING_PERCENTAGE: 31 %
MDC_IDC_MSMT_BATTERY_STATUS: NORMAL
MDC_IDC_MSMT_CAP_CHARGE_DTM: NORMAL
MDC_IDC_MSMT_CAP_CHARGE_TIME: 12.9 S
MDC_IDC_MSMT_CAP_CHARGE_TYPE: NORMAL
MDC_IDC_MSMT_LEADCHNL_LV_IMPEDANCE_VALUE: 362 OHM
MDC_IDC_MSMT_LEADCHNL_LV_PACING_THRESHOLD_AMPLITUDE: 1.2 V
MDC_IDC_MSMT_LEADCHNL_LV_PACING_THRESHOLD_PULSEWIDTH: 1 MS
MDC_IDC_MSMT_LEADCHNL_RA_IMPEDANCE_VALUE: 592 OHM
MDC_IDC_MSMT_LEADCHNL_RA_LEAD_CHANNEL_STATUS: NORMAL
MDC_IDC_MSMT_LEADCHNL_RA_PACING_THRESHOLD_AMPLITUDE: 0.4 V
MDC_IDC_MSMT_LEADCHNL_RA_PACING_THRESHOLD_PULSEWIDTH: 0.5 MS
MDC_IDC_MSMT_LEADCHNL_RV_IMPEDANCE_VALUE: 409 OHM
MDC_IDC_MSMT_LEADCHNL_RV_PACING_THRESHOLD_AMPLITUDE: 0.7 V
MDC_IDC_MSMT_LEADCHNL_RV_PACING_THRESHOLD_PULSEWIDTH: 1 MS
MDC_IDC_PG_IMPLANT_DTM: NORMAL
MDC_IDC_PG_MFG: NORMAL
MDC_IDC_PG_MODEL: NORMAL
MDC_IDC_PG_SERIAL: NORMAL
MDC_IDC_PG_TYPE: NORMAL
MDC_IDC_SESS_CLINIC_NAME: NORMAL
MDC_IDC_SESS_DTM: NORMAL
MDC_IDC_SESS_TYPE: NORMAL
MDC_IDC_SET_BRADY_AT_MODE_SWITCH_RATE: 170 {BEATS}/MIN
MDC_IDC_SET_BRADY_LOWRATE: 40 {BEATS}/MIN
MDC_IDC_SET_BRADY_MODE: NORMAL
MDC_IDC_SET_CRT_PACED_CHAMBERS: NORMAL
MDC_IDC_SET_LEADCHNL_LV_SENSING_ADAPTATION_MODE: NORMAL
MDC_IDC_SET_LEADCHNL_LV_SENSING_ANODE_ELECTRODE_1: NORMAL
MDC_IDC_SET_LEADCHNL_LV_SENSING_ANODE_LOCATION_1: NORMAL
MDC_IDC_SET_LEADCHNL_LV_SENSING_CATHODE_ELECTRODE_1: NORMAL
MDC_IDC_SET_LEADCHNL_LV_SENSING_CATHODE_LOCATION_1: NORMAL
MDC_IDC_SET_LEADCHNL_LV_SENSING_SENSITIVITY: 1 MV
MDC_IDC_SET_LEADCHNL_RA_PACING_POLARITY: NORMAL
MDC_IDC_SET_LEADCHNL_RA_SENSING_ADAPTATION_MODE: NORMAL
MDC_IDC_SET_LEADCHNL_RA_SENSING_POLARITY: NORMAL
MDC_IDC_SET_LEADCHNL_RA_SENSING_SENSITIVITY: 0.15 MV
MDC_IDC_SET_LEADCHNL_RV_PACING_AMPLITUDE: 2 V
MDC_IDC_SET_LEADCHNL_RV_PACING_POLARITY: NORMAL
MDC_IDC_SET_LEADCHNL_RV_PACING_PULSEWIDTH: 1 MS
MDC_IDC_SET_LEADCHNL_RV_SENSING_ADAPTATION_MODE: NORMAL
MDC_IDC_SET_LEADCHNL_RV_SENSING_POLARITY: NORMAL
MDC_IDC_SET_LEADCHNL_RV_SENSING_SENSITIVITY: 0.6 MV
MDC_IDC_SET_ZONE_DETECTION_INTERVAL: 250 MS
MDC_IDC_SET_ZONE_DETECTION_INTERVAL: 333 MS
MDC_IDC_SET_ZONE_DETECTION_INTERVAL: 375 MS
MDC_IDC_SET_ZONE_TYPE: NORMAL
MDC_IDC_SET_ZONE_VENDOR_TYPE: NORMAL
MDC_IDC_STAT_BRADY_DTM_END: NORMAL
MDC_IDC_STAT_BRADY_DTM_START: NORMAL
MDC_IDC_STAT_BRADY_RA_PERCENT_PACED: 0 %
MDC_IDC_STAT_BRADY_RV_PERCENT_PACED: 0 %
MDC_IDC_STAT_CRT_DTM_END: NORMAL
MDC_IDC_STAT_CRT_DTM_START: NORMAL
MDC_IDC_STAT_CRT_LV_PERCENT_PACED: 0 %
MDC_IDC_STAT_EPISODE_RECENT_COUNT: 0
MDC_IDC_STAT_EPISODE_RECENT_COUNT_DTM_END: NORMAL
MDC_IDC_STAT_EPISODE_RECENT_COUNT_DTM_START: NORMAL
MDC_IDC_STAT_EPISODE_TYPE: NORMAL
MDC_IDC_STAT_EPISODE_VENDOR_TYPE: NORMAL
MDC_IDC_STAT_TACHYTHERAPY_ATP_DELIVERED_RECENT: 0
MDC_IDC_STAT_TACHYTHERAPY_ATP_DELIVERED_TOTAL: 0
MDC_IDC_STAT_TACHYTHERAPY_RECENT_DTM_END: NORMAL
MDC_IDC_STAT_TACHYTHERAPY_RECENT_DTM_START: NORMAL
MDC_IDC_STAT_TACHYTHERAPY_SHOCKS_ABORTED_RECENT: 0
MDC_IDC_STAT_TACHYTHERAPY_SHOCKS_ABORTED_TOTAL: 0
MDC_IDC_STAT_TACHYTHERAPY_SHOCKS_DELIVERED_RECENT: 0
MDC_IDC_STAT_TACHYTHERAPY_SHOCKS_DELIVERED_TOTAL: 0
MDC_IDC_STAT_TACHYTHERAPY_TOTAL_DTM_END: NORMAL

## 2021-09-19 ENCOUNTER — HEALTH MAINTENANCE LETTER (OUTPATIENT)
Age: 76
End: 2021-09-19

## 2021-11-12 ENCOUNTER — MYC MEDICAL ADVICE (OUTPATIENT)
Dept: PEDIATRICS | Facility: CLINIC | Age: 76
End: 2021-11-12
Payer: COMMERCIAL

## 2021-11-12 DIAGNOSIS — I42.9 IDIOPATHIC CARDIOMYOPATHY (H): ICD-10-CM

## 2021-11-12 RX ORDER — SPIRONOLACTONE 25 MG/1
12.5 TABLET ORAL DAILY
Qty: 15 TABLET | Refills: 0 | Status: SHIPPED | OUTPATIENT
Start: 2021-11-12 | End: 2021-12-07

## 2021-11-18 ENCOUNTER — ANCILLARY PROCEDURE (OUTPATIENT)
Dept: MAMMOGRAPHY | Facility: CLINIC | Age: 76
End: 2021-11-18
Attending: NURSE PRACTITIONER
Payer: COMMERCIAL

## 2021-11-18 ENCOUNTER — LAB (OUTPATIENT)
Dept: LAB | Facility: CLINIC | Age: 76
End: 2021-11-18
Payer: COMMERCIAL

## 2021-11-18 DIAGNOSIS — Z12.31 VISIT FOR SCREENING MAMMOGRAM: ICD-10-CM

## 2021-11-18 DIAGNOSIS — E11.9 TYPE 2 DIABETES MELLITUS WITHOUT COMPLICATION, WITHOUT LONG-TERM CURRENT USE OF INSULIN (H): Primary | ICD-10-CM

## 2021-11-18 LAB
ANION GAP SERPL CALCULATED.3IONS-SCNC: 4 MMOL/L (ref 3–14)
BUN SERPL-MCNC: 14 MG/DL (ref 7–30)
CALCIUM SERPL-MCNC: 9.1 MG/DL (ref 8.5–10.1)
CHLORIDE BLD-SCNC: 109 MMOL/L (ref 94–109)
CO2 SERPL-SCNC: 26 MMOL/L (ref 20–32)
CREAT SERPL-MCNC: 0.88 MG/DL (ref 0.52–1.04)
GFR SERPL CREATININE-BSD FRML MDRD: 64 ML/MIN/1.73M2
GLUCOSE BLD-MCNC: 142 MG/DL (ref 70–99)
HBA1C MFR BLD: 6 % (ref 0–5.6)
POTASSIUM BLD-SCNC: 4.2 MMOL/L (ref 3.4–5.3)
SODIUM SERPL-SCNC: 139 MMOL/L (ref 133–144)

## 2021-11-18 PROCEDURE — 80048 BASIC METABOLIC PNL TOTAL CA: CPT

## 2021-11-18 PROCEDURE — 36415 COLL VENOUS BLD VENIPUNCTURE: CPT

## 2021-11-18 PROCEDURE — 77063 BREAST TOMOSYNTHESIS BI: CPT | Mod: TC | Performed by: RADIOLOGY

## 2021-11-18 PROCEDURE — 83036 HEMOGLOBIN GLYCOSYLATED A1C: CPT

## 2021-11-18 PROCEDURE — 77067 SCR MAMMO BI INCL CAD: CPT | Mod: TC | Performed by: RADIOLOGY

## 2021-12-02 ENCOUNTER — HOSPITAL ENCOUNTER (OUTPATIENT)
Dept: MAMMOGRAPHY | Facility: CLINIC | Age: 76
End: 2021-12-02
Attending: NURSE PRACTITIONER
Payer: COMMERCIAL

## 2021-12-02 ENCOUNTER — HOSPITAL ENCOUNTER (OUTPATIENT)
Dept: ULTRASOUND IMAGING | Facility: CLINIC | Age: 76
End: 2021-12-02
Attending: NURSE PRACTITIONER
Payer: COMMERCIAL

## 2021-12-02 DIAGNOSIS — R92.8 ABNORMAL MAMMOGRAM: ICD-10-CM

## 2021-12-02 PROCEDURE — 77061 BREAST TOMOSYNTHESIS UNI: CPT | Mod: RT

## 2021-12-02 PROCEDURE — 76642 ULTRASOUND BREAST LIMITED: CPT | Mod: RT

## 2021-12-06 ENCOUNTER — ANCILLARY PROCEDURE (OUTPATIENT)
Dept: CARDIOLOGY | Facility: CLINIC | Age: 76
End: 2021-12-06
Attending: INTERNAL MEDICINE
Payer: COMMERCIAL

## 2021-12-06 DIAGNOSIS — I42.9 IDIOPATHIC CARDIOMYOPATHY (H): ICD-10-CM

## 2021-12-06 DIAGNOSIS — Z95.810 ICD (IMPLANTABLE CARDIOVERTER-DEFIBRILLATOR) IN PLACE: ICD-10-CM

## 2021-12-06 PROCEDURE — 93296 REM INTERROG EVL PM/IDS: CPT | Performed by: INTERNAL MEDICINE

## 2021-12-06 PROCEDURE — 93295 DEV INTERROG REMOTE 1/2/MLT: CPT | Performed by: INTERNAL MEDICINE

## 2021-12-07 LAB
MDC_IDC_LEAD_IMPLANT_DT: NORMAL
MDC_IDC_LEAD_LOCATION: NORMAL
MDC_IDC_LEAD_LOCATION_DETAIL_1: NORMAL
MDC_IDC_LEAD_MFG: NORMAL
MDC_IDC_LEAD_MODEL: 4047
MDC_IDC_LEAD_MODEL: 4047
MDC_IDC_LEAD_MODEL: NORMAL
MDC_IDC_LEAD_MODEL: NORMAL
MDC_IDC_LEAD_POLARITY_TYPE: NORMAL
MDC_IDC_LEAD_SERIAL: NORMAL
MDC_IDC_MSMT_BATTERY_DTM: NORMAL
MDC_IDC_MSMT_BATTERY_REMAINING_LONGEVITY: 12 MO
MDC_IDC_MSMT_BATTERY_REMAINING_PERCENTAGE: 23 %
MDC_IDC_MSMT_BATTERY_STATUS: NORMAL
MDC_IDC_MSMT_CAP_CHARGE_DTM: NORMAL
MDC_IDC_MSMT_CAP_CHARGE_TIME: 13.2 S
MDC_IDC_MSMT_CAP_CHARGE_TYPE: NORMAL
MDC_IDC_MSMT_LEADCHNL_LV_IMPEDANCE_VALUE: 359 OHM
MDC_IDC_MSMT_LEADCHNL_LV_PACING_THRESHOLD_AMPLITUDE: 1.2 V
MDC_IDC_MSMT_LEADCHNL_LV_PACING_THRESHOLD_PULSEWIDTH: 1 MS
MDC_IDC_MSMT_LEADCHNL_RA_IMPEDANCE_VALUE: 669 OHM
MDC_IDC_MSMT_LEADCHNL_RA_PACING_THRESHOLD_AMPLITUDE: 0.4 V
MDC_IDC_MSMT_LEADCHNL_RA_PACING_THRESHOLD_PULSEWIDTH: 0.5 MS
MDC_IDC_MSMT_LEADCHNL_RV_IMPEDANCE_VALUE: 435 OHM
MDC_IDC_MSMT_LEADCHNL_RV_PACING_THRESHOLD_AMPLITUDE: 0.7 V
MDC_IDC_MSMT_LEADCHNL_RV_PACING_THRESHOLD_PULSEWIDTH: 1 MS
MDC_IDC_PG_IMPLANT_DTM: NORMAL
MDC_IDC_PG_MFG: NORMAL
MDC_IDC_PG_MODEL: NORMAL
MDC_IDC_PG_SERIAL: NORMAL
MDC_IDC_PG_TYPE: NORMAL
MDC_IDC_SESS_CLINIC_NAME: NORMAL
MDC_IDC_SESS_DTM: NORMAL
MDC_IDC_SESS_TYPE: NORMAL
MDC_IDC_SET_BRADY_AT_MODE_SWITCH_RATE: 170 {BEATS}/MIN
MDC_IDC_SET_BRADY_LOWRATE: 40 {BEATS}/MIN
MDC_IDC_SET_BRADY_MODE: NORMAL
MDC_IDC_SET_CRT_PACED_CHAMBERS: NORMAL
MDC_IDC_SET_LEADCHNL_LV_SENSING_ADAPTATION_MODE: NORMAL
MDC_IDC_SET_LEADCHNL_LV_SENSING_ANODE_ELECTRODE_1: NORMAL
MDC_IDC_SET_LEADCHNL_LV_SENSING_ANODE_LOCATION_1: NORMAL
MDC_IDC_SET_LEADCHNL_LV_SENSING_CATHODE_ELECTRODE_1: NORMAL
MDC_IDC_SET_LEADCHNL_LV_SENSING_CATHODE_LOCATION_1: NORMAL
MDC_IDC_SET_LEADCHNL_LV_SENSING_SENSITIVITY: 1 MV
MDC_IDC_SET_LEADCHNL_RA_PACING_POLARITY: NORMAL
MDC_IDC_SET_LEADCHNL_RA_SENSING_ADAPTATION_MODE: NORMAL
MDC_IDC_SET_LEADCHNL_RA_SENSING_POLARITY: NORMAL
MDC_IDC_SET_LEADCHNL_RA_SENSING_SENSITIVITY: 0.15 MV
MDC_IDC_SET_LEADCHNL_RV_PACING_AMPLITUDE: 2 V
MDC_IDC_SET_LEADCHNL_RV_PACING_POLARITY: NORMAL
MDC_IDC_SET_LEADCHNL_RV_PACING_PULSEWIDTH: 1 MS
MDC_IDC_SET_LEADCHNL_RV_SENSING_ADAPTATION_MODE: NORMAL
MDC_IDC_SET_LEADCHNL_RV_SENSING_POLARITY: NORMAL
MDC_IDC_SET_LEADCHNL_RV_SENSING_SENSITIVITY: 0.6 MV
MDC_IDC_SET_ZONE_DETECTION_INTERVAL: 250 MS
MDC_IDC_SET_ZONE_DETECTION_INTERVAL: 333 MS
MDC_IDC_SET_ZONE_DETECTION_INTERVAL: 375 MS
MDC_IDC_SET_ZONE_TYPE: NORMAL
MDC_IDC_SET_ZONE_VENDOR_TYPE: NORMAL
MDC_IDC_STAT_BRADY_DTM_END: NORMAL
MDC_IDC_STAT_BRADY_DTM_START: NORMAL
MDC_IDC_STAT_BRADY_RA_PERCENT_PACED: 0 %
MDC_IDC_STAT_BRADY_RV_PERCENT_PACED: 0 %
MDC_IDC_STAT_CRT_DTM_END: NORMAL
MDC_IDC_STAT_CRT_DTM_START: NORMAL
MDC_IDC_STAT_CRT_LV_PERCENT_PACED: 0 %
MDC_IDC_STAT_EPISODE_RECENT_COUNT: 0
MDC_IDC_STAT_EPISODE_RECENT_COUNT_DTM_END: NORMAL
MDC_IDC_STAT_EPISODE_RECENT_COUNT_DTM_START: NORMAL
MDC_IDC_STAT_EPISODE_TYPE: NORMAL
MDC_IDC_STAT_EPISODE_VENDOR_TYPE: NORMAL
MDC_IDC_STAT_TACHYTHERAPY_ATP_DELIVERED_RECENT: 0
MDC_IDC_STAT_TACHYTHERAPY_ATP_DELIVERED_TOTAL: 0
MDC_IDC_STAT_TACHYTHERAPY_RECENT_DTM_END: NORMAL
MDC_IDC_STAT_TACHYTHERAPY_RECENT_DTM_START: NORMAL
MDC_IDC_STAT_TACHYTHERAPY_SHOCKS_ABORTED_RECENT: 0
MDC_IDC_STAT_TACHYTHERAPY_SHOCKS_ABORTED_TOTAL: 0
MDC_IDC_STAT_TACHYTHERAPY_SHOCKS_DELIVERED_RECENT: 0
MDC_IDC_STAT_TACHYTHERAPY_SHOCKS_DELIVERED_TOTAL: 0
MDC_IDC_STAT_TACHYTHERAPY_TOTAL_DTM_END: NORMAL

## 2021-12-07 RX ORDER — SPIRONOLACTONE 25 MG/1
TABLET ORAL
Qty: 45 TABLET | Refills: 1 | Status: SHIPPED | OUTPATIENT
Start: 2021-12-07 | End: 2022-01-24

## 2021-12-17 DIAGNOSIS — G47.00 INSOMNIA, UNSPECIFIED TYPE: ICD-10-CM

## 2021-12-20 RX ORDER — MIRTAZAPINE 7.5 MG/1
TABLET, FILM COATED ORAL
Qty: 60 TABLET | Refills: 5 | Status: SHIPPED | OUTPATIENT
Start: 2021-12-20 | End: 2022-12-30

## 2021-12-20 NOTE — TELEPHONE ENCOUNTER
Routing refill request to provider for review/approval because:  Drug interaction warning    Layla Murphy RN on 12/20/2021 at 11:36 AM

## 2021-12-23 DIAGNOSIS — Z96.659 STATUS POST TOTAL KNEE REPLACEMENT, UNSPECIFIED LATERALITY: ICD-10-CM

## 2021-12-23 RX ORDER — CIPROFLOXACIN 500 MG/1
TABLET, FILM COATED ORAL
Qty: 9 TABLET | Refills: 7 | OUTPATIENT
Start: 2021-12-23

## 2021-12-23 NOTE — TELEPHONE ENCOUNTER
Routing refill request to provider for review/approval because:  Drug not on the [facility] refill protocol   Medication is reported/historical    Nessa Curtis RN

## 2022-02-19 ENCOUNTER — MYC MEDICAL ADVICE (OUTPATIENT)
Dept: PEDIATRICS | Facility: CLINIC | Age: 77
End: 2022-02-19
Payer: COMMERCIAL

## 2022-02-22 DIAGNOSIS — F41.1 GENERALIZED ANXIETY DISORDER: ICD-10-CM

## 2022-02-22 NOTE — TELEPHONE ENCOUNTER
Routing refill request to provider for review/approval because:  Drug interaction warning  Wandy COSME RN, BSN

## 2022-02-23 RX ORDER — VENLAFAXINE HYDROCHLORIDE 75 MG/1
CAPSULE, EXTENDED RELEASE ORAL
Qty: 270 CAPSULE | Refills: 0 | Status: SHIPPED | OUTPATIENT
Start: 2022-02-23 | End: 2022-05-04

## 2022-03-04 ENCOUNTER — OFFICE VISIT (OUTPATIENT)
Dept: PEDIATRICS | Facility: CLINIC | Age: 77
End: 2022-03-04
Payer: COMMERCIAL

## 2022-03-04 VITALS
WEIGHT: 204 LBS | OXYGEN SATURATION: 96 % | TEMPERATURE: 97.2 F | SYSTOLIC BLOOD PRESSURE: 126 MMHG | BODY MASS INDEX: 33.99 KG/M2 | RESPIRATION RATE: 14 BRPM | HEIGHT: 65 IN | HEART RATE: 66 BPM | DIASTOLIC BLOOD PRESSURE: 80 MMHG

## 2022-03-04 DIAGNOSIS — K60.2 ANAL FISSURE: Primary | ICD-10-CM

## 2022-03-04 DIAGNOSIS — K64.8 INTERNAL HEMORRHOID, BLEEDING: ICD-10-CM

## 2022-03-04 PROCEDURE — 99213 OFFICE O/P EST LOW 20 MIN: CPT | Performed by: NURSE PRACTITIONER

## 2022-03-04 NOTE — PATIENT INSTRUCTIONS
Patient Education     I recommend plenty of fiber and fluids in diet  Start metamucil daily  Sitz bath (soak in tub)  Follow up with colorectal to discuss the fissure and hemorrhoid  Understanding Anal Fissures  An anal fissure is a small rip or tear in the lining of the anus. The anus is the opening where stool leaves the body.  Anal fissures are common. They are sometimes confused with hemorrhoids, which can cause similar symptoms.   What causes an anal fissure?  Having constipation or straining during bowel movements is the most common cause of an anal fissure. You can also get one from:    Diarrhea    Childbirth    Anal sex  Less commonly, a fissure may be caused by:    Inflammatory bowel disease    An anal infection    An anal tumor  Symptoms of an anal fissure  The main symptom of an anal fissure is sharp pain during a bowel movement. This pain may last a few minutes to hours after. You may also have:    Minor bleeding during or after a bowel movement    Itching or irritation around the anus    A small lump near the anus  Treatment for an anal fissure  Most anal fissures will get better with no or minor treatment. Your healthcare provider may advise:    Changes in your diet. Eating more fiber-filled foods, such as fruits and vegetables, can help keep your bowel movements regular. It can also make your stool easier to pass. So, too, can drinking more water. Your healthcare provider may also tell you to take a fiber supplement if diet changes are not enough.    Stool softeners or laxatives. These medicines can help prevent constipation.    Sitz baths. Soaking in warm water for 10 to 20 minutes a day can help ease symptoms.    Medicines. Ointments, creams, and suppositories may help ease pain and aid with healing.    Botulinum toxin injections. This treatment may be used if an anal fissure is not healing well. It can help relax the anal sphincter muscles. This may increase blood flow to the area and help  healing.    Surgery. Your healthcare provider may advise surgery if the anal fissure isn t healing or keeps coming back. You ll likely have a lateral internal sphincterotomy. During this procedure, a cut is made in the anal sphincter to lower pressure inside the anus and increase blood flow. One possible complication of surgery is fecal incontinence.     When to call your healthcare provider  Call your healthcare provider right away if you have any of these:    Fever of 100.4 F (38 C) or higher, or as directed by your healthcare provider    Symptoms that don t get better, or get worse    New symptoms   [employer] last reviewed this educational content on 1/1/2020 2000-2021 [employer]. All rights reserved. This information is not intended as a substitute for professional medical care. Always follow your healthcare professional's instructions.

## 2022-03-04 NOTE — PROGRESS NOTES
Assessment & Plan     Anal fissure  Internal hemorrhoid, bleeding  Recommend increase fiber, fluids, metamucil, and sitz baths. To see colorectal to discuss further treatment options.  - Colorectal Surgery Referral; Future      Patient Instructions     Patient Education     I recommend plenty of fiber and fluids in diet  Start metamucil daily  Sitz bath (soak in tub)  Follow up with colorectal to discuss the fissure and hemorrhoid  Understanding Anal Fissures  An anal fissure is a small rip or tear in the lining of the anus. The anus is the opening where stool leaves the body.  Anal fissures are common. They are sometimes confused with hemorrhoids, which can cause similar symptoms.   What causes an anal fissure?  Having constipation or straining during bowel movements is the most common cause of an anal fissure. You can also get one from:    Diarrhea    Childbirth    Anal sex  Less commonly, a fissure may be caused by:    Inflammatory bowel disease    An anal infection    An anal tumor  Symptoms of an anal fissure  The main symptom of an anal fissure is sharp pain during a bowel movement. This pain may last a few minutes to hours after. You may also have:    Minor bleeding during or after a bowel movement    Itching or irritation around the anus    A small lump near the anus  Treatment for an anal fissure  Most anal fissures will get better with no or minor treatment. Your healthcare provider may advise:    Changes in your diet. Eating more fiber-filled foods, such as fruits and vegetables, can help keep your bowel movements regular. It can also make your stool easier to pass. So, too, can drinking more water. Your healthcare provider may also tell you to take a fiber supplement if diet changes are not enough.    Stool softeners or laxatives. These medicines can help prevent constipation.    Sitz baths. Soaking in warm water for 10 to 20 minutes a day can help ease symptoms.    Medicines. Ointments, creams, and  suppositories may help ease pain and aid with healing.    Botulinum toxin injections. This treatment may be used if an anal fissure is not healing well. It can help relax the anal sphincter muscles. This may increase blood flow to the area and help healing.    Surgery. Your healthcare provider may advise surgery if the anal fissure isn t healing or keeps coming back. You ll likely have a lateral internal sphincterotomy. During this procedure, a cut is made in the anal sphincter to lower pressure inside the anus and increase blood flow. One possible complication of surgery is fecal incontinence.     When to call your healthcare provider  Call your healthcare provider right away if you have any of these:    Fever of 100.4 F (38 C) or higher, or as directed by your healthcare provider    Symptoms that don t get better, or get worse    New symptoms   [employer] last reviewed this educational content on 1/1/2020 2000-2021 [employer]. All rights reserved. This information is not intended as a substitute for professional medical care. Always follow your healthcare professional's instructions.               Return if symptoms worsen or fail to improve.    Dinora David NP  [facility] SHANNENJHONY Mcknight is a 76 year old who presents for the following health issues     HPI     Hemorrhoids  Onset/Duration: 4 weeks  Description:   Caitlyn-anal lump: YES  Pain: YES  Itching: YES  Accompanying Signs & Symptoms:  Blood in stool: YES- faint   Changes in stool pattern: no  History:   Any previous GI studies done:Colonoscopy  Family History of colon cancer: no  Precipitating factors:   Using bathrrom  Alleviating factors:  None  Therapies tried and outcome: anti-itch helped a bit    Denies any hx of constipation  Had hemorrhoid procedure years ago  Symptoms improving but feels pain with BM  Some blood with wiping, no blood in stools    Review of Systems   Otherwise ROS is negative except as  "stated above.        Objective    /80 (BP Location: Right arm, Patient Position: Chair, Cuff Size: Adult Large)   Pulse 66   Temp 97.2  F (36.2  C) (Tympanic)   Resp 14   Ht 1.651 m (5' 5\")   Wt 92.5 kg (204 lb)   SpO2 96%   BMI 33.95 kg/m    Body mass index is 33.95 kg/m .  Physical Exam   GENERAL: healthy, alert and no distress  RECTAL (female): small prolapsed internal hemorrhoid and anal fissure to left side of anus              "

## 2022-03-06 ENCOUNTER — HEALTH MAINTENANCE LETTER (OUTPATIENT)
Age: 77
End: 2022-03-06

## 2022-03-10 ENCOUNTER — ANCILLARY PROCEDURE (OUTPATIENT)
Dept: CARDIOLOGY | Facility: CLINIC | Age: 77
End: 2022-03-10
Attending: INTERNAL MEDICINE
Payer: COMMERCIAL

## 2022-03-10 DIAGNOSIS — Z95.810 ICD (IMPLANTABLE CARDIOVERTER-DEFIBRILLATOR) IN PLACE: ICD-10-CM

## 2022-03-10 DIAGNOSIS — I42.9 IDIOPATHIC CARDIOMYOPATHY (H): ICD-10-CM

## 2022-03-10 PROCEDURE — 93295 DEV INTERROG REMOTE 1/2/MLT: CPT | Performed by: INTERNAL MEDICINE

## 2022-03-10 PROCEDURE — 93296 REM INTERROG EVL PM/IDS: CPT | Performed by: INTERNAL MEDICINE

## 2022-03-18 DIAGNOSIS — L30.9 ECZEMA, UNSPECIFIED TYPE: ICD-10-CM

## 2022-03-18 DIAGNOSIS — E78.5 HYPERLIPIDEMIA LDL GOAL <130: ICD-10-CM

## 2022-03-21 NOTE — TELEPHONE ENCOUNTER
Routing refill request to provider for review/approval because:  Drug not on the [facility] refill protocol   Labs not current:  BE PONCE RN

## 2022-03-22 RX ORDER — CLOBETASOL PROPIONATE 0.5 MG/G
CREAM TOPICAL
Qty: 45 G | Refills: 1 | Status: SHIPPED | OUTPATIENT
Start: 2022-03-22

## 2022-03-22 RX ORDER — ROSUVASTATIN CALCIUM 10 MG/1
TABLET, COATED ORAL
Qty: 45 TABLET | Refills: 0 | Status: SHIPPED | OUTPATIENT
Start: 2022-03-22 | End: 2022-05-04

## 2022-03-31 LAB
MDC_IDC_EPISODE_DTM: NORMAL
MDC_IDC_EPISODE_ID: NORMAL
MDC_IDC_EPISODE_TYPE: NORMAL
MDC_IDC_LEAD_IMPLANT_DT: NORMAL
MDC_IDC_LEAD_LOCATION: NORMAL
MDC_IDC_LEAD_LOCATION_DETAIL_1: NORMAL
MDC_IDC_LEAD_MFG: NORMAL
MDC_IDC_LEAD_MODEL: 4047
MDC_IDC_LEAD_MODEL: 4047
MDC_IDC_LEAD_MODEL: NORMAL
MDC_IDC_LEAD_MODEL: NORMAL
MDC_IDC_LEAD_POLARITY_TYPE: NORMAL
MDC_IDC_LEAD_SERIAL: NORMAL
MDC_IDC_MSMT_BATTERY_DTM: NORMAL
MDC_IDC_MSMT_BATTERY_REMAINING_LONGEVITY: 11 MO
MDC_IDC_MSMT_BATTERY_REMAINING_PERCENTAGE: 16 %
MDC_IDC_MSMT_BATTERY_STATUS: NORMAL
MDC_IDC_MSMT_CAP_CHARGE_DTM: NORMAL
MDC_IDC_MSMT_CAP_CHARGE_TIME: 13.6 S
MDC_IDC_MSMT_CAP_CHARGE_TYPE: NORMAL
MDC_IDC_MSMT_LEADCHNL_LV_IMPEDANCE_VALUE: 363 OHM
MDC_IDC_MSMT_LEADCHNL_LV_PACING_THRESHOLD_AMPLITUDE: 1.2 V
MDC_IDC_MSMT_LEADCHNL_LV_PACING_THRESHOLD_PULSEWIDTH: 1 MS
MDC_IDC_MSMT_LEADCHNL_RA_IMPEDANCE_VALUE: 706 OHM
MDC_IDC_MSMT_LEADCHNL_RA_LEAD_CHANNEL_STATUS: NORMAL
MDC_IDC_MSMT_LEADCHNL_RA_PACING_THRESHOLD_AMPLITUDE: 0.4 V
MDC_IDC_MSMT_LEADCHNL_RA_PACING_THRESHOLD_PULSEWIDTH: 0.5 MS
MDC_IDC_MSMT_LEADCHNL_RV_IMPEDANCE_VALUE: 409 OHM
MDC_IDC_MSMT_LEADCHNL_RV_PACING_THRESHOLD_AMPLITUDE: 0.7 V
MDC_IDC_MSMT_LEADCHNL_RV_PACING_THRESHOLD_PULSEWIDTH: 1 MS
MDC_IDC_PG_IMPLANT_DTM: NORMAL
MDC_IDC_PG_MFG: NORMAL
MDC_IDC_PG_MODEL: NORMAL
MDC_IDC_PG_SERIAL: NORMAL
MDC_IDC_PG_TYPE: NORMAL
MDC_IDC_SESS_CLINIC_NAME: NORMAL
MDC_IDC_SESS_DTM: NORMAL
MDC_IDC_SESS_TYPE: NORMAL
MDC_IDC_SET_BRADY_AT_MODE_SWITCH_RATE: 170 {BEATS}/MIN
MDC_IDC_SET_BRADY_LOWRATE: 40 {BEATS}/MIN
MDC_IDC_SET_BRADY_MODE: NORMAL
MDC_IDC_SET_CRT_PACED_CHAMBERS: NORMAL
MDC_IDC_SET_LEADCHNL_LV_SENSING_ADAPTATION_MODE: NORMAL
MDC_IDC_SET_LEADCHNL_LV_SENSING_ANODE_ELECTRODE_1: NORMAL
MDC_IDC_SET_LEADCHNL_LV_SENSING_ANODE_LOCATION_1: NORMAL
MDC_IDC_SET_LEADCHNL_LV_SENSING_CATHODE_ELECTRODE_1: NORMAL
MDC_IDC_SET_LEADCHNL_LV_SENSING_CATHODE_LOCATION_1: NORMAL
MDC_IDC_SET_LEADCHNL_LV_SENSING_SENSITIVITY: 1 MV
MDC_IDC_SET_LEADCHNL_RA_PACING_POLARITY: NORMAL
MDC_IDC_SET_LEADCHNL_RA_SENSING_ADAPTATION_MODE: NORMAL
MDC_IDC_SET_LEADCHNL_RA_SENSING_POLARITY: NORMAL
MDC_IDC_SET_LEADCHNL_RA_SENSING_SENSITIVITY: 0.15 MV
MDC_IDC_SET_LEADCHNL_RV_PACING_AMPLITUDE: 2 V
MDC_IDC_SET_LEADCHNL_RV_PACING_POLARITY: NORMAL
MDC_IDC_SET_LEADCHNL_RV_PACING_PULSEWIDTH: 1 MS
MDC_IDC_SET_LEADCHNL_RV_SENSING_ADAPTATION_MODE: NORMAL
MDC_IDC_SET_LEADCHNL_RV_SENSING_POLARITY: NORMAL
MDC_IDC_SET_LEADCHNL_RV_SENSING_SENSITIVITY: 0.6 MV
MDC_IDC_SET_ZONE_DETECTION_INTERVAL: 250 MS
MDC_IDC_SET_ZONE_DETECTION_INTERVAL: 333 MS
MDC_IDC_SET_ZONE_DETECTION_INTERVAL: 375 MS
MDC_IDC_SET_ZONE_TYPE: NORMAL
MDC_IDC_SET_ZONE_VENDOR_TYPE: NORMAL
MDC_IDC_STAT_BRADY_DTM_END: NORMAL
MDC_IDC_STAT_BRADY_DTM_START: NORMAL
MDC_IDC_STAT_BRADY_RA_PERCENT_PACED: 0 %
MDC_IDC_STAT_BRADY_RV_PERCENT_PACED: 0 %
MDC_IDC_STAT_CRT_DTM_END: NORMAL
MDC_IDC_STAT_CRT_DTM_START: NORMAL
MDC_IDC_STAT_CRT_LV_PERCENT_PACED: 0 %
MDC_IDC_STAT_EPISODE_RECENT_COUNT: 0
MDC_IDC_STAT_EPISODE_RECENT_COUNT_DTM_END: NORMAL
MDC_IDC_STAT_EPISODE_RECENT_COUNT_DTM_START: NORMAL
MDC_IDC_STAT_EPISODE_TYPE: NORMAL
MDC_IDC_STAT_EPISODE_VENDOR_TYPE: NORMAL
MDC_IDC_STAT_TACHYTHERAPY_ATP_DELIVERED_RECENT: 0
MDC_IDC_STAT_TACHYTHERAPY_ATP_DELIVERED_TOTAL: 0
MDC_IDC_STAT_TACHYTHERAPY_RECENT_DTM_END: NORMAL
MDC_IDC_STAT_TACHYTHERAPY_RECENT_DTM_START: NORMAL
MDC_IDC_STAT_TACHYTHERAPY_SHOCKS_ABORTED_RECENT: 0
MDC_IDC_STAT_TACHYTHERAPY_SHOCKS_ABORTED_TOTAL: 0
MDC_IDC_STAT_TACHYTHERAPY_SHOCKS_DELIVERED_RECENT: 0
MDC_IDC_STAT_TACHYTHERAPY_SHOCKS_DELIVERED_TOTAL: 0
MDC_IDC_STAT_TACHYTHERAPY_TOTAL_DTM_END: NORMAL

## 2022-04-04 DIAGNOSIS — I42.9 CARDIOMYOPATHY, UNSPECIFIED TYPE (H): ICD-10-CM

## 2022-04-04 DIAGNOSIS — I42.9 IDIOPATHIC CARDIOMYOPATHY (H): Primary | ICD-10-CM

## 2022-04-07 ENCOUNTER — HOSPITAL ENCOUNTER (INPATIENT)
Facility: CLINIC | Age: 77
LOS: 2 days | Discharge: HOME | DRG: 177 | End: 2022-04-09
Attending: EMERGENCY MEDICINE | Admitting: INTERNAL MEDICINE
Payer: COMMERCIAL

## 2022-04-07 ENCOUNTER — APPOINTMENT (OUTPATIENT)
Dept: GENERAL RADIOLOGY | Facility: CLINIC | Age: 77
DRG: 177 | End: 2022-04-07
Attending: EMERGENCY MEDICINE
Payer: COMMERCIAL

## 2022-04-07 DIAGNOSIS — U07.1 INFECTION DUE TO 2019 NOVEL CORONAVIRUS: ICD-10-CM

## 2022-04-07 DIAGNOSIS — R09.02 HYPOXEMIA: ICD-10-CM

## 2022-04-07 LAB
ALBUMIN SERPL-MCNC: 3.8 G/DL (ref 3.4–5)
ALP SERPL-CCNC: 78 U/L (ref 40–150)
ALT SERPL W P-5'-P-CCNC: 37 U/L (ref 0–50)
ANION GAP SERPL CALCULATED.3IONS-SCNC: 6 MMOL/L (ref 3–14)
AST SERPL W P-5'-P-CCNC: 30 U/L (ref 0–45)
ATRIAL RATE - MUSE: 76 BPM
BASOPHILS # BLD AUTO: 0 10E3/UL (ref 0–0.2)
BASOPHILS NFR BLD AUTO: 1 %
BILIRUB SERPL-MCNC: 0.7 MG/DL (ref 0.2–1.3)
BUN SERPL-MCNC: 13 MG/DL (ref 7–30)
CALCIUM SERPL-MCNC: 8.8 MG/DL (ref 8.5–10.1)
CHLORIDE BLD-SCNC: 103 MMOL/L (ref 94–109)
CO2 SERPL-SCNC: 25 MMOL/L (ref 20–32)
CREAT SERPL-MCNC: 1.02 MG/DL (ref 0.52–1.04)
CRP SERPL-MCNC: 21.7 MG/L (ref 0–8)
D DIMER PPP FEU-MCNC: 0.49 UG/ML FEU (ref 0–0.5)
DIASTOLIC BLOOD PRESSURE - MUSE: NORMAL MMHG
EOSINOPHIL # BLD AUTO: 0 10E3/UL (ref 0–0.7)
EOSINOPHIL NFR BLD AUTO: 0 %
ERYTHROCYTE [DISTWIDTH] IN BLOOD BY AUTOMATED COUNT: 12.5 % (ref 10–15)
FIBRINOGEN PPP-MCNC: 458 MG/DL (ref 170–490)
FLUAV RNA SPEC QL NAA+PROBE: NEGATIVE
FLUBV RNA RESP QL NAA+PROBE: NEGATIVE
GFR SERPL CREATININE-BSD FRML MDRD: 57 ML/MIN/1.73M2
GLUCOSE BLD-MCNC: 205 MG/DL (ref 70–99)
GLUCOSE BLDC GLUCOMTR-MCNC: 154 MG/DL (ref 70–99)
GLUCOSE BLDC GLUCOMTR-MCNC: 394 MG/DL (ref 70–99)
GLUCOSE BLDC GLUCOMTR-MCNC: 396 MG/DL (ref 70–99)
HBA1C MFR BLD: 6.4 % (ref 0–5.6)
HCT VFR BLD AUTO: 38.3 % (ref 35–47)
HGB BLD-MCNC: 12.6 G/DL (ref 11.7–15.7)
IMM GRANULOCYTES # BLD: 0.1 10E3/UL
IMM GRANULOCYTES NFR BLD: 1 %
INR PPP: 1.06 (ref 0.85–1.15)
INTERPRETATION ECG - MUSE: NORMAL
LDH SERPL L TO P-CCNC: 213 U/L (ref 81–234)
LYMPHOCYTES # BLD AUTO: 0.9 10E3/UL (ref 0.8–5.3)
LYMPHOCYTES NFR BLD AUTO: 19 %
MAGNESIUM SERPL-MCNC: 2.1 MG/DL (ref 1.6–2.3)
MCH RBC QN AUTO: 31.4 PG (ref 26.5–33)
MCHC RBC AUTO-ENTMCNC: 32.9 G/DL (ref 31.5–36.5)
MCV RBC AUTO: 96 FL (ref 78–100)
MONOCYTES # BLD AUTO: 0.5 10E3/UL (ref 0–1.3)
MONOCYTES NFR BLD AUTO: 10 %
NEUTROPHILS # BLD AUTO: 3.1 10E3/UL (ref 1.6–8.3)
NEUTROPHILS NFR BLD AUTO: 69 %
NRBC # BLD AUTO: 0 10E3/UL
NRBC BLD AUTO-RTO: 0 /100
P AXIS - MUSE: 24 DEGREES
PLATELET # BLD AUTO: 133 10E3/UL (ref 150–450)
POTASSIUM BLD-SCNC: 3.7 MMOL/L (ref 3.4–5.3)
PR INTERVAL - MUSE: 172 MS
PROT SERPL-MCNC: 6.9 G/DL (ref 6.8–8.8)
QRS DURATION - MUSE: 152 MS
QT - MUSE: 448 MS
QTC - MUSE: 504 MS
R AXIS - MUSE: 81 DEGREES
RBC # BLD AUTO: 4.01 10E6/UL (ref 3.8–5.2)
RSV RNA SPEC NAA+PROBE: NEGATIVE
SARS-COV-2 RNA RESP QL NAA+PROBE: POSITIVE
SODIUM SERPL-SCNC: 134 MMOL/L (ref 133–144)
SYSTOLIC BLOOD PRESSURE - MUSE: NORMAL MMHG
T AXIS - MUSE: -41 DEGREES
VENTRICULAR RATE- MUSE: 76 BPM
WBC # BLD AUTO: 4.5 10E3/UL (ref 4–11)

## 2022-04-07 PROCEDURE — 83735 ASSAY OF MAGNESIUM: CPT | Performed by: PHYSICIAN ASSISTANT

## 2022-04-07 PROCEDURE — 80053 COMPREHEN METABOLIC PANEL: CPT | Performed by: EMERGENCY MEDICINE

## 2022-04-07 PROCEDURE — 93005 ELECTROCARDIOGRAM TRACING: CPT

## 2022-04-07 PROCEDURE — C9803 HOPD COVID-19 SPEC COLLECT: HCPCS

## 2022-04-07 PROCEDURE — 87637 SARSCOV2&INF A&B&RSV AMP PRB: CPT | Performed by: EMERGENCY MEDICINE

## 2022-04-07 PROCEDURE — 94640 AIRWAY INHALATION TREATMENT: CPT

## 2022-04-07 PROCEDURE — 83036 HEMOGLOBIN GLYCOSYLATED A1C: CPT | Performed by: PHYSICIAN ASSISTANT

## 2022-04-07 PROCEDURE — 83615 LACTATE (LD) (LDH) ENZYME: CPT | Performed by: PHYSICIAN ASSISTANT

## 2022-04-07 PROCEDURE — 120N000001 HC R&B MED SURG/OB

## 2022-04-07 PROCEDURE — 250N000013 HC RX MED GY IP 250 OP 250 PS 637: Performed by: EMERGENCY MEDICINE

## 2022-04-07 PROCEDURE — 258N000003 HC RX IP 258 OP 636: Performed by: PHYSICIAN ASSISTANT

## 2022-04-07 PROCEDURE — 250N000009 HC RX 250: Performed by: PHYSICIAN ASSISTANT

## 2022-04-07 PROCEDURE — 85384 FIBRINOGEN ACTIVITY: CPT | Performed by: PHYSICIAN ASSISTANT

## 2022-04-07 PROCEDURE — 96374 THER/PROPH/DIAG INJ IV PUSH: CPT

## 2022-04-07 PROCEDURE — 250N000011 HC RX IP 250 OP 636: Performed by: EMERGENCY MEDICINE

## 2022-04-07 PROCEDURE — 85379 FIBRIN DEGRADATION QUANT: CPT | Performed by: EMERGENCY MEDICINE

## 2022-04-07 PROCEDURE — 71046 X-RAY EXAM CHEST 2 VIEWS: CPT

## 2022-04-07 PROCEDURE — 36415 COLL VENOUS BLD VENIPUNCTURE: CPT | Performed by: EMERGENCY MEDICINE

## 2022-04-07 PROCEDURE — 86140 C-REACTIVE PROTEIN: CPT | Performed by: PHYSICIAN ASSISTANT

## 2022-04-07 PROCEDURE — 99223 1ST HOSP IP/OBS HIGH 75: CPT | Mod: AI | Performed by: PHYSICIAN ASSISTANT

## 2022-04-07 PROCEDURE — 99285 EMERGENCY DEPT VISIT HI MDM: CPT | Mod: 25

## 2022-04-07 PROCEDURE — 250N000013 HC RX MED GY IP 250 OP 250 PS 637: Performed by: PHYSICIAN ASSISTANT

## 2022-04-07 PROCEDURE — 85610 PROTHROMBIN TIME: CPT | Performed by: PHYSICIAN ASSISTANT

## 2022-04-07 PROCEDURE — 250N000012 HC RX MED GY IP 250 OP 636 PS 637: Performed by: PHYSICIAN ASSISTANT

## 2022-04-07 PROCEDURE — 250N000011 HC RX IP 250 OP 636: Performed by: PHYSICIAN ASSISTANT

## 2022-04-07 PROCEDURE — 999N000157 HC STATISTIC RCP TIME EA 10 MIN

## 2022-04-07 PROCEDURE — 85025 COMPLETE CBC W/AUTO DIFF WBC: CPT | Performed by: EMERGENCY MEDICINE

## 2022-04-07 PROCEDURE — XW033E5 INTRODUCTION OF REMDESIVIR ANTI-INFECTIVE INTO PERIPHERAL VEIN, PERCUTANEOUS APPROACH, NEW TECHNOLOGY GROUP 5: ICD-10-PCS | Performed by: INTERNAL MEDICINE

## 2022-04-07 RX ORDER — ACETAMINOPHEN 325 MG/1
650 TABLET ORAL EVERY 4 HOURS PRN
Status: DISCONTINUED | OUTPATIENT
Start: 2022-04-07 | End: 2022-04-09 | Stop reason: HOSPADM

## 2022-04-07 RX ORDER — LOSARTAN POTASSIUM 50 MG/1
50 TABLET ORAL DAILY
Status: DISCONTINUED | OUTPATIENT
Start: 2022-04-08 | End: 2022-04-09 | Stop reason: HOSPADM

## 2022-04-07 RX ORDER — ACETAMINOPHEN 325 MG/1
650 TABLET ORAL ONCE
Status: COMPLETED | OUTPATIENT
Start: 2022-04-07 | End: 2022-04-07

## 2022-04-07 RX ORDER — CALCIUM CARBONATE 500(1250)
500 TABLET ORAL
Status: DISCONTINUED | OUTPATIENT
Start: 2022-04-07 | End: 2022-04-09 | Stop reason: HOSPADM

## 2022-04-07 RX ORDER — MIRTAZAPINE 7.5 MG/1
7.5-15 TABLET, FILM COATED ORAL AT BEDTIME
Status: DISCONTINUED | OUTPATIENT
Start: 2022-04-07 | End: 2022-04-09 | Stop reason: HOSPADM

## 2022-04-07 RX ORDER — CARVEDILOL 25 MG/1
25 TABLET ORAL 2 TIMES DAILY WITH MEALS
Status: DISCONTINUED | OUTPATIENT
Start: 2022-04-07 | End: 2022-04-09 | Stop reason: HOSPADM

## 2022-04-07 RX ORDER — ROSUVASTATIN CALCIUM 5 MG/1
10 TABLET, COATED ORAL EVERY OTHER DAY
Status: DISCONTINUED | OUTPATIENT
Start: 2022-04-08 | End: 2022-04-09 | Stop reason: HOSPADM

## 2022-04-07 RX ORDER — INHALER, ASSIST DEVICES
1 SPACER (EA) MISCELLANEOUS ONCE
Status: DISCONTINUED | OUTPATIENT
Start: 2022-04-07 | End: 2022-04-09 | Stop reason: HOSPADM

## 2022-04-07 RX ORDER — SPIRONOLACTONE 25 MG/1
25 TABLET ORAL DAILY
Status: DISCONTINUED | OUTPATIENT
Start: 2022-04-08 | End: 2022-04-09 | Stop reason: HOSPADM

## 2022-04-07 RX ORDER — AMOXICILLIN 250 MG
2 CAPSULE ORAL 2 TIMES DAILY PRN
Status: DISCONTINUED | OUTPATIENT
Start: 2022-04-07 | End: 2022-04-09 | Stop reason: HOSPADM

## 2022-04-07 RX ORDER — NICOTINE POLACRILEX 4 MG
15-30 LOZENGE BUCCAL
Status: DISCONTINUED | OUTPATIENT
Start: 2022-04-07 | End: 2022-04-09 | Stop reason: HOSPADM

## 2022-04-07 RX ORDER — ACETAMINOPHEN 650 MG/1
650 SUPPOSITORY RECTAL EVERY 4 HOURS PRN
Status: DISCONTINUED | OUTPATIENT
Start: 2022-04-07 | End: 2022-04-09 | Stop reason: HOSPADM

## 2022-04-07 RX ORDER — LIDOCAINE 40 MG/G
CREAM TOPICAL
Status: DISCONTINUED | OUTPATIENT
Start: 2022-04-07 | End: 2022-04-09 | Stop reason: HOSPADM

## 2022-04-07 RX ORDER — NALOXONE HYDROCHLORIDE 0.4 MG/ML
0.4 INJECTION, SOLUTION INTRAMUSCULAR; INTRAVENOUS; SUBCUTANEOUS
Status: DISCONTINUED | OUTPATIENT
Start: 2022-04-07 | End: 2022-04-09 | Stop reason: HOSPADM

## 2022-04-07 RX ORDER — NALOXONE HYDROCHLORIDE 0.4 MG/ML
0.2 INJECTION, SOLUTION INTRAMUSCULAR; INTRAVENOUS; SUBCUTANEOUS
Status: DISCONTINUED | OUTPATIENT
Start: 2022-04-07 | End: 2022-04-09 | Stop reason: HOSPADM

## 2022-04-07 RX ORDER — ONDANSETRON 2 MG/ML
4 INJECTION INTRAMUSCULAR; INTRAVENOUS EVERY 6 HOURS PRN
Status: DISCONTINUED | OUTPATIENT
Start: 2022-04-07 | End: 2022-04-09 | Stop reason: HOSPADM

## 2022-04-07 RX ORDER — ASPIRIN 81 MG/1
81 TABLET ORAL DAILY
Status: DISCONTINUED | OUTPATIENT
Start: 2022-04-08 | End: 2022-04-09 | Stop reason: HOSPADM

## 2022-04-07 RX ORDER — METHYLPREDNISOLONE SODIUM SUCCINATE 40 MG/ML
40 INJECTION, POWDER, LYOPHILIZED, FOR SOLUTION INTRAMUSCULAR; INTRAVENOUS EVERY 12 HOURS
Status: COMPLETED | OUTPATIENT
Start: 2022-04-07 | End: 2022-04-07

## 2022-04-07 RX ORDER — ONDANSETRON 4 MG/1
4 TABLET, ORALLY DISINTEGRATING ORAL EVERY 6 HOURS PRN
Status: DISCONTINUED | OUTPATIENT
Start: 2022-04-07 | End: 2022-04-09 | Stop reason: HOSPADM

## 2022-04-07 RX ORDER — DEXTROSE MONOHYDRATE 25 G/50ML
25-50 INJECTION, SOLUTION INTRAVENOUS
Status: DISCONTINUED | OUTPATIENT
Start: 2022-04-07 | End: 2022-04-09 | Stop reason: HOSPADM

## 2022-04-07 RX ORDER — FAMOTIDINE 20 MG/1
20 TABLET, FILM COATED ORAL 2 TIMES DAILY PRN
Status: DISCONTINUED | OUTPATIENT
Start: 2022-04-07 | End: 2022-04-09 | Stop reason: HOSPADM

## 2022-04-07 RX ORDER — AMOXICILLIN 250 MG
1 CAPSULE ORAL 2 TIMES DAILY PRN
Status: DISCONTINUED | OUTPATIENT
Start: 2022-04-07 | End: 2022-04-09 | Stop reason: HOSPADM

## 2022-04-07 RX ORDER — LORATADINE 10 MG/1
10 TABLET ORAL DAILY
Status: DISCONTINUED | OUTPATIENT
Start: 2022-04-08 | End: 2022-04-09 | Stop reason: HOSPADM

## 2022-04-07 RX ORDER — FAMOTIDINE 20 MG/1
20 TABLET, FILM COATED ORAL 2 TIMES DAILY
Status: DISCONTINUED | OUTPATIENT
Start: 2022-04-07 | End: 2022-04-07

## 2022-04-07 RX ORDER — METHYLPREDNISOLONE SODIUM SUCCINATE 125 MG/2ML
125 INJECTION, POWDER, LYOPHILIZED, FOR SOLUTION INTRAMUSCULAR; INTRAVENOUS ONCE
Status: COMPLETED | OUTPATIENT
Start: 2022-04-07 | End: 2022-04-07

## 2022-04-07 RX ORDER — BISACODYL 10 MG
10 SUPPOSITORY, RECTAL RECTAL DAILY PRN
Status: DISCONTINUED | OUTPATIENT
Start: 2022-04-07 | End: 2022-04-09 | Stop reason: HOSPADM

## 2022-04-07 RX ORDER — ALPRAZOLAM 0.25 MG
0.25 TABLET ORAL 2 TIMES DAILY PRN
Status: DISCONTINUED | OUTPATIENT
Start: 2022-04-07 | End: 2022-04-09 | Stop reason: HOSPADM

## 2022-04-07 RX ORDER — METHYLPREDNISOLONE SODIUM SUCCINATE 40 MG/ML
40 INJECTION, POWDER, LYOPHILIZED, FOR SOLUTION INTRAMUSCULAR; INTRAVENOUS EVERY 12 HOURS
Status: DISCONTINUED | OUTPATIENT
Start: 2022-04-07 | End: 2022-04-07

## 2022-04-07 RX ORDER — AMOXICILLIN 250 MG
1-2 CAPSULE ORAL 2 TIMES DAILY
Status: DISCONTINUED | OUTPATIENT
Start: 2022-04-07 | End: 2022-04-09 | Stop reason: HOSPADM

## 2022-04-07 RX ADMIN — IPRATROPIUM BROMIDE AND ALBUTEROL 2 PUFF: 20; 100 SPRAY, METERED RESPIRATORY (INHALATION) at 19:31

## 2022-04-07 RX ADMIN — REMDESIVIR 200 MG: 100 INJECTION, POWDER, LYOPHILIZED, FOR SOLUTION INTRAVENOUS at 17:19

## 2022-04-07 RX ADMIN — CALCIUM 500 MG: 500 TABLET ORAL at 17:19

## 2022-04-07 RX ADMIN — INSULIN ASPART 1 UNITS: 100 INJECTION, SOLUTION INTRAVENOUS; SUBCUTANEOUS at 18:29

## 2022-04-07 RX ADMIN — SODIUM CHLORIDE 50 ML: 9 INJECTION, SOLUTION INTRAVENOUS at 17:00

## 2022-04-07 RX ADMIN — METHYLPREDNISOLONE SODIUM SUCCINATE 40 MG: 40 INJECTION, POWDER, FOR SOLUTION INTRAMUSCULAR; INTRAVENOUS at 18:28

## 2022-04-07 RX ADMIN — ACETAMINOPHEN 650 MG: 325 TABLET, FILM COATED ORAL at 13:39

## 2022-04-07 RX ADMIN — MIRTAZAPINE 7.5 MG: 7.5 TABLET, FILM COATED ORAL at 22:18

## 2022-04-07 RX ADMIN — DEXTRAN 70, GLYCERIN, HYPROMELLOSE 1 DROP: 1; 2; 3 SOLUTION/ DROPS OPHTHALMIC at 22:18

## 2022-04-07 RX ADMIN — OMEPRAZOLE 40 MG: 20 CAPSULE, DELAYED RELEASE ORAL at 18:29

## 2022-04-07 RX ADMIN — ENOXAPARIN SODIUM 40 MG: 40 INJECTION SUBCUTANEOUS at 17:25

## 2022-04-07 RX ADMIN — METHYLPREDNISOLONE SODIUM SUCCINATE 125 MG: 125 INJECTION, POWDER, FOR SOLUTION INTRAMUSCULAR; INTRAVENOUS at 13:39

## 2022-04-07 RX ADMIN — SENNOSIDES AND DOCUSATE SODIUM 1 TABLET: 50; 8.6 TABLET ORAL at 20:49

## 2022-04-07 ASSESSMENT — ACTIVITIES OF DAILY LIVING (ADL)
ADLS_ACUITY_SCORE: 12
ADLS_ACUITY_SCORE: 7
ADLS_ACUITY_SCORE: 7
ADLS_ACUITY_SCORE: 12
ADLS_ACUITY_SCORE: 7

## 2022-04-07 ASSESSMENT — ENCOUNTER SYMPTOMS
COUGH: 1
FATIGUE: 1
VOMITING: 0
SHORTNESS OF BREATH: 1

## 2022-04-07 NOTE — ED NOTES
[facility]  ED Nurse Handoff Report    Kristie Jones is a 76 year old female   ED Chief complaint: Covid Concern  . ED Diagnosis:   Final diagnoses:   Infection due to 2019 novel coronavirus   Hypoxemia     Allergies:   Allergies   Allergen Reactions     Corticosteroids Other (See Comments)     flush up through chest and face, decadron  flushing  Other reaction(s): Erythema     Phenothiazines Anxiety and GI Disturbance     anxiety attack, compazine  Other reaction(s): Tardive Dyskinesia     Prochlorperazine      Other reaction(s): Tardive Dyskinesia     Amoxicillin Hives     Clindamycin Other (See Comments)     Burning sensation in chest     Decadron      flushing     Dexamethasone      Other reaction(s): Erythema     Compazine Anxiety       Code Status: Full Code  Activity level - Baseline/Home:  Independent. Activity Level - Current:   Stand by Assist. Lift room needed: No. Bariatric: No   Needed: No   Isolation: Yes. Infection: Not Applicable  COVID r/o and special precautions.     Vital Signs:   Vitals:    04/07/22 1315 04/07/22 1329 04/07/22 1345 04/07/22 1400   BP: 112/58  108/57 102/53   Pulse: 72  76 75   Resp:       Temp:       TempSrc:       SpO2: 99% 96% 97% 98%       Cardiac Rhythm:  ,      Pain level:    Patient confused: No. Patient Falls Risk: No.   Elimination Status: Has voided   Patient Report - Initial Complaint: covid conern. Focused Assessment: pt having some SOB, body aches, congestion. Pt SpO2 low 90's- 88%.    Tests Performed: see results. Abnormal Results: see results.  Labs Ordered and Resulted from Time of ED Arrival to Time of ED Departure   COMPREHENSIVE METABOLIC PANEL - Abnormal       Result Value    Sodium 134      Potassium 3.7      Chloride 103      Carbon Dioxide (CO2) 25      Anion Gap 6      Urea Nitrogen 13      Creatinine 1.02      Calcium 8.8      Glucose 205 (*)     Alkaline Phosphatase 78      AST 30      ALT 37      Protein Total 6.9      Albumin  3.8      Bilirubin Total 0.7      GFR Estimate 57 (*)    CBC WITH PLATELETS AND DIFFERENTIAL - Abnormal    WBC Count 4.5      RBC Count 4.01      Hemoglobin 12.6      Hematocrit 38.3      MCV 96      MCH 31.4      MCHC 32.9      RDW 12.5      Platelet Count 133 (*)     % Neutrophils 69      % Lymphocytes 19      % Monocytes 10      % Eosinophils 0      % Basophils 1      % Immature Granulocytes 1      NRBCs per 100 WBC 0      Absolute Neutrophils 3.1      Absolute Lymphocytes 0.9      Absolute Monocytes 0.5      Absolute Eosinophils 0.0      Absolute Basophils 0.0      Absolute Immature Granulocytes 0.1      Absolute NRBCs 0.0     INFLUENZA A/B & SARS-COV2 PCR MULTIPLEX   D DIMER QUANTITATIVE     Chest XR,  PA & LAT   Final Result   IMPRESSION: No significant interval change. Stable cardiac device left   chest wall. Cardiac enlargement. Pulmonary vascularity is within   normal limits. Lungs clear. Aortic calcification. No pneumothorax.      WILLIAM HARO MD            SYSTEM ID:  [id number]         Treatments provided: see MAR  Family Comments: n/a  OBS brochure/video discussed/provided to patient:  N/A  ED Medications:   Medications   acetaminophen (TYLENOL) tablet 650 mg (650 mg Oral Given 4/7/22 1339)   methylPREDNISolone sodium succinate (solu-MEDROL) injection 125 mg (125 mg Intravenous Given 4/7/22 1339)     Drips infusing:  No  For the majority of the shift, the patient's behavior Green. Interventions performed were n/a.    Sepsis treatment initiated: No     Patient tested for COVID 19 prior to admission: YES    ED Nurse Name/Phone Number: Chon Cotton RN,   2:06 PM  RECEIVING UNIT ED HANDOFF REVIEW    Above ED Nurse Handoff Report was reviewed: Yes  Reviewed by: Ford Ayala RN on April 7, 2022 at 2:39 PM

## 2022-04-07 NOTE — H&P
[facility]  History and Physical  Hospitalist       Date of Admission:  4/7/2022    Assessment & Plan   Kristie Jones is a 76 year old female with nonischemic cardiomyopathy (EF 30-35%) s/p AICD, HFrEF, Migraine headaches, NIDDM2, HTN, DLD, GERD, fibromyalgia, and SUSAN on CPAP who presented to [facility] ED on 4/7/2022 with increasing shortness of breath in setting of positive COVID19 home antigen test.  SpO2 88% in ED.  Received IV SoluMedrol (remote allergy to dexamethasone) and admission requested.       Patient is vaccinated (3/2021) and boosted (9/2021) with Tip or Skip.      Symptom onset 4/5/2022 with headache, sinus congestion, and body aches.    Positive home antigen test 4/5/2022 with positive PCR 4/7/2022.      Acute hypoxemic respiratory failure   COVID19 pneumonia  Symptoms evolving and now include bronchospasm, low grade fever, and shortness of breath.  Hypoxic to 88% on RA in ED, now 90-91% at rest on 1L NC.    -- Give another dose of SoluMedrol tonight and per discussion with Dr Chinchilla, will transition to oral dexamethasone tomorrow AM (patient agreeable)  -- Remdesivir, day 1/5  -- Scheduled combivent inhaler to help with bronchospasm  -- Symptomatic cares  -- Lovenox for DVT PPx  -- Wean O2 as tolerated to keep sats 89% or higher  -- Encourage ambulation and pulmonary toilet  -- HEPA filter tonight while using CPAP  -- Update Echo     NICM, EF 30-35%, s/p empiric AICD   Biventricular HFrEF, compensated  Follows with Dr Osborne.  Stable, compensated from cardiac standpoint.  Last Echo 12/2020 showed EF 32% with G1 LVDD, mildly decreased RVSF, mild MR, RVSP 39 mmHg + RAP, and severe septal / anterior wall / inferior wall LV hypokinesis.   -- Echo, as above  -- Continue PTA carvedilol, losartan, and spironolactone with hold parameters.  -- Continue PTA crestor and ASA 81     NIDDM2, diet controlled  Most recent HgbA1c 6 suggesting good control.  Is on no antihyperglycemics.   -- ISS while  receiving steroids     Depression / Anxiety   Fibromyalgia  -- Continue PTA mirtazapine and Effexor  -- Continue PTA PRN alprazolam     GERD  EGD 7/2021 showed small area of GE junction inflammation concerning for Barretts however biopsies were negative for Barretts and suggested mild nonspecific chronic inflammation.   -- Increase dose of PTA omeprazole to 40 mg and give BID while on steroids  -- BID Pepcid PRN     SUSAN  CPAP    Clinically Significant Risk Factors Present on Admission                # Platelet Defect: home medication list includes an antiplatelet medication       DVT Prophylaxis: Enoxaparin (Lovenox) SQ  Code Status: Full Code     Disposition: Expected discharge in 2-5 days      María Elena Beth, PAC  Hospitalist Service  [facility]  Text Page (7AM - 5PM, M-F)      PRIMARY CARE PROVIDER: Julianne Thornton    CHIEF COMPLAINT  Shortness of breath    History is obtained from the patient    HISTORY OF PRESENT ILLNESS  Kristie Roxanna Jones is a 76 year old female with nonischemic cardiomyopathy (EF 30-35%) s/p empiric AICD, HFrEF, Migraine headaches, NIDDM2, HTN, DLD, GERD, fibromyalgia, and SUSAN on CPAP who presented to [facility] ED on 4/7/2022 with increasing shortness of breath.  Patient is vaccinated (3/2021) and boosted (9/2021) with Pfizer. Symptom onset 4/5/2022 with headache, sinus congestion, and body aches.  No known sick contacts or exposures.  Wears a mask when outside the home.  Recently started going out more and ate out at a restaurant once.   does not have symptoms and has thus far tested negative.  Positive home antigen test 4/5/2022.  Symptoms evolving over last day and now patient experiencing shortness of breath and frequent cough, especially when trying to speak.  She can barely get through a sentence because speaking triggers coughing spells.  No big fevers at home.  No chest pain, diarrhea, nausea, rash.  In fact, she has actually been constipated.  Headache and  sinus congestion have improved.  Patient had a severe focal pain under her right jaw that required ice because it was so painful but this has also since resolved.      In the ED, /57, HR 84, RR 21, SpO2 88% on room air, temp 99.  CMP and CBC within normal limits save for platelet 133.  Glucose 205.  EKG revealed sinus rhythm with an incomplete left bundle branch block.  CXR largely negative.  COVID-19 PCR positive.  D-dimer 0.49, normal.  Received IV SoluMedrol (remote allergy to dexamethasone) and admission requested.     Patient was seen on the floor at present time she is resting comfortably.  She feels much better since receiving steroids.  She can speak in longer sentences without triggering a coughing spell.    PAST MEDICAL HISTORY  I have reviewed this patient's medical history and updated it with pertinent information if needed.   Past Medical History:   Diagnosis Date     Acute posthemorrhagic anemia 9/11/2015     Anemia      Chronic systolic congestive heart failure (H) 10/10/2018     EF 35%     Fibromyalgia      Gastro-oesophageal reflux disease      GENERAL OSTEOARTHROSIS [715.00] 11/23/2004    knee     Generalized anxiety disorder 9/30/2013     Hyperlipidemia LDL goal <130 2/10/2010    Failed simvastatin- muscle aches      Hypertension     because of the heart     Idiopathic cardiomyopathy (H) 1/19/2012    Dr. Osborne 3/2011 EF 30-35%      Insomnia 1/19/2012     Iron deficiency anemia 8/12/2015     Problem list name updated by automated process. Provider to review     LBBB (left bundle branch block)      Left ventricular systolic dysfunction:EF 35% 2/5/2012    Must stay on diovan per cardiology      Major depressive disorder, recurrent episode, mild (H) 2/17/2016     Migraine 6/21/2005     Problem list name updated by automated process. Provider to review     Nonischemic cardiomyopathy (H)     EF 30-35%, Dr. Osborne [facility] (suspect virus); s/p AICD     NSVT (nonsustained ventricular tachycardia) (H)       Obesity 1/20/2012     SUSAN (obstructive sleep apnea) 01/19/2012    CPAP      Pure hypercholesterolemia      Restless leg syndrome      Type 2 diabetes mellitus without complication (H) 9/24/2016       PAST SURGICAL HISTORY  I have reviewed this patient's surgical history and updated it with pertinent information if needed.  Past Surgical History:   Procedure Laterality Date     APPENDECTOMY       ARTHROPLASTY KNEE  1/7/2013    Procedure: ARTHROPLASTY KNEE;  Right Total Knee Arthroplasty       ARTHROPLASTY REVISION KNEE Right 8/26/2015    Procedure: ARTHROPLASTY REVISION KNEE;  Surgeon: Néstor Beth MD;  Location: [facility] OR     ARTHROSCOPY KNEE       bunionectomy left foot       CLOSED REDUCTION, PERCUTANEOUS PINNING FINGER, COMBINED Right 6/17/2021    Procedure: Closed reduction, pinning right long finger distal interphalangeal joint fracture;  Surgeon: Joseluis Delong MD;  Location: [facility] OR     COLONOSCOPY       CORONARY ANGIOGRAPHY ADULT ORDER  2002    normal     CORONARY ANGIOGRAPHY ADULT ORDER  12/7/12    no significant focal narrowing that would benefit from mechanical intervention      ESOPHAGOSCOPY, GASTROSCOPY, DUODENOSCOPY (EGD), COMBINED N/A 7/9/2021    Procedure: ESOPHAGOGASTRODUODENOSCOPY, WITH BIOPSY with distal esophagus biopies to rule out Barretts esophagus by cold biopsy forceps;  Surgeon: Travis Harrington MD;  Location:  GI     HYSTERECTOMY       IMPLANT AUTOMATIC IMPLANTABLE CARDIOVERTER DEFIBRILLATOR  4/30/12     INSERT THORACIC PACEMAKER LEAD EPICARDIAL  5/1/2012    Procedure:INSERT THORACIC PACEMAKER LEAD EPICARDIAL; EPICARDIAL LEAD PLACEMENT; Surgeon:WEST ARECHIGA; Location:[facility] OR     TONSILLECTOMY       TRANSPLANT - corneal lenses      Bilateral for cataracts       PRIOR TO ADMISSION MEDICATIONS  Prior to Admission Medications   Prescriptions Last Dose Informant Patient Reported? Taking?   ALPRAZolam (XANAX) 0.25 MG tablet   No Yes   Sig: Take 1 tablet (0.25 mg) by mouth 2 times  daily as needed for anxiety   CALCIUM 500 MG OR TABS 4/7/2022 at Unknown time  Yes Yes   Sig: Takes 3 tabs daily takees total of 1200 mg daily   Cholecalciferol (VITAMIN D) 2000 UNITS tablet 4/7/2022 at Unknown time  Yes Yes   Sig: Take 1 tablet by mouth daily.   Multiple Vitamins-Minerals (MULTIVITAMIN ADULT PO) 4/7/2022 at Unknown time  Yes Yes   Sig: Take 1 tablet by mouth daily   acetaminophen (TYLENOL) 500 MG tablet   Yes Yes   Sig: Take 1,000 mg by mouth daily as needed for mild pain   aspirin 81 MG tablet 4/7/2022 at Unknown time  Yes Yes   Sig: Take 81 mg by mouth daily   carvedilol (COREG) 25 MG tablet 4/7/2022 at Unknown time  No Yes   Sig: TAKE 1 TABLET BY MOUTH TWICE DAILY WITH MEALS   ciprofloxacin (CIPRO) 100 MG tablet   Yes Yes   Sig: Take 100 mg by mouth daily as needed For dental appointments   clobetasol (TEMOVATE) 0.05 % external cream   No Yes   Sig: Apply thin layer to bilateral hand twice daily for 2 weeks   co-enzyme Q-10 50 MG CAPS 4/7/2022 at Unknown time  Yes Yes   Sig: Take 1 capsule by mouth daily.   lifitegrast (XIIDRA) 5 % opthalmic solution 4/7/2022 at Unknown time  Yes Yes   Sig: Place 1 drop into both eyes 2 times daily   loratadine (CLARITIN) 10 MG tablet 4/7/2022 at Unknown time  Yes Yes   Sig: Take 10 mg by mouth daily   losartan (COZAAR) 50 MG tablet 4/7/2022 at Unknown time  No Yes   Sig: Take 1 tablet (50 mg) by mouth daily   mirtazapine (REMERON) 7.5 MG tablet 4/6/2022 at Unknown time  No Yes   Sig: TAKE 1 TO 2 TABLETS 30 MINUTES BEFORE BEDTIME   omeprazole (PRILOSEC) 20 MG DR capsule 4/7/2022 at Unknown time  Yes Yes   Sig: Take 1 capsule (20 mg) by mouth daily   rosuvastatin (CRESTOR) 10 MG tablet 4/6/2022 at Unknown time  No Yes   Sig: TAKE 1 TABLET EVERY OTHER DAY   spironolactone (ALDACTONE) 25 MG tablet 4/7/2022 at Unknown time  No Yes   Sig: TAKE 1/2 TABLET BY MOUTH DAILY   venlafaxine (EFFEXOR-XR) 75 MG 24 hr capsule 4/7/2022 at Unknown time  No Yes   Sig: TAKE 3  CAPSULES ONCE DAILY      Facility-Administered Medications: None       ALLERGIES  Allergies   Allergen Reactions     Corticosteroids Other (See Comments)     flush up through chest and face, decadron  flushing  Other reaction(s): Erythema     Phenothiazines Anxiety and GI Disturbance     anxiety attack, compazine  Other reaction(s): Tardive Dyskinesia     Prochlorperazine      Other reaction(s): Tardive Dyskinesia     Amoxicillin Hives     Clindamycin Other (See Comments)     Burning sensation in chest     Decadron      flushing     Dexamethasone      Other reaction(s): Erythema     Compazine Anxiety       SOCIAL HISTORY  .  Lives at home with .  Lifelong nonsmoker.  Rare alcohol use.  Typically IADL.      FAMILY HISTORY  I have reviewed this patient's family history and updated it with pertinent information if needed.   Family History   Problem Relation Age of Onset     Cancer Father         intraabdominal mass - not colon cancer     C.A.D. Mother      Hypertension Mother      Lipids Mother      Heart Disease Mother      Cancer Daughter 36        brain      Diabetes Paternal Uncle 52     Prostate Cancer Brother      Heart Disease Sister         murmur     Anxiety Disorder Sister      Colon Cancer No family hx of        REVIEW OF SYSTEMS:  14 point comprehensive ROS undertaken with pertinent positives and negatives as above and otherwise unremarkable.     PHYSICAL EXAM  Temp: 100.1  F (37.8  C) Temp src: Oral BP: 102/49 Pulse: 75   Resp: 20 SpO2: 96 % O2 Device: Nasal cannula Oxygen Delivery: 1 LPM  Vital Signs with Ranges  Temp:  [99  F (37.2  C)-100.1  F (37.8  C)] 100.1  F (37.8  C)  Pulse:  [72-84] 75  Resp:  [20-21] 20  BP: (101-112)/(49-62) 102/49  SpO2:  [88 %-99 %] 96 %  0 lbs 0 oz    GENERAL:  Pleasant, cooperative, alert. Well developed, well nourished.  Nontoxic.  NAD.  No accessory mm use, increased WOB.  HEENT: Normocephalic, atraumatic.  Extra occular mm intact.  Sclera clear. PERRL.   Mucous membranes moist.     PULMONARY: Clear to auscultation bilaterally.  No wheeze.  CARDIAC: Regular rate and rhythm.  No appreciated murmur.  Heart sounds distant   ABDOMEN: Soft, nontender non distended.    MUSCULOSKELETAL:  Moving x 4 spontaneously with CMS intact x4.  Normal bulk and tone.  No LE edema.       NEURO: Alert and oriented x3.  CN II-XII grossly intact and symmetric.  No ataxia or tremor.  Nonfocal.  SKIN:  Warm, pink, dry.    DATA  Data reviewed today:  I personally reviewed the EKG tracing showing NSR, incomplete LBBB.    Recent Labs   Lab 04/07/22  1252   WBC 4.5   HGB 12.6   MCV 96   *      POTASSIUM 3.7   CHLORIDE 103   CO2 25   BUN 13   CR 1.02   ANIONGAP 6   WILEY 8.8   *   ALBUMIN 3.8   PROTTOTAL 6.9   BILITOTAL 0.7   ALKPHOS 78   ALT 37   AST 30       Recent Results (from the past 24 hour(s))   Chest XR,  PA & LAT    Narrative    CHEST TWO VIEWS April 7, 2022 1:12 PM     HISTORY: Cough. Dyspnea.    COMPARISON: 12/26/2020.      Impression    IMPRESSION: No significant interval change. Stable cardiac device left  chest wall. Cardiac enlargement. Pulmonary vascularity is within  normal limits. Lungs clear. Aortic calcification. No pneumothorax.    WILLIAM HARO MD         SYSTEM ID:  [id number]

## 2022-04-07 NOTE — ED NOTES
Pt SpO2 decreased to 88-89% while sitting still, pt not sleeping. HOB raised which did not help, 2L NC applied. Pt denies SOB.

## 2022-04-07 NOTE — ED PROVIDER NOTES
History   Chief Complaint:  Covid Concern       HPI     Kristie Jones is a 76 year old female with history of CHF, cardiomyopathy, NSVT, hyperlipidemia, hypertension, and type 2 diabetes who presents with concerns of COVID-19 complications.  Patient's symptoms began 2 days prior to arrival.  Her symptoms began with cough, fever and diffuse myalgias.  She developed intermittent shortness of breath that worsened with exertion.  Her O2 saturations at home were 90% although her dyspnea is improved currently.  She denies any associated chest pain.  It was suggested to her to present to the ED given her chronic medical problems including congestive heart failure.  Patient is vaccinated and boosted against COVID-19.    Review of Systems   Constitutional: Positive for fatigue.   Respiratory: Positive for cough and shortness of breath.    Cardiovascular: Negative for chest pain.   Gastrointestinal: Negative for vomiting.   All other systems reviewed and are negative.    Allergies:  Corticosteroids  Phenothiazines  Prochlorperazine  Amoxicillin  Clindamycin  Decadron  Dexamethasone  Compazine    Medications:  Xanax  Aspirin 81 mg  Coreg  Cipro  Cozaar  Remeron  Prilosec  Zocor  Diovan  Celebrex  Effexor  Prednisolone    Past Medical History:    Anemia   CHF  Fibromyalgia   GERD  Anxiety  Hyperlipidemia   Hypertension   Idiopathic cardiomyopathy  Insomnia    Left bundle branch block   Left ventricular systolic dysfunction  Depression  Migraine   Nonischemic cardiomyopathy  NSVT (nonsustained ventricular tachycardia)  Obesity   SUSAN (obstructive sleep apnea)  Hypercholesterolemia   Type 2 diabetes       Past Surgical History:    Appendectomy  Arthroplasty, right knee  Arthroplasty revision, right knee  Arthroscopy, right knee  Closed reduction and percutaneous pinning, right finger  Colonoscopy  Coronary angiography x2  EGD, combined  Hysterectomy  Defibrillator implantation  Pacemaker insertion  Tonsillectomy  Corneal lenses  transplant, bilateral   Blepharoplasty  Subtalar athroereisis    Family History:    Father: unspecified abdominal cancer  Mother: CAD, hypertension, lipids, heart disease  Brother: prostate cancer  Sister: heart disease, anxiety    Social History:  The patient presents to the ED alone.    Physical Exam     Patient Vitals for the past 24 hrs:   BP Temp Temp src Pulse Resp SpO2   04/07/22 1400 102/53 -- -- 75 -- 98 %   04/07/22 1345 108/57 -- -- 76 -- 97 %   04/07/22 1329 -- -- -- -- -- 96 %   04/07/22 1315 112/58 -- -- 72 -- 99 %   04/07/22 1307 -- -- -- -- -- (!) 88 %   04/07/22 1306 -- -- -- -- -- (!) 89 %   04/07/22 1300 -- -- -- -- -- 90 %   04/07/22 1219 108/57 99  F (37.2  C) Oral 84 21 96 %       Physical Exam      Eyes:    Conjunctiva normal  Neck:    Supple, no meningismus.     CV:     Regular rate and rhythm.      No murmurs, rubs or gallops.       No unilateral leg swelling.       2+ radial pulses bilateral.       No lower extremity edema.  PULM:    Clear to auscultation bilateral.       No respiratory distress.      Good air exchange.     No rales or wheezing.     No stridor.  ABD:    Soft, non-tender, non-distended.       No pulsatile masses.       No rebound, guarding or rigidity.  MSK:     No gross deformity to all four extremities.   LYMPH:   No cervical lymphadenopathy.  NEURO:   Alert. Good muscle tone, no atrophy.  Skin:    Warm, dry and intact.    Psych:    Mood is good and affect is appropriate.        Emergency Department Course     ECG  ECG obtained at 1258, ECG read at 1305  Normal sinus rhythm. Possible left atrial enlargement. Nonspecific intraventricular block.   No significant change as compared to prior, dated 6/15/21.  Rate 76 bpm. NJ interval 172 ms. QRS duration 152 ms. QT/QTc 448/504 ms. P-R-T axes 24 81 -41.     Imaging:  Chest XR,  PA & LAT   Preliminary Result   IMPRESSION: No significant interval change. Stable cardiac device left   chest wall. Cardiac enlargement. Pulmonary  vascularity is within   normal limits. Lungs clear. Aortic calcification. No pneumothorax.        Report per radiology    Laboratory:  Labs Ordered and Resulted from Time of ED Arrival to Time of ED Departure   COMPREHENSIVE METABOLIC PANEL - Abnormal       Result Value    Sodium 134      Potassium 3.7      Chloride 103      Carbon Dioxide (CO2) 25      Anion Gap 6      Urea Nitrogen 13      Creatinine 1.02      Calcium 8.8      Glucose 205 (*)     Alkaline Phosphatase 78      AST 30      ALT 37      Protein Total 6.9      Albumin 3.8      Bilirubin Total 0.7      GFR Estimate 57 (*)    CBC WITH PLATELETS AND DIFFERENTIAL - Abnormal    WBC Count 4.5      RBC Count 4.01      Hemoglobin 12.6      Hematocrit 38.3      MCV 96      MCH 31.4      MCHC 32.9      RDW 12.5      Platelet Count 133 (*)     % Neutrophils 69      % Lymphocytes 19      % Monocytes 10      % Eosinophils 0      % Basophils 1      % Immature Granulocytes 1      NRBCs per 100 WBC 0      Absolute Neutrophils 3.1      Absolute Lymphocytes 0.9      Absolute Monocytes 0.5      Absolute Eosinophils 0.0      Absolute Basophils 0.0      Absolute Immature Granulocytes 0.1      Absolute NRBCs 0.0     INFLUENZA A/B & SARS-COV2 PCR MULTIPLEX   D DIMER QUANTITATIVE      Emergency Department Course:    Reviewed:  I reviewed nursing notes, vitals, past medical history and Care Everywhere    Assessments:   I obtained history and examined the patient as noted above.   1329 I rechecked the patient and discussed admission.       Consults:  1340 I spoke with Dr. Chinchilla, hospitalist, who agreed to admit the patient.     Interventions:  1339 Tylenol 650 mg PO  1339 Solu-medrol 125 mg IV    Disposition:  The patient was admitted to the hospital under the care of Dr. Chinchilla.     Impression & Plan     Medical Decision Makin-year-old female who is vaccinated and boosted with known COVID-19 presents to the ED with persistent symptoms.  Initial O2 saturations were  93% but developed hypoxia as low as 88% requiring 1 L per nasal cannula.  Her basic laboratory studies were unremarkable.  Chest x-ray is unrevealing.  She reports allergy to dexamethasone thus Solu-Medrol provided for hypoxia related to COVID-19.  Patient will be transferred to a medical bed.  Hospitalist requested D-dimer which was sent.  Hospital medicine service to follow-up on results and order CT if deemed appropriate.  Patient safe for transfer the floor.    Diagnosis:    ICD-10-CM    1. Infection due to 2019 novel coronavirus  U07.1    2. Hypoxemia  R09.02        Scribe Disclosure:  I, Lucas Rousseau, am serving as a scribe at 12:32 PM on 4/7/2022 to document services personally performed by Faisal Greer MD based on my observations and the provider's statements to me.          Faisal Greer MD  04/07/22 [id number]

## 2022-04-07 NOTE — ED TRIAGE NOTES
Patient presents with COVID concerns. Took at home COVID test yesterday and it came back positive. Called PCP office and talked to a NP about her results and given her heart history they thought she should come in for further evaluation. Patient endorsing sore throat, body aches, and cough.

## 2022-04-07 NOTE — PHARMACY-ADMISSION MEDICATION HISTORY
Admission medication history interview status for this patient is complete. See [facility] admission navigator for allergy information, prior to admission medications and immunization status.     Medication history interview done, indicate source(s): Patient's   Medication history resources (including written lists, pill bottles, clinic record):[facility] list  [facility]    Changes made to PTA medication list:  Added: None  Changed: None  Reported as Not Taking: None  Removed: None    Actions taken by pharmacist (provider contacted, etc):None     Additional medication history information:None    Medication reconciliation/reorder completed by provider prior to medication history?  N   (Y/N)         Prior to Admission medications    Medication Sig Last Dose Taking? Auth Provider   acetaminophen (TYLENOL) 500 MG tablet Take 1,000 mg by mouth daily as needed for mild pain  Yes Unknown, Entered By History   ALPRAZolam (XANAX) 0.25 MG tablet Take 1 tablet (0.25 mg) by mouth 2 times daily as needed for anxiety  Yes Julianne Thornton APRN CNP   aspirin 81 MG tablet Take 81 mg by mouth daily 4/7/2022 at Unknown time Yes Reported, Patient   CALCIUM 500 MG OR TABS Takes 3 tabs daily takees total of 1200 mg daily 4/7/2022 at Unknown time Yes Juana Washington MD   carvedilol (COREG) 25 MG tablet TAKE 1 TABLET BY MOUTH TWICE DAILY WITH MEALS 4/7/2022 at Unknown time Yes Julianne Thornton APRN CNP   Cholecalciferol (VITAMIN D) 2000 UNITS tablet Take 1 tablet by mouth daily. 4/7/2022 at Unknown time Yes Reported, Patient   ciprofloxacin (CIPRO) 100 MG tablet Take 100 mg by mouth daily as needed For dental appointments  Yes Unknown, Entered By History   clobetasol (TEMOVATE) 0.05 % external cream Apply thin layer to bilateral hand twice daily for 2 weeks  Yes Julianne Thornton APRN CNP   co-enzyme Q-10 50 MG CAPS Take 1 capsule by mouth daily. 4/7/2022 at Unknown time Yes Pepper Baugh MD    lifitegrast (XIIDRA) 5 % opthalmic solution Place 1 drop into both eyes 2 times daily 4/7/2022 at Unknown time Yes Unknown, Entered By History   loratadine (CLARITIN) 10 MG tablet Take 10 mg by mouth daily 4/7/2022 at Unknown time Yes Reported, Patient   losartan (COZAAR) 50 MG tablet Take 1 tablet (50 mg) by mouth daily 4/7/2022 at Unknown time Yes Julianne Thornton APRN CNP   mirtazapine (REMERON) 7.5 MG tablet TAKE 1 TO 2 TABLETS 30 MINUTES BEFORE BEDTIME 4/6/2022 at Unknown time Yes Julianne Thornton APRN CNP   Multiple Vitamins-Minerals (MULTIVITAMIN ADULT PO) Take 1 tablet by mouth daily 4/7/2022 at Unknown time Yes Unknown, Entered By History   omeprazole (PRILOSEC) 20 MG DR capsule Take 1 capsule (20 mg) by mouth daily 4/7/2022 at Unknown time Yes Julianne Thornton APRN CNP   rosuvastatin (CRESTOR) 10 MG tablet TAKE 1 TABLET EVERY OTHER DAY 4/6/2022 at Unknown time Yes Julianne Thornton APRN CNP   spironolactone (ALDACTONE) 25 MG tablet TAKE 1/2 TABLET BY MOUTH DAILY 4/7/2022 at Unknown time Yes Julianne Thornton APRN CNP   venlafaxine (EFFEXOR-XR) 75 MG 24 hr capsule TAKE 3 CAPSULES ONCE DAILY 4/7/2022 at Unknown time Yes Katerin Joiner MD

## 2022-04-08 ENCOUNTER — APPOINTMENT (OUTPATIENT)
Dept: CARDIOLOGY | Facility: CLINIC | Age: 77
DRG: 177 | End: 2022-04-08
Attending: PHYSICIAN ASSISTANT
Payer: COMMERCIAL

## 2022-04-08 LAB
ALBUMIN SERPL-MCNC: 3.7 G/DL (ref 3.4–5)
ALP SERPL-CCNC: 75 U/L (ref 40–150)
ALT SERPL W P-5'-P-CCNC: 39 U/L (ref 0–50)
ANION GAP SERPL CALCULATED.3IONS-SCNC: 8 MMOL/L (ref 3–14)
AST SERPL W P-5'-P-CCNC: 26 U/L (ref 0–45)
BILIRUB SERPL-MCNC: 0.4 MG/DL (ref 0.2–1.3)
BUN SERPL-MCNC: 18 MG/DL (ref 7–30)
CALCIUM SERPL-MCNC: 9.3 MG/DL (ref 8.5–10.1)
CHLORIDE BLD-SCNC: 105 MMOL/L (ref 94–109)
CO2 SERPL-SCNC: 23 MMOL/L (ref 20–32)
CREAT SERPL-MCNC: 0.88 MG/DL (ref 0.52–1.04)
CRP SERPL-MCNC: 30.2 MG/L (ref 0–8)
D DIMER PPP FEU-MCNC: 0.27 UG/ML FEU (ref 0–0.5)
ERYTHROCYTE [DISTWIDTH] IN BLOOD BY AUTOMATED COUNT: 12.1 % (ref 10–15)
FIBRINOGEN PPP-MCNC: 539 MG/DL (ref 170–490)
GFR SERPL CREATININE-BSD FRML MDRD: 68 ML/MIN/1.73M2
GLUCOSE BLD-MCNC: 249 MG/DL (ref 70–99)
GLUCOSE BLDC GLUCOMTR-MCNC: 155 MG/DL (ref 70–99)
GLUCOSE BLDC GLUCOMTR-MCNC: 161 MG/DL (ref 70–99)
GLUCOSE BLDC GLUCOMTR-MCNC: 187 MG/DL (ref 70–99)
GLUCOSE BLDC GLUCOMTR-MCNC: 206 MG/DL (ref 70–99)
GLUCOSE BLDC GLUCOMTR-MCNC: 294 MG/DL (ref 70–99)
HCT VFR BLD AUTO: 39.4 % (ref 35–47)
HGB BLD-MCNC: 12.8 G/DL (ref 11.7–15.7)
LVEF ECHO: NORMAL
MCH RBC QN AUTO: 30.7 PG (ref 26.5–33)
MCHC RBC AUTO-ENTMCNC: 32.5 G/DL (ref 31.5–36.5)
MCV RBC AUTO: 95 FL (ref 78–100)
PLATELET # BLD AUTO: 139 10E3/UL (ref 150–450)
POTASSIUM BLD-SCNC: 4 MMOL/L (ref 3.4–5.3)
PROT SERPL-MCNC: 7.1 G/DL (ref 6.8–8.8)
RBC # BLD AUTO: 4.17 10E6/UL (ref 3.8–5.2)
SODIUM SERPL-SCNC: 136 MMOL/L (ref 133–144)
WBC # BLD AUTO: 6.1 10E3/UL (ref 4–11)

## 2022-04-08 PROCEDURE — 93306 TTE W/DOPPLER COMPLETE: CPT | Mod: 26 | Performed by: INTERNAL MEDICINE

## 2022-04-08 PROCEDURE — 999N000157 HC STATISTIC RCP TIME EA 10 MIN

## 2022-04-08 PROCEDURE — 86140 C-REACTIVE PROTEIN: CPT | Performed by: PHYSICIAN ASSISTANT

## 2022-04-08 PROCEDURE — 255N000002 HC RX 255 OP 636: Performed by: INTERNAL MEDICINE

## 2022-04-08 PROCEDURE — 99232 SBSQ HOSP IP/OBS MODERATE 35: CPT | Performed by: STUDENT IN AN ORGANIZED HEALTH CARE EDUCATION/TRAINING PROGRAM

## 2022-04-08 PROCEDURE — 250N000009 HC RX 250: Performed by: PHYSICIAN ASSISTANT

## 2022-04-08 PROCEDURE — 250N000012 HC RX MED GY IP 250 OP 636 PS 637: Performed by: STUDENT IN AN ORGANIZED HEALTH CARE EDUCATION/TRAINING PROGRAM

## 2022-04-08 PROCEDURE — 258N000003 HC RX IP 258 OP 636: Performed by: PHYSICIAN ASSISTANT

## 2022-04-08 PROCEDURE — 999N000208 ECHOCARDIOGRAM COMPLETE

## 2022-04-08 PROCEDURE — 85384 FIBRINOGEN ACTIVITY: CPT | Performed by: PHYSICIAN ASSISTANT

## 2022-04-08 PROCEDURE — 85379 FIBRIN DEGRADATION QUANT: CPT | Performed by: PHYSICIAN ASSISTANT

## 2022-04-08 PROCEDURE — 250N000013 HC RX MED GY IP 250 OP 250 PS 637: Performed by: STUDENT IN AN ORGANIZED HEALTH CARE EDUCATION/TRAINING PROGRAM

## 2022-04-08 PROCEDURE — 36415 COLL VENOUS BLD VENIPUNCTURE: CPT | Performed by: PHYSICIAN ASSISTANT

## 2022-04-08 PROCEDURE — 94640 AIRWAY INHALATION TREATMENT: CPT | Mod: 76

## 2022-04-08 PROCEDURE — 94640 AIRWAY INHALATION TREATMENT: CPT

## 2022-04-08 PROCEDURE — 250N000013 HC RX MED GY IP 250 OP 250 PS 637: Performed by: PHYSICIAN ASSISTANT

## 2022-04-08 PROCEDURE — 250N000011 HC RX IP 250 OP 636: Performed by: PHYSICIAN ASSISTANT

## 2022-04-08 PROCEDURE — 120N000001 HC R&B MED SURG/OB

## 2022-04-08 PROCEDURE — 80053 COMPREHEN METABOLIC PANEL: CPT | Performed by: PHYSICIAN ASSISTANT

## 2022-04-08 PROCEDURE — 258N000003 HC RX IP 258 OP 636: Performed by: INTERNAL MEDICINE

## 2022-04-08 PROCEDURE — 85027 COMPLETE CBC AUTOMATED: CPT | Performed by: PHYSICIAN ASSISTANT

## 2022-04-08 RX ORDER — GUAIFENESIN/DEXTROMETHORPHAN 100-10MG/5
10 SYRUP ORAL EVERY 4 HOURS PRN
Status: DISCONTINUED | OUTPATIENT
Start: 2022-04-08 | End: 2022-04-09 | Stop reason: HOSPADM

## 2022-04-08 RX ORDER — PREDNISONE 20 MG/1
40 TABLET ORAL DAILY
Status: DISCONTINUED | OUTPATIENT
Start: 2022-04-08 | End: 2022-04-09 | Stop reason: HOSPADM

## 2022-04-08 RX ADMIN — ACETAMINOPHEN 650 MG: 325 TABLET, FILM COATED ORAL at 15:18

## 2022-04-08 RX ADMIN — SODIUM CHLORIDE 50 ML: 9 INJECTION, SOLUTION INTRAVENOUS at 17:20

## 2022-04-08 RX ADMIN — SENNOSIDES AND DOCUSATE SODIUM 1 TABLET: 50; 8.6 TABLET ORAL at 20:22

## 2022-04-08 RX ADMIN — HUMAN ALBUMIN MICROSPHERES AND PERFLUTREN 3 ML: 10; .22 INJECTION, SOLUTION INTRAVENOUS at 10:47

## 2022-04-08 RX ADMIN — MIRTAZAPINE 7.5 MG: 7.5 TABLET, FILM COATED ORAL at 21:24

## 2022-04-08 RX ADMIN — CARVEDILOL 25 MG: 25 TABLET, FILM COATED ORAL at 17:24

## 2022-04-08 RX ADMIN — SENNOSIDES AND DOCUSATE SODIUM 2 TABLET: 50; 8.6 TABLET ORAL at 09:15

## 2022-04-08 RX ADMIN — REMDESIVIR 100 MG: 100 INJECTION, POWDER, LYOPHILIZED, FOR SOLUTION INTRAVENOUS at 17:20

## 2022-04-08 RX ADMIN — GUAIFENESIN AND DEXTROMETHORPHAN 10 ML: 100; 10 SYRUP ORAL at 12:27

## 2022-04-08 RX ADMIN — IPRATROPIUM BROMIDE AND ALBUTEROL 2 PUFF: 20; 100 SPRAY, METERED RESPIRATORY (INHALATION) at 15:20

## 2022-04-08 RX ADMIN — ALPRAZOLAM 0.25 MG: 0.25 TABLET ORAL at 02:45

## 2022-04-08 RX ADMIN — CARVEDILOL 25 MG: 25 TABLET, FILM COATED ORAL at 09:02

## 2022-04-08 RX ADMIN — LOSARTAN POTASSIUM 50 MG: 50 TABLET, FILM COATED ORAL at 09:02

## 2022-04-08 RX ADMIN — CALCIUM 500 MG: 500 TABLET ORAL at 09:07

## 2022-04-08 RX ADMIN — DEXTRAN 70, GLYCERIN, HYPROMELLOSE 1 DROP: 1; 2; 3 SOLUTION/ DROPS OPHTHALMIC at 09:09

## 2022-04-08 RX ADMIN — CALCIUM 500 MG: 500 TABLET ORAL at 12:27

## 2022-04-08 RX ADMIN — LORATADINE 10 MG: 10 TABLET ORAL at 09:08

## 2022-04-08 RX ADMIN — INSULIN ASPART 1 UNITS: 100 INJECTION, SOLUTION INTRAVENOUS; SUBCUTANEOUS at 09:10

## 2022-04-08 RX ADMIN — OMEPRAZOLE 40 MG: 20 CAPSULE, DELAYED RELEASE ORAL at 06:35

## 2022-04-08 RX ADMIN — VENLAFAXINE HYDROCHLORIDE 225 MG: 150 CAPSULE, EXTENDED RELEASE ORAL at 09:25

## 2022-04-08 RX ADMIN — PREDNISONE 40 MG: 20 TABLET ORAL at 12:27

## 2022-04-08 RX ADMIN — ALPRAZOLAM 0.25 MG: 0.25 TABLET ORAL at 23:47

## 2022-04-08 RX ADMIN — IPRATROPIUM BROMIDE AND ALBUTEROL 2 PUFF: 20; 100 SPRAY, METERED RESPIRATORY (INHALATION) at 12:30

## 2022-04-08 RX ADMIN — IPRATROPIUM BROMIDE AND ALBUTEROL 2 PUFF: 20; 100 SPRAY, METERED RESPIRATORY (INHALATION) at 08:16

## 2022-04-08 RX ADMIN — ASPIRIN 81 MG: 81 TABLET, COATED ORAL at 09:25

## 2022-04-08 RX ADMIN — SPIRONOLACTONE 25 MG: 25 TABLET ORAL at 09:07

## 2022-04-08 RX ADMIN — ACETAMINOPHEN 650 MG: 325 TABLET, FILM COATED ORAL at 09:31

## 2022-04-08 RX ADMIN — SODIUM CHLORIDE 250 ML: 9 INJECTION, SOLUTION INTRAVENOUS at 21:24

## 2022-04-08 RX ADMIN — CALCIUM 500 MG: 500 TABLET ORAL at 17:24

## 2022-04-08 RX ADMIN — INSULIN ASPART 1 UNITS: 100 INJECTION, SOLUTION INTRAVENOUS; SUBCUTANEOUS at 17:26

## 2022-04-08 RX ADMIN — ROSUVASTATIN CALCIUM 10 MG: 5 TABLET, FILM COATED ORAL at 09:02

## 2022-04-08 RX ADMIN — OMEPRAZOLE 40 MG: 20 CAPSULE, DELAYED RELEASE ORAL at 15:19

## 2022-04-08 RX ADMIN — INSULIN ASPART 1 UNITS: 100 INJECTION, SOLUTION INTRAVENOUS; SUBCUTANEOUS at 13:21

## 2022-04-08 RX ADMIN — IPRATROPIUM BROMIDE AND ALBUTEROL 2 PUFF: 20; 100 SPRAY, METERED RESPIRATORY (INHALATION) at 19:54

## 2022-04-08 RX ADMIN — DEXTRAN 70, GLYCERIN, HYPROMELLOSE 1 DROP: 1; 2; 3 SOLUTION/ DROPS OPHTHALMIC at 20:22

## 2022-04-08 RX ADMIN — ENOXAPARIN SODIUM 40 MG: 40 INJECTION SUBCUTANEOUS at 17:24

## 2022-04-08 ASSESSMENT — ACTIVITIES OF DAILY LIVING (ADL)
ADLS_ACUITY_SCORE: 7

## 2022-04-08 NOTE — PLAN OF CARE
Goal Outcome Evaluation:    End of Shift Summary  For vital signs and complete assessments, please see documentation flowsheets.     Pertinent assessments: Pt is A&O, LS diminished w/ coarse crackles on 2 L NC. Bs active. Voiding w/out difficulty. Up IND. PT wears Cpap at night, supposed to have Heppa filter in room.     Major Shift Events Admitted    Treatment Plan: IV Remdesivir and IV solumederol    Bedside Nurse: Ford Ayala RN

## 2022-04-08 NOTE — PROGRESS NOTES
End of Shift Summary  For vital signs and complete assessments, please see documentation flowsheets.     Pertinent assessments: Pt A&O, 2L NC, cpap @ HS, HEPA filter in room. Pt denies any pain/SOB. LS diminished. Voiding without difficulty. BG checks 394, 294. On tele monitoring     Major Shift Events: pt c/o jaw pain/tightness, difficulty breathing, feeling anxious, PRN Xanax given with relief     Treatment Plan: IV Remdesivir, oral Decadron, Lovenox, wean from O2 as able     Bedside Nurse: Olga Lidia May RN

## 2022-04-08 NOTE — PROGRESS NOTES
[facility]    Medicine Progress Note - Hospitalist Service    Date of Admission:  4/7/2022    Assessment & Plan          76-year-old female with nonischemic cardiomyopathy (EF 30 to 35%) s/p AICD, Migraine headaches, NIDDM2, HTN, DLD, GERD, fibromyalgia, and SUSAN on CPAP who presented to [facility] ED on 4/7/2022 with increasing shortness of breath in setting of positive COVID19 home antigen test.  SpO2 88% in ED.  Received IV SoluMedrol (remote allergy to dexamethasone) and admission requested.       Patient is vaccinated (3/2021) and boosted (9/2021) with Pfizer.      Symptom onset 4/5/2022 with headache, sinus congestion, and body aches.    Positive home antigen test 4/5/2022 with positive PCR 4/7/2022.       Plan    1. Acute hypoxic respiratory failure secondary to COVID-19 pneumonia..      Started remdesivir on 4/7.    Patient allergic to dexamethasone, will give prednisone 40 mg daily, for steroid dose on 4/7.    Wean oxygen, currently on 2 L/min.  CXR clear.  2. Nonischemic cardiomyopathy.    EF of 30 to 35%, status post AICD.  Currently not in exacerbation.    Continue carvedilol, losartan and spironolactone.    Repeat echocardiogram shows EF of 20 to 25%, likely due to acute illness and can be repeated in 1 month as an outpatient.  3. Diet-controlled diabetes mellitus.  Last HbA1c is 6 and not on any hypoglycemic agents.  Monitor while receiving steroids.  4. Depression/anxiety/fibromyalgia.  Continue mirtazapine, Effexor and as needed alprazolam.  5. GERD.  Increase dose of omeprazole to 40 mg twice daily while on steroids.  6. Sleep apnea, continue CPAP.         Diet: Combination Diet 2 gm NA Diet    DVT Prophylaxis: Enoxaparin (Lovenox) SQ  Henriquez Catheter: Not present  Central Lines: None  Cardiac Monitoring: ACTIVE order. Indication: covid19  Code Status: Full Code      Disposition Plan    Expected Discharge: 4/9/2022  Anticipated discharge location:  Awaiting care coordination  huddle  Delays:            The patient's care was discussed with the Patient.    Daniel Cano MD  Hospitalist Service  [facility]  Securely message with the [employer] Web Console (learn more here)  Text page via ([employer] Paging/Directory         Clinically Significant Risk Factors Present on Admission                # Platelet Defect: home medication list includes an antiplatelet medication       ______________________________________________________________________    Interval History   Denies shortness of breath or fever.  Has some sore throat.    Data reviewed today: I reviewed all medications, new labs and imaging results over the last 24 hours.    Physical Exam   Vital Signs: Temp: 97.7  F (36.5  C) Temp src: Oral BP: 119/62 Pulse: 69   Resp: 18 SpO2: 94 % O2 Device: Nasal cannula Oxygen Delivery: 2 LPM  Weight: 202 lbs 4.8 oz  General Appearance: Appears comfortable.  Respiratory: Clear to auscultation  Cardiovascular: S1-S2 normal  GI: Nontender nondistended  Skin: No rash  Other: No pedal edema    Data   Recent Labs   Lab 04/08/22  0857 04/08/22  0649 04/08/22  0130 04/07/22  1621 04/07/22  1345 04/07/22  1252   WBC  --  6.1  --   --   --  4.5   HGB  --  12.8  --   --   --  12.6   MCV  --  95  --   --   --  96   PLT  --  139*  --   --   --  133*   INR  --   --   --   --  1.06  --    NA  --  136  --   --   --  134   POTASSIUM  --  4.0  --   --   --  3.7   CHLORIDE  --  105  --   --   --  103   CO2  --  23  --   --   --  25   BUN  --  18  --   --   --  13   CR  --  0.88  --   --   --  1.02   ANIONGAP  --  8  --   --   --  6   WILEY  --  9.3  --   --   --  8.8   * 249* 294*   < >  --  205*   ALBUMIN  --  3.7  --   --   --  3.8   PROTTOTAL  --  7.1  --   --   --  6.9   BILITOTAL  --  0.4  --   --   --  0.7   ALKPHOS  --  75  --   --   --  78   ALT  --  39  --   --   --  37   AST  --  26  --   --   --  30    < > = values in this interval not displayed.     Recent Results (from the past 24  hour(s))   Chest XR,  PA & LAT    Narrative    CHEST TWO VIEWS 2022 1:12 PM     HISTORY: Cough. Dyspnea.    COMPARISON: 2020.      Impression    IMPRESSION: No significant interval change. Stable cardiac device left  chest wall. Cardiac enlargement. Pulmonary vascularity is within  normal limits. Lungs clear. Aortic calcification. No pneumothorax.    WILLIAM HARO MD         SYSTEM ID:  [id number]   Echocardiogram Complete   Result Value    LVEF  20-25%    Narrative    [id number]  [facility]  [id number]  [id number]^BINTA^JESSICA^GE     [facility]  Echocardiography Laboratory  [address] [place] [address]     Name: LEONID MCKEON  MRN: [id number]  : 1945  Study Date: 2022 10:26 AM  Age: 76 yrs  Gender: Female  Patient Location: [facility]  Reason For Study: Dyspnea, Cardiomyopathy  Ordering Physician: JESSICA JUDD  Referring Physician: Julianne Thornton  Performed By: Chava Cotton RDCS     BSA: 2.0 m2  Height: 65 in  Weight: 205 lb  HR: 77  BP: 102/49 mmHg  ______________________________________________________________________________  Procedure  Complete Portable Echo Adult. Optison (NDC #[id number]) given intravenously.  ______________________________________________________________________________  Interpretation Summary     The visual ejection fraction is 20-25%.  Left ventricular systolic function is severely reduced.  The left ventricle is slightly larger than before. Otherwise there is no  significant change from the previous study. The study was technically  difficult.  ______________________________________________________________________________  Left Ventricle  The left ventricle is moderately dilated. There is normal left ventricular  wall thickness. Diastolic Doppler findings (E/E' ratio and/or other  parameters) suggest left ventricular filling pressures are indeterminate. The  visual ejection fraction is 20-25%. Left ventricular systolic function  is  severely reduced. Septal wall motion abnormality may reflect pacemaker  activation. There is septal dyskinesis. There is severe global hypokinesia of  the left ventricle.     Right Ventricle  There is a catheter/pacemaker lead seen in the right ventricle. The right  ventricle is normal in size and function.     Atria  Normal left atrial size. Right atrial size is normal. There is no color  Doppler evidence of an atrial shunt.     Mitral Valve  The mitral valve leaflets are mildly thickened. There is mild (1+) mitral  regurgitation.     Tricuspid Valve  There is trace tricuspid regurgitation. The right ventricular systolic  pressure is approximated at 27mmHg plus the right atrial pressure.     Aortic Valve  The aortic valve is not well visualized. No aortic regurgitation is present.  No hemodynamically significant valvular aortic stenosis.     Pulmonic Valve  There is no pulmonic valvular regurgitation. Normal pulmonic valve velocity.     Vessels  The aortic root is normal size. Normal size ascending aorta. IVC diameter <2.1  cm collapsing >50% with sniff suggests a normal RA pressure of 3 mmHg.     Pericardium  There is no pericardial effusion.     Rhythm  Sinus rhythm was noted.  ______________________________________________________________________________  MMode/2D Measurements & Calculations  IVSd: 0.77 cm     LVIDd: 6.2 cm  LVIDs: 5.5 cm  LVPWd: 1.2 cm  FS: 11.5 %  LV mass(C)d: 260.7 grams  LV mass(C)dI: 130.4 grams/m2  Ao root diam: 3.1 cm  LA dimension: 4.5 cm  asc Aorta Diam: 3.1 cm  LA/Ao: 1.4  LVOT diam: 2.0 cm  LVOT area: 3.2 cm2  LA Volume (BP): 55.0 ml  LA Volume Index (BP): 27.5 ml/m2  RWT: 0.39     Time Measurements  Aortic HR: 73.0 BPM     Doppler Measurements & Calculations  MV E max bell: 83.4 cm/sec  MV A max bell: 117.0 cm/sec  MV E/A: 0.71  MV dec time: 0.15 sec  LV V1 max P.2 mmHg  LV V1 max: 102.7 cm/sec  LV V1 VTI: 19.5 cm  CO(LVOT): 4.5 l/min  CI(LVOT): 2.3 l/min/m2  SV(LVOT): 62.3  ml  SI(LVOT): 31.2 ml/m2  PA acc time: 0.13 sec  TR max bell: 229.4 cm/sec  TR max P.3 mmHg  E/E' av.7  Lateral E/e': 19.5  Medial E/e': 13.9     ______________________________________________________________________________  Report approved by: Bertha Gallegos 2022 11:25 AM           Medications     - MEDICATION INSTRUCTIONS -         remdesivir  100 mg Intravenous Q24H    And     sodium chloride 0.9%  50 mL Intravenous Q24H     aerochamber  1 each Inhalation Once     aspirin  81 mg Oral Daily     calcium carbonate 500 mg (elemental)  500 mg Oral TID w/meals     carvedilol  25 mg Oral BID w/meals     enoxaparin ANTICOAGULANT  40 mg Subcutaneous Q24H     hypromellose-dextran  1 drop Both Eyes BID     insulin aspart  1-7 Units Subcutaneous TID AC     insulin aspart  1-5 Units Subcutaneous At Bedtime     ipratropium-albuterol  2 puff Inhalation 4x Daily     loratadine  10 mg Oral Daily     losartan  50 mg Oral Daily     mirtazapine  7.5-15 mg Oral At Bedtime     omeprazole  40 mg Oral BID AC     predniSONE  40 mg Oral Daily     rosuvastatin  10 mg Oral Every Other Day     senna-docusate  1-2 tablet Oral BID     sodium chloride (PF)  3 mL Intracatheter Q8H     spironolactone  25 mg Oral Daily     venlafaxine  225 mg Oral Daily with breakfast

## 2022-04-09 VITALS
HEART RATE: 65 BPM | BODY MASS INDEX: 33.66 KG/M2 | OXYGEN SATURATION: 92 % | RESPIRATION RATE: 16 BRPM | WEIGHT: 202.3 LBS | TEMPERATURE: 97.6 F | DIASTOLIC BLOOD PRESSURE: 56 MMHG | SYSTOLIC BLOOD PRESSURE: 109 MMHG

## 2022-04-09 LAB
ALBUMIN SERPL-MCNC: 3.5 G/DL (ref 3.4–5)
ALP SERPL-CCNC: 74 U/L (ref 40–150)
ALT SERPL W P-5'-P-CCNC: 45 U/L (ref 0–50)
ANION GAP SERPL CALCULATED.3IONS-SCNC: 5 MMOL/L (ref 3–14)
AST SERPL W P-5'-P-CCNC: 27 U/L (ref 0–45)
BILIRUB SERPL-MCNC: 0.6 MG/DL (ref 0.2–1.3)
BUN SERPL-MCNC: 22 MG/DL (ref 7–30)
CALCIUM SERPL-MCNC: 9.1 MG/DL (ref 8.5–10.1)
CHLORIDE BLD-SCNC: 109 MMOL/L (ref 94–109)
CO2 SERPL-SCNC: 25 MMOL/L (ref 20–32)
CREAT SERPL-MCNC: 0.72 MG/DL (ref 0.52–1.04)
CRP SERPL-MCNC: 14.1 MG/L (ref 0–8)
D DIMER PPP FEU-MCNC: 0.29 UG/ML FEU (ref 0–0.5)
ERYTHROCYTE [DISTWIDTH] IN BLOOD BY AUTOMATED COUNT: 12.5 % (ref 10–15)
FIBRINOGEN PPP-MCNC: 465 MG/DL (ref 170–490)
GFR SERPL CREATININE-BSD FRML MDRD: 87 ML/MIN/1.73M2
GLUCOSE BLD-MCNC: 182 MG/DL (ref 70–99)
GLUCOSE BLDC GLUCOMTR-MCNC: 140 MG/DL (ref 70–99)
GLUCOSE BLDC GLUCOMTR-MCNC: 215 MG/DL (ref 70–99)
HCT VFR BLD AUTO: 38.1 % (ref 35–47)
HGB BLD-MCNC: 12.4 G/DL (ref 11.7–15.7)
MCH RBC QN AUTO: 30.8 PG (ref 26.5–33)
MCHC RBC AUTO-ENTMCNC: 32.5 G/DL (ref 31.5–36.5)
MCV RBC AUTO: 95 FL (ref 78–100)
PLATELET # BLD AUTO: 154 10E3/UL (ref 150–450)
POTASSIUM BLD-SCNC: 4.2 MMOL/L (ref 3.4–5.3)
PROT SERPL-MCNC: 6.6 G/DL (ref 6.8–8.8)
RBC # BLD AUTO: 4.03 10E6/UL (ref 3.8–5.2)
SODIUM SERPL-SCNC: 139 MMOL/L (ref 133–144)
WBC # BLD AUTO: 9.6 10E3/UL (ref 4–11)

## 2022-04-09 PROCEDURE — 85379 FIBRIN DEGRADATION QUANT: CPT | Performed by: PHYSICIAN ASSISTANT

## 2022-04-09 PROCEDURE — 250N000013 HC RX MED GY IP 250 OP 250 PS 637: Performed by: INTERNAL MEDICINE

## 2022-04-09 PROCEDURE — 80053 COMPREHEN METABOLIC PANEL: CPT | Performed by: PHYSICIAN ASSISTANT

## 2022-04-09 PROCEDURE — 99239 HOSP IP/OBS DSCHRG MGMT >30: CPT | Performed by: INTERNAL MEDICINE

## 2022-04-09 PROCEDURE — 36415 COLL VENOUS BLD VENIPUNCTURE: CPT | Performed by: PHYSICIAN ASSISTANT

## 2022-04-09 PROCEDURE — 85027 COMPLETE CBC AUTOMATED: CPT | Performed by: PHYSICIAN ASSISTANT

## 2022-04-09 PROCEDURE — 250N000012 HC RX MED GY IP 250 OP 636 PS 637: Performed by: STUDENT IN AN ORGANIZED HEALTH CARE EDUCATION/TRAINING PROGRAM

## 2022-04-09 PROCEDURE — 250N000013 HC RX MED GY IP 250 OP 250 PS 637: Performed by: PHYSICIAN ASSISTANT

## 2022-04-09 PROCEDURE — 94640 AIRWAY INHALATION TREATMENT: CPT

## 2022-04-09 PROCEDURE — 94640 AIRWAY INHALATION TREATMENT: CPT | Mod: 76

## 2022-04-09 PROCEDURE — 85384 FIBRINOGEN ACTIVITY: CPT | Performed by: PHYSICIAN ASSISTANT

## 2022-04-09 PROCEDURE — 86140 C-REACTIVE PROTEIN: CPT | Performed by: PHYSICIAN ASSISTANT

## 2022-04-09 RX ORDER — GLIPIZIDE 5 MG/1
5 TABLET, FILM COATED, EXTENDED RELEASE ORAL
Status: DISCONTINUED | OUTPATIENT
Start: 2022-04-09 | End: 2022-04-09 | Stop reason: HOSPADM

## 2022-04-09 RX ADMIN — ACETAMINOPHEN 650 MG: 325 TABLET, FILM COATED ORAL at 10:08

## 2022-04-09 RX ADMIN — DEXTRAN 70, GLYCERIN, HYPROMELLOSE 1 DROP: 1; 2; 3 SOLUTION/ DROPS OPHTHALMIC at 10:19

## 2022-04-09 RX ADMIN — IPRATROPIUM BROMIDE AND ALBUTEROL 2 PUFF: 20; 100 SPRAY, METERED RESPIRATORY (INHALATION) at 12:15

## 2022-04-09 RX ADMIN — LORATADINE 10 MG: 10 TABLET ORAL at 10:09

## 2022-04-09 RX ADMIN — SENNOSIDES AND DOCUSATE SODIUM 1 TABLET: 50; 8.6 TABLET ORAL at 10:09

## 2022-04-09 RX ADMIN — SPIRONOLACTONE 25 MG: 25 TABLET ORAL at 10:09

## 2022-04-09 RX ADMIN — VENLAFAXINE HYDROCHLORIDE 225 MG: 150 CAPSULE, EXTENDED RELEASE ORAL at 10:09

## 2022-04-09 RX ADMIN — LOSARTAN POTASSIUM 50 MG: 50 TABLET, FILM COATED ORAL at 10:09

## 2022-04-09 RX ADMIN — CALCIUM 500 MG: 500 TABLET ORAL at 10:09

## 2022-04-09 RX ADMIN — OMEPRAZOLE 40 MG: 20 CAPSULE, DELAYED RELEASE ORAL at 10:09

## 2022-04-09 RX ADMIN — PREDNISONE 40 MG: 20 TABLET ORAL at 10:09

## 2022-04-09 RX ADMIN — GLIPIZIDE 5 MG: 5 TABLET, FILM COATED, EXTENDED RELEASE ORAL at 10:09

## 2022-04-09 RX ADMIN — CALCIUM 500 MG: 500 TABLET ORAL at 12:42

## 2022-04-09 RX ADMIN — CARVEDILOL 25 MG: 25 TABLET, FILM COATED ORAL at 10:09

## 2022-04-09 RX ADMIN — ASPIRIN 81 MG: 81 TABLET, COATED ORAL at 10:12

## 2022-04-09 ASSESSMENT — ACTIVITIES OF DAILY LIVING (ADL)
ADLS_ACUITY_SCORE: 7

## 2022-04-09 NOTE — PLAN OF CARE
Patient has been assessed for Home Oxygen needs. Oxygen readings:    *Pulse oximetry (SpO2) = 92% on room air at rest while awake.    *SpO2 improved to 97% on 0 liters/minute at rest.    *SpO2 = 97% on room air during activity/with exercise.    *SpO2 improved to 97% on 0 liters/minute during activity/with exercise.

## 2022-04-09 NOTE — PLAN OF CARE
Goal Outcome Evaluation:    AOX4. 2LNC. CPAP and Hepa Filter overnight. Denies pain. C/O dizziness, doctor notified 250 bouls given. Will continue to monitor. BS: 206, 215. Stand by assist after concerns of dizziness, normally  independent to bathroom. On Lovenox, Remdesivir. Contact precautions maintained.

## 2022-04-09 NOTE — DISCHARGE SUMMARY
[facility]  Discharge Summary  Hospitalist      Date of Admission:  4/7/2022  Date of Discharge:  4/9/2022  Provider:  Torsten Gongora MD. [facility]  Date of Service (when I last saw the patient): 04/09/22      Primary Provider: Julianne Thornton          Discharge Diagnosis:     Discharge Diagnoses   Acute hypoxic respiratory failure secondary to COVID-19 pneumonia  Nonischemic cardiomyopathy  Diabetes mellitus type 2 diet controlled    Other medical issues:  Past Medical History:   Diagnosis Date     Acute posthemorrhagic anemia 9/11/2015     Anemia      Chronic systolic congestive heart failure (H) 10/10/2018     EF 35%     Fibromyalgia      Gastro-oesophageal reflux disease      GENERAL OSTEOARTHROSIS [715.00] 11/23/2004    knee     Generalized anxiety disorder 9/30/2013     Hyperlipidemia LDL goal <130 2/10/2010    Failed simvastatin- muscle aches      Hypertension     because of the heart     Idiopathic cardiomyopathy (H) 1/19/2012    Dr. Osborne 3/2011 EF 30-35%      Insomnia 1/19/2012     Iron deficiency anemia 8/12/2015     Problem list name updated by automated process. Provider to review     LBBB (left bundle branch block)      Left ventricular systolic dysfunction:EF 35% 2/5/2012    Must stay on diovan per cardiology      Major depressive disorder, recurrent episode, mild (H) 2/17/2016     Migraine 6/21/2005     Problem list name updated by automated process. Provider to review     Nonischemic cardiomyopathy (H)     EF 30-35%, Dr. Osborne [facility] (suspect virus); s/p AICD     NSVT (nonsustained ventricular tachycardia) (H)      Obesity 1/20/2012     SUSAN (obstructive sleep apnea) 01/19/2012    CPAP      Pure hypercholesterolemia      Restless leg syndrome      Type 2 diabetes mellitus without complication (H) 9/24/2016         Please see the admission history and physical for full details.     Hospital Course     Kristie MARYANN Jones was admitted on 4/7/2022.  The following problems were addressed  during her hospitalization:  76-year-old female with nonischemic cardiomyopathy (EF 30 to 35%) s/p AICD, Migraine headaches, NIDDM2, HTN, DLD, GERD, fibromyalgia, and SUSAN on CPAP who presented to [facility] ED on 4/7/2022 with increasing shortness of breath in setting of positive COVID19 home antigen test.  SpO2 88% in ED.  Received IV SoluMedrol (remote allergy to dexamethasone) and admission requested.        Patient is vaccinated (3/2021) and boosted (9/2021) with Bizo.      Symptom onset 4/5/2022 with headache, sinus congestion, and body aches.    Positive home antigen test 4/5/2022 with positive PCR 4/7/2022.            1. Acute hypoxic respiratory failure secondary to COVID-19 pneumonitis..      Started remdesivir on 4/7.    Patient allergic to dexamethasone, she was given prednisone during her hospital stay for 3 days without any side effect however she developed significant hyperglycemia, the risk and benefit discussed with the patient time of discharge and decision was not to continue steroid specially that her inflammatory markers were not significantly elevated and her chest x-ray was clear    She was weaned off oxygen and was able to do her ADL on the day she requested discharge and felt at baseline  2. Nonischemic cardiomyopathy.    EF of 3 status post AICD.  Currently not in exacerbation.    Continue carvedilol, losartan and spironolactone.    Repeat echocardiogram shows EF of 20 to 25%, likely due to acute illness and can be repeated in 1 month as an outpatient.  She already has a follow-up with cardiology in the next monts also reviewing her previous echoes she has had readings of 25% ejection fraction which makes me suspect possible variability related to the readers  3. Diet-controlled diabetes mellitus.  Last HbA1c is 6 and not on any hypoglycemic agents.    Should improve now with stopping steroid, requested to monitor her blood sugar at home she does have a Accu-Chek and follow-up with her primary  care doctor next week  4. Depression/anxiety/fibromyalgia.  Continue mirtazapine, Effexor and as needed alprazolam.  5. GERD.  Increase dose of omeprazole to 40 mg twice daily while on steroids.  6. Sleep apnea, continue CPAP.           Pending Results   Unresulted Labs Ordered in the Past 30 Days of this Admission     No orders found from 3/8/2022 to 4/8/2022.          Discharge Orders      Reason for your hospital stay    COVID-19 infection     Follow-up and recommended labs and tests     Follow-up with primary care physician next week on blood sugar control  Follow-up with cardiology in a month with repeat echocardiogram     Activity    Your activity upon discharge: activity as tolerated     Diet    Follow this diet upon discharge: Orders Placed This Encounter      Combination Diet 2 gm NA Diet       Code Status   Full Code       Primary Care Physician   Julianne Thornton    Physical Exam   Temp: 97.6  F (36.4  C) Temp src: Oral BP: 109/56 Pulse: 65   Resp: 16 SpO2: 92 % O2 Device: None (Room air) Oxygen Delivery: 2 LPM  Vitals:    04/08/22 0632   Weight: 91.8 kg (202 lb 4.8 oz)     Vital Signs with Ranges  Temp:  [97.6  F (36.4  C)-98.4  F (36.9  C)] 97.6  F (36.4  C)  Pulse:  [64-79] 65  Resp:  [16-18] 16  BP: (107-120)/(40-60) 109/56  SpO2:  [92 %-97 %] 92 %  I/O last 3 completed shifts:  In: 120 [P.O.:120]  Out: -     Constitutional:  alert, cooperative, no apparent distress  Respiratory: No increased work of breathing, good air exchange, no crackles or wheezing.  Cardiovascular: apical impulse,normal S1 and S2  GI: bowel sounds present, soft, non-distended, non-tender      Discharge Disposition   Discharged to home    Consultations This Hospital Stay   None    Time Spent on this Encounter   I, Torsten Gongora MD, personally saw the patient today and spent greater than 30 minutes discharging this patient.      Discharge Medications   Current Discharge Medication List      CONTINUE these medications which  have NOT CHANGED    Details   acetaminophen (TYLENOL) 500 MG tablet Take 1,000 mg by mouth daily as needed for mild pain      ALPRAZolam (XANAX) 0.25 MG tablet Take 1 tablet (0.25 mg) by mouth 2 times daily as needed for anxiety  Qty: 30 tablet, Refills: 1    Associated Diagnoses: Generalized anxiety disorder      aspirin 81 MG tablet Take 81 mg by mouth daily      CALCIUM 500 MG OR TABS Takes 3 tabs daily takees total of 1200 mg daily      carvedilol (COREG) 25 MG tablet TAKE 1 TABLET BY MOUTH TWICE DAILY WITH MEALS  Qty: 180 tablet, Refills: 3    Comments: Profile Rx: patient will contact pharmacy when needed  Associated Diagnoses: Idiopathic cardiomyopathy (H)      Cholecalciferol (VITAMIN D) 2000 UNITS tablet Take 1 tablet by mouth daily.    Associated Diagnoses: Obesity, unspecified      ciprofloxacin (CIPRO) 100 MG tablet Take 100 mg by mouth daily as needed For dental appointments      clobetasol (TEMOVATE) 0.05 % external cream Apply thin layer to bilateral hand twice daily for 2 weeks  Qty: 45 g, Refills: 1    Associated Diagnoses: Eczema, unspecified type      co-enzyme Q-10 50 MG CAPS Take 1 capsule by mouth daily.      lifitegrast (XIIDRA) 5 % opthalmic solution Place 1 drop into both eyes 2 times daily      loratadine (CLARITIN) 10 MG tablet Take 10 mg by mouth daily      losartan (COZAAR) 50 MG tablet Take 1 tablet (50 mg) by mouth daily  Qty: 90 tablet, Refills: 3    Comments: Profile Rx: patient will contact pharmacy when needed  Associated Diagnoses: Idiopathic cardiomyopathy (H)      mirtazapine (REMERON) 7.5 MG tablet TAKE 1 TO 2 TABLETS 30 MINUTES BEFORE BEDTIME  Qty: 60 tablet, Refills: 5    Associated Diagnoses: Insomnia, unspecified type      Multiple Vitamins-Minerals (MULTIVITAMIN ADULT PO) Take 1 tablet by mouth daily      omeprazole (PRILOSEC) 20 MG DR capsule Take 1 capsule (20 mg) by mouth daily  Qty:        rosuvastatin (CRESTOR) 10 MG tablet TAKE 1 TABLET EVERY OTHER DAY  Qty: 45  tablet, Refills: 0    Associated Diagnoses: Hyperlipidemia LDL goal <130      spironolactone (ALDACTONE) 25 MG tablet TAKE 1/2 TABLET BY MOUTH DAILY  Qty: 45 tablet, Refills: 1    Associated Diagnoses: Idiopathic cardiomyopathy (H)      venlafaxine (EFFEXOR-XR) 75 MG 24 hr capsule TAKE 3 CAPSULES ONCE DAILY  Qty: 270 capsule, Refills: 0    Associated Diagnoses: Generalized anxiety disorder           Allergies   Allergies   Allergen Reactions     Corticosteroids Other (See Comments)     flush up through chest and face, decadron  flushing  Other reaction(s): Erythema     Phenothiazines Anxiety and GI Disturbance     anxiety attack, compazine  Other reaction(s): Tardive Dyskinesia     Prochlorperazine      Other reaction(s): Tardive Dyskinesia     Amoxicillin Hives     Clindamycin Other (See Comments)     Burning sensation in chest     Decadron      flushing     Dexamethasone      Other reaction(s): Erythema     Compazine Anxiety     Data   Most Recent 3 CBC's:Recent Labs   Lab Test 04/09/22  0851 04/08/22  0649 04/07/22  1252   WBC 9.6 6.1 4.5   HGB 12.4 12.8 12.6   MCV 95 95 96    139* 133*      Most Recent 3 BMP's:  Recent Labs   Lab Test 04/09/22  1006 04/09/22  0851 04/09/22  0212 04/08/22  0857 04/08/22  0649 04/07/22  1621 04/07/22  1252   NA  --  139  --   --  136  --  134   POTASSIUM  --  4.2  --   --  4.0  --  3.7   CHLORIDE  --  109  --   --  105  --  103   CO2  --  25  --   --  23  --  25   BUN  --  22  --   --  18  --  13   CR  --  0.72  --   --  0.88  --  1.02   ANIONGAP  --  5  --   --  8  --  6   WILEY  --  9.1  --   --  9.3  --  8.8   * 182* 215*   < > 249*   < > 205*    < > = values in this interval not displayed.     Most Recent 2 LFT's:  Recent Labs   Lab Test 04/09/22  0851 04/08/22  0649   AST 27 26   ALT 45 39   ALKPHOS 74 75   BILITOTAL 0.6 0.4     Most Recent INR's and Anticoagulation Dosing History:  Anticoagulation Dose History     Recent Dosing and Labs Latest Ref Rng & Units  4/30/2012 6/26/2012 12/5/2012 12/26/2020 4/7/2022    INR 0.85 - 1.15 1.03 0.92 0.88 0.99 1.06        Most Recent 3 Troponin's:  Recent Labs   Lab Test 12/27/20  0316 12/26/20  2330 12/26/20  1840   TROPI <0.015 <0.015 <0.015     Most Recent Cholesterol Panel:  Recent Labs   Lab Test 01/25/21  0854   CHOL 170   LDL 80   HDL 39*   TRIG 253*     Most Recent 6 Bacteria Isolates From Any Culture (See EPIC Reports for Culture Details):  Recent Labs   Lab Test 08/26/15  0815 06/23/15  1335 11/25/14  1300   CULT No anaerobes isolated  On day 5, isolated in broth only: Anaerobic diptheroids Unable to completely   identify  Susceptibility testing not routinely done  These bacteria are part of normal skin meme, but on occasion, may be true   pathogens.  Clinical correlation must be applied to interpreting this   microbiology result.  *  No growth  No growth No anaerobes isolated  No growth No anaerobes isolated  No growth     Most Recent TSH, T4 and A1c Labs:  Recent Labs   Lab Test 04/07/22  1252 11/18/21  1034 02/05/21  0908 09/26/18  1014 02/13/18  0833   TSH  --   --  2.21   < > 1.89   T4  --   --   --   --  0.78   A1C 6.4*   < >  --    < > 5.4    < > = values in this interval not displayed.     Results for orders placed or performed during the hospital encounter of 04/07/22   Chest XR,  PA & LAT    Narrative    CHEST TWO VIEWS April 7, 2022 1:12 PM     HISTORY: Cough. Dyspnea.    COMPARISON: 12/26/2020.      Impression    IMPRESSION: No significant interval change. Stable cardiac device left  chest wall. Cardiac enlargement. Pulmonary vascularity is within  normal limits. Lungs clear. Aortic calcification. No pneumothorax.    WILLIAM HARO MD         SYSTEM ID:  [id number]   Echocardiogram Complete     Value    LVEF  20-25%    Narrative    [id number]  [id number]  [id number]  [id number]^BINTA^JESSICA^[facility]  Echocardiography Laboratory  [address] [place] [address]     Name: MIKE  LEONID WOLF  MRN: [id number]  : 1945  Study Date: 2022 10:26 AM  Age: 76 yrs  Gender: Female  Patient [facility]: [facility]  Reason For Study: Dyspnea, Cardiomyopathy  Ordering Physician: JESSICA JUDD  Referring Physician: Julianne Thornton  Performed By: Chava Cotton RDCS     BSA: 2.0 m2  Height: 65 in  Weight: 205 lb  HR: 77  BP: 102/49 mmHg  ______________________________________________________________________________  Procedure  Complete Portable Echo Adult. Optison (NDC #0499-6651) given intravenously.  ______________________________________________________________________________  Interpretation Summary     The visual ejection fraction is 20-25%.  Left ventricular systolic function is severely reduced.  The left ventricle is slightly larger than before. Otherwise there is no  significant change from the previous study. The study was technically  difficult.  ______________________________________________________________________________  Left Ventricle  The left ventricle is moderately dilated. There is normal left ventricular  wall thickness. Diastolic Doppler findings (E/E' ratio and/or other  parameters) suggest left ventricular filling pressures are indeterminate. The  visual ejection fraction is 20-25%. Left ventricular systolic function is  severely reduced. Septal wall motion abnormality may reflect pacemaker  activation. There is septal dyskinesis. There is severe global hypokinesia of  the left ventricle.     Right Ventricle  There is a catheter/pacemaker lead seen in the right ventricle. The right  ventricle is normal in size and function.     Atria  Normal left atrial size. Right atrial size is normal. There is no color  Doppler evidence of an atrial shunt.     Mitral Valve  The mitral valve leaflets are mildly thickened. There is mild (1+) mitral  regurgitation.     Tricuspid Valve  There is trace tricuspid regurgitation. The right ventricular systolic  pressure is approximated at 27mmHg plus  the right atrial pressure.     Aortic Valve  The aortic valve is not well visualized. No aortic regurgitation is present.  No hemodynamically significant valvular aortic stenosis.     Pulmonic Valve  There is no pulmonic valvular regurgitation. Normal pulmonic valve velocity.     Vessels  The aortic root is normal size. Normal size ascending aorta. IVC diameter <2.1  cm collapsing >50% with sniff suggests a normal RA pressure of 3 mmHg.     Pericardium  There is no pericardial effusion.     Rhythm  Sinus rhythm was noted.  ______________________________________________________________________________  MMode/2D Measurements & Calculations  IVSd: 0.77 cm     LVIDd: 6.2 cm  LVIDs: 5.5 cm  LVPWd: 1.2 cm  FS: 11.5 %  LV mass(C)d: 260.7 grams  LV mass(C)dI: 130.4 grams/m2  Ao root diam: 3.1 cm  LA dimension: 4.5 cm  asc Aorta Diam: 3.1 cm  LA/Ao: 1.4  LVOT diam: 2.0 cm  LVOT area: 3.2 cm2  LA Volume (BP): 55.0 ml  LA Volume Index (BP): 27.5 ml/m2  RWT: 0.39     Time Measurements  Aortic HR: 73.0 BPM     Doppler Measurements & Calculations  MV E max bell: 83.4 cm/sec  MV A max bell: 117.0 cm/sec  MV E/A: 0.71  MV dec time: 0.15 sec  LV V1 max P.2 mmHg  LV V1 max: 102.7 cm/sec  LV V1 VTI: 19.5 cm  CO(LVOT): 4.5 l/min  CI(LVOT): 2.3 l/min/m2  SV(LVOT): 62.3 ml  SI(LVOT): 31.2 ml/m2  PA acc time: 0.13 sec  TR max bell: 229.4 cm/sec  TR max P.3 mmHg  E/E' av.7  Lateral E/e': 19.5  Medial E/e': 13.9     ______________________________________________________________________________  Report approved by: Bertha Gallegos 2022 11:25 AM                   Disclaimer: This note consists of symbols derived from keyboarding, dictation and/or voice recognition software. As a result, there may be errors in the script that have gone undetected. Please consider this when interpreting information found in this chart.

## 2022-04-09 NOTE — PROGRESS NOTES
Pt discharged home with  to transport. VSS at time of discharge. Reviewed AVS + home medication schedule, no questions at this time. Verbalizes understanding of recommended follow-up. Sent with all personal belongings & pulse oximeter. No new prescriptions.

## 2022-04-11 ENCOUNTER — PATIENT OUTREACH (OUTPATIENT)
Dept: CARE COORDINATION | Facility: CLINIC | Age: 77
End: 2022-04-11
Payer: COMMERCIAL

## 2022-04-11 DIAGNOSIS — Z71.89 OTHER SPECIFIED COUNSELING: ICD-10-CM

## 2022-04-11 NOTE — PROGRESS NOTES
Clinic Care Coordination Contact  [facility]/Voicemail       Clinical Data: Care Coordinator Outreach  Outreach attempted x 1.  Left message on patient's voicemail with call back information and requested return call.  Plan: Care Coordinator will try to reach patient again in 1-2 business days.    Shayy Izaguirre  Community Health Worker  [facility]  [phone]

## 2022-04-12 NOTE — PROGRESS NOTES
Clinic Care Coordination Contact  [facility]/Voicemail       Clinical Data: Care Coordinator Outreach  Outreach attempted x 2.  Left message on patient's voicemail with call back information and requested return call.  Plan:  Care Coordinator will do no further outreaches at this time.    Shayy Izaguirre  Community Health Worker  [facility]  [phone]

## 2022-04-14 ENCOUNTER — OFFICE VISIT (OUTPATIENT)
Dept: CARDIOLOGY | Facility: CLINIC | Age: 77
End: 2022-04-14
Payer: COMMERCIAL

## 2022-04-14 VITALS
SYSTOLIC BLOOD PRESSURE: 102 MMHG | HEART RATE: 64 BPM | HEIGHT: 65 IN | BODY MASS INDEX: 33.99 KG/M2 | WEIGHT: 204 LBS | DIASTOLIC BLOOD PRESSURE: 62 MMHG

## 2022-04-14 DIAGNOSIS — U07.1 PNEUMONIA DUE TO 2019 NOVEL CORONAVIRUS: ICD-10-CM

## 2022-04-14 DIAGNOSIS — I42.9 IDIOPATHIC CARDIOMYOPATHY (H): Primary | ICD-10-CM

## 2022-04-14 DIAGNOSIS — J12.82 PNEUMONIA DUE TO 2019 NOVEL CORONAVIRUS: ICD-10-CM

## 2022-04-14 PROCEDURE — 99214 OFFICE O/P EST MOD 30 MIN: CPT | Performed by: INTERNAL MEDICINE

## 2022-04-14 NOTE — LETTER
4/14/2022    Julianne EMatt Thornton, WINNIE CNP  [address] [facility] [address]    RE: Kristie Jones       Dear Colleague,     I had the pleasure of seeing Kristie Jones in the [facility].  HPI and Plan:   It is my pleasure to see this very pleasant 76-year-old lady who I been following for a long time as she has nonischemic cardiomyopathy.    Baseline EF is about 30%.  Blood pressures have always been soft with systolic hovering mostly around 100 at baseline.  She has a BiV /AICD in place though the former has been switched off due to narrowing of the QRS complex.  She is maintained on 25 mg of Coreg, 25 mg of spironolactone and 50 mg of losartan.  She has not developed CHF for very long time.    Approximately 10 days ago she developed COVID-pneumonia with oxygen saturation dropping to the 80s.  She was briefly hospitalized and received remdesivir as well as prednisone as she is allergic to dexamethasone.  She has made an excellent recovery.  She was not in congestive heart failure but an echo did show LVEF dropping to 20 to 25%.    Currently she is feeling fine.  She has no heart failure symptoms.  Breathing is back to baseline.    Cardiac examination reveals no evidence at all of congestive heart failure.    I am sure with the development of COVID-pneumonia and hypoxia there will be a drop in her ejection fraction.  I am very happy to see that she has no symptoms or signs of heart failure.  I am cautiously optimistic that the LVEF should recover but we need to give it some time.  She has an echo scheduled in the next month or so.  I will personally review this and arrange follow-up as necessary.  If there is recovery in LVEF then I will see her again in 1 years time for continued follow-up.        No orders of the defined types were placed in this encounter.      No orders of the defined types were placed in this encounter.      No diagnosis found.    CURRENT MEDICATIONS:  Current Outpatient  Medications   Medication Sig Dispense Refill     acetaminophen (TYLENOL) 500 MG tablet Take 1,000 mg by mouth daily as needed for mild pain       ALPRAZolam (XANAX) 0.25 MG tablet Take 1 tablet (0.25 mg) by mouth 2 times daily as needed for anxiety 30 tablet 1     aspirin 81 MG tablet Take 81 mg by mouth daily       CALCIUM 500 MG OR TABS Takes 3 tabs daily takees total of 1200 mg daily       carvedilol (COREG) 25 MG tablet TAKE 1 TABLET BY MOUTH TWICE DAILY WITH MEALS 180 tablet 3     Cholecalciferol (VITAMIN D) 2000 UNITS tablet Take 1 tablet by mouth daily.       ciprofloxacin (CIPRO) 100 MG tablet Take 100 mg by mouth daily as needed For dental appointments       clobetasol (TEMOVATE) 0.05 % external cream Apply thin layer to bilateral hand twice daily for 2 weeks 45 g 1     co-enzyme Q-10 50 MG CAPS Take 1 capsule by mouth daily.       lifitegrast (XIIDRA) 5 % opthalmic solution Place 1 drop into both eyes 2 times daily       loratadine (CLARITIN) 10 MG tablet Take 10 mg by mouth daily       losartan (COZAAR) 50 MG tablet Take 1 tablet (50 mg) by mouth daily 90 tablet 3     mirtazapine (REMERON) 7.5 MG tablet TAKE 1 TO 2 TABLETS 30 MINUTES BEFORE BEDTIME 60 tablet 5     Multiple Vitamins-Minerals (MULTIVITAMIN ADULT PO) Take 1 tablet by mouth daily       omeprazole (PRILOSEC) 20 MG DR capsule Take 1 capsule (20 mg) by mouth daily       rosuvastatin (CRESTOR) 10 MG tablet TAKE 1 TABLET EVERY OTHER DAY 45 tablet 0     spironolactone (ALDACTONE) 25 MG tablet TAKE 1/2 TABLET BY MOUTH DAILY 45 tablet 1     venlafaxine (EFFEXOR-XR) 75 MG 24 hr capsule TAKE 3 CAPSULES ONCE DAILY 270 capsule 0       ALLERGIES     Allergies   Allergen Reactions     Corticosteroids Other (See Comments)     flush up through chest and face, decadron  flushing  Other reaction(s): Erythema     Phenothiazines Anxiety and GI Disturbance     anxiety attack, compazine  Other reaction(s): Tardive Dyskinesia     Prochlorperazine      Other  reaction(s): Tardive Dyskinesia     Amoxicillin Hives     Clindamycin Other (See Comments)     Burning sensation in chest     Decadron      flushing     Dexamethasone      Other reaction(s): Erythema     Compazine Anxiety       PAST MEDICAL HISTORY:  Past Medical History:   Diagnosis Date     Acute posthemorrhagic anemia 9/11/2015     Anemia      Chronic systolic congestive heart failure (H) 10/10/2018     EF 35%     Fibromyalgia      Gastro-oesophageal reflux disease      GENERAL OSTEOARTHROSIS [715.00] 11/23/2004    knee     Generalized anxiety disorder 9/30/2013     Hyperlipidemia LDL goal <130 2/10/2010    Failed simvastatin- muscle aches      Hypertension     because of the heart     Idiopathic cardiomyopathy (H) 1/19/2012    Dr. Osborne 3/2011 EF 30-35%      Insomnia 1/19/2012     Iron deficiency anemia 8/12/2015     Problem list name updated by automated process. Provider to review     LBBB (left bundle branch block)      Left ventricular systolic dysfunction:EF 35% 2/5/2012    Must stay on diovan per cardiology      Major depressive disorder, recurrent episode, mild (H) 2/17/2016     Migraine 6/21/2005     Problem list name updated by automated process. Provider to review     Nonischemic cardiomyopathy (H)     EF 30-35%, Dr. Osborne [facility] (suspect virus); s/p AICD     NSVT (nonsustained ventricular tachycardia) (H)      Obesity 1/20/2012     SUSAN (obstructive sleep apnea) 01/19/2012    CPAP      Pure hypercholesterolemia      Restless leg syndrome      Type 2 diabetes mellitus without complication (H) 9/24/2016       PAST SURGICAL HISTORY:  Past Surgical History:   Procedure Laterality Date     APPENDECTOMY       ARTHROPLASTY KNEE  1/7/2013    Procedure: ARTHROPLASTY KNEE;  Right Total Knee Arthroplasty       ARTHROPLASTY REVISION KNEE Right 8/26/2015    Procedure: ARTHROPLASTY REVISION KNEE;  Surgeon: Néstor Beth MD;  Location: [facility] OR     ARTHROSCOPY KNEE       bunionectomy left foot       CLOSED REDUCTION,  PERCUTANEOUS PINNING FINGER, COMBINED Right 6/17/2021    Procedure: Closed reduction, pinning right long finger distal interphalangeal joint fracture;  Surgeon: Joseluis Delong MD;  Location: [facility] OR     COLONOSCOPY       CORONARY ANGIOGRAPHY ADULT ORDER  2002    normal     CORONARY ANGIOGRAPHY ADULT ORDER  12/7/12    no significant focal narrowing that would benefit from mechanical intervention      ESOPHAGOSCOPY, GASTROSCOPY, DUODENOSCOPY (EGD), COMBINED N/A 7/9/2021    Procedure: ESOPHAGOGASTRODUODENOSCOPY, WITH BIOPSY with distal esophagus biopies to rule out Barretts esophagus by cold biopsy forceps;  Surgeon: Travis Harrington MD;  Location: [facility] GI     HYSTERECTOMY       IMPLANT AUTOMATIC IMPLANTABLE CARDIOVERTER DEFIBRILLATOR  4/30/12     INSERT THORACIC PACEMAKER LEAD EPICARDIAL  5/1/2012    Procedure:INSERT THORACIC PACEMAKER LEAD EPICARDIAL; EPICARDIAL LEAD PLACEMENT; Surgeon:WEST ARECHIGA; Location:[facility] OR     TONSILLECTOMY       TRANSPLANT - corneal lenses      Bilateral for cataracts       FAMILY HISTORY:  Family History   Problem Relation Age of Onset     Cancer Father         intraabdominal mass - not colon cancer     C.A.D. 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 Multiple ignacio problems, multiple hospitalizations in last 7 years, anxiety        Pt has 2 daughters, 2 step-daughters's and  7 grandchildrens        Youngest daughter Sabi has malignant brain tumor                   Review of Systems:  Skin:  Negative     Eyes:  Negative    ENT:  Negative    Respiratory:  Positive for dyspnea on exertion;cough;mucoid expectorant;sleep apnea;CPAP  Cardiovascular:  Negative for;palpitations;chest pain;edema Positive for;fatigue  Gastroenterology: Positive for poor appetite  Genitourinary:  Negative    Musculoskeletal:  Positive for    Neurologic:  Positive for headaches  Psychiatric:  Negative    Heme/Lymph/Imm:  Negative    Endocrine:  Negative      Physical Exam:  Vitals: /62 (BP Location: Right arm, Patient Position: Chair, Cuff Size: Adult Regular)   Pulse 64   Ht 1.651 m (5' 5\")   Wt 92.5 kg (204 lb)   BMI 33.95 kg/m      Constitutional:           Skin:             Head:           Eyes:           Lymph:      ENT:           Neck:           Respiratory:            Cardiac:                                                           GI:           Extremities and Muscular Skeletal:                 Neurological:           Psych:           Recent Lab Results:  LIPID RESULTS:  Lab Results   Component Value Date    CHOL 170 01/25/2021    HDL 39 (L) 01/25/2021    LDL 80 01/25/2021    TRIG 253 (H) 01/25/2021    CHOLHDLRATIO 4.7 " 10/13/2015       LIVER ENZYME RESULTS:  Lab Results   Component Value Date    AST 27 04/09/2022    AST 28 12/26/2020    ALT 45 04/09/2022    ALT 32 12/26/2020       CBC RESULTS:  Lab Results   Component Value Date    WBC 9.6 04/09/2022    WBC 4.0 02/05/2021    RBC 4.03 04/09/2022    RBC 3.86 02/05/2021    HGB 12.4 04/09/2022    HGB 12.0 02/05/2021    HCT 38.1 04/09/2022    HCT 37.0 02/05/2021    MCV 95 04/09/2022    MCV 96 02/05/2021    MCH 30.8 04/09/2022    MCH 31.1 02/05/2021    MCHC 32.5 04/09/2022    MCHC 32.4 02/05/2021    RDW 12.5 04/09/2022    RDW 12.5 02/05/2021     04/09/2022     02/05/2021       BMP RESULTS:  Lab Results   Component Value Date     04/09/2022     02/05/2021    POTASSIUM 4.2 04/09/2022    POTASSIUM 4.2 06/17/2021    CHLORIDE 109 04/09/2022    CHLORIDE 108 02/05/2021    CO2 25 04/09/2022    CO2 23 02/05/2021    ANIONGAP 5 04/09/2022    ANIONGAP 8 02/05/2021     (H) 04/09/2022     (H) 04/09/2022     (H) 02/05/2021    BUN 22 04/09/2022    BUN 16 02/05/2021    CR 0.72 04/09/2022    CR 0.99 06/17/2021    GFRESTIMATED 87 04/09/2022    GFRESTIMATED 56 (L) 06/17/2021    GFRESTBLACK 64 06/17/2021    WILEY 9.1 04/09/2022    WILEY 9.1 02/05/2021        A1C RESULTS:  Lab Results   Component Value Date    A1C 6.4 (H) 04/07/2022    A1C 6.1 (H) 01/25/2021       INR RESULTS:  Lab Results   Component Value Date    INR 1.06 04/07/2022    INR 0.99 12/26/2020    INR 0.88 12/05/2012           CC  Referred Self    Thank you for allowing me to participate in the care of your patient.      Sincerely,     DR TRENT BARBOSA MD     [facility]

## 2022-04-15 NOTE — PROGRESS NOTES
HPI and Plan:   It is my pleasure to see this very pleasant 76-year-old lady who I been following for a long time as she has nonischemic cardiomyopathy.    Baseline EF is about 30%.  Blood pressures have always been soft with systolic hovering mostly around 100 at baseline.  She has a BiV /AICD in place though the former has been switched off due to narrowing of the QRS complex.  She is maintained on 25 mg of Coreg, 25 mg of spironolactone and 50 mg of losartan.  She has not developed CHF for very long time.    Approximately 10 days ago she developed COVID-pneumonia with oxygen saturation dropping to the 80s.  She was briefly hospitalized and received remdesivir as well as prednisone as she is allergic to dexamethasone.  She has made an excellent recovery.  She was not in congestive heart failure but an echo did show LVEF dropping to 20 to 25%.    Currently she is feeling fine.  She has no heart failure symptoms.  Breathing is back to baseline.    Cardiac examination reveals no evidence at all of congestive heart failure.    I am sure with the development of COVID-pneumonia and hypoxia there will be a drop in her ejection fraction.  I am very happy to see that she has no symptoms or signs of heart failure.  I am cautiously optimistic that the LVEF should recover but we need to give it some time.  She has an echo scheduled in the next month or so.  I will personally review this and arrange follow-up as necessary.  If there is recovery in LVEF then I will see her again in 1 years time for continued follow-up.        No orders of the defined types were placed in this encounter.      No orders of the defined types were placed in this encounter.      No diagnosis found.    CURRENT MEDICATIONS:  Current Outpatient Medications   Medication Sig Dispense Refill     acetaminophen (TYLENOL) 500 MG tablet Take 1,000 mg by mouth daily as needed for mild pain       ALPRAZolam (XANAX) 0.25 MG tablet Take 1 tablet (0.25 mg) by  mouth 2 times daily as needed for anxiety 30 tablet 1     aspirin 81 MG tablet Take 81 mg by mouth daily       CALCIUM 500 MG OR TABS Takes 3 tabs daily takees total of 1200 mg daily       carvedilol (COREG) 25 MG tablet TAKE 1 TABLET BY MOUTH TWICE DAILY WITH MEALS 180 tablet 3     Cholecalciferol (VITAMIN D) 2000 UNITS tablet Take 1 tablet by mouth daily.       ciprofloxacin (CIPRO) 100 MG tablet Take 100 mg by mouth daily as needed For dental appointments       clobetasol (TEMOVATE) 0.05 % external cream Apply thin layer to bilateral hand twice daily for 2 weeks 45 g 1     co-enzyme Q-10 50 MG CAPS Take 1 capsule by mouth daily.       lifitegrast (XIIDRA) 5 % opthalmic solution Place 1 drop into both eyes 2 times daily       loratadine (CLARITIN) 10 MG tablet Take 10 mg by mouth daily       losartan (COZAAR) 50 MG tablet Take 1 tablet (50 mg) by mouth daily 90 tablet 3     mirtazapine (REMERON) 7.5 MG tablet TAKE 1 TO 2 TABLETS 30 MINUTES BEFORE BEDTIME 60 tablet 5     Multiple Vitamins-Minerals (MULTIVITAMIN ADULT PO) Take 1 tablet by mouth daily       omeprazole (PRILOSEC) 20 MG DR capsule Take 1 capsule (20 mg) by mouth daily       rosuvastatin (CRESTOR) 10 MG tablet TAKE 1 TABLET EVERY OTHER DAY 45 tablet 0     spironolactone (ALDACTONE) 25 MG tablet TAKE 1/2 TABLET BY MOUTH DAILY 45 tablet 1     venlafaxine (EFFEXOR-XR) 75 MG 24 hr capsule TAKE 3 CAPSULES ONCE DAILY 270 capsule 0       ALLERGIES     Allergies   Allergen Reactions     Corticosteroids Other (See Comments)     flush up through chest and face, decadron  flushing  Other reaction(s): Erythema     Phenothiazines Anxiety and GI Disturbance     anxiety attack, compazine  Other reaction(s): Tardive Dyskinesia     Prochlorperazine      Other reaction(s): Tardive Dyskinesia     Amoxicillin Hives     Clindamycin Other (See Comments)     Burning sensation in chest     Decadron      flushing     Dexamethasone      Other reaction(s): Erythema     Compazine  Anxiety       PAST MEDICAL HISTORY:  Past Medical History:   Diagnosis Date     Acute posthemorrhagic anemia 9/11/2015     Anemia      Chronic systolic congestive heart failure (H) 10/10/2018     EF 35%     Fibromyalgia      Gastro-oesophageal reflux disease      GENERAL OSTEOARTHROSIS [715.00] 11/23/2004    knee     Generalized anxiety disorder 9/30/2013     Hyperlipidemia LDL goal <130 2/10/2010    Failed simvastatin- muscle aches      Hypertension     because of the heart     Idiopathic cardiomyopathy (H) 1/19/2012    Dr. Osborne 3/2011 EF 30-35%      Insomnia 1/19/2012     Iron deficiency anemia 8/12/2015     Problem list name updated by automated process. Provider to review     LBBB (left bundle branch block)      Left ventricular systolic dysfunction:EF 35% 2/5/2012    Must stay on diovan per cardiology      Major depressive disorder, recurrent episode, mild (H) 2/17/2016     Migraine 6/21/2005     Problem list name updated by automated process. Provider to review     Nonischemic cardiomyopathy (H)     EF 30-35%, Dr. Osborne [facility] (suspect virus); s/p AICD     NSVT (nonsustained ventricular tachycardia) (H)      Obesity 1/20/2012     SUSAN (obstructive sleep apnea) 01/19/2012    CPAP      Pure hypercholesterolemia      Restless leg syndrome      Type 2 diabetes mellitus without complication (H) 9/24/2016       PAST SURGICAL HISTORY:  Past Surgical History:   Procedure Laterality Date     APPENDECTOMY       ARTHROPLASTY KNEE  1/7/2013    Procedure: ARTHROPLASTY KNEE;  Right Total Knee Arthroplasty       ARTHROPLASTY REVISION KNEE Right 8/26/2015    Procedure: ARTHROPLASTY REVISION KNEE;  Surgeon: Néstor Beth MD;  Location:  OR     ARTHROSCOPY KNEE       bunionectomy left foot       CLOSED REDUCTION, PERCUTANEOUS PINNING FINGER, COMBINED Right 6/17/2021    Procedure: Closed reduction, pinning right long finger distal interphalangeal joint fracture;  Surgeon: Joseluis Delong MD;  Location:  OR     COLONOSCOPY        CORONARY ANGIOGRAPHY ADULT ORDER  2002    normal     CORONARY ANGIOGRAPHY ADULT ORDER  12/7/12    no significant focal narrowing that would benefit from mechanical intervention      ESOPHAGOSCOPY, GASTROSCOPY, DUODENOSCOPY (EGD), COMBINED N/A 7/9/2021    Procedure: ESOPHAGOGASTRODUODENOSCOPY, WITH BIOPSY with distal esophagus biopies to rule out Barretts esophagus by cold biopsy forceps;  Surgeon: Travis Harrington MD;  Location: [facility] GI     HYSTERECTOMY       IMPLANT AUTOMATIC IMPLANTABLE CARDIOVERTER DEFIBRILLATOR  4/30/12     INSERT THORACIC PACEMAKER LEAD EPICARDIAL  5/1/2012    Procedure:INSERT THORACIC PACEMAKER LEAD EPICARDIAL; EPICARDIAL LEAD PLACEMENT; Surgeon:WEST ARECHIGA; Location:[facility] OR     TONSILLECTOMY       TRANSPLANT - corneal lenses      Bilateral for cataracts       FAMILY HISTORY:  Family History   Problem Relation Age of Onset     Cancer Father         intraabdominal mass - not colon cancer     C.A.D. 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 Multiple ignacio problems, multiple hospitalizations in last 7 years, anxiety        Pt has 2 daughters, 2 step-daughters's and  7 grandchildrens        Youngest daughter Sabi has malignant brain tumor                   Review of Systems:  Skin:  Negative     Eyes:  Negative    ENT:  Negative    Respiratory:  Positive for dyspnea on exertion;cough;mucoid expectorant;sleep apnea;CPAP  Cardiovascular:  Negative for;palpitations;chest pain;edema Positive for;fatigue  Gastroenterology: Positive for poor appetite  Genitourinary:  Negative    Musculoskeletal:  Positive for    Neurologic:  Positive for headaches  Psychiatric:  Negative    Heme/Lymph/Imm:  Negative    Endocrine:  Negative      Physical Exam:  Vitals: /62 (BP Location: Right arm, Patient Position: Chair, Cuff Size: Adult Regular)   Pulse 64   Ht 1.651 m (5' 5\")   Wt 92.5 kg (204 lb)   BMI 33.95 kg/m      Constitutional:           Skin:             Head:           Eyes:           Lymph:      ENT:           Neck:           Respiratory:            Cardiac:                                                           GI:           Extremities and Muscular Skeletal:                 Neurological:           Psych:           Recent Lab Results:  LIPID RESULTS:  Lab Results   Component Value Date    CHOL 170 01/25/2021    HDL 39 (L) 01/25/2021    LDL 80 01/25/2021    TRIG 253 (H) 01/25/2021    CHOLHDLRATIO 4.7 10/13/2015       LIVER ENZYME RESULTS:  Lab Results   Component Value Date    AST 27 04/09/2022    AST 28 12/26/2020    ALT 45 04/09/2022    ALT 32 12/26/2020       CBC RESULTS:  Lab Results   Component Value Date    WBC " 9.6 04/09/2022    WBC 4.0 02/05/2021    RBC 4.03 04/09/2022    RBC 3.86 02/05/2021    HGB 12.4 04/09/2022    HGB 12.0 02/05/2021    HCT 38.1 04/09/2022    HCT 37.0 02/05/2021    MCV 95 04/09/2022    MCV 96 02/05/2021    MCH 30.8 04/09/2022    MCH 31.1 02/05/2021    MCHC 32.5 04/09/2022    MCHC 32.4 02/05/2021    RDW 12.5 04/09/2022    RDW 12.5 02/05/2021     04/09/2022     02/05/2021       BMP RESULTS:  Lab Results   Component Value Date     04/09/2022     02/05/2021    POTASSIUM 4.2 04/09/2022    POTASSIUM 4.2 06/17/2021    CHLORIDE 109 04/09/2022    CHLORIDE 108 02/05/2021    CO2 25 04/09/2022    CO2 23 02/05/2021    ANIONGAP 5 04/09/2022    ANIONGAP 8 02/05/2021     (H) 04/09/2022     (H) 04/09/2022     (H) 02/05/2021    BUN 22 04/09/2022    BUN 16 02/05/2021    CR 0.72 04/09/2022    CR 0.99 06/17/2021    GFRESTIMATED 87 04/09/2022    GFRESTIMATED 56 (L) 06/17/2021    GFRESTBLACK 64 06/17/2021    WILEY 9.1 04/09/2022    WILEY 9.1 02/05/2021        A1C RESULTS:  Lab Results   Component Value Date    A1C 6.4 (H) 04/07/2022    A1C 6.1 (H) 01/25/2021       INR RESULTS:  Lab Results   Component Value Date    INR 1.06 04/07/2022    INR 0.99 12/26/2020    INR 0.88 12/05/2012           CC  Referred Stephen, MD  No address on file

## 2022-04-18 ENCOUNTER — MYC MEDICAL ADVICE (OUTPATIENT)
Dept: PEDIATRICS | Facility: CLINIC | Age: 77
End: 2022-04-18
Payer: COMMERCIAL

## 2022-05-02 PROBLEM — R07.9 CHEST PAIN, UNSPECIFIED TYPE: Status: RESOLVED | Noted: 2020-12-26 | Resolved: 2022-05-02

## 2022-05-02 PROBLEM — R09.02 HYPOXEMIA: Status: RESOLVED | Noted: 2022-04-07 | Resolved: 2022-05-02

## 2022-05-02 NOTE — TELEPHONE ENCOUNTER
CARVEDILOL 25MG      Last Written Prescription Date: 2/16/2016  Last Fill Quantity: 180, # refills: 3    Last Office Visit with [facility], [facility] or [facility] prescribing provider:  9/22/2016   Future Office Visit:        BP Readings from Last 3 Encounters:   09/22/16 102/54   08/01/16 108/60   03/04/16 144/87     VALSARTAN 160MG      Last Written Prescription Date: 9/22/2016  Last Fill Quantity: 45, # refills: 1  Last Office Visit with [facility], [facility] or [facility] prescribing provider: 9/22/2016       Potassium   Date Value Ref Range Status   12/22/2016 4.0 3.4 - 5.3 mmol/L Final     Creatinine   Date Value Ref Range Status   12/22/2016 1.02 0.52 - 1.04 mg/dL Final     BP Readings from Last 3 Encounters:   09/22/16 102/54   08/01/16 108/60   03/04/16 144/87        [FreeTextEntry1] : Establish care  [de-identified] : Mr. SANG HART is a 69 year old man with pmhx of class 1 obesity, HTN, HLD, controlled T2DM, post nasal drip, cardiomyopathy (sees cardiology), nephrolithiasis who presents to establish care. He endorses being in good health and mood. He complains of mildly elevated sugars at home to 140s. He states this started after starting jardiance.  BP at home has been well controlled. . He takes medications as prescribed.  He had prevnar in the pharmacy. He will come back for shingles. He has colonoscopy scheduled soon. He saw eye doctor last month. \par \par

## 2022-05-04 ENCOUNTER — OFFICE VISIT (OUTPATIENT)
Dept: PEDIATRICS | Facility: CLINIC | Age: 77
End: 2022-05-04
Payer: COMMERCIAL

## 2022-05-04 VITALS
HEART RATE: 65 BPM | TEMPERATURE: 98.7 F | DIASTOLIC BLOOD PRESSURE: 53 MMHG | HEIGHT: 65 IN | OXYGEN SATURATION: 97 % | WEIGHT: 203 LBS | BODY MASS INDEX: 33.82 KG/M2 | SYSTOLIC BLOOD PRESSURE: 88 MMHG | RESPIRATION RATE: 20 BRPM

## 2022-05-04 DIAGNOSIS — F41.1 GENERALIZED ANXIETY DISORDER: ICD-10-CM

## 2022-05-04 DIAGNOSIS — Z95.810 ICD (IMPLANTABLE CARDIOVERTER-DEFIBRILLATOR), BIVENTRICULAR, IN SITU: ICD-10-CM

## 2022-05-04 DIAGNOSIS — E11.9 TYPE 2 DIABETES MELLITUS WITHOUT COMPLICATION, WITHOUT LONG-TERM CURRENT USE OF INSULIN (H): ICD-10-CM

## 2022-05-04 DIAGNOSIS — Z13.220 SCREENING FOR HYPERLIPIDEMIA: ICD-10-CM

## 2022-05-04 DIAGNOSIS — I42.9 IDIOPATHIC CARDIOMYOPATHY (H): ICD-10-CM

## 2022-05-04 DIAGNOSIS — U07.1 INFECTION DUE TO 2019 NOVEL CORONAVIRUS: ICD-10-CM

## 2022-05-04 DIAGNOSIS — E78.5 HYPERLIPIDEMIA LDL GOAL <130: ICD-10-CM

## 2022-05-04 DIAGNOSIS — Z00.01 ENCOUNTER FOR GENERAL ADULT MEDICAL EXAMINATION WITH ABNORMAL FINDINGS: Primary | ICD-10-CM

## 2022-05-04 DIAGNOSIS — F33.0 MAJOR DEPRESSIVE DISORDER, RECURRENT EPISODE, MILD (H): ICD-10-CM

## 2022-05-04 LAB
CREAT UR-MCNC: 70 MG/DL
MICROALBUMIN UR-MCNC: 10 MG/L
MICROALBUMIN/CREAT UR: 14.29 MG/G CR (ref 0–25)

## 2022-05-04 PROCEDURE — 99397 PER PM REEVAL EST PAT 65+ YR: CPT | Mod: GC | Performed by: STUDENT IN AN ORGANIZED HEALTH CARE EDUCATION/TRAINING PROGRAM

## 2022-05-04 PROCEDURE — 99214 OFFICE O/P EST MOD 30 MIN: CPT | Mod: 25 | Performed by: STUDENT IN AN ORGANIZED HEALTH CARE EDUCATION/TRAINING PROGRAM

## 2022-05-04 PROCEDURE — 82043 UR ALBUMIN QUANTITATIVE: CPT | Performed by: STUDENT IN AN ORGANIZED HEALTH CARE EDUCATION/TRAINING PROGRAM

## 2022-05-04 RX ORDER — VENLAFAXINE HYDROCHLORIDE 75 MG/1
CAPSULE, EXTENDED RELEASE ORAL
Qty: 270 CAPSULE | Refills: 1 | Status: SHIPPED | OUTPATIENT
Start: 2022-05-04 | End: 2022-10-28

## 2022-05-04 RX ORDER — ROSUVASTATIN CALCIUM 10 MG/1
TABLET, COATED ORAL
Qty: 45 TABLET | Refills: 3 | Status: SHIPPED | OUTPATIENT
Start: 2022-05-04 | End: 2022-07-29

## 2022-05-04 ASSESSMENT — ENCOUNTER SYMPTOMS
EYE PAIN: 0
HEARTBURN: 1
WEAKNESS: 1
HEMATOCHEZIA: 0
DIZZINESS: 1
SORE THROAT: 0
MYALGIAS: 1
BREAST MASS: 0
HEADACHES: 1
COUGH: 1
JOINT SWELLING: 0
NAUSEA: 1
DYSURIA: 0
CHILLS: 0
CONSTIPATION: 0
DIARRHEA: 0
HEMATURIA: 0
SHORTNESS OF BREATH: 1
ARTHRALGIAS: 1
FEVER: 0
ABDOMINAL PAIN: 0
PARESTHESIAS: 0
FREQUENCY: 0
NERVOUS/ANXIOUS: 1
PALPITATIONS: 1

## 2022-05-04 ASSESSMENT — PATIENT HEALTH QUESTIONNAIRE - PHQ9
10. IF YOU CHECKED OFF ANY PROBLEMS, HOW DIFFICULT HAVE THESE PROBLEMS MADE IT FOR YOU TO DO YOUR WORK, TAKE CARE OF THINGS AT HOME, OR GET ALONG WITH OTHER PEOPLE: SOMEWHAT DIFFICULT
SUM OF ALL RESPONSES TO PHQ QUESTIONS 1-9: 6
SUM OF ALL RESPONSES TO PHQ QUESTIONS 1-9: 6

## 2022-05-04 ASSESSMENT — PAIN SCALES - GENERAL: PAINLEVEL: SEVERE PAIN (7)

## 2022-05-04 ASSESSMENT — ACTIVITIES OF DAILY LIVING (ADL): CURRENT_FUNCTION: NO ASSISTANCE NEEDED

## 2022-05-04 NOTE — PROGRESS NOTES
"SUBJECTIVE:   Kristie Jones is a 76 year old female who presents for Preventive Visit.    Patient has been advised of split billing requirements and indicates understanding: Yes     Are you in the first 12 months of your Medicare coverage?  No    Healthy Habits:     In general, how would you rate your overall health?  Fair    Frequency of exercise:  1 day/week    Duration of exercise:  Less than 15 minutes    Do you usually eat at least 4 servings of fruit and vegetables a day, include whole grains    & fiber and avoid regularly eating high fat or \"junk\" foods?  No    Taking medications regularly:  Yes    Medication side effects:  Lightheadedness    Ability to successfully perform activities of daily living:  No assistance needed    Home Safety:  No safety concerns identified    Hearing Impairment:  No hearing concerns    In the past 6 months, have you been bothered by leaking of urine?  No    In general, how would you rate your overall mental or emotional health?  Fair      PHQ-2 Total Score: 1    Additional concerns today:  No    Do you feel safe in your environment? Yes    Have you ever done Advance Care Planning? (For example, a Health Directive, POLST, or a discussion with a medical provider or your loved ones about your wishes): Yes, patient states has an Advance Care Planning document and will bring a copy to the clinic.       Fall risk  Fallen 2 or more times in the past year?: No  Any fall with injury in the past year?: No  click delete button to remove this line now  Cognitive Screening   1) Repeat 3 items (Leader, Season, Table)    2) Clock draw: NORMAL  3) 3 item recall: Recalls 3 objects  Results: 3 items recalled: COGNITIVE IMPAIRMENT LESS LIKELY    Mini-CogTM Copyright KATLYN Pope. Licensed by the author for use in [facility]; reprinted with permission (jeramy@.[email or web address]). All rights reserved.      Do you have sleep apnea, excessive snoring or daytime drowsiness?: yes    Reviewed and updated as " "needed this visit by clinical staff   Tobacco  Allergies    Med Hx            Reviewed and updated as needed this visit by Provider                   Social History     Tobacco Use     Smoking status: Never Smoker     Smokeless tobacco: Never Used   Substance Use Topics     Alcohol use: Yes     Alcohol/week: 0.0 standard drinks     Comment: rarely     If you drink alcohol do you typically have >3 drinks per day or >7 drinks per week? No    Alcohol Use 5/4/2022   Prescreen: >3 drinks/day or >7 drinks/week? No   Prescreen: >3 drinks/day or >7 drinks/week? -     Answers for HPI/ROS submitted by the patient on 5/4/2022  If you checked off any problems, how difficult have these problems made it for you to do your work, take care of things at home, or get along with other people?: Somewhat difficult  PHQ9 TOTAL SCORE: 6    COVID infection - Hospitalized 4/7. Doesn't feel totally recovered. Still with daily headaches. Some tension-type pain throughout the day in posterior neck and shoulders; used to be helped by massage. Feels this is exacerbated by being more social. Eyes feel heavy, have looked more red but wonders if this is seasonal allergy-related.    Heart failure - Drop in EF with COVID. More short of breath, harder to walk on incline. Repeat echo next week. Weight stable. No other heart failure exacerbation symptoms.    Prediabetes (vs diabetes?) - Blood sugar high in hospital recently, got insulin. A1c 6.4% last month. Has never been on medications for diabetes in past.     with memory issues, stressful at home as he forgets what she has told him and will feel frustrated with her about \"you didn't tell me that.\" Has caused her more anxiety. Sleep is helped by mirtazapine started recently by her PCP. Taking Xanax (also prescribed by PCP) most evenings.   Also has daughter with brain cancer, is forgetful.   Did really well during COVID isolation time - took online courses, crafts. Now struggling with being " "pressured to return to \"normal\" and be more social.  Has seen a therapist in past, last two months ago.    Current providers sharing in care for this patient include:   Patient Care Team:  Julianne Thornton APRN CNP as PCP - General (Nurse Practitioner - Adult Health)  Pepper Baugh MD as MD (Internal Medicine)  Julianne Thornton APRN CNP as Assigned PCP  IpCiaran MD as Assigned Heart and Vascular Provider    The following health maintenance items are reviewed in Epic and correct as of today:  Health Maintenance Due   Topic Date Due     DIABETIC FOOT EXAM  02/16/2017     EYE EXAM  08/08/2017     DTAP/TDAP/TD IMMUNIZATION (1 - Tdap) 02/20/2018     HF ACTION PLAN  02/16/2019     COVID-19 Vaccine (4 - Booster for Pfizer series) 01/24/2022     LIPID  01/25/2022     MICROALBUMIN  01/25/2022     MEDICARE ANNUAL WELLNESS VISIT  02/05/2022     ANNUAL REVIEW OF HM ORDERS  02/05/2022     FALL RISK ASSESSMENT  02/05/2022     Mammogram Screening - Patient over age 75, has elected to continue with screening.  Pertinent mammograms are reviewed under the imaging tab.    Review of Systems   Constitutional: Negative for chills and fever.   HENT: Positive for congestion. Negative for ear pain, hearing loss and sore throat.    Eyes: Negative for pain and visual disturbance.   Respiratory: Positive for cough and shortness of breath.    Cardiovascular: Positive for chest pain and palpitations. Negative for peripheral edema.   Gastrointestinal: Positive for heartburn and nausea. Negative for abdominal pain, constipation, diarrhea and hematochezia.   Breasts:  Negative for tenderness, breast mass and discharge.   Genitourinary: Negative for dysuria, frequency, genital sores, hematuria, pelvic pain, urgency, vaginal bleeding and vaginal discharge.   Musculoskeletal: Positive for arthralgias and myalgias. Negative for joint swelling.   Skin: Negative for rash.   Neurological: Positive for dizziness, weakness and " "headaches. Negative for paresthesias.   Psychiatric/Behavioral: Positive for mood changes. The patient is nervous/anxious.      OBJECTIVE:   BP (!) 88/53   Pulse 65   Temp 98.7  F (37.1  C) (Tympanic)   Resp 20   Ht 1.651 m (5' 5\")   Wt 92.1 kg (203 lb)   SpO2 97%   BMI 33.78 kg/m   Estimated body mass index is 33.78 kg/m  as calculated from the following:    Height as of this encounter: 1.651 m (5' 5\").    Weight as of this encounter: 92.1 kg (203 lb).  Physical Exam  GENERAL: healthy, alert and no distress  NECK: no adenopathy, no asymmetry, masses, or scars and thyroid normal to palpation  RESP: lungs clear to auscultation - no rales, rhonchi or wheezes  CV: regular rate and rhythm, normal S1 S2, no S3 or S4, no murmur, click or rub, no peripheral edema and peripheral pulses strong  ABDOMEN: soft, nontender, no hepatosplenomegaly, no masses and bowel sounds normal  MS: no gross musculoskeletal defects noted, no edema  NEURO: Normal strength and tone, mentation intact and speech normal  BACK: no CVA tenderness, no paralumbar tenderness  PSYCH: mentation appears normal, affect normal/bright    Diagnostic Test Results:  Labs reviewed in Epic    ASSESSMENT / PLAN:   1. Encounter for Medicare annual wellness exam  Overall doing well, still recovering from COVID infection requiring hospitalization last month. Aware she is eligible for COVID booster, will get in coming months. Otherwise up to date on vaccines, cancer screenings. Will return in 6 months for mental health follow up, labs.    2. Idiopathic cardiomyopathy (H)  3. ICD (implantable cardioverter-defibrillator), biventricular, in situ  Follows w/ cardiology; EF decreased w/ acute COVID infection last month. Has repeat echo next week, follow up with cards pending echo results. No exacerbation-type symptoms right now. Continue losartan, spironolactone, carvedilol.    4. Infection due to 2019 novel coronavirus  Hospitalized last month w/ COVID; recovering " "but still w/ some residual symptoms. Will get second booster when she is further out from infection.    5. Hyperlipidemia LDL goal <130  Due for lipids; will check w/ additional labs when she returns to clinic in 6 months. Plan to continue Crestor.  - rosuvastatin (CRESTOR) 10 MG tablet; TAKE 1 TABLET EVERY OTHER DAY  Dispense: 45 tablet; Refill: 3    6. Generalized anxiety disorder  Not all that well controlled recently; several triggers/stressors at home including 's cognitive impairment, daughter's cancer. Encouraged her to follow up more frequently w/ therapist. Continue meds at current doses.  - venlafaxine (EFFEXOR XR) 75 MG 24 hr capsule; TAKE 3 CAPSULES ONCE DAILY  Dispense: 270 capsule; Refill: 1    7. Type 2 diabetes mellitus without complication, without long-term current use of insulin (H)  Per chart review, diagnosed w/ diabetes >5 years ago, always diet controlled. Most recent A1c 6.4%, up from last check prior to that 6.0%. Will repeat labs in six months, encouraged dietary changes, increasing exercise in mean time.  - Albumin Random Urine Quantitative with Creat Ratio  - Lipid panel reflex to direct LDL Fasting; Future  - Hemoglobin A1c; Future      COUNSELING:  Reviewed preventive health counseling, as reflected in patient instructions    Estimated body mass index is 33.78 kg/m  as calculated from the following:    Height as of this encounter: 1.651 m (5' 5\").    Weight as of this encounter: 92.1 kg (203 lb).    Weight management plan: Discussed healthy diet and exercise guidelines    She reports that she has never smoked. She has never used smokeless tobacco.      Appropriate preventive services were discussed with this patient, including applicable screening as appropriate for cardiovascular disease, diabetes, osteopenia/osteoporosis, and glaucoma.  As appropriate for age/gender, discussed screening for colorectal cancer, prostate cancer, breast cancer, and cervical cancer. Checklist " reviewing preventive services available has been given to the patient.    Reviewed patients plan of care and provided an AVS. The Basic Care Plan (routine screening as documented in Health Maintenance) for Kristie meets the Care Plan requirement. This Care Plan has been established and reviewed with the Patient.    Counseling Resources:  ATP IV Guidelines  Pooled Cohorts Equation Calculator  Breast Cancer Risk Calculator  Breast Cancer: Medication to Reduce Risk  FRAX Risk Assessment  ICSI Preventive Guidelines  Dietary Guidelines for Americans, 2010  eTimesheets.com's MyPlate  ASA Prophylaxis  Lung CA Screening    Caty Overton MD  [facility]    Identified Health Risks:

## 2022-05-04 NOTE — PATIENT INSTRUCTIONS
It was so nice to meet you today, Kristie. Thanks for coming in.    We'll schedule you for a 6-month follow up with labs beforehand.    Let us know when you want your second COVID booster - we can do it here in clinic if you want.    Dementia specialist in [facility].      Patient Education   Personalized Prevention Plan  You are due for the preventive services outlined below.  Your care team is available to assist you in scheduling these services.  If you have already completed any of these items, please share that information with your care team to update in your medical record.  Health Maintenance Due   Topic Date Due    Diabetic Foot Exam  02/16/2017    Eye Exam  08/08/2017    Diptheria Tetanus Pertussis (DTAP/TDAP/TD) Vaccine (1 - Tdap) 02/20/2018    Heart Failure Action Plan  02/16/2019    COVID-19 Vaccine (4 - Booster for Pfizer series) 01/24/2022    Cholesterol Lab  01/25/2022    Kidney Microalbumin Urine Test  01/25/2022    ANNUAL REVIEW OF HM ORDERS  02/05/2022    FALL RISK ASSESSMENT  02/05/2022       Depression and Suicide in Older Adults    Nearly 2 million older Americans have some type of depression. Some of them even take their own lives. Yet depression among older adults is often ignored. Learn the warning signs. You may help spare a loved one needless pain. You may also save a life.   What is depression?  Depression is a common and serious illness that affects the way you think and feel. It is not a normal part of aging, nor is it a sign of weakness, a character flaw, or something you can snap out of. Most people with depression need treatment to get better. The most common symptom is a feeling of deep sadness. People who are depressed also may seem tired and listless. And nothing seems to give them pleasure. It s normal to grieve or be sad sometimes. But sadness lessens or passes with time. Depression rarely goes away or improves on its own. A person with clinical depression  "can't \"snap out of it.\" Other symptoms of depression are:   Sleeping more or less than normal  Eating more or less than normal  Having headaches, stomachaches, or other pains that don t go away  Feeling nervous,  empty,  or worthless  Crying a great deal  Thinking or talking about suicide or death  Loss of interest in activities previously enjoyed  Social isolation  Feeling confused or forgetful  What causes it?  The causes of depression aren t fully known. But it is thought to result from a complex blend of these factors:   Biochemistry. Certain chemicals in the brain play a role.  Genes. Depression does run in families.  Life stress. Life stresses can also trigger depression in some people. Older adults often face many stressors, such as death of friends or a spouse, health problems, and financial concerns.  Chronic conditions. This includes conditions such as diabetes, heart disease, or cancer. These can cause symptoms of depression. Medicine side effects can cause changes in thoughts and behaviors.  How you can help  Often, depressed people may not want to ask for help. When they do, they may be ignored. Or, they may receive the wrong treatment. You can help by showing parents and older friends love and support. If they seem depressed, don t lecture the person, ignore the symptoms, or discount the symptoms as a  normal  part of aging -which they are not. Get involved, listen, and show interest and support.   Help them understand that depression is a treatable illness. Tell them you can help them find the right treatment. Offer to go to their healthcare provider's appointment with them for support when the symptoms are discussed. With their approval, contact a local mental health center, social service agency, or hospital about services.   You can be an advocate for him or her at healthcare appointments. Many older adults have chronic illnesses that can cause symptoms of depression. Medicine side effects can " change thoughts and behaviors. You can help make sure that the healthcare provider looks at all of these factors. He or she should refer your family member or friend to a mental healthcare provider when needed. in some cases, untreated depression can lead to a misdiagnosis. A person may be diagnosed with a brain disorder such as dementia. If the healthcare provider does not take the issue of depression seriously, help your family member or friend to find another provider.   Don't be afraid to ask  If you think an older person you care about could be suicidal, ask,  Have you thought about suicide?  Most people will tell you the truth. If they say  yes,  they may already have a plan for how and when they will attempt it. Find out as much as you can. The more detailed the plan, and the easier it is to carry out, the more danger the person is in right now. Tell the person you are there for them and do not want them to harm him or herself. Don't wait to get help for the person. Call the person's healthcare provider, local hospital, or emergency services.   To learn more  National Suicide Prevention Lifeline (crisis hotline) 141-651-UKMU (325-810-5577)  National [place] of Mental Abiisg930-733-2348rll.[facility].nih.gov  National [place] on Mental Sgxakvd349-946-6057fkd.lilian.org  Mental Health Ewwupuw053-910-6400ems.[facility].org  National Suicide Apmqoim278-AJBNTUA (631-431-0218)    Call 911  Never leave the person alone. A person who is actively suicidal needs psychiatric care right away. They will need constant supervision. Never leave the person out of sight. Call 911 or the national 24-hour suicide crisis hotline at 163-243-IQOZ (585-563-3221). You can also take the person to the closest emergency room.   [employer] last reviewed this educational content on 5/1/2020 2000-2021 [employer]. All rights reserved. This information is not intended as a substitute for professional medical care. Always follow your  healthcare professional's instructions.

## 2022-05-05 PROBLEM — E66.01 MORBID OBESITY (H): Status: RESOLVED | Noted: 2020-03-10 | Resolved: 2022-05-05

## 2022-05-10 ENCOUNTER — HOSPITAL ENCOUNTER (OUTPATIENT)
Dept: CARDIOLOGY | Facility: CLINIC | Age: 77
Discharge: HOME | End: 2022-05-10
Attending: INTERNAL MEDICINE | Admitting: INTERNAL MEDICINE
Payer: COMMERCIAL

## 2022-05-10 DIAGNOSIS — I42.9 IDIOPATHIC CARDIOMYOPATHY (H): ICD-10-CM

## 2022-05-10 DIAGNOSIS — I42.9 CARDIOMYOPATHY, UNSPECIFIED TYPE (H): ICD-10-CM

## 2022-05-10 LAB — LVEF ECHO: NORMAL

## 2022-05-10 PROCEDURE — 93308 TTE F-UP OR LMTD: CPT | Mod: 26 | Performed by: INTERNAL MEDICINE

## 2022-05-10 PROCEDURE — 255N000002 HC RX 255 OP 636: Performed by: INTERNAL MEDICINE

## 2022-05-10 PROCEDURE — 93325 DOPPLER ECHO COLOR FLOW MAPG: CPT

## 2022-05-10 PROCEDURE — 93321 DOPPLER ECHO F-UP/LMTD STD: CPT | Mod: 26 | Performed by: INTERNAL MEDICINE

## 2022-05-10 PROCEDURE — 93325 DOPPLER ECHO COLOR FLOW MAPG: CPT | Mod: 26 | Performed by: INTERNAL MEDICINE

## 2022-05-10 RX ADMIN — HUMAN ALBUMIN MICROSPHERES AND PERFLUTREN 9 ML: 10; .22 INJECTION, SOLUTION INTRAVENOUS at 15:37

## 2022-05-11 ENCOUNTER — CARE COORDINATION (OUTPATIENT)
Dept: CARDIOLOGY | Facility: CLINIC | Age: 77
End: 2022-05-11
Payer: COMMERCIAL

## 2022-05-11 NOTE — PROGRESS NOTES
Preliminary results from Echocardiogram      Per Dr. Osborne's last clinic note, if Echo showed improved LVEF, patient could return in a year for follow up.   However, the LVEF has not returned to baseline 30%.      Will await Dr. Osborne's return to clinic next week for further instructions.    Phone call to patient with this update of preliminary information.  Informed her that we will be in touch later next week, once Dr. Osborne has a chance to review.    Kristan Connell RN on 5/11/2022 at 10:21 AM

## 2022-05-13 DIAGNOSIS — I42.9 IDIOPATHIC CARDIOMYOPATHY (H): ICD-10-CM

## 2022-05-16 ENCOUNTER — NURSE TRIAGE (OUTPATIENT)
Dept: NURSING | Facility: CLINIC | Age: 77
End: 2022-05-16
Payer: COMMERCIAL

## 2022-05-16 ENCOUNTER — TELEPHONE (OUTPATIENT)
Dept: CARDIOLOGY | Facility: CLINIC | Age: 77
End: 2022-05-16
Payer: COMMERCIAL

## 2022-05-16 DIAGNOSIS — I42.9 IDIOPATHIC CARDIOMYOPATHY (H): ICD-10-CM

## 2022-05-16 DIAGNOSIS — I42.9 CARDIOMYOPATHY, UNSPECIFIED TYPE (H): Primary | ICD-10-CM

## 2022-05-16 NOTE — TELEPHONE ENCOUNTER
"Pt calling    Ran out of carvedilol this morning     Recently saw resident Caty Tian at the [facility] for physical on 5/4/22 and had meds refilled but realized the carvedilol did not get reordered     Pt states she just ran out today but chart shows it last being refilled 2/5/21 so there is a break in the medication and unable to be refilled without a provider approval    Pt advised that a message would be sent to clinic to review in the morning     Pt states that pulse is normally \"on the lower side\" but she doesn't typically check her BP/pulse. Advised that it is okay to miss a dose of medication tonight     Pt verbalizes understanding and will follow up in the morning       Tg Gilliam RN [facility] May 16, 2022 7:32 PM      Reason for Disposition    [1] Caller requesting a NON-URGENT new prescription or refill AND [2] triager unable to refill per unit policy    Protocols used: MEDICATION QUESTION CALL-A-AH      "

## 2022-05-17 DIAGNOSIS — I42.9 IDIOPATHIC CARDIOMYOPATHY (H): ICD-10-CM

## 2022-05-17 RX ORDER — CARVEDILOL 25 MG/1
TABLET ORAL
Qty: 180 TABLET | Refills: 3 | Status: SHIPPED | OUTPATIENT
Start: 2022-05-17 | End: 2022-05-17

## 2022-05-17 RX ORDER — LOSARTAN POTASSIUM 50 MG/1
TABLET ORAL
Qty: 90 TABLET | Refills: 3 | Status: SHIPPED | OUTPATIENT
Start: 2022-05-17 | End: 2022-07-29

## 2022-05-17 RX ORDER — CARVEDILOL 25 MG/1
TABLET ORAL
Qty: 180 TABLET | Refills: 3 | Status: SHIPPED | OUTPATIENT
Start: 2022-05-17 | End: 2022-07-29

## 2022-05-17 NOTE — TELEPHONE ENCOUNTER
Prescription approved per [facility] Refill Protocol.  Losartan and carvedilol     Justa PRITCHETT RN

## 2022-05-17 NOTE — TELEPHONE ENCOUNTER
Patient requesting carvedilol be sent to      [id number] - [place], [place] - [address] AT [facility]     Request processed.    Norma Beth RN

## 2022-05-17 NOTE — TELEPHONE ENCOUNTER
Medication ordered via refill encounter. Approved per protocol and note.   5/4/22:  2. Idiopathic cardiomyopathy (H)  3. ICD (implantable cardioverter-defibrillator), biventricular, in situ  Follows w/ cardiology; EF decreased w/ acute COVID infection last month. Has repeat echo next week, follow up with cards pending echo results. No exacerbation-type symptoms right now. Continue losartan, spironolactone, carvedilol.    Justa PRITCHETT RN

## 2022-06-03 ENCOUNTER — TRANSFERRED RECORDS (OUTPATIENT)
Dept: HEALTH INFORMATION MANAGEMENT | Facility: CLINIC | Age: 77
End: 2022-06-03

## 2022-06-10 ENCOUNTER — TELEPHONE (OUTPATIENT)
Dept: CARDIOLOGY | Facility: CLINIC | Age: 77
End: 2022-06-10
Payer: COMMERCIAL

## 2022-06-10 NOTE — TELEPHONE ENCOUNTER
[facility]    Phone Message    May a detailed message be left on voicemail: yes     Reason for Call: Other: Kristie Mcknight calling to find out if she need to have an echo done prior to her upcoming appointment on 7/29 with Dr. Osborne.   Please review and call her back to discuss     Action Taken: Message routed to:  Other: Cardiology    Travel Screening: Not Applicable

## 2022-06-10 NOTE — TELEPHONE ENCOUNTER
has reviewed Dr Osborne's notes, latest 2 Echos from this spring.   notes that on 5\16, we received direction from Dr. Osborne to have the patient come into office for a follow up apmnt at next available.  Kristie Mcknight is scheduled to see him in July.  There were no directions for a 3rd Echocardiogram prior to that appointment.   has returned call to Kristie Mcknight, but it immediately went to voicemail and I had to [facility].  I explained the above info, and asked her if that did not answer her inquiry to please call us.  I gave her the direct nurse line phone to the office.    Kristan Connell RN on 6/10/2022 at 12:17 PM

## 2022-06-14 ENCOUNTER — ANCILLARY PROCEDURE (OUTPATIENT)
Dept: CARDIOLOGY | Facility: CLINIC | Age: 77
End: 2022-06-14
Attending: INTERNAL MEDICINE
Payer: COMMERCIAL

## 2022-06-14 DIAGNOSIS — Z95.810 ICD (IMPLANTABLE CARDIOVERTER-DEFIBRILLATOR) IN PLACE: ICD-10-CM

## 2022-06-14 DIAGNOSIS — I42.9 IDIOPATHIC CARDIOMYOPATHY (H): ICD-10-CM

## 2022-06-17 PROCEDURE — 93295 DEV INTERROG REMOTE 1/2/MLT: CPT | Performed by: INTERNAL MEDICINE

## 2022-06-17 PROCEDURE — 93296 REM INTERROG EVL PM/IDS: CPT | Performed by: INTERNAL MEDICINE

## 2022-06-18 LAB
MDC_IDC_EPISODE_DTM: NORMAL
MDC_IDC_EPISODE_DTM: NORMAL
MDC_IDC_EPISODE_ID: NORMAL
MDC_IDC_EPISODE_ID: NORMAL
MDC_IDC_EPISODE_TYPE: NORMAL
MDC_IDC_EPISODE_TYPE: NORMAL
MDC_IDC_LEAD_IMPLANT_DT: NORMAL
MDC_IDC_LEAD_LOCATION: NORMAL
MDC_IDC_LEAD_LOCATION_DETAIL_1: NORMAL
MDC_IDC_LEAD_MFG: NORMAL
MDC_IDC_LEAD_MODEL: 4047
MDC_IDC_LEAD_MODEL: 4047
MDC_IDC_LEAD_MODEL: NORMAL
MDC_IDC_LEAD_MODEL: NORMAL
MDC_IDC_LEAD_POLARITY_TYPE: NORMAL
MDC_IDC_LEAD_SERIAL: NORMAL
MDC_IDC_MSMT_BATTERY_DTM: NORMAL
MDC_IDC_MSMT_BATTERY_REMAINING_LONGEVITY: 6 MO
MDC_IDC_MSMT_BATTERY_REMAINING_PERCENTAGE: 10 %
MDC_IDC_MSMT_BATTERY_STATUS: NORMAL
MDC_IDC_MSMT_CAP_CHARGE_DTM: NORMAL
MDC_IDC_MSMT_CAP_CHARGE_TIME: 14.1 S
MDC_IDC_MSMT_CAP_CHARGE_TYPE: NORMAL
MDC_IDC_MSMT_LEADCHNL_LV_IMPEDANCE_VALUE: 359 OHM
MDC_IDC_MSMT_LEADCHNL_LV_PACING_THRESHOLD_AMPLITUDE: 1.2 V
MDC_IDC_MSMT_LEADCHNL_LV_PACING_THRESHOLD_PULSEWIDTH: 1 MS
MDC_IDC_MSMT_LEADCHNL_RA_IMPEDANCE_VALUE: 768 OHM
MDC_IDC_MSMT_LEADCHNL_RA_LEAD_CHANNEL_STATUS: NORMAL
MDC_IDC_MSMT_LEADCHNL_RA_PACING_THRESHOLD_AMPLITUDE: 0.4 V
MDC_IDC_MSMT_LEADCHNL_RA_PACING_THRESHOLD_PULSEWIDTH: 0.5 MS
MDC_IDC_MSMT_LEADCHNL_RV_IMPEDANCE_VALUE: 413 OHM
MDC_IDC_MSMT_LEADCHNL_RV_PACING_THRESHOLD_AMPLITUDE: 0.7 V
MDC_IDC_MSMT_LEADCHNL_RV_PACING_THRESHOLD_PULSEWIDTH: 1 MS
MDC_IDC_PG_IMPLANT_DTM: NORMAL
MDC_IDC_PG_MFG: NORMAL
MDC_IDC_PG_MODEL: NORMAL
MDC_IDC_PG_SERIAL: NORMAL
MDC_IDC_PG_TYPE: NORMAL
MDC_IDC_SESS_CLINIC_NAME: NORMAL
MDC_IDC_SESS_DTM: NORMAL
MDC_IDC_SESS_TYPE: NORMAL
MDC_IDC_SET_BRADY_AT_MODE_SWITCH_RATE: 170 {BEATS}/MIN
MDC_IDC_SET_BRADY_LOWRATE: 40 {BEATS}/MIN
MDC_IDC_SET_BRADY_MODE: NORMAL
MDC_IDC_SET_CRT_PACED_CHAMBERS: NORMAL
MDC_IDC_SET_LEADCHNL_LV_SENSING_ADAPTATION_MODE: NORMAL
MDC_IDC_SET_LEADCHNL_LV_SENSING_ANODE_ELECTRODE_1: NORMAL
MDC_IDC_SET_LEADCHNL_LV_SENSING_ANODE_LOCATION_1: NORMAL
MDC_IDC_SET_LEADCHNL_LV_SENSING_CATHODE_ELECTRODE_1: NORMAL
MDC_IDC_SET_LEADCHNL_LV_SENSING_CATHODE_LOCATION_1: NORMAL
MDC_IDC_SET_LEADCHNL_LV_SENSING_SENSITIVITY: 1 MV
MDC_IDC_SET_LEADCHNL_RA_PACING_POLARITY: NORMAL
MDC_IDC_SET_LEADCHNL_RA_SENSING_ADAPTATION_MODE: NORMAL
MDC_IDC_SET_LEADCHNL_RA_SENSING_POLARITY: NORMAL
MDC_IDC_SET_LEADCHNL_RA_SENSING_SENSITIVITY: 0.15 MV
MDC_IDC_SET_LEADCHNL_RV_PACING_AMPLITUDE: 2 V
MDC_IDC_SET_LEADCHNL_RV_PACING_POLARITY: NORMAL
MDC_IDC_SET_LEADCHNL_RV_PACING_PULSEWIDTH: 1 MS
MDC_IDC_SET_LEADCHNL_RV_SENSING_ADAPTATION_MODE: NORMAL
MDC_IDC_SET_LEADCHNL_RV_SENSING_POLARITY: NORMAL
MDC_IDC_SET_LEADCHNL_RV_SENSING_SENSITIVITY: 0.6 MV
MDC_IDC_SET_ZONE_DETECTION_INTERVAL: 250 MS
MDC_IDC_SET_ZONE_DETECTION_INTERVAL: 333 MS
MDC_IDC_SET_ZONE_DETECTION_INTERVAL: 375 MS
MDC_IDC_SET_ZONE_TYPE: NORMAL
MDC_IDC_SET_ZONE_VENDOR_TYPE: NORMAL
MDC_IDC_STAT_BRADY_DTM_END: NORMAL
MDC_IDC_STAT_BRADY_DTM_START: NORMAL
MDC_IDC_STAT_BRADY_RA_PERCENT_PACED: 0 %
MDC_IDC_STAT_BRADY_RV_PERCENT_PACED: 0 %
MDC_IDC_STAT_CRT_DTM_END: NORMAL
MDC_IDC_STAT_CRT_DTM_START: NORMAL
MDC_IDC_STAT_CRT_LV_PERCENT_PACED: 0 %
MDC_IDC_STAT_EPISODE_RECENT_COUNT: 0
MDC_IDC_STAT_EPISODE_RECENT_COUNT_DTM_END: NORMAL
MDC_IDC_STAT_EPISODE_RECENT_COUNT_DTM_START: NORMAL
MDC_IDC_STAT_EPISODE_TYPE: NORMAL
MDC_IDC_STAT_EPISODE_VENDOR_TYPE: NORMAL
MDC_IDC_STAT_TACHYTHERAPY_ATP_DELIVERED_RECENT: 0
MDC_IDC_STAT_TACHYTHERAPY_ATP_DELIVERED_TOTAL: 0
MDC_IDC_STAT_TACHYTHERAPY_RECENT_DTM_END: NORMAL
MDC_IDC_STAT_TACHYTHERAPY_RECENT_DTM_START: NORMAL
MDC_IDC_STAT_TACHYTHERAPY_SHOCKS_ABORTED_RECENT: 0
MDC_IDC_STAT_TACHYTHERAPY_SHOCKS_ABORTED_TOTAL: 0
MDC_IDC_STAT_TACHYTHERAPY_SHOCKS_DELIVERED_RECENT: 0
MDC_IDC_STAT_TACHYTHERAPY_SHOCKS_DELIVERED_TOTAL: 0
MDC_IDC_STAT_TACHYTHERAPY_TOTAL_DTM_END: NORMAL

## 2022-07-07 ENCOUNTER — TELEPHONE (OUTPATIENT)
Dept: CARDIOLOGY | Facility: CLINIC | Age: 77
End: 2022-07-07

## 2022-07-07 NOTE — TELEPHONE ENCOUNTER
Received call from Kristie Mcknight, requesting us to help her with application for handicapped parking permit.   Writer has mailed the application to her.    Writer has called to Kristie Mcknight to let her know I have mailed the application.  She verbalized understanding and she will bring to her appointment with Dr. Osborne on 7\29.    Kristan Connell RN on 7/7/2022 at 1:50 PM

## 2022-07-29 ENCOUNTER — OFFICE VISIT (OUTPATIENT)
Dept: CARDIOLOGY | Facility: CLINIC | Age: 77
End: 2022-07-29
Payer: COMMERCIAL

## 2022-07-29 ENCOUNTER — TELEPHONE (OUTPATIENT)
Dept: CARDIOLOGY | Facility: CLINIC | Age: 77
End: 2022-07-29

## 2022-07-29 VITALS
HEIGHT: 65 IN | OXYGEN SATURATION: 95 % | BODY MASS INDEX: 34.26 KG/M2 | DIASTOLIC BLOOD PRESSURE: 71 MMHG | WEIGHT: 205.6 LBS | SYSTOLIC BLOOD PRESSURE: 111 MMHG | HEART RATE: 73 BPM

## 2022-07-29 DIAGNOSIS — E78.5 HYPERLIPIDEMIA LDL GOAL <130: ICD-10-CM

## 2022-07-29 DIAGNOSIS — F41.1 GENERALIZED ANXIETY DISORDER: ICD-10-CM

## 2022-07-29 DIAGNOSIS — I42.9 CARDIOMYOPATHY, UNSPECIFIED TYPE (H): ICD-10-CM

## 2022-07-29 DIAGNOSIS — I42.9 IDIOPATHIC CARDIOMYOPATHY (H): ICD-10-CM

## 2022-07-29 PROCEDURE — 99214 OFFICE O/P EST MOD 30 MIN: CPT | Performed by: INTERNAL MEDICINE

## 2022-07-29 RX ORDER — ALPRAZOLAM 0.25 MG
0.25 TABLET ORAL 2 TIMES DAILY PRN
Qty: 30 TABLET | Refills: 1 | Status: CANCELLED | OUTPATIENT
Start: 2022-07-29

## 2022-07-29 RX ORDER — LOSARTAN POTASSIUM 50 MG/1
50 TABLET ORAL DAILY
Qty: 90 TABLET | Refills: 3 | Status: SHIPPED | OUTPATIENT
Start: 2022-07-29 | End: 2023-04-04

## 2022-07-29 RX ORDER — SPIRONOLACTONE 25 MG/1
TABLET ORAL
Qty: 45 TABLET | Refills: 1 | Status: SHIPPED | OUTPATIENT
Start: 2022-07-29 | End: 2023-02-21

## 2022-07-29 RX ORDER — CARVEDILOL 25 MG/1
TABLET ORAL
Qty: 180 TABLET | Refills: 3 | Status: SHIPPED | OUTPATIENT
Start: 2022-07-29 | End: 2023-02-19

## 2022-07-29 RX ORDER — CIPROFLOXACIN HCL 100 MG
100 TABLET ORAL DAILY PRN
Status: CANCELLED | OUTPATIENT
Start: 2022-07-29

## 2022-07-29 RX ORDER — CIPROFLOXACIN HCL 100 MG
100 TABLET ORAL EVERY 12 HOURS
Qty: 10 TABLET | Refills: 0 | Status: SHIPPED | OUTPATIENT
Start: 2022-07-29 | End: 2022-11-10

## 2022-07-29 RX ORDER — ROSUVASTATIN CALCIUM 10 MG/1
TABLET, COATED ORAL
Qty: 45 TABLET | Refills: 3 | Status: SHIPPED | OUTPATIENT
Start: 2022-07-29 | End: 2023-07-13

## 2022-07-29 NOTE — TELEPHONE ENCOUNTER
I left patient a message asking her to call us back to discuss her symptoms/limitations so we can complete her disability parking certificate.

## 2022-07-29 NOTE — PROGRESS NOTES
HPI and Plan:   It is always a pleasure to see this very delightful 76-year-old lady for follow-up of nonischemic cardiomyopathy.    I been following her for a long time and her baseline EF is about 30%, though recently it has dropped to 20 to 25%.  She had recovered very nicely from COVID infection and I am not sure there is is a significant contribution from that.  Even if there is I do not think there is much we can do about it.    Blood pressures have always been soft with systolic hovering mostly around 100 at baseline.  She has a BiV /AICD in place though the former has been switched off due to widening of the QRS complex with pacing.  Baseline ECG shows an intraventricular conduction delay of just over 150 ms.  She is maintained on 25 mg of Coreg, 25 mg of spironolactone and 50 mg of losartan.  She has not developed CHF for very long time.    She comes in today and she tells me that she has noticed a significant decrease in her stamina.  She usually walks 2 miles a day and now she has to stop multiple times and going up the stairs have been challenging.  However she has no chest pains PND orthopnea or ankle swelling and indeed by physical examination she appears completely euvolemic.    I think she is now having symptoms of low output and unfortunately in terms of medical treatment there is nothing more that I can offer.    I think coronary artery disease is unlikely but I will further evaluate with stress nuclear testing.    I reviewed her previous medical records with regards to her pacer.  I do note that percutaneously we had great difficulty placing the LV lead in good position 10 years ago and she actually underwent minithoracotomy for placement of epicardial lead.  Unfortunately even with this pacing was suboptimal and she is just now on backup pacing.    I wonder if there is anything more that we could do such as repositioning of the epicardial lead.  For this reason I have asked her to make an  appointment to see my electrophysiology colleagues.  I did let her know that I am not too optimistic that anything more could be done but there again on occasions I am known to be overly pessimistic.    Provisionally arranged to see her again in 6 months time for further follow-up.  Orders Placed This Encounter   Procedures     Follow-Up with Cardiology EP     Follow-Up with Cardiology       No orders of the defined types were placed in this encounter.      Encounter Diagnoses   Name Primary?     Cardiomyopathy, unspecified type (H)      Idiopathic cardiomyopathy (H)      Generalized anxiety disorder        CURRENT MEDICATIONS:  Current Outpatient Medications   Medication Sig Dispense Refill     acetaminophen (TYLENOL) 500 MG tablet Take 1,000 mg by mouth daily as needed for mild pain       ALPRAZolam (XANAX) 0.25 MG tablet Take 1 tablet (0.25 mg) by mouth 2 times daily as needed for anxiety 30 tablet 1     aspirin 81 MG tablet Take 81 mg by mouth daily       CALCIUM 500 MG OR TABS Takes 3 tabs daily takees total of 1200 mg daily       carvedilol (COREG) 25 MG tablet TAKE 1 TABLET TWICE A DAY WITH MEALS 180 tablet 3     Cholecalciferol (VITAMIN D) 2000 UNITS tablet Take 1 tablet by mouth daily.       ciprofloxacin (CIPRO) 100 MG tablet Take 100 mg by mouth daily as needed For dental appointments       clobetasol (TEMOVATE) 0.05 % external cream Apply thin layer to bilateral hand twice daily for 2 weeks 45 g 1     co-enzyme Q-10 50 MG CAPS Take 1 capsule by mouth daily.       lifitegrast (XIIDRA) 5 % opthalmic solution Place 1 drop into both eyes 2 times daily       loratadine (CLARITIN) 10 MG tablet Take 10 mg by mouth daily       losartan (COZAAR) 50 MG tablet TAKE 1 TABLET DAILY 90 tablet 3     mirtazapine (REMERON) 7.5 MG tablet TAKE 1 TO 2 TABLETS 30 MINUTES BEFORE BEDTIME 60 tablet 5     Multiple Vitamins-Minerals (MULTIVITAMIN ADULT PO) Take 1 tablet by mouth daily       omeprazole (PRILOSEC) 20 MG DR capsule  Take 1 capsule (20 mg) by mouth daily       rosuvastatin (CRESTOR) 10 MG tablet TAKE 1 TABLET EVERY OTHER DAY 45 tablet 3     spironolactone (ALDACTONE) 25 MG tablet TAKE 1/2 TABLET BY MOUTH DAILY 45 tablet 1     venlafaxine (EFFEXOR XR) 75 MG 24 hr capsule TAKE 3 CAPSULES ONCE DAILY 270 capsule 1       ALLERGIES     Allergies   Allergen Reactions     Corticosteroids Other (See Comments)     flush up through chest and face, decadron  flushing  Other reaction(s): Erythema     Phenothiazines Anxiety and GI Disturbance     anxiety attack, compazine  Other reaction(s): Tardive Dyskinesia     Prochlorperazine      Other reaction(s): Tardive Dyskinesia     Amoxicillin Hives     Clindamycin Other (See Comments)     Burning sensation in chest     Decadron      flushing     Dexamethasone      Other reaction(s): Erythema     Compazine Anxiety       PAST MEDICAL HISTORY:  Past Medical History:   Diagnosis Date     Acute posthemorrhagic anemia 9/11/2015     Anemia      Chronic systolic congestive heart failure (H) 10/10/2018     EF 35%     Fibromyalgia      Gastro-oesophageal reflux disease      GENERAL OSTEOARTHROSIS [715.00] 11/23/2004    knee     Generalized anxiety disorder 9/30/2013     Hyperlipidemia LDL goal <130 2/10/2010    Failed simvastatin- muscle aches      Hypertension     because of the heart     Idiopathic cardiomyopathy (H) 1/19/2012    Dr. Osborne 3/2011 EF 30-35%      Insomnia 1/19/2012     Iron deficiency anemia 8/12/2015     Problem list name updated by automated process. Provider to review     LBBB (left bundle branch block)      Left ventricular systolic dysfunction:EF 35% 2/5/2012    Must stay on diovan per cardiology      Major depressive disorder, recurrent episode, mild (H) 2/17/2016     Migraine 6/21/2005     Problem list name updated by automated process. Provider to review     Nonischemic cardiomyopathy (H)     EF 30-35%, Dr. Osborne [facility] (suspect virus); s/p AICD     NSVT (nonsustained ventricular  tachycardia) (H)      Obesity 1/20/2012     SUSAN (obstructive sleep apnea) 01/19/2012    CPAP      Pure hypercholesterolemia      Restless leg syndrome      Type 2 diabetes mellitus without complication (H) 9/24/2016       PAST SURGICAL HISTORY:  Past Surgical History:   Procedure Laterality Date     APPENDECTOMY       ARTHROPLASTY KNEE  1/7/2013    Procedure: ARTHROPLASTY KNEE;  Right Total Knee Arthroplasty       ARTHROPLASTY REVISION KNEE Right 8/26/2015    Procedure: ARTHROPLASTY REVISION KNEE;  Surgeon: Néstor Beth MD;  Location: [facility] OR     ARTHROSCOPY KNEE       bunionectomy left foot       CLOSED REDUCTION, PERCUTANEOUS PINNING FINGER, COMBINED Right 6/17/2021    Procedure: Closed reduction, pinning right long finger distal interphalangeal joint fracture;  Surgeon: Joseluis Delong MD;  Location: [facility] OR     COLONOSCOPY       CORONARY ANGIOGRAPHY ADULT ORDER  2002    normal     CORONARY ANGIOGRAPHY ADULT ORDER  12/7/12    no significant focal narrowing that would benefit from mechanical intervention      ESOPHAGOSCOPY, GASTROSCOPY, DUODENOSCOPY (EGD), COMBINED N/A 7/9/2021    Procedure: ESOPHAGOGASTRODUODENOSCOPY, WITH BIOPSY with distal esophagus biopies to rule out Barretts esophagus by cold biopsy forceps;  Surgeon: Travis Harrington MD;  Location: [facility] GI     HYSTERECTOMY       IMPLANT AUTOMATIC IMPLANTABLE CARDIOVERTER DEFIBRILLATOR  4/30/12     INSERT THORACIC PACEMAKER LEAD EPICARDIAL  5/1/2012    Procedure:INSERT THORACIC PACEMAKER LEAD EPICARDIAL; EPICARDIAL LEAD PLACEMENT; Surgeon:WEST ARECHIGA; Location:[facility] OR     TONSILLECTOMY       TRANSPLANT - corneal lenses      Bilateral for cataracts       FAMILY HISTORY:  Family History   Problem Relation Age of Onset     Cancer Father         intraabdominal mass - not colon cancer     C.A.D. 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,[address]   Tobacco Use     Smoking status: Never Smoker     Smokeless tobacco: Never Used   Substance and Sexual Activity     Alcohol use: Yes     Alcohol/week: 0.0 standard drinks     Comment: rarely     Drug use: No     Sexual activity: Not Currently     Partners: Male     Birth control/protection: Surgical     Comment: She has had a hysterectomy   Other Topics Concern     Parent/sibling w/ CABG, MI or angioplasty before 65F 55M? 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 Multiple ignacio problems, multiple hospitalizations in last 7 years, anxiety        Pt has 2 daughters, 2 step-daughters's and  7 grandchildrens        Youngest daughter Sabi has malignant brain tumor                   Review of Systems:  Skin:  Negative     Eyes:  Negative    ENT:  Positive for nasal congestion  Respiratory:  Positive for dyspnea on exertion;sleep apnea;CPAP  Cardiovascular:    Positive for;lightheadedness;dizziness;chest pain  Gastroenterology: Negative    Genitourinary:  Negative    Musculoskeletal:  Positive for back pain;neck pain;joint pain;arthritis;joint stiffness  Neurologic:  Positive for numbness or tingling of hands  Psychiatric:    anxiety;excessive stress;sleep disturbances  Heme/Lymph/Imm:   "Negative    Endocrine:  Positive for diabetes    Physical Exam:  Vitals: /71 (Cuff Size: Adult Large)   Pulse 73   Ht 1.651 m (5' 5\")   Wt 93.3 kg (205 lb 9.6 oz)   SpO2 95%   BMI 34.21 kg/m      Constitutional:  cooperative;in no acute distress obese      Skin:  warm and dry to the touch   pacemaker incision in the left infraclavicular area was well-healed      Head:  normocephalic        Eyes:  pupils equal and round        Lymph:      ENT:  no pallor or cyanosis, dentition good        Neck:  JVP normal;no carotid bruit        Respiratory:  clear to auscultation;normal respiratory excursion         Cardiac: regular rhythm;normal S1 and S2   distant heart sounds            pulses full and equal                                        GI:  abdomen soft obese      Extremities and Muscular Skeletal:  no deformities, clubbing, cyanosis, erythema observed;no edema              Neurological:  affect appropriate        Psych:  Alert and Oriented x 3        Recent Lab Results:  LIPID RESULTS:  Lab Results   Component Value Date    CHOL 170 01/25/2021    HDL 39 (L) 01/25/2021    LDL 80 01/25/2021    TRIG 253 (H) 01/25/2021    CHOLHDLRATIO 4.7 10/13/2015       LIVER ENZYME RESULTS:  Lab Results   Component Value Date    AST 27 04/09/2022    AST 28 12/26/2020    ALT 45 04/09/2022    ALT 32 12/26/2020       CBC RESULTS:  Lab Results   Component Value Date    WBC 9.6 04/09/2022    WBC 4.0 02/05/2021    RBC 4.03 04/09/2022    RBC 3.86 02/05/2021    HGB 12.4 04/09/2022    HGB 12.0 02/05/2021    HCT 38.1 04/09/2022    HCT 37.0 02/05/2021    MCV 95 04/09/2022    MCV 96 02/05/2021    MCH 30.8 04/09/2022    MCH 31.1 02/05/2021    MCHC 32.5 04/09/2022    MCHC 32.4 02/05/2021    RDW 12.5 04/09/2022    RDW 12.5 02/05/2021     04/09/2022     02/05/2021       BMP RESULTS:  Lab Results   Component Value Date     04/09/2022     02/05/2021    POTASSIUM 4.2 04/09/2022    POTASSIUM 4.2 06/17/2021    CHLORIDE " 109 04/09/2022    CHLORIDE 108 02/05/2021    CO2 25 04/09/2022    CO2 23 02/05/2021    ANIONGAP 5 04/09/2022    ANIONGAP 8 02/05/2021     (H) 04/09/2022     (H) 04/09/2022     (H) 02/05/2021    BUN 22 04/09/2022    BUN 16 02/05/2021    CR 0.72 04/09/2022    CR 0.99 06/17/2021    GFRESTIMATED 87 04/09/2022    GFRESTIMATED 56 (L) 06/17/2021    GFRESTBLACK 64 06/17/2021    WILEY 9.1 04/09/2022    WILEY 9.1 02/05/2021        A1C RESULTS:  Lab Results   Component Value Date    A1C 6.4 (H) 04/07/2022    A1C 6.1 (H) 01/25/2021       INR RESULTS:  Lab Results   Component Value Date    INR 1.06 04/07/2022    INR 0.99 12/26/2020    INR 0.88 12/05/2012           CC  Referred Self, MD  No address on file

## 2022-07-29 NOTE — LETTER
7/29/2022    Padmaja Sepulveda MD  [address] [facility] [address]    RE: Kristie Jones       Dear Colleague,     I had the pleasure of seeing Kristie Jones in the [facility].  HPI and Plan:   It is always a pleasure to see this very delightful 76-year-old lady for follow-up of nonischemic cardiomyopathy.    I been following her for a long time and her baseline EF is about 30%, though recently it has dropped to 20 to 25%.  She had recovered very nicely from COVID infection and I am not sure there is is a significant contribution from that.  Even if there is I do not think there is much we can do about it.    Blood pressures have always been soft with systolic hovering mostly around 100 at baseline.  She has a BiV /AICD in place though the former has been switched off due to widening of the QRS complex with pacing.  Baseline ECG shows an intraventricular conduction delay of just over 150 ms.  She is maintained on 25 mg of Coreg, 25 mg of spironolactone and 50 mg of losartan.  She has not developed CHF for very long time.    She comes in today and she tells me that she has noticed a significant decrease in her stamina.  She usually walks 2 miles a day and now she has to stop multiple times and going up the stairs have been challenging.  However she has no chest pains PND orthopnea or ankle swelling and indeed by physical examination she appears completely euvolemic.    I think she is now having symptoms of low output and unfortunately in terms of medical treatment there is nothing more that I can offer.    I think coronary artery disease is unlikely but I will further evaluate with stress nuclear testing.    I reviewed her previous medical records with regards to her pacer.  I do note that percutaneously we had great difficulty placing the LV lead in good position 10 years ago and she actually underwent minithoracotomy for placement of epicardial lead.  Unfortunately even with this pacing  was suboptimal and she is just now on backup pacing.    I wonder if there is anything more that we could do such as repositioning of the epicardial lead.  For this reason I have asked her to make an appointment to see my electrophysiology colleagues.  I did let her know that I am not too optimistic that anything more could be done but there again on occasions I am known to be overly pessimistic.    Provisionally arranged to see her again in 6 months time for further follow-up.  Orders Placed This Encounter   Procedures     Follow-Up with Cardiology EP     Follow-Up with Cardiology       No orders of the defined types were placed in this encounter.      Encounter Diagnoses   Name Primary?     Cardiomyopathy, unspecified type (H)      Idiopathic cardiomyopathy (H)      Generalized anxiety disorder        CURRENT MEDICATIONS:  Current Outpatient Medications   Medication Sig Dispense Refill     acetaminophen (TYLENOL) 500 MG tablet Take 1,000 mg by mouth daily as needed for mild pain       ALPRAZolam (XANAX) 0.25 MG tablet Take 1 tablet (0.25 mg) by mouth 2 times daily as needed for anxiety 30 tablet 1     aspirin 81 MG tablet Take 81 mg by mouth daily       CALCIUM 500 MG OR TABS Takes 3 tabs daily takees total of 1200 mg daily       carvedilol (COREG) 25 MG tablet TAKE 1 TABLET TWICE A DAY WITH MEALS 180 tablet 3     Cholecalciferol (VITAMIN D) 2000 UNITS tablet Take 1 tablet by mouth daily.       ciprofloxacin (CIPRO) 100 MG tablet Take 100 mg by mouth daily as needed For dental appointments       clobetasol (TEMOVATE) 0.05 % external cream Apply thin layer to bilateral hand twice daily for 2 weeks 45 g 1     co-enzyme Q-10 50 MG CAPS Take 1 capsule by mouth daily.       lifitegrast (XIIDRA) 5 % opthalmic solution Place 1 drop into both eyes 2 times daily       loratadine (CLARITIN) 10 MG tablet Take 10 mg by mouth daily       losartan (COZAAR) 50 MG tablet TAKE 1 TABLET DAILY 90 tablet 3     mirtazapine (REMERON)  7.5 MG tablet TAKE 1 TO 2 TABLETS 30 MINUTES BEFORE BEDTIME 60 tablet 5     Multiple Vitamins-Minerals (MULTIVITAMIN ADULT PO) Take 1 tablet by mouth daily       omeprazole (PRILOSEC) 20 MG DR capsule Take 1 capsule (20 mg) by mouth daily       rosuvastatin (CRESTOR) 10 MG tablet TAKE 1 TABLET EVERY OTHER DAY 45 tablet 3     spironolactone (ALDACTONE) 25 MG tablet TAKE 1/2 TABLET BY MOUTH DAILY 45 tablet 1     venlafaxine (EFFEXOR XR) 75 MG 24 hr capsule TAKE 3 CAPSULES ONCE DAILY 270 capsule 1       ALLERGIES     Allergies   Allergen Reactions     Corticosteroids Other (See Comments)     flush up through chest and face, decadron  flushing  Other reaction(s): Erythema     Phenothiazines Anxiety and GI Disturbance     anxiety attack, compazine  Other reaction(s): Tardive Dyskinesia     Prochlorperazine      Other reaction(s): Tardive Dyskinesia     Amoxicillin Hives     Clindamycin Other (See Comments)     Burning sensation in chest     Decadron      flushing     Dexamethasone      Other reaction(s): Erythema     Compazine Anxiety       PAST MEDICAL HISTORY:  Past Medical History:   Diagnosis Date     Acute posthemorrhagic anemia 9/11/2015     Anemia      Chronic systolic congestive heart failure (H) 10/10/2018     EF 35%     Fibromyalgia      Gastro-oesophageal reflux disease      GENERAL OSTEOARTHROSIS [715.00] 11/23/2004    knee     Generalized anxiety disorder 9/30/2013     Hyperlipidemia LDL goal <130 2/10/2010    Failed simvastatin- muscle aches      Hypertension     because of the heart     Idiopathic cardiomyopathy (H) 1/19/2012    Dr. Osborne 3/2011 EF 30-35%      Insomnia 1/19/2012     Iron deficiency anemia 8/12/2015     Problem list name updated by automated process. Provider to review     LBBB (left bundle branch block)      Left ventricular systolic dysfunction:EF 35% 2/5/2012    Must stay on diovan per cardiology      Major depressive disorder, recurrent episode, mild (H) 2/17/2016     Migraine 6/21/2005      Problem list name updated by automated process. Provider to review     Nonischemic cardiomyopathy (H)     EF 30-35%, Dr. Osborne [facility] (suspect virus); s/p AICD     NSVT (nonsustained ventricular tachycardia) (H)      Obesity 1/20/2012     SUSAN (obstructive sleep apnea) 01/19/2012    CPAP      Pure hypercholesterolemia      Restless leg syndrome      Type 2 diabetes mellitus without complication (H) 9/24/2016       PAST SURGICAL HISTORY:  Past Surgical History:   Procedure Laterality Date     APPENDECTOMY       ARTHROPLASTY KNEE  1/7/2013    Procedure: ARTHROPLASTY KNEE;  Right Total Knee Arthroplasty       ARTHROPLASTY REVISION KNEE Right 8/26/2015    Procedure: ARTHROPLASTY REVISION KNEE;  Surgeon: Néstor Beth MD;  Location: [facility] OR     ARTHROSCOPY KNEE       bunionectomy left foot       CLOSED REDUCTION, PERCUTANEOUS PINNING FINGER, COMBINED Right 6/17/2021    Procedure: Closed reduction, pinning right long finger distal interphalangeal joint fracture;  Surgeon: Joseluis Delong MD;  Location: [facility] OR     COLONOSCOPY       CORONARY ANGIOGRAPHY ADULT ORDER  2002    normal     CORONARY ANGIOGRAPHY ADULT ORDER  12/7/12    no significant focal narrowing that would benefit from mechanical intervention      ESOPHAGOSCOPY, GASTROSCOPY, DUODENOSCOPY (EGD), COMBINED N/A 7/9/2021    Procedure: ESOPHAGOGASTRODUODENOSCOPY, WITH BIOPSY with distal esophagus biopies to rule out Barretts esophagus by cold biopsy forceps;  Surgeon: Travis Harrington MD;  Location:  GI     HYSTERECTOMY       IMPLANT AUTOMATIC IMPLANTABLE CARDIOVERTER DEFIBRILLATOR  4/30/12     INSERT THORACIC PACEMAKER LEAD EPICARDIAL  5/1/2012    Procedure:INSERT THORACIC PACEMAKER LEAD EPICARDIAL; EPICARDIAL LEAD PLACEMENT; Surgeon:WEST ARECHIGA; Location:[facility] OR     TONSILLECTOMY       TRANSPLANT - corneal lenses      Bilateral for cataracts       FAMILY HISTORY:  Family History   Problem Relation Age of Onset     Cancer Father         intraabdominal  mass - not colon cancer     C.A.D. 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,[address] [place]   Tobacco Use     Smoking status: Never Smoker     Smokeless tobacco: Never Used   Substance and Sexual Activity     Alcohol use: Yes     Alcohol/week: 0.0 standard drinks     Comment: rarely     Drug use: No     Sexual activity: Not Currently     Partners: Male     Birth control/protection: Surgical     Comment: She has had a hysterectomy   Other Topics Concern     Parent/sibling w/ CABG, MI or angioplasty before 65F 55M? 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 Multiple ignacio problems, multiple hospitalizations in last 7 years, anxiety        Pt has 2 daughters, 2 step-daughters's and  7 grandchildrens        Youngest daughter Sabi has malignant brain tumor                   Review of Systems:  Skin:  Negative     Eyes:  Negative    ENT:  Positive for nasal congestion  Respiratory:  Positive for dyspnea on exertion;sleep apnea;CPAP  Cardiovascular:    Positive for;lightheadedness;dizziness;chest pain  Gastroenterology: Negative    Genitourinary:  Negative    Musculoskeletal:  Positive for back pain;neck  "pain;joint pain;arthritis;joint stiffness  Neurologic:  Positive for numbness or tingling of hands  Psychiatric:    anxiety;excessive stress;sleep disturbances  Heme/Lymph/Imm:  Negative    Endocrine:  Positive for diabetes    Physical Exam:  Vitals: /71 (Cuff Size: Adult Large)   Pulse 73   Ht 1.651 m (5' 5\")   Wt 93.3 kg (205 lb 9.6 oz)   SpO2 95%   BMI 34.21 kg/m      Constitutional:  cooperative;in no acute distress obese      Skin:  warm and dry to the touch   pacemaker incision in the left infraclavicular area was well-healed      Head:  normocephalic        Eyes:  pupils equal and round        Lymph:      ENT:  no pallor or cyanosis, dentition good        Neck:  JVP normal;no carotid bruit        Respiratory:  clear to auscultation;normal respiratory excursion         Cardiac: regular rhythm;normal S1 and S2   distant heart sounds            pulses full and equal                                        GI:  abdomen soft obese      Extremities and Muscular Skeletal:  no deformities, clubbing, cyanosis, erythema observed;no edema              Neurological:  affect appropriate        Psych:  Alert and Oriented x 3        Recent Lab Results:  LIPID RESULTS:  Lab Results   Component Value Date    CHOL 170 01/25/2021    HDL 39 (L) 01/25/2021    LDL 80 01/25/2021    TRIG 253 (H) 01/25/2021    CHOLHDLRATIO 4.7 10/13/2015       LIVER ENZYME RESULTS:  Lab Results   Component Value Date    AST 27 04/09/2022    AST 28 12/26/2020    ALT 45 04/09/2022    ALT 32 12/26/2020       CBC RESULTS:  Lab Results   Component Value Date    WBC 9.6 04/09/2022    WBC 4.0 02/05/2021    RBC 4.03 04/09/2022    RBC 3.86 02/05/2021    HGB 12.4 04/09/2022    HGB 12.0 02/05/2021    HCT 38.1 04/09/2022    HCT 37.0 02/05/2021    MCV 95 04/09/2022    MCV 96 02/05/2021    MCH 30.8 04/09/2022    MCH 31.1 02/05/2021    MCHC 32.5 04/09/2022    MCHC 32.4 02/05/2021    RDW 12.5 04/09/2022    RDW 12.5 02/05/2021     04/09/2022    PLT " 159 02/05/2021       BMP RESULTS:  Lab Results   Component Value Date     04/09/2022     02/05/2021    POTASSIUM 4.2 04/09/2022    POTASSIUM 4.2 06/17/2021    CHLORIDE 109 04/09/2022    CHLORIDE 108 02/05/2021    CO2 25 04/09/2022    CO2 23 02/05/2021    ANIONGAP 5 04/09/2022    ANIONGAP 8 02/05/2021     (H) 04/09/2022     (H) 04/09/2022     (H) 02/05/2021    BUN 22 04/09/2022    BUN 16 02/05/2021    CR 0.72 04/09/2022    CR 0.99 06/17/2021    GFRESTIMATED 87 04/09/2022    GFRESTIMATED 56 (L) 06/17/2021    GFRESTBLACK 64 06/17/2021    WILEY 9.1 04/09/2022    WILEY 9.1 02/05/2021        A1C RESULTS:  Lab Results   Component Value Date    A1C 6.4 (H) 04/07/2022    A1C 6.1 (H) 01/25/2021       INR RESULTS:  Lab Results   Component Value Date    INR 1.06 04/07/2022    INR 0.99 12/26/2020    INR 0.88 12/05/2012         CC  Referred Self    Thank you for allowing me to participate in the care of your patient.      Sincerely,     DR TRENT BARBOSA MD     [facility]

## 2022-08-01 DIAGNOSIS — Z95.810 ICD (IMPLANTABLE CARDIOVERTER-DEFIBRILLATOR) IN PLACE: Primary | ICD-10-CM

## 2022-08-01 NOTE — TELEPHONE ENCOUNTER
Writer attempted to reach Kristie Mcknight today, phone went immediately to voice mail.  I left her a message requesting her to call back, OK to [facility], stating what are her specific symptoms; ie how far can she walk, what limitations on breathing or any specific things, so we can complete the handicapped parking forms.    Kristan Connell RN on 8/1/2022 at 3:37 PM

## 2022-08-01 NOTE — PROGRESS NOTES
Device gen change information - ICD  Date NCIHO triggered: 7/25/22      Original date of implant: 4/30/12  Last gen change: na  Device Information:      Date of last echo and EF: 5/10/22: 20-25%    Mode: VVI 40  Pacing percentage: 0  Underlying rhythm: SR 70's with 1st degree AV BLock  Dependent?: no    Lead measurements:  Implant:     Most recent:           Is pt on AC: no  Hx stroke/PE: no    Settings/parameters:  Implant:   Most Recent:    (BV pacing off since 10/2016 due to narrow intrinsic QRS)    (Include date, parameters may change at NICHO)  JOSE ELIAS Chavarria

## 2022-08-02 ENCOUNTER — HOSPITAL ENCOUNTER (OUTPATIENT)
Dept: NUCLEAR MEDICINE | Facility: CLINIC | Age: 77
Setting detail: NUCLEAR MEDICINE
Discharge: HOME | End: 2022-08-02
Attending: INTERNAL MEDICINE
Payer: COMMERCIAL

## 2022-08-02 ENCOUNTER — TELEPHONE (OUTPATIENT)
Dept: CARDIOLOGY | Facility: CLINIC | Age: 77
End: 2022-08-02

## 2022-08-02 ENCOUNTER — HOSPITAL ENCOUNTER (OUTPATIENT)
Dept: CARDIOLOGY | Facility: CLINIC | Age: 77
Discharge: HOME | End: 2022-08-02
Attending: INTERNAL MEDICINE
Payer: COMMERCIAL

## 2022-08-02 VITALS — HEART RATE: 75 BPM | SYSTOLIC BLOOD PRESSURE: 112 MMHG | DIASTOLIC BLOOD PRESSURE: 56 MMHG

## 2022-08-02 DIAGNOSIS — I42.9 IDIOPATHIC CARDIOMYOPATHY (H): ICD-10-CM

## 2022-08-02 DIAGNOSIS — F41.1 GENERALIZED ANXIETY DISORDER: ICD-10-CM

## 2022-08-02 DIAGNOSIS — I42.9 CARDIOMYOPATHY, UNSPECIFIED TYPE (H): ICD-10-CM

## 2022-08-02 LAB
CV BLOOD PRESSURE: 28 MMHG
CV STRESS MAX HR HE: 73
NUC STRESS EJECTION FRACTION: 25 %
RATE PRESSURE PRODUCT: 7373
STRESS ECHO BASELINE DIASTOLIC HE: 56
STRESS ECHO BASELINE HR: 66 BPM
STRESS ECHO BASELINE SYSTOLIC BP: 112
STRESS ECHO CALCULATED PERCENT HR: 50 %
STRESS ECHO LAST STRESS DIASTOLIC BP: 52
STRESS ECHO LAST STRESS SYSTOLIC BP: 101
STRESS ECHO TARGET HR: 145

## 2022-08-02 PROCEDURE — 93016 CV STRESS TEST SUPVJ ONLY: CPT | Performed by: INTERNAL MEDICINE

## 2022-08-02 PROCEDURE — 78452 HT MUSCLE IMAGE SPECT MULT: CPT

## 2022-08-02 PROCEDURE — 250N000011 HC RX IP 250 OP 636

## 2022-08-02 PROCEDURE — 93017 CV STRESS TEST TRACING ONLY: CPT

## 2022-08-02 PROCEDURE — 78452 HT MUSCLE IMAGE SPECT MULT: CPT | Mod: 26 | Performed by: INTERNAL MEDICINE

## 2022-08-02 PROCEDURE — 343N000001 HC RX 343: Performed by: INTERNAL MEDICINE

## 2022-08-02 PROCEDURE — 93018 CV STRESS TEST I&R ONLY: CPT | Performed by: INTERNAL MEDICINE

## 2022-08-02 PROCEDURE — A9502 TC99M TETROFOSMIN: HCPCS | Performed by: INTERNAL MEDICINE

## 2022-08-02 RX ORDER — REGADENOSON 0.08 MG/ML
INJECTION, SOLUTION INTRAVENOUS
Status: COMPLETED
Start: 2022-08-02 | End: 2022-08-02

## 2022-08-02 RX ORDER — AMINOPHYLLINE 25 MG/ML
50-100 INJECTION, SOLUTION INTRAVENOUS
Status: DISCONTINUED | OUTPATIENT
Start: 2022-08-02 | End: 2022-08-03 | Stop reason: HOSPADM

## 2022-08-02 RX ORDER — ACYCLOVIR 200 MG/1
0-1 CAPSULE ORAL
Status: DISCONTINUED | OUTPATIENT
Start: 2022-08-02 | End: 2022-08-03 | Stop reason: HOSPADM

## 2022-08-02 RX ORDER — REGADENOSON 0.08 MG/ML
0.4 INJECTION, SOLUTION INTRAVENOUS ONCE
Status: COMPLETED | OUTPATIENT
Start: 2022-08-02 | End: 2022-08-02

## 2022-08-02 RX ORDER — ALBUTEROL SULFATE 90 UG/1
2 AEROSOL, METERED RESPIRATORY (INHALATION) EVERY 5 MIN PRN
Status: DISCONTINUED | OUTPATIENT
Start: 2022-08-02 | End: 2022-08-03 | Stop reason: HOSPADM

## 2022-08-02 RX ADMIN — REGADENOSON 0.4 MG: 0.08 INJECTION, SOLUTION INTRAVENOUS at 10:41

## 2022-08-02 RX ADMIN — TETROFOSMIN 10.4 MCI.: 1.38 INJECTION, POWDER, LYOPHILIZED, FOR SOLUTION INTRAVENOUS at 09:09

## 2022-08-02 RX ADMIN — TETROFOSMIN 29.9 MCI.: 1.38 INJECTION, POWDER, LYOPHILIZED, FOR SOLUTION INTRAVENOUS at 10:44

## 2022-08-02 NOTE — TELEPHONE ENCOUNTER
Pt reached NICHO on 7/25/22 and is due for a gen change.      She has a [employer] N160 Incepta CRT-D and current settings are VVI 40 since 10/11/2016 secondary to decrease in BV pacing and QRS measurement not as wide (per Dr. Sahni).      AP:0%, : 0%     To make note, RA LEAD HAS MICRODISLODGEMENT, SO DOES NOT CAPTURE also.        Current leads that are active are as follows:           The two LV-leads are epicardial.      I will route this information to Dr. Sahni to see if he would like to down-grade order of the ICD.

## 2022-08-02 NOTE — TELEPHONE ENCOUNTER
Called pt and informed her of her NICHO status. She informed me that she would prefer to see Dr. Gonzalez as an EP provider.  I will route a message telling Dr. Gonzalez this and also to scheduling to schedule her H&P sooner.

## 2022-08-03 ENCOUNTER — TELEPHONE (OUTPATIENT)
Dept: CARDIOLOGY | Facility: CLINIC | Age: 77
End: 2022-08-03

## 2022-08-03 NOTE — TELEPHONE ENCOUNTER
----- Message from Ciaran Osborne MD sent at 8/3/2022  7:59 AM CDT -----  Pls let her know that test showed no ischemia.  Keep appt with EP.  Thanks.      Tried to call patient to review results above. No answer. Left detailed VM with result. Left team 3 direct number to call with any further questions or concerns.

## 2022-08-04 NOTE — TELEPHONE ENCOUNTER
Patient returned our call.    I inquired about her limitations and she replied as follows;    1. She can only walk a block-321 ' or 100 Meters before she has to stop to catch her breath.    2. After one flight of stairs she has to stop to catch her breath    3.If she is parked at a store she will walk to the store entrance and have to stop before entering to catch her breath.    She is comfortable at rest.    These limitations in distance would qualify for American Heart Association Class III heart failure.      Dr. Osborne will sign and we will make a copy for her chart and mail to patient to complete.    We are giving her a 5 year certificate.

## 2022-08-05 ENCOUNTER — DOCUMENTATION ONLY (OUTPATIENT)
Dept: CARDIOLOGY | Facility: CLINIC | Age: 77
End: 2022-08-05

## 2022-08-05 NOTE — PROGRESS NOTES
Disability parking certificate form completed and signed by Dr. Osborne and placed in mail today.  Copy of form sent to be scanned to patient's chart.

## 2022-08-12 ENCOUNTER — OFFICE VISIT (OUTPATIENT)
Dept: CARDIOLOGY | Facility: CLINIC | Age: 77
End: 2022-08-12
Payer: COMMERCIAL

## 2022-08-12 VITALS
HEIGHT: 65 IN | BODY MASS INDEX: 35.49 KG/M2 | SYSTOLIC BLOOD PRESSURE: 100 MMHG | WEIGHT: 213 LBS | HEART RATE: 69 BPM | DIASTOLIC BLOOD PRESSURE: 64 MMHG

## 2022-08-12 DIAGNOSIS — I44.7 LBBB (LEFT BUNDLE BRANCH BLOCK): ICD-10-CM

## 2022-08-12 DIAGNOSIS — I42.9 CARDIOMYOPATHY, UNSPECIFIED TYPE (H): Primary | ICD-10-CM

## 2022-08-12 DIAGNOSIS — I50.22 CHRONIC SYSTOLIC CONGESTIVE HEART FAILURE (H): ICD-10-CM

## 2022-08-12 DIAGNOSIS — I42.9 IDIOPATHIC CARDIOMYOPATHY (H): ICD-10-CM

## 2022-08-12 PROBLEM — E66.01 MORBID OBESITY (H): Status: ACTIVE | Noted: 2022-08-12

## 2022-08-12 PROCEDURE — 99214 OFFICE O/P EST MOD 30 MIN: CPT | Performed by: INTERNAL MEDICINE

## 2022-08-12 PROCEDURE — 93000 ELECTROCARDIOGRAM COMPLETE: CPT | Performed by: INTERNAL MEDICINE

## 2022-08-12 NOTE — PROGRESS NOTES
Service Date: 08/12/2022    HISTORY OF PRESENT ILLNESS:    I had the pleasure of seeing Ms. Kristie Jones, a delightful 76-year-old female who has been referred by Dr. Ciaran Osborne, for discussion of resuming CRT.  In addition, the patient's ICD has recently reached NICHO.    Ms. Jones has the following chronic cardiac/medical issues:    1.  Chronic nonischemic cardiomyopathy, EF around 30-35% over the years, more recently 20%-25%.  2.  Intermittent LBBB.  3.  CRT-D in 2012 for primary prevention and CRT, see below.  4.  Dyslipidemia.  5.  GERD.  6.  Obstructive sleep apnea.    7.  Degenerative joint disease of.  8.  Diabetes mellitus type 2.    Ms. Jones's device history is complex.  I will review it here.  I spent considerable time reviewing past records, EP and operative procedures, chest x-rays and Device Clinic notes.    In 04/2012, the patient underwent attempted CRT-D implantation by Dr. Sahni.  The indication was nonischemic cardiomyopathy with EF 25%-30%, intermittent LBBB.  During the procedure, there was limited dissection of the coronary sinus leading to an inconsequential small pericardial effusion.  Unfortunately, there were no good anatomic options for LV lead placement.  Dr. Sahni tried to implant the lead in a small CS vein, but the lead dislodged soon after the procedure.  It was pointless to revise it and was left alone.    While the patient was hospitalized post CRT-D, cardiothoracic surgery was consulted.  Dr. Mane Lakhani thoracoscopically implanted a new LV lead.  A chest x-ray post procedure shows 2 separate epicardials lead on the posterolateral aspect of the left ventricle.  It also shows the RA lead in an expected location.    During subsequent followup in the clinic approximately 1 month later, the patient complained of discomfort at the device site.  It was felt that the device was too superficial and it was decided to revise the pocket.  At the same time, it was decided to extract the  transvenous LV lead that had been left in place during cardiac surgery.     This third EP intervention of the patient's device pocket took place on 06/26/2012.  Dr. Sahni removed the transvenous LV lead and revised the ICD pocket.    Later in the year, it was determined that the patient's QRS had spontaneously narrowed.  It was only about 100 milliseconds.  The paced QRS was actually wider than her native QRS, therefore, the LV lead was inactivated.    At some point, during follow-up in Device Clinic it was determined that the atrial lead malfunctioned.  In retrospect, a chest x-ray in 2012, following the third intervention in the pocket, showed that the RA lead had dislodged.    For several years (late 2012 to now), the device was programmed in VVI mode to minimize ventricular pacing.  Of note, the patient has never received a shock for ventricular tachyarrhythmia.      Ms. Jones saw Dr. Osborne on 07/29/2022.  She complained of poor energy level and not being able to do as much as she did the previous year.  Ciaran asked for EP reevaluation to determine whether it would be reasonable to activate CRT, etc.  A few days after that appointment, the device reached NICHO.    In coming in today, the patient confirmed that she feels fatigued and energy level and is lacking.  She does not have orthopnea, PND, chest pain or other cardiac symptoms.    The patient is not a smoker and does not drink much alcohol.  She lives with her  in [place].      PHYSICAL EXAMINATION:    VITAL SIGNS:  Blood pressure 100/64, heart rate 69 and regular.  Weight 96 kg, BMI 35.4.  GENERAL:  She is an extremely pleasant woman in no distress.  She is moderately overweight.  HEENT:  Normocephalic.  NECK:  Supple with normal JVP.  LUNGS:  Clear.  CARDIOVASCULAR:  Normal DELMIS, regular rhythm, no gallop, murmur or rub.  ABDOMEN:  Mildly obese, soft, nontender.  CHEST:  The device incision has healed very nicely.  EXTREMITIES:  No significant  edema.      DIAGNOSTIC STUDIES:    - Laboratory tests:  Sodium 139, potassium 4.2, creatinine 0.72, ALT 45, AST 27, CRP 14.1, hematocrit 38%.  - Most recent echocardiogram in 05/2022 showed EF 20%-25% with moderate LV dilatation and global LV hypokinesis.  Moderate LA dilatation.  There are no significant valvular abnormalities.  - A 12-lead ECG today was done after the LV lead was activated.  It showed atrially sensed rhythm with biventricular pacing.  QRS is consistent with biventricular capture, duration 125 milliseconds.  A previous ECG in 04/2022 showed sinus rhythm, IVCD, QRS duration 152 milliseconds.      IMPRESSION:    1.  Chronic severe nonischemic cardiomyopathy, EF 20%-25%.  NYHA class III despite medical therapy. Recent deterioration in EF.  Subjective fatigue and COLLINS.   2.  CRT-D implantation in 2012 with history as above.    The patient's device currently operates in a VVI mode, essentially to avoid ventricular pacing.  Today it was determined that the epicardial LV lead is functional.  Unfortunately, the right atrial lead dislodged some time in 2012 and atrial pacing is no longer possible.    RECOMMENDATIONS:     - Intervention is needed at this time because of NICHO.  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  Thankfully, the epicardial LV lead still works but the patient will require RA lead revision.  - I went over the technical aspects, risks and benefits of RA lead revision and CRT-D generator replacement with MsMatt Karen.  I explained there is a small risk of pneumothorax, lead dislodgement, approximately 2% risk of infection, a potentially serious complication in this patient with epicardial LV lead.  She expressed understanding and has agreed to proceed.    This was a lengthy clinic visit with extensive time required to analyze her complex device history.      It was my pleasure being involved in this  patient's care.      Hillary Gonzalez MD, FACC      cc:   Padmaja Sepulveda MD  [address] [address]     D: 2022   T: 2022   MT: FREDDIE    Name:     LEONID MCKEON  MRN:      [id number]        Account:      [id number]   :      1945           Service Date: 2022       Document: [id number]

## 2022-08-12 NOTE — LETTER
8/12/2022    Padmaja Sepulveda MD  [address] [facility] [address]    RE: Kristie Jones       Dear Colleague,     I had the pleasure of seeing Kristie Jones in the [facility].  HPI and Plan:   See dictation [id number]      Orders Placed This Encounter   Procedures     EKG 12-lead complete w/read - Clinics (performed today)       No orders of the defined types were placed in this encounter.      There are no discontinued medications.      Encounter Diagnosis   Name Primary?     Cardiomyopathy, unspecified type (H) Yes       CURRENT MEDICATIONS:  Current Outpatient Medications   Medication Sig Dispense Refill     acetaminophen (TYLENOL) 500 MG tablet Take 1,000 mg by mouth daily as needed for mild pain       ALPRAZolam (XANAX) 0.25 MG tablet Take 1 tablet (0.25 mg) by mouth 2 times daily as needed for anxiety 30 tablet 1     aspirin 81 MG tablet Take 81 mg by mouth daily       CALCIUM 500 MG OR TABS Takes 3 tabs daily takees total of 1200 mg daily       carvedilol (COREG) 25 MG tablet TAKE 1 TABLET TWICE A DAY WITH MEALS 180 tablet 3     Cholecalciferol (VITAMIN D) 2000 UNITS tablet Take 1 tablet by mouth daily.       ciprofloxacin (CIPRO) 100 MG tablet Take 1 tablet (100 mg) by mouth every 12 hours For dental appointments 10 tablet 0     clobetasol (TEMOVATE) 0.05 % external cream Apply thin layer to bilateral hand twice daily for 2 weeks 45 g 1     co-enzyme Q-10 50 MG CAPS Take 1 capsule by mouth daily.       lifitegrast (XIIDRA) 5 % opthalmic solution Place 1 drop into both eyes 2 times daily       loratadine (CLARITIN) 10 MG tablet Take 10 mg by mouth daily       losartan (COZAAR) 50 MG tablet Take 1 tablet (50 mg) by mouth daily 90 tablet 3     mirtazapine (REMERON) 7.5 MG tablet TAKE 1 TO 2 TABLETS 30 MINUTES BEFORE BEDTIME 60 tablet 5     Multiple Vitamins-Minerals (MULTIVITAMIN ADULT PO) Take 1 tablet by mouth daily       omeprazole (PRILOSEC) 20 MG DR capsule Take 1 capsule (20 mg)  by mouth daily       rosuvastatin (CRESTOR) 10 MG tablet TAKE 1 TABLET EVERY OTHER DAY 45 tablet 3     spironolactone (ALDACTONE) 25 MG tablet TAKE 1/2 TABLET BY MOUTH DAILY 45 tablet 1     venlafaxine (EFFEXOR XR) 75 MG 24 hr capsule TAKE 3 CAPSULES ONCE DAILY 270 capsule 1       ALLERGIES     Allergies   Allergen Reactions     Corticosteroids Other (See Comments)     flush up through chest and face, decadron  flushing  Other reaction(s): Erythema     Phenothiazines Anxiety and GI Disturbance     anxiety attack, compazine  Other reaction(s): Tardive Dyskinesia     Prochlorperazine      Other reaction(s): Tardive Dyskinesia     Amoxicillin Hives     Clindamycin Other (See Comments)     Burning sensation in chest     Decadron      flushing     Dexamethasone      Other reaction(s): Erythema     Compazine Anxiety       PAST MEDICAL HISTORY:  Past Medical History:   Diagnosis Date     Acute posthemorrhagic anemia 9/11/2015     Anemia      Chronic systolic congestive heart failure (H) 10/10/2018     EF 35%     Fibromyalgia      Gastro-oesophageal reflux disease      GENERAL OSTEOARTHROSIS [715.00] 11/23/2004    knee     Generalized anxiety disorder 9/30/2013     Hyperlipidemia LDL goal <130 2/10/2010    Failed simvastatin- muscle aches      Hypertension     because of the heart     Idiopathic cardiomyopathy (H) 1/19/2012    Dr. Osborne 3/2011 EF 30-35%      Insomnia 1/19/2012     Iron deficiency anemia 8/12/2015     Problem list name updated by automated process. Provider to review     LBBB (left bundle branch block)      Left ventricular systolic dysfunction:EF 35% 2/5/2012    Must stay on diovan per cardiology      Major depressive disorder, recurrent episode, mild (H) 2/17/2016     Migraine 6/21/2005     Problem list name updated by automated process. Provider to review     Nonischemic cardiomyopathy (H)     EF 30-35%, Dr. Osborne [facility] (suspect virus); s/p AICD     NSVT (nonsustained ventricular tachycardia) (H)      Obesity  1/20/2012     SUSAN (obstructive sleep apnea) 01/19/2012    CPAP      Pure hypercholesterolemia      Restless leg syndrome      Type 2 diabetes mellitus without complication (H) 9/24/2016       PAST SURGICAL HISTORY:  Past Surgical History:   Procedure Laterality Date     APPENDECTOMY       ARTHROPLASTY KNEE  1/7/2013    Procedure: ARTHROPLASTY KNEE;  Right Total Knee Arthroplasty       ARTHROPLASTY REVISION KNEE Right 8/26/2015    Procedure: ARTHROPLASTY REVISION KNEE;  Surgeon: Néstor Beth MD;  Location: [facility] OR     ARTHROSCOPY KNEE       bunionectomy left foot       CLOSED REDUCTION, PERCUTANEOUS PINNING FINGER, COMBINED Right 6/17/2021    Procedure: Closed reduction, pinning right long finger distal interphalangeal joint fracture;  Surgeon: Joseluis Delong MD;  Location: [facility] OR     COLONOSCOPY       CORONARY ANGIOGRAPHY ADULT ORDER  2002    normal     CORONARY ANGIOGRAPHY ADULT ORDER  12/7/12    no significant focal narrowing that would benefit from mechanical intervention      ESOPHAGOSCOPY, GASTROSCOPY, DUODENOSCOPY (EGD), COMBINED N/A 7/9/2021    Procedure: ESOPHAGOGASTRODUODENOSCOPY, WITH BIOPSY with distal esophagus biopies to rule out Barretts esophagus by cold biopsy forceps;  Surgeon: Travis Harrington MD;  Location: [facility] GI     HYSTERECTOMY       IMPLANT AUTOMATIC IMPLANTABLE CARDIOVERTER DEFIBRILLATOR  4/30/12     INSERT THORACIC PACEMAKER LEAD EPICARDIAL  5/1/2012    Procedure:INSERT THORACIC PACEMAKER LEAD EPICARDIAL; EPICARDIAL LEAD PLACEMENT; Surgeon:WEST ARECHIGA; Location:[facility] OR     TONSILLECTOMY       TRANSPLANT - corneal lenses      Bilateral for cataracts       FAMILY HISTORY:  Family History   Problem Relation Age of Onset     Cancer Father         intraabdominal mass - not colon cancer     C.A.D. 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 Multiple ignacio problems, multiple hospitalizations in last 7 years, anxiety        Pt has 2 daughters, 2 step-daughters's and  7 grandchildrens        Youngest daughter Sabi has malignant brain tumor                   Thank you for allowing me to participate in the care of your patient.      Sincerely,     Hillary Gonzalez MD     [facility]  cc:   No referring provider defined for this encounter.

## 2022-08-12 NOTE — PROGRESS NOTES
HPI and Plan:   See dictation [id number]      Orders Placed This Encounter   Procedures     EKG 12-lead complete w/read - Clinics (performed today)       No orders of the defined types were placed in this encounter.      There are no discontinued medications.      Encounter Diagnosis   Name Primary?     Cardiomyopathy, unspecified type (H) Yes       CURRENT MEDICATIONS:  Current Outpatient Medications   Medication Sig Dispense Refill     acetaminophen (TYLENOL) 500 MG tablet Take 1,000 mg by mouth daily as needed for mild pain       ALPRAZolam (XANAX) 0.25 MG tablet Take 1 tablet (0.25 mg) by mouth 2 times daily as needed for anxiety 30 tablet 1     aspirin 81 MG tablet Take 81 mg by mouth daily       CALCIUM 500 MG OR TABS Takes 3 tabs daily takees total of 1200 mg daily       carvedilol (COREG) 25 MG tablet TAKE 1 TABLET TWICE A DAY WITH MEALS 180 tablet 3     Cholecalciferol (VITAMIN D) 2000 UNITS tablet Take 1 tablet by mouth daily.       ciprofloxacin (CIPRO) 100 MG tablet Take 1 tablet (100 mg) by mouth every 12 hours For dental appointments 10 tablet 0     clobetasol (TEMOVATE) 0.05 % external cream Apply thin layer to bilateral hand twice daily for 2 weeks 45 g 1     co-enzyme Q-10 50 MG CAPS Take 1 capsule by mouth daily.       lifitegrast (XIIDRA) 5 % opthalmic solution Place 1 drop into both eyes 2 times daily       loratadine (CLARITIN) 10 MG tablet Take 10 mg by mouth daily       losartan (COZAAR) 50 MG tablet Take 1 tablet (50 mg) by mouth daily 90 tablet 3     mirtazapine (REMERON) 7.5 MG tablet TAKE 1 TO 2 TABLETS 30 MINUTES BEFORE BEDTIME 60 tablet 5     Multiple Vitamins-Minerals (MULTIVITAMIN ADULT PO) Take 1 tablet by mouth daily       omeprazole (PRILOSEC) 20 MG DR capsule Take 1 capsule (20 mg) by mouth daily       rosuvastatin (CRESTOR) 10 MG tablet TAKE 1 TABLET EVERY OTHER DAY 45 tablet 3     spironolactone (ALDACTONE) 25 MG tablet TAKE 1/2 TABLET BY MOUTH DAILY 45 tablet 1     venlafaxine  (EFFEXOR XR) 75 MG 24 hr capsule TAKE 3 CAPSULES ONCE DAILY 270 capsule 1       ALLERGIES     Allergies   Allergen Reactions     Corticosteroids Other (See Comments)     flush up through chest and face, decadron  flushing  Other reaction(s): Erythema     Phenothiazines Anxiety and GI Disturbance     anxiety attack, compazine  Other reaction(s): Tardive Dyskinesia     Prochlorperazine      Other reaction(s): Tardive Dyskinesia     Amoxicillin Hives     Clindamycin Other (See Comments)     Burning sensation in chest     Decadron      flushing     Dexamethasone      Other reaction(s): Erythema     Compazine Anxiety       PAST MEDICAL HISTORY:  Past Medical History:   Diagnosis Date     Acute posthemorrhagic anemia 9/11/2015     Anemia      Chronic systolic congestive heart failure (H) 10/10/2018     EF 35%     Fibromyalgia      Gastro-oesophageal reflux disease      GENERAL OSTEOARTHROSIS [715.00] 11/23/2004    knee     Generalized anxiety disorder 9/30/2013     Hyperlipidemia LDL goal <130 2/10/2010    Failed simvastatin- muscle aches      Hypertension     because of the heart     Idiopathic cardiomyopathy (H) 1/19/2012    Dr. Osborne 3/2011 EF 30-35%      Insomnia 1/19/2012     Iron deficiency anemia 8/12/2015     Problem list name updated by automated process. Provider to review     LBBB (left bundle branch block)      Left ventricular systolic dysfunction:EF 35% 2/5/2012    Must stay on diovan per cardiology      Major depressive disorder, recurrent episode, mild (H) 2/17/2016     Migraine 6/21/2005     Problem list name updated by automated process. Provider to review     Nonischemic cardiomyopathy (H)     EF 30-35%, Dr. Osborne [facility] (suspect virus); s/p AICD     NSVT (nonsustained ventricular tachycardia) (H)      Obesity 1/20/2012     SUSAN (obstructive sleep apnea) 01/19/2012    CPAP      Pure hypercholesterolemia      Restless leg syndrome      Type 2 diabetes mellitus without complication (H) 9/24/2016       PAST  SURGICAL HISTORY:  Past Surgical History:   Procedure Laterality Date     APPENDECTOMY       ARTHROPLASTY KNEE  1/7/2013    Procedure: ARTHROPLASTY KNEE;  Right Total Knee Arthroplasty       ARTHROPLASTY REVISION KNEE Right 8/26/2015    Procedure: ARTHROPLASTY REVISION KNEE;  Surgeon: Néstor Beth MD;  Location: [facility] OR     ARTHROSCOPY KNEE       bunionectomy left foot       CLOSED REDUCTION, PERCUTANEOUS PINNING FINGER, COMBINED Right 6/17/2021    Procedure: Closed reduction, pinning right long finger distal interphalangeal joint fracture;  Surgeon: Joseluis Delong MD;  Location: [facility] OR     COLONOSCOPY       CORONARY ANGIOGRAPHY ADULT ORDER  2002    normal     CORONARY ANGIOGRAPHY ADULT ORDER  12/7/12    no significant focal narrowing that would benefit from mechanical intervention      ESOPHAGOSCOPY, GASTROSCOPY, DUODENOSCOPY (EGD), COMBINED N/A 7/9/2021    Procedure: ESOPHAGOGASTRODUODENOSCOPY, WITH BIOPSY with distal esophagus biopies to rule out Barretts esophagus by cold biopsy forceps;  Surgeon: Travis Harrington MD;  Location: [facility] GI     HYSTERECTOMY       IMPLANT AUTOMATIC IMPLANTABLE CARDIOVERTER DEFIBRILLATOR  4/30/12     INSERT THORACIC PACEMAKER LEAD EPICARDIAL  5/1/2012    Procedure:INSERT THORACIC PACEMAKER LEAD EPICARDIAL; EPICARDIAL LEAD PLACEMENT; Surgeon:WEST ARECHIGA; Location:[facility] OR     TONSILLECTOMY       TRANSPLANT - corneal lenses      Bilateral for cataracts       FAMILY HISTORY:  Family History   Problem Relation Age of Onset     Cancer Father         intraabdominal mass - not colon cancer     C.A.D. 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 Multiple ignacio problems, multiple hospitalizations in last 7 years, anxiety        Pt has 2 daughters, 2 step-daughters's and  7 grandchildrens        Youngest daughter Sabi has malignant brain tumor

## 2022-08-19 DIAGNOSIS — Z95.810 ICD (IMPLANTABLE CARDIOVERTER-DEFIBRILLATOR) IN PLACE: ICD-10-CM

## 2022-08-19 DIAGNOSIS — I42.9 IDIOPATHIC CARDIOMYOPATHY (H): Primary | ICD-10-CM

## 2022-08-19 RX ORDER — SODIUM CHLORIDE 450 MG/100ML
INJECTION, SOLUTION INTRAVENOUS CONTINUOUS
Status: CANCELLED | OUTPATIENT
Start: 2022-08-19

## 2022-08-19 RX ORDER — LIDOCAINE 40 MG/G
CREAM TOPICAL
Status: CANCELLED | OUTPATIENT
Start: 2022-08-19

## 2022-08-19 NOTE — PROGRESS NOTES
Called patient with pre-procedure instructions for device implant:     Anticoagulation: none   Oral diabetes meds: none  Insulin: none  Diuretic: spirnolactone, hold day of  Contrast allergy: no  Pt informed to be NPO at midnight  (if procedure scheduled 1pm or later, may have clear liquid breakfast before 8am)    Instructed pt to shower the morning of the procedure, and then put on a clean shirt in order to help prevent infection.     Pt has post-procedure transportation and 24 hours monitoring set up. Pt reminded to call before coming in if their plans change.  With limited bed availability due to COVID, overnight hospital stays will be allowed for clinical reasons only.    Pt aware of no driving for 24 hours post procedure due to sedation.     INR check scheduled: no  (INR not needed)     COVID test scheduled: will bring test results    Pt reminded to self-quarantine from the time of the COVID test to the procedure.    Asked pt to take temperature the morning of the procedure and call [facility] at [phone] if it is above 100.0    Pt aware of arrival time and location. Pt verbalized understanding of instructions.

## 2022-08-22 NOTE — PROGRESS NOTES
Attempted to call pt to discuss information from [facility] RN's note below, no answer, left VM to call back for pre-procedure instructions.     Pt's procedure is scheduled at 9:30am, arrival 7:30am.     Awaiting return call from pt.     ADDENDUM 4:10PM. Pt called back and left VM. I called pt and spoke with her. Went through all instructions from note below and this note. Pt states understanding and has no questions.

## 2022-08-24 ENCOUNTER — TELEPHONE (OUTPATIENT)
Dept: MEDSURG UNIT | Facility: CLINIC | Age: 77
End: 2022-08-24

## 2022-08-24 NOTE — TELEPHONE ENCOUNTER
Chart review for upcoming procedure. LVM asking for return call to confirm details regarding procedure tomorrow.

## 2022-08-25 ENCOUNTER — HOSPITAL ENCOUNTER (OUTPATIENT)
Facility: CLINIC | Age: 77
Discharge: HOME | End: 2022-08-26
Admitting: INTERNAL MEDICINE
Payer: COMMERCIAL

## 2022-08-25 ENCOUNTER — APPOINTMENT (OUTPATIENT)
Dept: CARDIOLOGY | Facility: CLINIC | Age: 77
End: 2022-08-25
Attending: INTERNAL MEDICINE
Payer: COMMERCIAL

## 2022-08-25 ENCOUNTER — APPOINTMENT (OUTPATIENT)
Dept: GENERAL RADIOLOGY | Facility: CLINIC | Age: 77
End: 2022-08-25
Attending: INTERNAL MEDICINE
Payer: COMMERCIAL

## 2022-08-25 DIAGNOSIS — Z95.810 ICD (IMPLANTABLE CARDIOVERTER-DEFIBRILLATOR) IN PLACE: ICD-10-CM

## 2022-08-25 DIAGNOSIS — I42.9 IDIOPATHIC CARDIOMYOPATHY (H): Primary | ICD-10-CM

## 2022-08-25 LAB
ANION GAP SERPL CALCULATED.3IONS-SCNC: 6 MMOL/L (ref 3–14)
BUN SERPL-MCNC: 15 MG/DL (ref 7–30)
CALCIUM SERPL-MCNC: 8.9 MG/DL (ref 8.5–10.1)
CHLORIDE BLD-SCNC: 109 MMOL/L (ref 94–109)
CO2 SERPL-SCNC: 26 MMOL/L (ref 20–32)
CREAT SERPL-MCNC: 0.95 MG/DL (ref 0.52–1.04)
ERYTHROCYTE [DISTWIDTH] IN BLOOD BY AUTOMATED COUNT: 12.6 % (ref 10–15)
GFR SERPL CREATININE-BSD FRML MDRD: 62 ML/MIN/1.73M2
GLUCOSE BLD-MCNC: 152 MG/DL (ref 70–99)
HCT VFR BLD AUTO: 34.9 % (ref 35–47)
HGB BLD-MCNC: 11.7 G/DL (ref 11.7–15.7)
LVEF ECHO: NORMAL
MCH RBC QN AUTO: 30.6 PG (ref 26.5–33)
MCHC RBC AUTO-ENTMCNC: 33.5 G/DL (ref 31.5–36.5)
MCV RBC AUTO: 91 FL (ref 78–100)
PLATELET # BLD AUTO: 138 10E3/UL (ref 150–450)
POTASSIUM BLD-SCNC: 4.1 MMOL/L (ref 3.4–5.3)
RBC # BLD AUTO: 3.82 10E6/UL (ref 3.8–5.2)
SODIUM SERPL-SCNC: 141 MMOL/L (ref 133–144)
WBC # BLD AUTO: 3.3 10E3/UL (ref 4–11)

## 2022-08-25 PROCEDURE — 99153 MOD SED SAME PHYS/QHP EA: CPT | Performed by: INTERNAL MEDICINE

## 2022-08-25 PROCEDURE — 93010 ELECTROCARDIOGRAM REPORT: CPT | Mod: 59 | Performed by: INTERNAL MEDICINE

## 2022-08-25 PROCEDURE — 33249 INSJ/RPLCMT DEFIB W/LEAD(S): CPT | Performed by: INTERNAL MEDICINE

## 2022-08-25 PROCEDURE — 272N000001 HC OR GENERAL SUPPLY STERILE: Performed by: INTERNAL MEDICINE

## 2022-08-25 PROCEDURE — 93308 TTE F-UP OR LMTD: CPT | Mod: 26 | Performed by: INTERNAL MEDICINE

## 2022-08-25 PROCEDURE — 33241 REMOVE PULSE GENERATOR: CPT | Performed by: INTERNAL MEDICINE

## 2022-08-25 PROCEDURE — 250N000011 HC RX IP 250 OP 636: Performed by: INTERNAL MEDICINE

## 2022-08-25 PROCEDURE — 33228 REMV&REPLC PM GEN DUAL LEAD: CPT | Performed by: INTERNAL MEDICINE

## 2022-08-25 PROCEDURE — 36415 COLL VENOUS BLD VENIPUNCTURE: CPT | Performed by: INTERNAL MEDICINE

## 2022-08-25 PROCEDURE — 82435 ASSAY OF BLOOD CHLORIDE: CPT | Performed by: INTERNAL MEDICINE

## 2022-08-25 PROCEDURE — 99152 MOD SED SAME PHYS/QHP 5/>YRS: CPT | Performed by: INTERNAL MEDICINE

## 2022-08-25 PROCEDURE — C1898 LEAD, PMKR, OTHER THAN TRANS: HCPCS | Performed by: INTERNAL MEDICINE

## 2022-08-25 PROCEDURE — 93005 ELECTROCARDIOGRAM TRACING: CPT

## 2022-08-25 PROCEDURE — 36591 DRAW BLOOD OFF VENOUS DEVICE: CPT

## 2022-08-25 PROCEDURE — 93325 DOPPLER ECHO COLOR FLOW MAPG: CPT | Mod: 26 | Performed by: INTERNAL MEDICINE

## 2022-08-25 PROCEDURE — 93308 TTE F-UP OR LMTD: CPT

## 2022-08-25 PROCEDURE — 999N000054 HC STATISTIC EKG NON-CHARGEABLE

## 2022-08-25 PROCEDURE — 85027 COMPLETE CBC AUTOMATED: CPT | Performed by: INTERNAL MEDICINE

## 2022-08-25 PROCEDURE — 33216 INSERT 1 ELECTRODE PM-DEFIB: CPT | Performed by: INTERNAL MEDICINE

## 2022-08-25 PROCEDURE — 250N000013 HC RX MED GY IP 250 OP 250 PS 637: Performed by: INTERNAL MEDICINE

## 2022-08-25 PROCEDURE — 999N000063 XR CHEST 2 VIEWS

## 2022-08-25 PROCEDURE — C1894 INTRO/SHEATH, NON-LASER: HCPCS | Performed by: INTERNAL MEDICINE

## 2022-08-25 PROCEDURE — 258N000003 HC RX IP 258 OP 636: Performed by: INTERNAL MEDICINE

## 2022-08-25 PROCEDURE — 999N000184 HC STATISTIC TELEMETRY

## 2022-08-25 PROCEDURE — C1882 AICD, OTHER THAN SING/DUAL: HCPCS | Performed by: INTERNAL MEDICINE

## 2022-08-25 PROCEDURE — 33233 REMOVAL OF PM GENERATOR: CPT | Performed by: INTERNAL MEDICINE

## 2022-08-25 PROCEDURE — 999N000071 HC STATISTIC HEART CATH LAB OR EP LAB

## 2022-08-25 PROCEDURE — 93321 DOPPLER ECHO F-UP/LMTD STD: CPT | Mod: 26 | Performed by: INTERNAL MEDICINE

## 2022-08-25 PROCEDURE — 33206 INSERT HEART PM ATRIAL: CPT | Performed by: INTERNAL MEDICINE

## 2022-08-25 PROCEDURE — 82374 ASSAY BLOOD CARBON DIOXIDE: CPT | Performed by: INTERNAL MEDICINE

## 2022-08-25 PROCEDURE — 93325 DOPPLER ECHO COLOR FLOW MAPG: CPT

## 2022-08-25 PROCEDURE — 258N000002 HC RX IP 258 OP 250: Performed by: INTERNAL MEDICINE

## 2022-08-25 PROCEDURE — C1778 LEAD, NEUROSTIMULATOR: HCPCS | Performed by: INTERNAL MEDICINE

## 2022-08-25 PROCEDURE — 250N000009 HC RX 250: Performed by: INTERNAL MEDICINE

## 2022-08-25 DEVICE — LEAD KIT CAP 6623: Type: IMPLANTABLE DEVICE | Status: FUNCTIONAL

## 2022-08-25 DEVICE — CRT-D MOMENTUM IS1 DF4: Type: IMPLANTABLE DEVICE | Status: FUNCTIONAL

## 2022-08-25 DEVICE — LEAD INGEVITY+ AF IS1 7840 4CM: Type: IMPLANTABLE DEVICE | Status: FUNCTIONAL

## 2022-08-25 DEVICE — WRENCH BI-DIRECTIONAL #2 6628: Type: IMPLANTABLE DEVICE | Status: FUNCTIONAL

## 2022-08-25 RX ORDER — BUPIVACAINE HYDROCHLORIDE 2.5 MG/ML
INJECTION, SOLUTION EPIDURAL; INFILTRATION; INTRACAUDAL
Status: DISCONTINUED | OUTPATIENT
Start: 2022-08-25 | End: 2022-08-25 | Stop reason: HOSPADM

## 2022-08-25 RX ORDER — KETOROLAC TROMETHAMINE 15 MG/ML
15 INJECTION, SOLUTION INTRAMUSCULAR; INTRAVENOUS ONCE
Status: COMPLETED | OUTPATIENT
Start: 2022-08-25 | End: 2022-08-25

## 2022-08-25 RX ORDER — MIRTAZAPINE 7.5 MG/1
7.5 TABLET, FILM COATED ORAL AT BEDTIME
Status: DISCONTINUED | OUTPATIENT
Start: 2022-08-25 | End: 2022-08-26 | Stop reason: HOSPADM

## 2022-08-25 RX ORDER — ALPRAZOLAM 0.25 MG
0.25 TABLET ORAL 2 TIMES DAILY PRN
Status: DISCONTINUED | OUTPATIENT
Start: 2022-08-25 | End: 2022-08-26 | Stop reason: HOSPADM

## 2022-08-25 RX ORDER — ASPIRIN 81 MG/1
81 TABLET ORAL EVERY EVENING
Status: DISCONTINUED | OUTPATIENT
Start: 2022-08-25 | End: 2022-08-26 | Stop reason: HOSPADM

## 2022-08-25 RX ORDER — NALOXONE HYDROCHLORIDE 0.4 MG/ML
0.2 INJECTION, SOLUTION INTRAMUSCULAR; INTRAVENOUS; SUBCUTANEOUS
Status: DISCONTINUED | OUTPATIENT
Start: 2022-08-25 | End: 2022-08-26 | Stop reason: HOSPADM

## 2022-08-25 RX ORDER — NALOXONE HYDROCHLORIDE 0.4 MG/ML
0.4 INJECTION, SOLUTION INTRAMUSCULAR; INTRAVENOUS; SUBCUTANEOUS
Status: DISCONTINUED | OUTPATIENT
Start: 2022-08-25 | End: 2022-08-26 | Stop reason: HOSPADM

## 2022-08-25 RX ORDER — ACETAMINOPHEN 325 MG/1
650 TABLET ORAL EVERY 4 HOURS PRN
Status: DISCONTINUED | OUTPATIENT
Start: 2022-08-25 | End: 2022-08-26 | Stop reason: HOSPADM

## 2022-08-25 RX ORDER — OXYCODONE AND ACETAMINOPHEN 5; 325 MG/1; MG/1
1 TABLET ORAL EVERY 4 HOURS PRN
Status: DISCONTINUED | OUTPATIENT
Start: 2022-08-25 | End: 2022-08-26 | Stop reason: HOSPADM

## 2022-08-25 RX ORDER — CARVEDILOL 25 MG/1
25 TABLET ORAL 2 TIMES DAILY WITH MEALS
Status: DISCONTINUED | OUTPATIENT
Start: 2022-08-25 | End: 2022-08-26 | Stop reason: HOSPADM

## 2022-08-25 RX ORDER — SODIUM CHLORIDE 450 MG/100ML
INJECTION, SOLUTION INTRAVENOUS CONTINUOUS
Status: DISCONTINUED | OUTPATIENT
Start: 2022-08-25 | End: 2022-08-25 | Stop reason: HOSPADM

## 2022-08-25 RX ORDER — COLCHICINE 0.6 MG/1
0.6 TABLET ORAL 2 TIMES DAILY
Status: DISCONTINUED | OUTPATIENT
Start: 2022-08-25 | End: 2022-08-26 | Stop reason: HOSPADM

## 2022-08-25 RX ORDER — FENTANYL CITRATE 50 UG/ML
INJECTION, SOLUTION INTRAMUSCULAR; INTRAVENOUS
Status: DISCONTINUED | OUTPATIENT
Start: 2022-08-25 | End: 2022-08-25 | Stop reason: HOSPADM

## 2022-08-25 RX ORDER — PANTOPRAZOLE SODIUM 40 MG/1
40 TABLET, DELAYED RELEASE ORAL DAILY
Status: DISCONTINUED | OUTPATIENT
Start: 2022-08-25 | End: 2022-08-26 | Stop reason: HOSPADM

## 2022-08-25 RX ORDER — LIDOCAINE 40 MG/G
CREAM TOPICAL
Status: DISCONTINUED | OUTPATIENT
Start: 2022-08-25 | End: 2022-08-25 | Stop reason: HOSPADM

## 2022-08-25 RX ORDER — ALPRAZOLAM 0.25 MG
0.5 TABLET ORAL ONCE
Status: COMPLETED | OUTPATIENT
Start: 2022-08-25 | End: 2022-08-25

## 2022-08-25 RX ORDER — MIDAZOLAM HCL IN 0.9 % NACL/PF 1 MG/ML
.5-6 PLASTIC BAG, INJECTION (ML) INTRAVENOUS CONTINUOUS PRN
Status: DISCONTINUED | OUTPATIENT
Start: 2022-08-25 | End: 2022-08-25 | Stop reason: HOSPADM

## 2022-08-25 RX ADMIN — SODIUM CHLORIDE: 4.5 INJECTION, SOLUTION INTRAVENOUS at 08:10

## 2022-08-25 RX ADMIN — CARVEDILOL 25 MG: 25 TABLET, FILM COATED ORAL at 18:14

## 2022-08-25 RX ADMIN — PANTOPRAZOLE SODIUM 40 MG: 40 TABLET, DELAYED RELEASE ORAL at 18:14

## 2022-08-25 RX ADMIN — ALPRAZOLAM 0.5 MG: 0.25 TABLET ORAL at 21:44

## 2022-08-25 RX ADMIN — OXYCODONE HYDROCHLORIDE AND ACETAMINOPHEN 1 TABLET: 5; 325 TABLET ORAL at 11:44

## 2022-08-25 RX ADMIN — KETOROLAC TROMETHAMINE 15 MG: 15 INJECTION, SOLUTION INTRAMUSCULAR; INTRAVENOUS at 15:19

## 2022-08-25 RX ADMIN — ASPIRIN 81 MG: 81 TABLET, COATED ORAL at 21:44

## 2022-08-25 RX ADMIN — VANCOMYCIN HYDROCHLORIDE 1500 MG: 10 INJECTION, POWDER, LYOPHILIZED, FOR SOLUTION INTRAVENOUS at 09:42

## 2022-08-25 RX ADMIN — COLCHICINE 0.6 MG: 0.6 TABLET ORAL at 18:14

## 2022-08-25 RX ADMIN — MIRTAZAPINE 7.5 MG: 7.5 TABLET, FILM COATED ORAL at 21:44

## 2022-08-25 RX ADMIN — KETOROLAC TROMETHAMINE 15 MG: 15 INJECTION, SOLUTION INTRAMUSCULAR; INTRAVENOUS at 23:37

## 2022-08-25 ASSESSMENT — COLUMBIA-SUICIDE SEVERITY RATING SCALE - C-SSRS
1. IN THE PAST MONTH, HAVE YOU WISHED YOU WERE DEAD OR WISHED YOU COULD GO TO SLEEP AND NOT WAKE UP?: NO
4. HAVE YOU HAD THESE THOUGHTS AND HAD SOME INTENTION OF ACTING ON THEM?: NO
3. HAVE YOU BEEN THINKING ABOUT HOW YOU MIGHT KILL YOURSELF?: NO
5. HAVE YOU STARTED TO WORK OUT OR WORKED OUT THE DETAILS OF HOW TO KILL YOURSELF? DO YOU INTEND TO CARRY OUT THIS PLAN?: NO
2. HAVE YOU ACTUALLY HAD ANY THOUGHTS OF KILLING YOURSELF IN THE PAST MONTH?: NO
6. HAVE YOU EVER DONE ANYTHING, STARTED TO DO ANYTHING, OR PREPARED TO DO ANYTHING TO END YOUR LIFE?: NO

## 2022-08-25 ASSESSMENT — ACTIVITIES OF DAILY LIVING (ADL)
ADLS_ACUITY_SCORE: 35
ADLS_ACUITY_SCORE: 31
ADLS_ACUITY_SCORE: 35
ADLS_ACUITY_SCORE: 35
ADLS_ACUITY_SCORE: 31
ADLS_ACUITY_SCORE: 35
ADLS_ACUITY_SCORE: 31
ADLS_ACUITY_SCORE: 35

## 2022-08-25 NOTE — PROGRESS NOTES
[facility] Admission Nursing Note    Patient Information  Name: Kristie Jones  Age: 76 year old  Reason for admission: BiVi Gen change and lead revision   [facility] arrival time: ~0730    Visitor Information  Name: Matheus-   Informed of visitor restrictions: Yes  1 visitor allowed per patient   Visitor must screen negative for COVID symptoms   Visitor must wear a mask  Waiting rooms closed to visitors    Patient Admission/Assessment   Pre-procedure assessment complete: Yes  If abnormal assessment/labs, provider notified: N/A  NPO: Yes  Medications held per instructions/orders: Yes  Consent: obtained  Patient oriented to room: Yes  Education/questions answered: Yes  Plan/other: Procedure ~0930    Discharge Planning  Discharge name/phone number: Matheus-spouse   Overnight post sedation caregiver: spouse [phone]  Discharge location: [place]    Roselia Valverde RN

## 2022-08-25 NOTE — PROGRESS NOTES
Pain downs to 2/10.  Pt stated she felt better. VS stable.   Detailed and hand off report to Ricardo to continue monitoring pt.

## 2022-08-25 NOTE — DISCHARGE INSTRUCTIONS
Lead Revision and Generator Change Discharge Instructions    After you go home:    Have an adult stay with you until tomorrow.  You may resume your normal diet.       For 24 hours - due to the sedation you received:  Relax and take it easy.  Do NOT make any important or legal decisions.  Do NOT drive or operate machines at home or at work.  Do NOT drink alcohol.    Care of Chest Incision:    Keep the bandage on at least 3 days. You may remove the dressing on 8/28/22. Change it beforehand only if it gets loose or soaked. If you need to change it, use 4x4-inch gauze and a large clear bandage.     Leave the strips of tape on. They will fall off on their own, or we will remove them at your first check-up.  Check your wound daily for signs of infection, such as increased redness, severe swelling or draining. Fever may also be a sign of infection. Call us if you see any of these signs.  If there are no signs of infection, you may shower after the bandage comes off in 3 days. If you take a tub bath, keep the wound dry.  No soaking the incision (swimming pool, bathtub, hot tub) for 2 weeks.  You may have mild to medium pain for 3 to 5 days. Take Acetaminophen (Tylenol) or the pain. If the pain persists or is severe, call us. We've also started you on COLCHICINE, an anti-inflammatory. Will take 0.6 mg twice a day x 1 week. If diarrhea noted, decrease this to once a day.     Activity:    For at least 3 weeks: Do not raise your LEFT elbow above your shoulder. You can begin to use your arm as it feels comfortable to you.  Do not use LEFT arm to lift more than 10 pounds for 2 weeks.  In 6 to 8 weeks: You may begin to golf, play tennis, swim and do similar activities.  No driving for one day & limit to necessary driving for one week.    Bleeding:    If you start bleeding from the incision site, sit down and press firmly on the site for 10 minutes.   Once bleeding stops, call [facility] as soon as you can.       Call [phone]  right away if you have heavy bleeding or bleeding that does not stop.      Medicines:    Continue all medications    Follow Up Appointments:    Follow up with Device Clinic at [facility]  Tuesday 9/6/2022, 10:00am  [facility] [place]  [address], [address], [place] [address]    Call the clinic if:    You have a large or growing hard lump around the site.  The site is red, swollen, hot or tender.  Blood or fluid is draining from the site.  You have chills or a fever greater than 101 F (38 C).  You feel dizzy or light-headed.  Questions or concerns.    Telling others about your device:    Before you leave the hospital, you will receive a temporary ID card. A permanent card will be mailed to you about 6 to 8 weeks later. Always carry the ID card with you. It has important details about your device.  You may also get a Medical Alert bracelet or tag that says you have a pacemaker or a defibrillator (ICD). Go to www.[email or web address].   Always tell doctors, dentists and other care providers that you have a device implanted in you.  Let us know before you plan any surgeries. Your care team must take special steps to keep you safe during certain procedures. These steps will depend on the type of device you have. Your provider will need to see your ID card. They may need to call us for instructions.    Device Safety:    Please refer to device  s booklet for further information.        [facility] [place]:       Device RNs: [phone]       On-call MD: [phone]                              * OR* [phone] [facility] (7 days a week)

## 2022-08-25 NOTE — PROGRESS NOTES
[facility] Post Procedure Note    Patient Information  Name: Kristie Jones  Age: 76 year old    Post Procedure  Time patient returned to Care [facility]: 1115  Concerns/abnormal assessment: none at present  If abnormal assessment, provider notified: N/A  Plan/Other: monitor    Olegario Torres RN

## 2022-08-25 NOTE — PROGRESS NOTES
The patient has developed right neck and right trapezius muscle discomfort following her EP procedure earlier today.      She states that she often has discomfort on this side and the quality of the current discomfort is somewhat similar, but not quite the same as before.  No clear pleuritic component yet.    She is hemodynamically stable.  There is no pericardial rub on exam.    Chest x-ray shows good lead position with RA lead tip located in the superior anterior RA.  Device interrogation shows excellent function.  Limited echocardiography shows no apparent pericardial effusion.  She has severe LV enlargement and severe systolic LV dysfunction, both chronic.    Impression/plan:  I am suspicious that the discomfort she is experiencing is pericardial in nature, due to microperforation of the atrium from the new RA lead, but it is difficult to be certain.    I discussed 2 options with the patient:   A. The first is to revise the RA lead now, but I explained that if the discomfort is musculoskeletal, and not related to the lead, we would be exposing her to a risk of infection with no benefit.  B. The second option is to monitor her overnight.  If the discomfort persists or worsens and/or becomes more clearly pericardial in nature, we would revise the lead tomorrow.    The patient has expressed understanding.  We have mutually agreed to pursue the second option.    Will provide Toradol tonight as well as a brief course of colchicine (7 days).

## 2022-08-25 NOTE — PROGRESS NOTES
Dictated.    Successful CRT-D generator change and placement of a new RA lead.    No apparent complication.  An antibiotic pouch was placed in the pocket.    EBL 10-15 mL    Plan:  -Chest x-ray and device interrogation later today  -Home this afternoon, if doing well  -Device clinic appointment in 7-10 days

## 2022-08-25 NOTE — PROGRESS NOTES
1530 Pt states pain 9/10. Toradol given as ordered. Bed request placed. Report to Carlos Alberto [id number].

## 2022-08-25 NOTE — PRE-PROCEDURE
GENERAL PRE-PROCEDURE:   Procedure:  CRTD   Date/Time:  8/25/2022 10:00 AM    Written consent obtained?: Yes    Risks and benefits: Risks, benefits and alternatives were discussed    Consent given by:  Patient  Patient states understanding of procedure being performed: Yes    Patient's understanding of procedure matches consent: Yes    Procedure consent matches procedure scheduled: Yes    Expected level of sedation:  Moderate  Appropriately NPO:  Yes  ASA Class:  3  Mallampati  :  Grade 2- soft palate, base of uvula, tonsillar pillars, and portion of posterior pharyngeal wall visible  Lungs:  Lungs clear with good breath sounds bilaterally  Heart:  Normal heart sounds and rate  History & Physical reviewed:  History and physical reviewed and no updates needed  Statement of review:  I have reviewed the lab findings, diagnostic data, medications, and the plan for sedation

## 2022-08-25 NOTE — PLAN OF CARE
Goal Outcome Evaluation:    Plan of Care Reviewed With: patient     Overall Patient Progress: improving     Patient arrived around 1730, alert and oriented, VSS on room air. Denies pain, dressing over procedure site CDI, CMS intact in LUE. Up SBA, tolerating cardiac diet. Tele shows AV paced. Plan to monitor pain and HR overnight, discharge tomorrow.

## 2022-08-25 NOTE — PHARMACY-ADMISSION MEDICATION HISTORY
Pharmacy Medication History  Admission medication history interview status for the 8/25/2022  admission is complete. See EPIC admission navigator for prior to admission medications     Location of Interview: Patient room  Medication history sources: Patient and Surescripts    Significant changes made to the medication list:  Modified clobetasol to prn    In the past week, patient estimated taking medication this percent of the time: greater than 90%    Additional medication history information:   none    Medication reconciliation completed by provider prior to medication history? Yes    Time spent in this activity: 10 minutes    Prior to Admission medications    Medication Sig Last Dose Taking? Auth Provider Long Term End Date   acetaminophen (TYLENOL) 500 MG tablet Take 1,000 mg by mouth daily as needed for mild pain 8/24/2022 at Unknown time Yes Unknown, Entered By History     ALPRAZolam (XANAX) 0.25 MG tablet Take 1 tablet (0.25 mg) by mouth 2 times daily as needed for anxiety 8/24/2022 at Unknown time Yes Julianne Thornton APRN CNP     aspirin 81 MG tablet Take 81 mg by mouth every evening 8/24/2022 at Unknown time Yes Reported, Patient No    CALCIUM 500 MG OR TABS Takes 3 tabs daily takees total of 1200 mg daily 8/24/2022 at Unknown time Yes Juana Washington MD     carvedilol (COREG) 25 MG tablet TAKE 1 TABLET TWICE A DAY WITH MEALS 8/25/2022 at Unknown time Yes Ciaran Osborne MD Yes    Cholecalciferol (VITAMIN D) 2000 UNITS tablet Take 1 tablet by mouth daily. 8/25/2022 at Unknown time Yes Reported, Patient     ciprofloxacin (CIPRO) 100 MG tablet Take 1 tablet (100 mg) by mouth every 12 hours For dental appointments More than a month at Unknown time Yes Ciaran Osborne MD     clobetasol (TEMOVATE) 0.05 % external cream Apply thin layer to bilateral hand twice daily for 2 weeks  Patient taking differently: 2 times daily as needed Apply thin layer to bilateral hand twice daily for 2 weeks  Unknown at Unknown time Yes Julianne Thornton APRN CNP     co-enzyme Q-10 50 MG CAPS Take 1 capsule by mouth every morning 8/25/2022 at Unknown time Yes Pepper Baugh MD     lifitegrast (XIIDRA) 5 % opthalmic solution Place 1 drop into both eyes 2 times daily 8/24/2022 at Unknown time Yes Unknown, Entered By History     loratadine (CLARITIN) 10 MG tablet Take 10 mg by mouth every evening 8/24/2022 at Unknown time Yes Reported, Patient     losartan (COZAAR) 50 MG tablet Take 1 tablet (50 mg) by mouth daily 8/24/2022 at Unknown time Yes Ciaran Osborne MD Yes    mirtazapine (REMERON) 7.5 MG tablet TAKE 1 TO 2 TABLETS 30 MINUTES BEFORE BEDTIME 8/24/2022 at Unknown time Yes Julianne Thornton APRN CNP Yes    Multiple Vitamins-Minerals (MULTIVITAMIN ADULT PO) Take 1 tablet by mouth daily 8/24/2022 at Unknown time Yes Unknown, Entered By History     omeprazole (PRILOSEC) 20 MG DR capsule Take 20 mg by mouth every morning 8/24/2022 at Unknown time Yes Julianne Thornton APRN CNP     rosuvastatin (CRESTOR) 10 MG tablet TAKE 1 TABLET EVERY OTHER DAY 8/23/2022 at Unknown time Yes Ciaran Osborne MD Yes    spironolactone (ALDACTONE) 25 MG tablet TAKE 1/2 TABLET BY MOUTH DAILY 8/24/2022 at Unknown time Yes Ciaran Osborne MD Yes    venlafaxine (EFFEXOR XR) 75 MG 24 hr capsule TAKE 3 CAPSULES ONCE DAILY 8/25/2022 at Unknown time Yes Avis Dietrich MD Yes        The information provided in this note is only as accurate as the sources available at the time of update(s)

## 2022-08-25 NOTE — PROGRESS NOTES
Pt states pain in neck 8/10. To Xray per WC. Echo called, stated they are on their way. Pt to remain NPO.

## 2022-08-26 ENCOUNTER — DOCUMENTATION ONLY (OUTPATIENT)
Dept: CARDIOLOGY | Facility: CLINIC | Age: 77
End: 2022-08-26

## 2022-08-26 VITALS
RESPIRATION RATE: 16 BRPM | HEIGHT: 65 IN | BODY MASS INDEX: 35.27 KG/M2 | SYSTOLIC BLOOD PRESSURE: 111 MMHG | WEIGHT: 211.7 LBS | HEART RATE: 73 BPM | TEMPERATURE: 98.7 F | DIASTOLIC BLOOD PRESSURE: 63 MMHG | OXYGEN SATURATION: 94 %

## 2022-08-26 LAB — GLUCOSE BLDC GLUCOMTR-MCNC: 137 MG/DL (ref 70–99)

## 2022-08-26 PROCEDURE — 82962 GLUCOSE BLOOD TEST: CPT

## 2022-08-26 PROCEDURE — 99024 POSTOP FOLLOW-UP VISIT: CPT | Performed by: PHYSICIAN ASSISTANT

## 2022-08-26 PROCEDURE — 250N000013 HC RX MED GY IP 250 OP 250 PS 637: Performed by: PHYSICIAN ASSISTANT

## 2022-08-26 PROCEDURE — 250N000013 HC RX MED GY IP 250 OP 250 PS 637: Performed by: INTERNAL MEDICINE

## 2022-08-26 RX ORDER — COLCHICINE 0.6 MG/1
0.6 TABLET ORAL 2 TIMES DAILY
Qty: 14 TABLET | Refills: 0 | Status: SHIPPED | OUTPATIENT
Start: 2022-08-26 | End: 2022-09-02

## 2022-08-26 RX ORDER — VENLAFAXINE HYDROCHLORIDE 75 MG/1
225 CAPSULE, EXTENDED RELEASE ORAL
Status: DISCONTINUED | OUTPATIENT
Start: 2022-08-26 | End: 2022-08-26 | Stop reason: HOSPADM

## 2022-08-26 RX ADMIN — ACETAMINOPHEN 650 MG: 325 TABLET ORAL at 08:34

## 2022-08-26 RX ADMIN — VENLAFAXINE HYDROCHLORIDE 225 MG: 75 CAPSULE, EXTENDED RELEASE ORAL at 09:49

## 2022-08-26 RX ADMIN — CARVEDILOL 25 MG: 25 TABLET, FILM COATED ORAL at 08:35

## 2022-08-26 RX ADMIN — PANTOPRAZOLE SODIUM 40 MG: 40 TABLET, DELAYED RELEASE ORAL at 08:35

## 2022-08-26 RX ADMIN — COLCHICINE 0.6 MG: 0.6 TABLET ORAL at 08:34

## 2022-08-26 ASSESSMENT — ACTIVITIES OF DAILY LIVING (ADL)
ADLS_ACUITY_SCORE: 31

## 2022-08-26 NOTE — PLAN OF CARE
Pt here for scheduled ICD revision. A&Ox4. Neuros and CMS intact. VSS. Tele AV-paced. NPO diet. Up with independently. Pain managed with Toradol and xanax. Pt scoring green on the Aggression Stop Light Tool. Plan for lead revision. Discharge pending.

## 2022-08-26 NOTE — PROGRESS NOTES
Device Discharge  Dressing:  Clean, dry, and intact  Chest XR reviewed:  Yes  Pneumo present?:  No  Lead Measurements per Device Rep:  WNL    Education Provided:  Avoid raising left arm above the level of the shoulder for 3 weeks.  Do not shower until outer occlusive dressing has been removed.  Remove outer dressing in 3 - 4 days.  Leave steri-strips in place, these will be removed at the 1 week check.   Call Device Clinic with increased swelling, drainage, or fever > 101 degrees.   Follow-up with the Device Clinic as scheduled.       Device RN will call pt at home on Monday to check on symptoms per request from Bárbara PORRAS

## 2022-08-26 NOTE — PLAN OF CARE
AVS reviewed with patient at the time of discharge including medications, follow up appointments, and activity instructions. Patient sent with colchicine prescription filled at [facility]. Patient verbalized understanding of discharge instructions. All belongings returned to patient.

## 2022-08-26 NOTE — PROGRESS NOTES
[facility]    EP Progress Note    Date of Service (when I saw the patient): 08/26/2022     Assessment & Plan   Kristie MARYANN Jones is a 76 year old female with h/o chronic NICM, most recently 20-25%, GERD, SUSAN, DJD, DM2 and h/o CRT-D in 2012 for primary prevention.  She was admitted on 8/25/2022 following outpatient replacement of CRT-D with replacement of RA lead. Developed R neck and R trapezius muscle discomfort following procedure.    1. NICMO, intermittent LBBB. EF 25-30% -   - S/p attempted CRT-D implantation 2012 resulting in limited dissection of CS. No good anatomic options for LV lead placement  - CV surgery (Dr. Lakhani) implanted a new LV lead thoracoscopically on posetrolateral aspect of LV  - Had pocket revision ~1 m after (5/2012) d/t discomfort. Transvenous LV lead that had been left in place during CV surgery was removed. RA lead noted to be dislodged following procedure  - LV lead inactivated as native QRS spontaneously narrowed to 100 ms and device programed VVI to minimize V pacing.    - Most recently, had increasing fatigue and questioned if CRT activation could be restored. She then reached NICHO.    - Dr. Gonzalez noted intrinsic QRS had now widened with chronic IVCD and CRT could be reactivated.    - She therefore had generator replacement of [employer] CRT-D and new RA lead implantation. Old RA lead was capped and abandoned.   - Ab pouch placed in pocket.     - After the procedure, noted R neck and R trapezius muscle discomfort. She'd had similar discomfort in the past but this was slightly different. No pleuritic discomfort noted.  - Limited echo 8/25 showed LVEF 25-30% with worsening MR 2-3+ and TR 2+. No pericardial effusion  - CXR noted good lead position.  - EKG with ASBiVP 66 bpm    - Kept overnight to ensure no worsening of discomfort. Started on colchicine 0.6 mg BID and got Toradol    - This AM, confirms she's still without any pleuritic CP whatsoever. Had  "some discomfort in R back/neck exactly like what she's had in the past and different than she had last night.    - Exam without rub    - Device interrogation (Emmanuel)showed <1% AP and 100% BiV Paced. Normal lead measurements  - CXR last night with proper lead placement; no PTX  - Device RN teaching PENDING     PLAN:   1. Discharge today   2. Colchicine 0.6 mg BID x 1 week & Tylenol   3. Routine Device follow-up will be arranged before discharge    2. Chronic NICMO -   - LVEF most recently 20-25% on echo 5/2022, with 1+ TR and nl MV  - Limited echo yesterday in setting of recent CRT-D gen change/lead revision with stable LVEF 20-25% and 2-3+TR and 2+ MR    - PTA on Coreg 25 mg BID, losartan 50 mg daily and spironolactone 12.5 mg daily  - SBPs here 90-110s  Component      Latest Ref Rng & Units 4/9/2022 8/25/2022   Sodium      133 - 144 mmol/L 139 141   Potassium      3.4 - 5.3 mmol/L 4.2 4.1   Chloride      94 - 109 mmol/L 109 109   Carbon Dioxide      20 - 32 mmol/L 25 26   Anion Gap      3 - 14 mmol/L 5 6   Urea Nitrogen      7 - 30 mg/dL 22 15   Creatinine      0.52 - 1.04 mg/dL 0.72 0.95   Calcium      8.5 - 10.1 mg/dL 9.1 8.9   Glucose      70 - 99 mg/dL 182 (H) 152 (H)   - As above, CRT-D in place     PLAN:   1. Routine follow-up with Dr. Osborne 1/2023 (not yet scheduled)   2. Continue meds      Adriana Harman PA-C, PAJOSE G MSPAS    Interval History   Feeling \"better\" this AM. Slept \"OK\" on her back. Noted her typical R sided MSK discomfort when she tried sleeping on R side last night.     NO pleuritic discomfort    No ICD site discomfort  Denies SOB    Physical Exam   Temp: 98.7  F (37.1  C) Temp src: Oral BP: 111/63 Pulse: 73   Resp: 16 SpO2: 94 % O2 Device: None (Room air) Oxygen Delivery: 2 LPM  Vitals:    08/25/22 0740 08/26/22 0615   Weight: 93.9 kg (207 lb) 96 kg (211 lb 11.2 oz)     Vital Signs with Ranges  Temp:  [97.5  F (36.4  C)-98.7  F (37.1  C)] 98.7  F (37.1  C)  Pulse:  [63-73] 73  Resp:  " [14-18] 16  BP: ()/(48-78) 111/63  SpO2:  [92 %-98 %] 94 %  No intake/output data recorded.    Telemetry: ASVP 60s    Constitutional: awake, alert, cooperative, no apparent distress, and appears stated age  Respiratory: CTA B  Cardiovascular: Regular. ICD site with c/d bandage. No hematoma/no ecchymosis. No rub appreciated.   Musculoskeletal: No sig edema    Medications       aspirin  81 mg Oral QPM     carvedilol  25 mg Oral BID w/meals     colchicine  0.6 mg Oral BID     lifitegrast  1 drop Both Eyes BID     mirtazapine  7.5 mg Oral At Bedtime     pantoprazole  40 mg Oral Daily       Data   I personally reviewed the EKG tracing showing ASBiV Paced 66 bpm.  Results for orders placed or performed during the hospital encounter of 08/25/22 (from the past 24 hour(s))   EKG 12-lead, tracing only   Result Value Ref Range    Systolic Blood Pressure  mmHg    Diastolic Blood Pressure  mmHg    Ventricular Rate 66 BPM    Atrial Rate 66 BPM    MI Interval 182 ms    QRS Duration 156 ms     ms    QTc 515 ms    P Axis 54 degrees    R AXIS 77 degrees    T Axis 0 degrees    Interpretation ECG       Sinus rhythm  Non-specific intra-ventricular conduction block  Possible Inferior infarct , age undetermined  Abnormal ECG  When compared with ECG of 02-AUG-2022 10:43,  No significant change was found     CBC with platelets   Result Value Ref Range    WBC Count 3.3 (L) 4.0 - 11.0 10e3/uL    RBC Count 3.82 3.80 - 5.20 10e6/uL    Hemoglobin 11.7 11.7 - 15.7 g/dL    Hematocrit 34.9 (L) 35.0 - 47.0 %    MCV 91 78 - 100 fL    MCH 30.6 26.5 - 33.0 pg    MCHC 33.5 31.5 - 36.5 g/dL    RDW 12.6 10.0 - 15.0 %    Platelet Count 138 (L) 150 - 450 10e3/uL   Basic metabolic panel   Result Value Ref Range    Sodium 141 133 - 144 mmol/L    Potassium 4.1 3.4 - 5.3 mmol/L    Chloride 109 94 - 109 mmol/L    Carbon Dioxide (CO2) 26 20 - 32 mmol/L    Anion Gap 6 3 - 14 mmol/L    Urea Nitrogen 15 7 - 30 mg/dL    Creatinine 0.95 0.52 - 1.04 mg/dL     Calcium 8.9 8.5 - 10.1 mg/dL    Glucose 152 (H) 70 - 99 mg/dL    GFR Estimate 62 >60 mL/min/1.73m2   EP Device    Narrative    PRE-OPERATIVE DIAGNOSIS:  Nonischemic cardiomyopathy, ejection fraction 25%, NYHA class 3.    POST-OPERATIVE DIAGNOSIS:    Successful replacement of CRT-D generator and RA lead replacement.        PROCEDURES PERFORMED:  1.  Replacement of cardiac resynchronization (biventricular) ICD.  2.  New RA lead implantation.  3.  Cardiac fluoroscopy (1.8 minutes).  4.  Conscious sedation, moderate level.       CLINICAL HISTORY:  76-year-old female with nonischemic cardiomyopathy with EF 25%, chronic   systolic CHF class III, LBBB who underwent implantation of a CRT-D 10   years ago.  Initial attempt at transvenous LV lead placement was   unsuccessful.  Within the same hospitalization the patient underwent   surgical epicardial lead placement.  2 LV leads were implanted, one is not   connected to the device.         Months after CRT-D implantation for QRS recovered (LBBB resolved).    Therefore, the pacing mode was programmed to VVI 40 ppm.       More recently, the patient's EF again decreased and she has once   again developed LBBB.  The CRT-D generator reached NICHO.  Device   replacement was recommended.  In the interim, the RA lead had dislodged.    Lead dislodgment may have happened 9 years ago.  To be able to both apply   CRT and continue primary prevention ICD therapy, the patient needs ICD   generator replacement and placement of a new RA lead.       The risks and benefits of the procedure were discussed in detail; the   patient expressed understanding and provided consent.       PROCEDURE:  The patient was brought to the cardiac electrophysiology laboratory at   [facility] on 8/25/2022.  I determined this patient to be   an appropriate candidate for the planned sedation and procedure and   reassessed the patient immediately prior to sedation and procedure. The   patient was  in the fasting nonsedated state.  Informed consent had been   obtained.  The patient was placed on the procedure table and the anterior   chest was prepped and draped in the usual sterile fashion.  We   continuously monitored vital signs, oxygenation and level of sedation.    Antibiotic prophylaxis was administered.  Intravenous sedation was given   to achieve patient comfort.  Injection of nonionic contrast confirmed   patency of the left subclavian vein.    Using a fluoroscopically guided technique the left subclavian vein was   cannulated x 1.  A single guidewire was introduced and retained.  Under   local anesthesia an incision was created over the existing scar.  The   incision was taken down to the ICD generator which was retrieved without   difficulty.    Using a peel-away introducer sheath, the new right atrial lead was   introduced. The tip of the lead was brought at the superior anterior RA.    Good sensing and pacing parameters were confirmed. 10 V pacing did not   stimulate the diaphragm. The lead was secured using interrupted Ethibond   sutures.    The device pocket was then irrigated with antibiotic solution. The chronic   RV and LV leads and the newly implanted RA lead were then connected to the   ICD generator which was placed in the pocket.  The old RA lead was capped   and abandoned.  A CaratLane TYRX antibiotic pouch was placed in the pocket   around the device.      The wound was closed in 3 layers using 2.0 and 4.0 Vicryl.  Steri-Strips,   sterile gauze, and a pressure dressing were placed over the wound.      DFT testing did not take place.  There were no apparent complications. The   patient was brought back to the hospital room in stable condition.    Estimated blood loss was 15 mL.         RESULTS:  A.  Implanted leads:  (i) RV lead (4/30/2012): [employer] lead model 0292, serial [id number].    R-wave sensing 11.1 mV.  Pacing threshold 0.8 V at 0.5 msec.  Pacing   impedance 409 ohms.   High voltage impedance 66 ohms.   (ii) LV lead (epicardial, surgically implanted, 5/1/2012): Guidant lead   model 4047, serial [id number].  R-wave sensing 11.5 mV.  Pacing threshold was   1.9 V at 0.5 msec.  Pacing impedance 323 ohms.    (iii) LV lead #2 (also epicardial, implanted together with the previous LV   lead, but not used):  Guidant model 4047, serial [id number].  (iv) RA lead (8/25/2022): [employer] lead model 7840, serial   [id number].  P-wave sensing 2.1 mV.  Pacing threshold 1.1 V at 0.5 msec.    Pacing impedance 663 ohms.      B.  Pulse generator: 1bib [employer] CRT-D, model G124, serial   [id number]    C.  Programming:   Bradycardia pacing mode DDD 55/130 ppm.    Ventricular tachycardia monitor zone starts at 160 bpm.  Ventricular tachycardia therapy zone starts at 180 bpm.  Ventricular fibrillation detection starts at 240 bpm.  For a detailed list of programmed therapies, please refer to the   's printout.        CONCLUSIONS:  1.  Successful replacement of CRT-D generator.  2.  Successful atrial lead replacement.  3.  No apparent in-lab complication.     EKG 12-lead, tracing only   Result Value Ref Range    Systolic Blood Pressure  mmHg    Diastolic Blood Pressure  mmHg    Ventricular Rate 66 BPM    Atrial Rate 66 BPM    VA Interval 128 ms    QRS Duration 128 ms     ms    QTc 570 ms    P Axis 74 degrees    R AXIS -57 degrees    T Axis 94 degrees    Interpretation ECG       Atrial-sensed ventricular-paced rhythm  Abnormal ECG  When compared with ECG of 25-AUG-2022 07:59, (unconfirmed)  Electronic ventricular pacemaker has replaced Sinus rhythm     X-ray Chest 2 vws*    Narrative    XR CHEST 2 VIEWS   8/25/2022 2:08 PM     HISTORY: Status post pacer/ICD    COMPARISON: Chest radiograph 4/7/2022.      Impression    IMPRESSION: Stable cardiomediastinal silhouette with placement of new  pacemaker lead overlying superior aspect of the right atrium.  Remainder of leads are in stable  position. No evidence of  pneumothorax. No focal airspace disease. No acute bony abnormality.    TRENT SIMMONS MD         SYSTEM ID:  [id number]   Echocardiogram Limited   Result Value Ref Range    LVEF  25-30%     [facility]    [id number]  [id number]  [id number]^SEUN^JESSICA^AMBER     [facility]  Echocardiography Laboratory  [facility] [place] [address]     Name: LEONID MCKEON  MRN: [id number]  : 1945  Study Date: 2022 02:51 PM  Age: 76 yrs  Gender: Female  Patient Location: [facility]  Reason For Study: Chest Pressure  Ordering Physician: JESSICA KOHLI  Referring Physician: JESSICA KOHLI  Performed By: Charisse Main     BSA: 2.0 m2  Height: 65 in  Weight: 212 lb  HR: 77  ______________________________________________________________________________  Procedure  Limited Portable Echo Adult.  ______________________________________________________________________________  Interpretation Summary     1. The left ventricle is severely dilated. There is normal left ventricular  wall thickness. Left ventricular systolic function is severely reduced. The  visual ejection fraction is 25-30%. Left ventricular diastolic function is  abnormal. There is severe global hypokinesia of the left ventricle. Septal  wall motion abnormality may reflect pacemaker activation.  2. The right ventricle is mildly dilated. There is a catheter/pacemaker lead  seen in the right ventricle. The right ventricular systolic function is  normal.  3. Mild to moderate bi-atrial enlargement.  4. There is moderate (2+) tricuspid regurgitation. Right ventricular systolic  pressure is elevated, consistent with severe pulmonary hypertension.  5. No pericardial effusion.  6. In direct comparison to the previous study dated 05/10/2022, there has been  an interval worsening in the degree of mitral and tricuspid regurgitation. The  estimated pulmonary artery systolic pressures are  also higher.  ______________________________________________________________________________  Left Ventricle  The left ventricle is severely dilated. There is normal left ventricular wall  thickness. Left ventricular systolic function is severely reduced. The visual  ejection fraction is 25-30%. Left ventricular diastolic function is abnormal.  There is severe global hypokinesia of the left ventricle. Septal wall motion  abnormality may reflect pacemaker activation.     Right Ventricle  The right ventricle is mildly dilated. There is a catheter/pacemaker lead seen  in the right ventricle. The right ventricular systolic function is normal.     Atria  There is mild-moderate biatrial enlargement.     Mitral Valve  There is moderate to mod-severe (2-3+) mitral regurgitation.     Tricuspid Valve  There is moderate (2+) tricuspid regurgitation. The right ventricular systolic  pressure is approximated at 50mmHg plus the right atrial pressure. IVC  diameter >2.1 cm collapsing <50% with sniff suggests a high RA pressure  estimated at 15 mmHg or greater. Right ventricular systolic pressure is  elevated, consistent with severe pulmonary hypertension.     Aortic Valve  No aortic regurgitation is present. No aortic stenosis is present.     Pulmonic Valve  There is no pulmonic valvular stenosis.     Vessels  Normal size ascending aorta. Dilation of the inferior vena cava is present  with abnormal respiratory variation in diameter.     Pericardium  There is no pericardial effusion.     Rhythm  The rhythm was paced.  ______________________________________________________________________________  MMode/2D Measurements & Calculations     IVSd: 0.83 cm  LVIDd: 7.7 cm  LVIDs: 7.6 cm  LVPWd: 0.57 cm  FS: 1.5 %  LV mass(C)d: 249.7 grams  LV mass(C)dI: 123.2 grams/m2  asc Aorta Diam: 3.1 cm  RWT: 0.15     Doppler Measurements & Calculations  MV max P.1 mmHg  MV mean P.1 mmHg  MV V2 VTI: 40.6 cm  MV P1/2t max bell: 156.7 cm/sec  MV  P1/2t: 56.2 msec  MVA(P1/2t): 3.9 cm2  MV dec slope: 817.1 cm/sec2  Ao V2 max: 115.1 cm/sec  Ao max P.0 mmHg  LV V1 max P.9 mmHg  LV V1 max: 85.9 cm/sec  PA V2 max: 85.8 cm/sec  PA max P.9 mmHg  PA acc time: 0.09 sec  TR max joshua: 306.2 cm/sec  TR max P.0 mmHg  AV Joshua Ratio (DI): 0.75     ______________________________________________________________________________  Report approved by: Bertha Saenz 2022 04:39 PM

## 2022-08-29 ENCOUNTER — TELEPHONE (OUTPATIENT)
Dept: CARDIOLOGY | Facility: CLINIC | Age: 77
End: 2022-08-29

## 2022-08-29 LAB
ATRIAL RATE - MUSE: 66 BPM
ATRIAL RATE - MUSE: 66 BPM
DIASTOLIC BLOOD PRESSURE - MUSE: NORMAL MMHG
DIASTOLIC BLOOD PRESSURE - MUSE: NORMAL MMHG
INTERPRETATION ECG - MUSE: NORMAL
INTERPRETATION ECG - MUSE: NORMAL
P AXIS - MUSE: 54 DEGREES
P AXIS - MUSE: 74 DEGREES
PR INTERVAL - MUSE: 128 MS
PR INTERVAL - MUSE: 182 MS
QRS DURATION - MUSE: 128 MS
QRS DURATION - MUSE: 156 MS
QT - MUSE: 492 MS
QT - MUSE: 544 MS
QTC - MUSE: 515 MS
QTC - MUSE: 570 MS
R AXIS - MUSE: -57 DEGREES
R AXIS - MUSE: 77 DEGREES
SYSTOLIC BLOOD PRESSURE - MUSE: NORMAL MMHG
SYSTOLIC BLOOD PRESSURE - MUSE: NORMAL MMHG
T AXIS - MUSE: 0 DEGREES
T AXIS - MUSE: 94 DEGREES
VENTRICULAR RATE- MUSE: 66 BPM
VENTRICULAR RATE- MUSE: 66 BPM

## 2022-08-29 NOTE — TELEPHONE ENCOUNTER
Called pt. No answer. Left voicemail requesting a call back. Let pt know we are checking on her after her procedure last week. Specifically how her neck pain is doing. Gave direct phone number on voicemail. Awaiting return call. JOSE ELIAS Chavarria

## 2022-09-06 ENCOUNTER — ANCILLARY PROCEDURE (OUTPATIENT)
Dept: CARDIOLOGY | Facility: CLINIC | Age: 77
End: 2022-09-06
Attending: INTERNAL MEDICINE
Payer: COMMERCIAL

## 2022-09-06 DIAGNOSIS — Z95.810 ICD (IMPLANTABLE CARDIOVERTER-DEFIBRILLATOR) IN PLACE: ICD-10-CM

## 2022-09-06 DIAGNOSIS — I42.9 IDIOPATHIC CARDIOMYOPATHY (H): ICD-10-CM

## 2022-09-06 PROCEDURE — 93284 PRGRMG EVAL IMPLANTABLE DFB: CPT | Performed by: INTERNAL MEDICINE

## 2022-09-07 LAB
MDC_IDC_LEAD_IMPLANT_DT: NORMAL
MDC_IDC_LEAD_LOCATION: NORMAL
MDC_IDC_LEAD_LOCATION_DETAIL_1: NORMAL
MDC_IDC_LEAD_MFG: NORMAL
MDC_IDC_LEAD_MODEL: 4047
MDC_IDC_LEAD_MODEL: 4047
MDC_IDC_LEAD_MODEL: NORMAL
MDC_IDC_LEAD_MODEL: NORMAL
MDC_IDC_LEAD_POLARITY_TYPE: NORMAL
MDC_IDC_LEAD_SERIAL: NORMAL
MDC_IDC_MSMT_BATTERY_DTM: NORMAL
MDC_IDC_MSMT_BATTERY_REMAINING_LONGEVITY: 120 MO
MDC_IDC_MSMT_BATTERY_REMAINING_PERCENTAGE: 100 %
MDC_IDC_MSMT_BATTERY_STATUS: NORMAL
MDC_IDC_MSMT_CAP_CHARGE_DTM: NORMAL
MDC_IDC_MSMT_CAP_CHARGE_TIME: 9 S
MDC_IDC_MSMT_CAP_CHARGE_TYPE: NORMAL
MDC_IDC_MSMT_LEADCHNL_LV_IMPEDANCE_VALUE: 345 OHM
MDC_IDC_MSMT_LEADCHNL_LV_PACING_THRESHOLD_AMPLITUDE: 1.8 V
MDC_IDC_MSMT_LEADCHNL_LV_PACING_THRESHOLD_PULSEWIDTH: 0.5 MS
MDC_IDC_MSMT_LEADCHNL_RA_IMPEDANCE_VALUE: 605 OHM
MDC_IDC_MSMT_LEADCHNL_RA_PACING_THRESHOLD_AMPLITUDE: 0.6 V
MDC_IDC_MSMT_LEADCHNL_RA_PACING_THRESHOLD_PULSEWIDTH: 0.4 MS
MDC_IDC_MSMT_LEADCHNL_RV_IMPEDANCE_VALUE: 442 OHM
MDC_IDC_MSMT_LEADCHNL_RV_PACING_THRESHOLD_AMPLITUDE: 1 V
MDC_IDC_MSMT_LEADCHNL_RV_PACING_THRESHOLD_PULSEWIDTH: 0.4 MS
MDC_IDC_PG_IMPLANT_DTM: NORMAL
MDC_IDC_PG_MFG: NORMAL
MDC_IDC_PG_MODEL: NORMAL
MDC_IDC_PG_SERIAL: NORMAL
MDC_IDC_PG_TYPE: NORMAL
MDC_IDC_SESS_CLINIC_NAME: NORMAL
MDC_IDC_SESS_DTM: NORMAL
MDC_IDC_SESS_TYPE: NORMAL
MDC_IDC_SET_BRADY_AT_MODE_SWITCH_MODE: NORMAL
MDC_IDC_SET_BRADY_AT_MODE_SWITCH_RATE: 170 {BEATS}/MIN
MDC_IDC_SET_BRADY_LOWRATE: 55 {BEATS}/MIN
MDC_IDC_SET_BRADY_MAX_SENSOR_RATE: 130 {BEATS}/MIN
MDC_IDC_SET_BRADY_MAX_TRACKING_RATE: 130 {BEATS}/MIN
MDC_IDC_SET_BRADY_MODE: NORMAL
MDC_IDC_SET_BRADY_PAV_DELAY_LOW: 180 MS
MDC_IDC_SET_BRADY_SAV_DELAY_LOW: 120 MS
MDC_IDC_SET_CRT_LVRV_DELAY: 30 MS
MDC_IDC_SET_CRT_PACED_CHAMBERS: NORMAL
MDC_IDC_SET_LEADCHNL_LV_PACING_AMPLITUDE: 3 V
MDC_IDC_SET_LEADCHNL_LV_PACING_ANODE_ELECTRODE_1: NORMAL
MDC_IDC_SET_LEADCHNL_LV_PACING_ANODE_LOCATION_1: NORMAL
MDC_IDC_SET_LEADCHNL_LV_PACING_CAPTURE_MODE: NORMAL
MDC_IDC_SET_LEADCHNL_LV_PACING_CATHODE_ELECTRODE_1: NORMAL
MDC_IDC_SET_LEADCHNL_LV_PACING_CATHODE_LOCATION_1: NORMAL
MDC_IDC_SET_LEADCHNL_LV_PACING_PULSEWIDTH: 0.5 MS
MDC_IDC_SET_LEADCHNL_LV_SENSING_ADAPTATION_MODE: NORMAL
MDC_IDC_SET_LEADCHNL_LV_SENSING_ANODE_ELECTRODE_1: NORMAL
MDC_IDC_SET_LEADCHNL_LV_SENSING_ANODE_LOCATION_1: NORMAL
MDC_IDC_SET_LEADCHNL_LV_SENSING_CATHODE_ELECTRODE_1: NORMAL
MDC_IDC_SET_LEADCHNL_LV_SENSING_CATHODE_LOCATION_1: NORMAL
MDC_IDC_SET_LEADCHNL_LV_SENSING_SENSITIVITY: 1 MV
MDC_IDC_SET_LEADCHNL_RA_PACING_AMPLITUDE: 2 V
MDC_IDC_SET_LEADCHNL_RA_PACING_CAPTURE_MODE: NORMAL
MDC_IDC_SET_LEADCHNL_RA_PACING_POLARITY: NORMAL
MDC_IDC_SET_LEADCHNL_RA_PACING_PULSEWIDTH: 0.4 MS
MDC_IDC_SET_LEADCHNL_RA_SENSING_ADAPTATION_MODE: NORMAL
MDC_IDC_SET_LEADCHNL_RA_SENSING_POLARITY: NORMAL
MDC_IDC_SET_LEADCHNL_RA_SENSING_SENSITIVITY: 0.25 MV
MDC_IDC_SET_LEADCHNL_RV_PACING_AMPLITUDE: 2 V
MDC_IDC_SET_LEADCHNL_RV_PACING_CAPTURE_MODE: NORMAL
MDC_IDC_SET_LEADCHNL_RV_PACING_POLARITY: NORMAL
MDC_IDC_SET_LEADCHNL_RV_PACING_PULSEWIDTH: 0.4 MS
MDC_IDC_SET_LEADCHNL_RV_SENSING_ADAPTATION_MODE: NORMAL
MDC_IDC_SET_LEADCHNL_RV_SENSING_POLARITY: NORMAL
MDC_IDC_SET_LEADCHNL_RV_SENSING_SENSITIVITY: 0.6 MV
MDC_IDC_SET_ZONE_DETECTION_INTERVAL: 250 MS
MDC_IDC_SET_ZONE_DETECTION_INTERVAL: 333 MS
MDC_IDC_SET_ZONE_DETECTION_INTERVAL: 375 MS
MDC_IDC_SET_ZONE_TYPE: NORMAL
MDC_IDC_SET_ZONE_VENDOR_TYPE: NORMAL
MDC_IDC_STAT_AT_BURDEN_PERCENT: 0 %
MDC_IDC_STAT_AT_DTM_END: NORMAL
MDC_IDC_STAT_AT_DTM_START: NORMAL
MDC_IDC_STAT_BRADY_DTM_END: NORMAL
MDC_IDC_STAT_BRADY_DTM_START: NORMAL
MDC_IDC_STAT_BRADY_RA_PERCENT_PACED: 8 %
MDC_IDC_STAT_BRADY_RV_PERCENT_PACED: 100 %
MDC_IDC_STAT_CRT_DTM_END: NORMAL
MDC_IDC_STAT_CRT_DTM_START: NORMAL
MDC_IDC_STAT_CRT_LV_PERCENT_PACED: 100 %
MDC_IDC_STAT_EPISODE_RECENT_COUNT: 0
MDC_IDC_STAT_EPISODE_RECENT_COUNT_DTM_END: NORMAL
MDC_IDC_STAT_EPISODE_RECENT_COUNT_DTM_START: NORMAL
MDC_IDC_STAT_EPISODE_TYPE: NORMAL
MDC_IDC_STAT_EPISODE_VENDOR_TYPE: NORMAL
MDC_IDC_STAT_TACHYTHERAPY_ATP_DELIVERED_RECENT: 0
MDC_IDC_STAT_TACHYTHERAPY_ATP_DELIVERED_TOTAL: 0
MDC_IDC_STAT_TACHYTHERAPY_RECENT_DTM_END: NORMAL
MDC_IDC_STAT_TACHYTHERAPY_RECENT_DTM_START: NORMAL
MDC_IDC_STAT_TACHYTHERAPY_SHOCKS_ABORTED_RECENT: 0
MDC_IDC_STAT_TACHYTHERAPY_SHOCKS_ABORTED_TOTAL: 0
MDC_IDC_STAT_TACHYTHERAPY_SHOCKS_DELIVERED_RECENT: 0
MDC_IDC_STAT_TACHYTHERAPY_SHOCKS_DELIVERED_TOTAL: 0
MDC_IDC_STAT_TACHYTHERAPY_TOTAL_DTM_END: NORMAL
MDC_IDC_STAT_TACHYTHERAPY_TOTAL_DTM_START: NORMAL

## 2022-09-28 ENCOUNTER — TELEPHONE (OUTPATIENT)
Dept: CARDIOLOGY | Facility: CLINIC | Age: 77
End: 2022-09-28

## 2022-10-05 ENCOUNTER — ANCILLARY PROCEDURE (OUTPATIENT)
Dept: CARDIOLOGY | Facility: CLINIC | Age: 77
End: 2022-10-05
Attending: INTERNAL MEDICINE
Payer: COMMERCIAL

## 2022-10-05 DIAGNOSIS — Z95.810 ICD (IMPLANTABLE CARDIOVERTER-DEFIBRILLATOR) IN PLACE: ICD-10-CM

## 2022-10-05 DIAGNOSIS — I42.9 IDIOPATHIC CARDIOMYOPATHY (H): ICD-10-CM

## 2022-10-05 PROCEDURE — 99207 CARDIAC DEVICE CHECK - IN CLINIC: CPT | Performed by: INTERNAL MEDICINE

## 2022-10-06 LAB
MDC_IDC_LEAD_IMPLANT_DT: NORMAL
MDC_IDC_LEAD_LOCATION: NORMAL
MDC_IDC_LEAD_LOCATION_DETAIL_1: NORMAL
MDC_IDC_LEAD_MFG: NORMAL
MDC_IDC_LEAD_MODEL: 4047
MDC_IDC_LEAD_MODEL: 4047
MDC_IDC_LEAD_MODEL: NORMAL
MDC_IDC_LEAD_MODEL: NORMAL
MDC_IDC_LEAD_POLARITY_TYPE: NORMAL
MDC_IDC_LEAD_SERIAL: NORMAL
MDC_IDC_MSMT_BATTERY_DTM: NORMAL
MDC_IDC_MSMT_BATTERY_REMAINING_LONGEVITY: 108 MO
MDC_IDC_MSMT_BATTERY_REMAINING_PERCENTAGE: 100 %
MDC_IDC_MSMT_BATTERY_STATUS: NORMAL
MDC_IDC_MSMT_CAP_CHARGE_DTM: NORMAL
MDC_IDC_MSMT_CAP_CHARGE_TIME: 9 S
MDC_IDC_MSMT_CAP_CHARGE_TYPE: NORMAL
MDC_IDC_MSMT_LEADCHNL_LV_IMPEDANCE_VALUE: 372 OHM
MDC_IDC_MSMT_LEADCHNL_LV_PACING_THRESHOLD_AMPLITUDE: 1.8 V
MDC_IDC_MSMT_LEADCHNL_LV_PACING_THRESHOLD_PULSEWIDTH: 0.5 MS
MDC_IDC_MSMT_LEADCHNL_RA_IMPEDANCE_VALUE: 771 OHM
MDC_IDC_MSMT_LEADCHNL_RA_PACING_THRESHOLD_AMPLITUDE: 0.5 V
MDC_IDC_MSMT_LEADCHNL_RA_PACING_THRESHOLD_PULSEWIDTH: 0.4 MS
MDC_IDC_MSMT_LEADCHNL_RV_IMPEDANCE_VALUE: 426 OHM
MDC_IDC_MSMT_LEADCHNL_RV_PACING_THRESHOLD_AMPLITUDE: 1 V
MDC_IDC_MSMT_LEADCHNL_RV_PACING_THRESHOLD_PULSEWIDTH: 0.4 MS
MDC_IDC_PG_IMPLANT_DTM: NORMAL
MDC_IDC_PG_MFG: NORMAL
MDC_IDC_PG_MODEL: NORMAL
MDC_IDC_PG_SERIAL: NORMAL
MDC_IDC_PG_TYPE: NORMAL
MDC_IDC_SESS_CLINIC_NAME: NORMAL
MDC_IDC_SESS_DTM: NORMAL
MDC_IDC_SESS_TYPE: NORMAL
MDC_IDC_SET_BRADY_AT_MODE_SWITCH_MODE: NORMAL
MDC_IDC_SET_BRADY_AT_MODE_SWITCH_RATE: 170 {BEATS}/MIN
MDC_IDC_SET_BRADY_LOWRATE: 55 {BEATS}/MIN
MDC_IDC_SET_BRADY_MAX_SENSOR_RATE: 130 {BEATS}/MIN
MDC_IDC_SET_BRADY_MAX_TRACKING_RATE: 130 {BEATS}/MIN
MDC_IDC_SET_BRADY_MODE: NORMAL
MDC_IDC_SET_BRADY_PAV_DELAY_LOW: 180 MS
MDC_IDC_SET_BRADY_SAV_DELAY_LOW: 120 MS
MDC_IDC_SET_CRT_LVRV_DELAY: 30 MS
MDC_IDC_SET_CRT_PACED_CHAMBERS: NORMAL
MDC_IDC_SET_LEADCHNL_LV_PACING_AMPLITUDE: 3.3 V
MDC_IDC_SET_LEADCHNL_LV_PACING_ANODE_ELECTRODE_1: NORMAL
MDC_IDC_SET_LEADCHNL_LV_PACING_ANODE_LOCATION_1: NORMAL
MDC_IDC_SET_LEADCHNL_LV_PACING_CAPTURE_MODE: NORMAL
MDC_IDC_SET_LEADCHNL_LV_PACING_CATHODE_ELECTRODE_1: NORMAL
MDC_IDC_SET_LEADCHNL_LV_PACING_CATHODE_LOCATION_1: NORMAL
MDC_IDC_SET_LEADCHNL_LV_PACING_PULSEWIDTH: 0.5 MS
MDC_IDC_SET_LEADCHNL_LV_SENSING_ADAPTATION_MODE: NORMAL
MDC_IDC_SET_LEADCHNL_LV_SENSING_ANODE_ELECTRODE_1: NORMAL
MDC_IDC_SET_LEADCHNL_LV_SENSING_ANODE_LOCATION_1: NORMAL
MDC_IDC_SET_LEADCHNL_LV_SENSING_CATHODE_ELECTRODE_1: NORMAL
MDC_IDC_SET_LEADCHNL_LV_SENSING_CATHODE_LOCATION_1: NORMAL
MDC_IDC_SET_LEADCHNL_LV_SENSING_SENSITIVITY: 1 MV
MDC_IDC_SET_LEADCHNL_RA_PACING_AMPLITUDE: 2 V
MDC_IDC_SET_LEADCHNL_RA_PACING_CAPTURE_MODE: NORMAL
MDC_IDC_SET_LEADCHNL_RA_PACING_POLARITY: NORMAL
MDC_IDC_SET_LEADCHNL_RA_PACING_PULSEWIDTH: 0.4 MS
MDC_IDC_SET_LEADCHNL_RA_SENSING_ADAPTATION_MODE: NORMAL
MDC_IDC_SET_LEADCHNL_RA_SENSING_POLARITY: NORMAL
MDC_IDC_SET_LEADCHNL_RA_SENSING_SENSITIVITY: 0.25 MV
MDC_IDC_SET_LEADCHNL_RV_PACING_AMPLITUDE: 2.2 V
MDC_IDC_SET_LEADCHNL_RV_PACING_CAPTURE_MODE: NORMAL
MDC_IDC_SET_LEADCHNL_RV_PACING_POLARITY: NORMAL
MDC_IDC_SET_LEADCHNL_RV_PACING_PULSEWIDTH: 0.4 MS
MDC_IDC_SET_LEADCHNL_RV_SENSING_ADAPTATION_MODE: NORMAL
MDC_IDC_SET_LEADCHNL_RV_SENSING_POLARITY: NORMAL
MDC_IDC_SET_LEADCHNL_RV_SENSING_SENSITIVITY: 0.6 MV
MDC_IDC_SET_ZONE_DETECTION_INTERVAL: 250 MS
MDC_IDC_SET_ZONE_DETECTION_INTERVAL: 333 MS
MDC_IDC_SET_ZONE_DETECTION_INTERVAL: 375 MS
MDC_IDC_SET_ZONE_TYPE: NORMAL
MDC_IDC_SET_ZONE_VENDOR_TYPE: NORMAL
MDC_IDC_STAT_AT_BURDEN_PERCENT: 0 %
MDC_IDC_STAT_AT_DTM_END: NORMAL
MDC_IDC_STAT_AT_DTM_START: NORMAL
MDC_IDC_STAT_BRADY_DTM_END: NORMAL
MDC_IDC_STAT_BRADY_DTM_START: NORMAL
MDC_IDC_STAT_BRADY_RA_PERCENT_PACED: 4 %
MDC_IDC_STAT_BRADY_RV_PERCENT_PACED: 100 %
MDC_IDC_STAT_CRT_DTM_END: NORMAL
MDC_IDC_STAT_CRT_DTM_START: NORMAL
MDC_IDC_STAT_CRT_LV_PERCENT_PACED: 100 %
MDC_IDC_STAT_EPISODE_RECENT_COUNT: 0
MDC_IDC_STAT_EPISODE_RECENT_COUNT_DTM_END: NORMAL
MDC_IDC_STAT_EPISODE_RECENT_COUNT_DTM_START: NORMAL
MDC_IDC_STAT_EPISODE_TYPE: NORMAL
MDC_IDC_STAT_EPISODE_VENDOR_TYPE: NORMAL
MDC_IDC_STAT_TACHYTHERAPY_ATP_DELIVERED_RECENT: 0
MDC_IDC_STAT_TACHYTHERAPY_ATP_DELIVERED_TOTAL: 0
MDC_IDC_STAT_TACHYTHERAPY_RECENT_DTM_END: NORMAL
MDC_IDC_STAT_TACHYTHERAPY_RECENT_DTM_START: NORMAL
MDC_IDC_STAT_TACHYTHERAPY_SHOCKS_ABORTED_RECENT: 0
MDC_IDC_STAT_TACHYTHERAPY_SHOCKS_ABORTED_TOTAL: 0
MDC_IDC_STAT_TACHYTHERAPY_SHOCKS_DELIVERED_RECENT: 0
MDC_IDC_STAT_TACHYTHERAPY_SHOCKS_DELIVERED_TOTAL: 0
MDC_IDC_STAT_TACHYTHERAPY_TOTAL_DTM_END: NORMAL
MDC_IDC_STAT_TACHYTHERAPY_TOTAL_DTM_START: NORMAL

## 2022-10-10 ENCOUNTER — MYC MEDICAL ADVICE (OUTPATIENT)
Dept: PEDIATRICS | Facility: CLINIC | Age: 77
End: 2022-10-10

## 2022-10-10 DIAGNOSIS — F41.1 GENERALIZED ANXIETY DISORDER: ICD-10-CM

## 2022-10-12 RX ORDER — ALPRAZOLAM 0.25 MG
0.25 TABLET ORAL
Qty: 30 TABLET | Refills: 0 | Status: SHIPPED | OUTPATIENT
Start: 2022-10-12 | End: 2022-11-10

## 2022-10-12 NOTE — TELEPHONE ENCOUNTER
Patient seen by resident 05/04/22. Routing to preceptor. Please see patient MyChart message and advise. Routing refill request to provider for review/approval because:  Drug not on the [facility] refill protocol       Robin PONCE RN 10/12/2022 at 12:41 PM

## 2022-10-13 ENCOUNTER — TELEPHONE (OUTPATIENT)
Dept: PEDIATRICS | Facility: CLINIC | Age: 77
End: 2022-10-13

## 2022-10-13 NOTE — TELEPHONE ENCOUNTER
Prior Authorization Retail Medication Request    Medication/Dose:   ICD code (if different than what is on RX):  F41.1  Previously Tried and Failed:    Rationale:      Insurance Name:  [employer]  Insurance ID:  [id number]       Pharmacy Information (if different than what is on RX)  Name:  [facility]   Phone:  [phone]

## 2022-10-13 NOTE — TELEPHONE ENCOUNTER
Central Prior Authorization Team   Phone: [phone]      PA Initiation    Medication: Alprazolam 0.25mg  Insurance Company: [employer] - Phone [phone] Fax [phone]  Pharmacy Filling the Rx: [id number] - [place], [place] - [address] AT [facility]  Filling Pharmacy Phone: [phone]  Filling Pharmacy Fax:    Start Date: 10/13/2022

## 2022-10-13 NOTE — TELEPHONE ENCOUNTER
Prior Authorization Approval    Authorization Effective Date: 9/13/2022  Authorization Expiration Date: 10/13/2023  Medication: Alprazolam 0.25mg-APPROVED  Approved Dose/Quantity:   Reference #:     Insurance Company: [employer] - Phone [phone] Fax [phone]  Expected CoPay:       CoPay Card Available:      Foundation Assistance Needed:    Which Pharmacy is filling the prescription (Not needed for infusion/clinic administered): [id number] - [place], [place] - [address] AT [facility]  Pharmacy Notified: Yes  Patient Notified: No  **Instructed pharmacy to notify patient when script is ready to /ship.**

## 2022-10-18 ENCOUNTER — MYC MEDICAL ADVICE (OUTPATIENT)
Dept: PEDIATRICS | Facility: CLINIC | Age: 77
End: 2022-10-18

## 2022-11-07 ENCOUNTER — LAB (OUTPATIENT)
Dept: LAB | Facility: CLINIC | Age: 77
End: 2022-11-07
Payer: COMMERCIAL

## 2022-11-07 DIAGNOSIS — E11.9 TYPE 2 DIABETES MELLITUS WITHOUT COMPLICATION, WITHOUT LONG-TERM CURRENT USE OF INSULIN (H): ICD-10-CM

## 2022-11-07 LAB
CHOLEST SERPL-MCNC: 165 MG/DL
FASTING STATUS PATIENT QL REPORTED: YES
HBA1C MFR BLD: 6.2 % (ref 0–5.6)
HDLC SERPL-MCNC: 32 MG/DL
LDLC SERPL CALC-MCNC: 60 MG/DL
NONHDLC SERPL-MCNC: 133 MG/DL
TRIGL SERPL-MCNC: 364 MG/DL

## 2022-11-07 PROCEDURE — 80061 LIPID PANEL: CPT

## 2022-11-07 PROCEDURE — 83036 HEMOGLOBIN GLYCOSYLATED A1C: CPT

## 2022-11-07 PROCEDURE — 36415 COLL VENOUS BLD VENIPUNCTURE: CPT

## 2022-11-10 ENCOUNTER — OFFICE VISIT (OUTPATIENT)
Dept: PEDIATRICS | Facility: CLINIC | Age: 77
End: 2022-11-10
Payer: COMMERCIAL

## 2022-11-10 VITALS
BODY MASS INDEX: 34.49 KG/M2 | WEIGHT: 207 LBS | HEART RATE: 80 BPM | RESPIRATION RATE: 16 BRPM | DIASTOLIC BLOOD PRESSURE: 60 MMHG | HEIGHT: 65 IN | SYSTOLIC BLOOD PRESSURE: 98 MMHG | TEMPERATURE: 98.7 F | OXYGEN SATURATION: 95 %

## 2022-11-10 DIAGNOSIS — N95.2 ATROPHIC VAGINITIS: ICD-10-CM

## 2022-11-10 DIAGNOSIS — Z12.31 VISIT FOR SCREENING MAMMOGRAM: ICD-10-CM

## 2022-11-10 DIAGNOSIS — E66.01 MORBID OBESITY (H): ICD-10-CM

## 2022-11-10 DIAGNOSIS — G47.00 INSOMNIA, UNSPECIFIED TYPE: Primary | ICD-10-CM

## 2022-11-10 DIAGNOSIS — F41.1 GENERALIZED ANXIETY DISORDER: ICD-10-CM

## 2022-11-10 PROCEDURE — 99214 OFFICE O/P EST MOD 30 MIN: CPT | Performed by: STUDENT IN AN ORGANIZED HEALTH CARE EDUCATION/TRAINING PROGRAM

## 2022-11-10 PROCEDURE — 96127 BRIEF EMOTIONAL/BEHAV ASSMT: CPT | Performed by: STUDENT IN AN ORGANIZED HEALTH CARE EDUCATION/TRAINING PROGRAM

## 2022-11-10 RX ORDER — ALPRAZOLAM 0.25 MG
0.25 TABLET ORAL
Qty: 90 TABLET | Refills: 3 | Status: SHIPPED | OUTPATIENT
Start: 2022-11-10 | End: 2022-11-10

## 2022-11-10 RX ORDER — ALPRAZOLAM 0.25 MG
0.25 TABLET ORAL
Qty: 90 TABLET | Refills: 3 | Status: SHIPPED | OUTPATIENT
Start: 2022-11-10 | End: 2023-05-18

## 2022-11-10 ASSESSMENT — PATIENT HEALTH QUESTIONNAIRE - PHQ9
SUM OF ALL RESPONSES TO PHQ QUESTIONS 1-9: 2
SUM OF ALL RESPONSES TO PHQ QUESTIONS 1-9: 2

## 2022-11-10 NOTE — PATIENT INSTRUCTIONS
So nice to meet you!      Schedule the mammogram after December 2nd.    You could go to [facility] or first floor at [facility].        I would try zinc oxide (Desitin) 20% or 40% as a barrier cream.

## 2022-11-10 NOTE — PROGRESS NOTES
"Assessment & Plan   Patient establishing care withme.     Insomnia, unspecified type  Generalized anxiety disorder  Continue medications.  - ALPRAZolam (XANAX) 0.25 MG tablet; Take 1 tablet (0.25 mg) by mouth nightly as needed for anxiety    Visit for screening mammogram  - MA SCREENING DIGITAL BILAT - Future  (s+30); Future    Atrophic vaginitis  - conjugated estrogens (PREMARIN) 0.625 MG/GM vaginal cream; Place 0.5 g vaginally twice a week    Morbid obesity  - complicated by hyperlipidemia.    Recommend trying zinc oxide for groin rash as does not seem consistent with bacterial or fungal infection and has not responded to topical antifungal or steroid. If not improving recommend dermatology evaluation.    Has follow up with cardiology.    Return in about 6 months (around 5/5/2023) for Annual Wellness Exam.    Padmaja Sepulveda MD  [facility] [place] is a 77 year old, presenting for the following health issues:  No chief complaint on file.      History of Present Illness       Reason for visit:  Follow up & skin lesions    She eats 2-3 servings of fruits and vegetables daily.She consumes 0 sweetened beverage(s) daily.She exercises with enough effort to increase her heart rate 20 to 29 minutes per day.  She exercises with enough effort to increase her heart rate 4 days per week.   She is taking medications regularly.    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 \"it hasn't been terrible\"    How are " "you doing with your anxiety since your last visit?  No change    Are you having other symptoms that might be associated with depression or anxiety? No    Have you had a significant life event? No     Do you have any concerns with your use of alcohol or other drugs? No    PHQ-9 score:    PHQ 11/10/2022   PHQ-9 Total Score 2   Q9: Thoughts of better off dead/self-harm past 2 weeks Not at all       TOD-7 SCORE 9/26/2018 8/6/2019 3/10/2020   Total Score - - -   Total Score 5 5 5     Skin issues  -in nose  -crease in leg: painful  -feels like skin is degrading: breaks open with wiping           Social History     Tobacco Use     Smoking status: Never     Smokeless tobacco: Never   Vaping Use     Vaping Use: Never used   Substance Use Topics     Alcohol use: Yes     Alcohol/week: 0.0 standard drinks     Comment: rarely     Drug use: No     PHQ 5/4/2022 5/4/2022 11/10/2022   PHQ-9 Total Score 6 5 2   Q9: Thoughts of better off dead/self-harm past 2 weeks Not at all Not at all Not at all     TOD-7 SCORE 9/26/2018 8/6/2019 3/10/2020   Total Score - - -   Total Score 5 5 5         Objective    BP 98/60 (BP Location: Right arm, Patient Position: Sitting, Cuff Size: Adult Large)   Pulse 80   Temp 98.7  F (37.1  C) (Oral)   Resp 16   Ht 1.651 m (5' 5\")   Wt 93.9 kg (207 lb)   SpO2 95%   BMI 34.45 kg/m    Body mass index is 34.45 kg/m .  Physical Exam   GENERAL: healthy, alert and no distress  SKIN: excoriation - r inguinal crease, no satellite lesions and no drainage or induration  PSYCH: mentation appears normal, affect normal/bright            Answers for HPI/ROS submitted by the patient on 11/10/2022  PHQ9 TOTAL SCORE: 2      "

## 2022-12-30 DIAGNOSIS — G47.00 INSOMNIA, UNSPECIFIED TYPE: ICD-10-CM

## 2022-12-30 RX ORDER — MIRTAZAPINE 7.5 MG/1
TABLET, FILM COATED ORAL
Qty: 180 TABLET | Refills: 3 | Status: SHIPPED | OUTPATIENT
Start: 2022-12-30 | End: 2023-10-03

## 2022-12-30 NOTE — TELEPHONE ENCOUNTER
Routing refill request to provider for review/approval because:  Drug interaction warning    Layla Murphy RN on 12/30/2022 at 11:28 AM

## 2023-01-10 ENCOUNTER — ANCILLARY PROCEDURE (OUTPATIENT)
Dept: MAMMOGRAPHY | Facility: CLINIC | Age: 78
End: 2023-01-10
Attending: STUDENT IN AN ORGANIZED HEALTH CARE EDUCATION/TRAINING PROGRAM
Payer: COMMERCIAL

## 2023-01-10 DIAGNOSIS — Z12.31 VISIT FOR SCREENING MAMMOGRAM: ICD-10-CM

## 2023-01-10 PROCEDURE — 77067 SCR MAMMO BI INCL CAD: CPT | Mod: TC | Performed by: RADIOLOGY

## 2023-01-10 PROCEDURE — 77063 BREAST TOMOSYNTHESIS BI: CPT | Mod: TC | Performed by: RADIOLOGY

## 2023-02-18 ENCOUNTER — MYC MEDICAL ADVICE (OUTPATIENT)
Dept: PEDIATRICS | Facility: CLINIC | Age: 78
End: 2023-02-18
Payer: COMMERCIAL

## 2023-02-18 DIAGNOSIS — I42.9 IDIOPATHIC CARDIOMYOPATHY (H): ICD-10-CM

## 2023-02-19 DIAGNOSIS — F41.1 GENERALIZED ANXIETY DISORDER: ICD-10-CM

## 2023-02-19 RX ORDER — CARVEDILOL 25 MG/1
TABLET ORAL
Qty: 20 TABLET | Refills: 0 | Status: SHIPPED | OUTPATIENT
Start: 2023-02-19 | End: 2023-04-04

## 2023-02-20 ENCOUNTER — MYC MEDICAL ADVICE (OUTPATIENT)
Dept: PEDIATRICS | Facility: CLINIC | Age: 78
End: 2023-02-20
Payer: COMMERCIAL

## 2023-02-20 DIAGNOSIS — Z79.2 PROPHYLACTIC ANTIBIOTIC: Primary | ICD-10-CM

## 2023-02-20 RX ORDER — CIPROFLOXACIN HCL 100 MG
TABLET ORAL
Qty: 12 TABLET | Refills: 0 | Status: SHIPPED | OUTPATIENT
Start: 2023-02-20 | End: 2023-04-21

## 2023-02-20 NOTE — TELEPHONE ENCOUNTER
Please see patient MyChart message and advise if visit needed.     Robin PONCE RN 2/20/2023 at 12:03 PM

## 2023-02-21 DIAGNOSIS — I42.9 IDIOPATHIC CARDIOMYOPATHY (H): ICD-10-CM

## 2023-02-21 RX ORDER — SPIRONOLACTONE 25 MG/1
TABLET ORAL
Qty: 45 TABLET | Refills: 1 | Status: SHIPPED | OUTPATIENT
Start: 2023-02-21 | End: 2023-04-14

## 2023-02-21 RX ORDER — VENLAFAXINE HYDROCHLORIDE 75 MG/1
CAPSULE, EXTENDED RELEASE ORAL
Qty: 270 CAPSULE | Refills: 3 | Status: SHIPPED | OUTPATIENT
Start: 2023-02-21 | End: 2024-01-02

## 2023-02-21 NOTE — TELEPHONE ENCOUNTER
Received refill request for:  Spironolactone    [facility] Refill Guideline reviewed.  Medication meets criteria for refill.    Isidra Loja RN, BSN  02/21/23 at 3:12 PM

## 2023-02-21 NOTE — TELEPHONE ENCOUNTER
Routing refill request to provider for review/approval because:  Drug interaction warning    Shawnee SALAS RN, BSN

## 2023-02-27 ENCOUNTER — TRANSFERRED RECORDS (OUTPATIENT)
Dept: HEALTH INFORMATION MANAGEMENT | Facility: CLINIC | Age: 78
End: 2023-02-27
Payer: COMMERCIAL

## 2023-03-14 ENCOUNTER — MYC MEDICAL ADVICE (OUTPATIENT)
Dept: PEDIATRICS | Facility: CLINIC | Age: 78
End: 2023-03-14
Payer: COMMERCIAL

## 2023-03-14 DIAGNOSIS — I42.9 IDIOPATHIC CARDIOMYOPATHY (H): Primary | ICD-10-CM

## 2023-03-14 RX ORDER — CIPROFLOXACIN 500 MG/1
TABLET, FILM COATED ORAL
Qty: 5 TABLET | Refills: 11 | Status: SHIPPED | OUTPATIENT
Start: 2023-03-14

## 2023-03-14 NOTE — TELEPHONE ENCOUNTER
Please see patient MyChart message. Patient inquiring if able to get alternative dose of medication as it is covered better under her insurance.     Robin PONCE RN 3/14/2023 at 3:59 PM

## 2023-04-03 ENCOUNTER — ANCILLARY PROCEDURE (OUTPATIENT)
Dept: CARDIOLOGY | Facility: CLINIC | Age: 78
End: 2023-04-03
Attending: INTERNAL MEDICINE
Payer: COMMERCIAL

## 2023-04-03 DIAGNOSIS — I42.9 IDIOPATHIC CARDIOMYOPATHY (H): Primary | ICD-10-CM

## 2023-04-03 DIAGNOSIS — Z95.810 ICD (IMPLANTABLE CARDIOVERTER-DEFIBRILLATOR) IN PLACE: ICD-10-CM

## 2023-04-03 DIAGNOSIS — I42.9 IDIOPATHIC CARDIOMYOPATHY (H): ICD-10-CM

## 2023-04-03 PROCEDURE — 93296 REM INTERROG EVL PM/IDS: CPT | Performed by: INTERNAL MEDICINE

## 2023-04-03 PROCEDURE — 93295 DEV INTERROG REMOTE 1/2/MLT: CPT | Performed by: INTERNAL MEDICINE

## 2023-04-04 ENCOUNTER — OFFICE VISIT (OUTPATIENT)
Dept: CARDIOLOGY | Facility: CLINIC | Age: 78
End: 2023-04-04
Attending: INTERNAL MEDICINE
Payer: COMMERCIAL

## 2023-04-04 VITALS
HEIGHT: 65 IN | DIASTOLIC BLOOD PRESSURE: 60 MMHG | SYSTOLIC BLOOD PRESSURE: 96 MMHG | OXYGEN SATURATION: 96 % | WEIGHT: 188.9 LBS | BODY MASS INDEX: 31.47 KG/M2 | HEART RATE: 66 BPM

## 2023-04-04 DIAGNOSIS — F41.1 GENERALIZED ANXIETY DISORDER: ICD-10-CM

## 2023-04-04 DIAGNOSIS — I42.9 CARDIOMYOPATHY, UNSPECIFIED TYPE (H): ICD-10-CM

## 2023-04-04 DIAGNOSIS — I42.9 IDIOPATHIC CARDIOMYOPATHY (H): ICD-10-CM

## 2023-04-04 PROCEDURE — 99213 OFFICE O/P EST LOW 20 MIN: CPT | Performed by: INTERNAL MEDICINE

## 2023-04-04 RX ORDER — CARVEDILOL 25 MG/1
TABLET ORAL
Qty: 180 TABLET | Refills: 3 | Status: SHIPPED | OUTPATIENT
Start: 2023-04-04

## 2023-04-04 RX ORDER — LOSARTAN POTASSIUM 50 MG/1
50 TABLET ORAL DAILY
Qty: 90 TABLET | Refills: 3 | Status: SHIPPED | OUTPATIENT
Start: 2023-04-04 | End: 2023-04-26

## 2023-04-04 NOTE — PROGRESS NOTES
HPI and Plan:   It is always my pleasure to see this very delightful 77-year-old lady who is one of my favorite patients.    I have known her for a long time as she has had nonischemic cardiomyopathy with severe left ventricular systolic function dysfunction for a long time.  She has never been troubled much by congestion but her symptoms are usually those of low cardiac output such as fatigue.    She had a tolerated time in 2012 with attempted placement of a biventricular pacer and recently I thought she may benefit from another attempt at biventricular pacing and I am so happy to see that doctor is course is able to replace in RA lead now she is 100% biventricular paced    She has noticed an improvement in her stamina and she realizes that it would never completely recover.  Nonetheless she is adjusting well.  She remains physically active.  When she goes on long walks that she stop several times to take a breath and this is very acceptable to her.  Otherwise she has no PND orthopnea and no symptoms of congestion.  She tells me that she used to have heartburn at night but not after the biventricular pacing.  I think this might just be a coincidence.    I was alarmed when she first presented with a blood pressure of 80 systolic.  When I rechecked it it was 96.    She has no signs of congestion.    I asked her to monitor blood pressures at home and if it is consistently less than 90 she should withhold the spironolactone.  She tells me she will also give us a call if that should happen    She is going on a river cruise along the [place] ending in [place] with [employer].  I am jealous and but am also looking forward to hearing about this trip when I see her again in 6 months time.    No orders of the defined types were placed in this encounter.      Orders Placed This Encounter   Medications     losartan (COZAAR) 50 MG tablet     Sig: Take 1 tablet (50 mg) by mouth daily     Dispense:  90 tablet     Refill:  3      carvedilol (COREG) 25 MG tablet     Sig: TAKE 1 TABLET TWICE A DAY WITH MEALS     Dispense:  180 tablet     Refill:  3       Encounter Diagnoses   Name Primary?     Cardiomyopathy, unspecified type (H)      Idiopathic cardiomyopathy (H)      Generalized anxiety disorder        CURRENT MEDICATIONS:  Current Outpatient Medications   Medication Sig Dispense Refill     acetaminophen (TYLENOL) 500 MG tablet Take 1,000 mg by mouth daily as needed for mild pain       ALPRAZolam (XANAX) 0.25 MG tablet Take 1 tablet (0.25 mg) by mouth nightly as needed for anxiety 90 tablet 3     aspirin 81 MG tablet Take 81 mg by mouth every evening       CALCIUM 500 MG OR TABS Takes 3 tabs daily takees total of 1200 mg daily       carvedilol (COREG) 25 MG tablet TAKE 1 TABLET TWICE A DAY WITH MEALS 180 tablet 3     Cholecalciferol (VITAMIN D) 2000 UNITS tablet Take 1 tablet by mouth daily.       ciprofloxacin (CIPRO) 100 MG tablet Take as needed before dental appointments 12 tablet 0     ciprofloxacin (CIPRO) 500 MG tablet Take 1 tablet by mmouth before dental appointments 5 tablet 11     clobetasol (TEMOVATE) 0.05 % external cream Apply thin layer to bilateral hand twice daily for 2 weeks (Patient taking differently: 2 times daily as needed Apply thin layer to bilateral hand twice daily for 2 weeks) 45 g 1     co-enzyme Q-10 50 MG CAPS Take 1 capsule by mouth every morning       conjugated estrogens (PREMARIN) 0.625 MG/GM vaginal cream Place 0.5 g vaginally twice a week 30 g 3     lifitegrast (XIIDRA) 5 % opthalmic solution Place 1 drop into both eyes 2 times daily       losartan (COZAAR) 50 MG tablet Take 1 tablet (50 mg) by mouth daily 90 tablet 3     mirtazapine (REMERON) 7.5 MG tablet TAKE 1 TO 2 TABLETS 30 MINUTES BEFORE BEDTIME 180 tablet 3     Multiple Vitamins-Minerals (MULTIVITAMIN ADULT PO) Take 1 tablet by mouth daily       rosuvastatin (CRESTOR) 10 MG tablet TAKE 1 TABLET EVERY OTHER DAY 45 tablet 3     spironolactone  (ALDACTONE) 25 MG tablet TAKE 1/2 TABLET BY MOUTH DAILY 45 tablet 1     venlafaxine (EFFEXOR XR) 75 MG 24 hr capsule TAKE 3 CAPSULES ONCE DAILY Strength: 75 mg 270 capsule 3     loratadine (CLARITIN) 10 MG tablet Take 10 mg by mouth every evening (Patient not taking: Reported on 4/4/2023)         ALLERGIES     Allergies   Allergen Reactions     Corticosteroids Other (See Comments)     flush up through chest and face, decadron  flushing  Other reaction(s): Erythema     Phenothiazines Anxiety and GI Disturbance     anxiety attack, compazine  Other reaction(s): Tardive Dyskinesia     Prochlorperazine      Other reaction(s): Tardive Dyskinesia     Amoxicillin Hives     Clindamycin Other (See Comments)     Burning sensation in chest     Decadron      flushing     Dexamethasone      Other reaction(s): Erythema     Compazine Anxiety       PAST MEDICAL HISTORY:  Past Medical History:   Diagnosis Date     Acute posthemorrhagic anemia 9/11/2015     Anemia      Chronic systolic congestive heart failure (H) 10/10/2018     EF 35%     Fibromyalgia      Gastro-oesophageal reflux disease      GENERAL OSTEOARTHROSIS [715.00] 11/23/2004    knee     Generalized anxiety disorder 9/30/2013     Hyperlipidemia LDL goal <130 2/10/2010    Failed simvastatin- muscle aches      Hypertension     because of the heart     Idiopathic cardiomyopathy (H) 1/19/2012    Dr. Osborne 3/2011 EF 30-35%      Insomnia 1/19/2012     Iron deficiency anemia 8/12/2015     Problem list name updated by automated process. Provider to review     LBBB (left bundle branch block)      Left ventricular systolic dysfunction:EF 35% 2/5/2012    Must stay on diovan per cardiology      Major depressive disorder, recurrent episode, mild (H) 2/17/2016     Migraine 6/21/2005     Problem list name updated by automated process. Provider to review     Nonischemic cardiomyopathy (H)     EF 30-35%, Dr. Osborne [facility] (suspect virus); s/p AICD     NSVT (nonsustained ventricular tachycardia) (H)       Obesity 1/20/2012     SUSAN (obstructive sleep apnea) 01/19/2012    CPAP      Pure hypercholesterolemia      Restless leg syndrome      Type 2 diabetes mellitus without complication (H) 9/24/2016       PAST SURGICAL HISTORY:  Past Surgical History:   Procedure Laterality Date     APPENDECTOMY       ARTHROPLASTY KNEE  1/7/2013    Procedure: ARTHROPLASTY KNEE;  Right Total Knee Arthroplasty       ARTHROPLASTY REVISION KNEE Right 8/26/2015    Procedure: ARTHROPLASTY REVISION KNEE;  Surgeon: Néstor Beth MD;  Location: [facility] OR     ARTHROSCOPY KNEE       bunionectomy left foot       CLOSED REDUCTION, PERCUTANEOUS PINNING FINGER, COMBINED Right 6/17/2021    Procedure: Closed reduction, pinning right long finger distal interphalangeal joint fracture;  Surgeon: Joseluis Delong MD;  Location:  OR     COLONOSCOPY       CORONARY ANGIOGRAPHY ADULT ORDER  2002    normal     CORONARY ANGIOGRAPHY ADULT ORDER  12/7/12    no significant focal narrowing that would benefit from mechanical intervention      EP PACEMAKER GENERATOR REPLACEMENT- BI-VENTRICULAR N/A 8/25/2022    Procedure: Pacemaker Generator Replacement Biventricular;  Surgeon: Hillary Gonzalez MD;  Location:  HEART CARDIAC CATH LAB     ESOPHAGOSCOPY, GASTROSCOPY, DUODENOSCOPY (EGD), COMBINED N/A 7/9/2021    Procedure: ESOPHAGOGASTRODUODENOSCOPY, WITH BIOPSY with distal esophagus biopies to rule out Barretts esophagus by cold biopsy forceps;  Surgeon: Travis Harrington MD;  Location:  GI     HYSTERECTOMY       IMPLANT AUTOMATIC IMPLANTABLE CARDIOVERTER DEFIBRILLATOR  4/30/12     INSERT THORACIC PACEMAKER LEAD EPICARDIAL  5/1/2012    Procedure:INSERT THORACIC PACEMAKER LEAD EPICARDIAL; EPICARDIAL LEAD PLACEMENT; Surgeon:WEST ARECHIGA; Location: OR     TONSILLECTOMY       TRANSPLANT - corneal lenses      Bilateral for cataracts       FAMILY HISTORY:  Family History   Problem Relation Age of Onset     Cancer Father          intraabdominal mass - not colon cancer     C.A.D. 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 Multiple ignacio problems, multiple hospitalizations in last 7 years, anxiety        Pt has 2 daughters, 2 step-daughters's and  7 grandchildrens        Youngest daughter Sabi has malignant brain tumor                 Social Determinants of Health     Financial Resource Strain: Low Risk  (8/6/2019)    Overall Financial Resource Strain (CARDIA)      Difficulty of Paying Living Expenses: Not hard at all   Food Insecurity: No Food Insecurity (8/6/2019)    Hunger Vital Sign      Worried About  "Running Out of Food in the Last Year: Never true      Ran Out of Food in the Last Year: Never true   Transportation Needs: No Transportation Needs (8/6/2019)    PRAPARE - Transportation      Lack of Transportation (Medical): No      Lack of Transportation (Non-Medical): No   Physical Activity: Insufficiently Active (8/6/2019)    Exercise Vital Sign      Days of Exercise per Week: 4 days      Minutes of Exercise per Session: 30 min   Stress: Stress Concern Present (8/6/2019)    [facility] of Occupational Health - Occupational Stress Questionnaire      Feeling of Stress : To some extent   Social Connections: Moderately Isolated (8/6/2019)    Social Connection and Isolation Panel [NHANES]      Frequency of Communication with Friends and Family: Twice a week      Frequency of Social Gatherings with Friends and Family: Once a week      Attends Mandaeism Services: Never      Active Member of Clubs or Organizations: No      Attends Club or Organization Meetings: Never      Marital Status:        Review of Systems:  Skin:  Negative     Eyes:  Positive for itching  ENT:  Negative    Respiratory:  Positive for sleep apnea;CPAP;cough  Cardiovascular:  Negative;chest pain;syncope or near-syncope;cyanosis;edema;exercise intolerance;fatigue;lightheadedness;dizziness Positive for  Gastroenterology: Negative    Genitourinary:  not assessed    Musculoskeletal:  Negative    Neurologic:  Positive for headaches  Psychiatric:  Positive for anxiety;depression;excessive stress  Heme/Lymph/Imm:  Positive for allergies  Endocrine:  Negative      Physical Exam:  Vitals: BP 96/60   Pulse 66   Ht 1.651 m (5' 5\")   Wt 85.7 kg (188 lb 14.4 oz)   SpO2 96%   BMI 31.43 kg/m      Constitutional:  cooperative;in no acute distress obese      Skin:  warm and dry to the touch   pacemaker incision in the left infraclavicular area was well-healed      Head:  normocephalic        Eyes:  pupils equal and round        Lymph:      ENT:  no " pallor or cyanosis, dentition good        Neck:  JVP normal;no carotid bruit        Respiratory:  clear to auscultation;normal respiratory excursion         Cardiac: regular rhythm;normal S1 and S2   distant heart sounds            pulses full and equal                                        GI:  abdomen soft obese      Extremities and Muscular Skeletal:  no deformities, clubbing, cyanosis, erythema observed;no edema              Neurological:  affect appropriate        Psych:  Alert and Oriented x 3        Recent Lab Results:  LIPID RESULTS:  Lab Results   Component Value Date    CHOL 165 11/07/2022    CHOL 170 01/25/2021    HDL 32 (L) 11/07/2022    HDL 39 (L) 01/25/2021    LDL 60 11/07/2022    LDL 80 01/25/2021    TRIG 364 (H) 11/07/2022    TRIG 253 (H) 01/25/2021    CHOLHDLRATIO 4.7 10/13/2015       LIVER ENZYME RESULTS:  Lab Results   Component Value Date    AST 27 04/09/2022    AST 28 12/26/2020    ALT 45 04/09/2022    ALT 32 12/26/2020       CBC RESULTS:  Lab Results   Component Value Date    WBC 3.3 (L) 08/25/2022    WBC 4.0 02/05/2021    RBC 3.82 08/25/2022    RBC 3.86 02/05/2021    HGB 11.7 08/25/2022    HGB 12.0 02/05/2021    HCT 34.9 (L) 08/25/2022    HCT 37.0 02/05/2021    MCV 91 08/25/2022    MCV 96 02/05/2021    MCH 30.6 08/25/2022    MCH 31.1 02/05/2021    MCHC 33.5 08/25/2022    MCHC 32.4 02/05/2021    RDW 12.6 08/25/2022    RDW 12.5 02/05/2021     (L) 08/25/2022     02/05/2021       BMP RESULTS:  Lab Results   Component Value Date     08/25/2022     02/05/2021    POTASSIUM 4.1 08/25/2022    POTASSIUM 4.2 06/17/2021    CHLORIDE 109 08/25/2022    CHLORIDE 108 02/05/2021    CO2 26 08/25/2022    CO2 23 02/05/2021    ANIONGAP 6 08/25/2022    ANIONGAP 8 02/05/2021     (H) 08/26/2022     (H) 08/25/2022     (H) 02/05/2021    BUN 15 08/25/2022    BUN 16 02/05/2021    CR 0.95 08/25/2022    CR 0.99 06/17/2021    GFRESTIMATED 62 08/25/2022    GFRESTIMATED 56 (L)  06/17/2021    GFRESTBLACK 64 06/17/2021    WILEY 8.9 08/25/2022    WILEY 9.1 02/05/2021        A1C RESULTS:  Lab Results   Component Value Date    A1C 6.2 (H) 11/07/2022    A1C 6.1 (H) 01/25/2021       INR RESULTS:  Lab Results   Component Value Date    INR 1.06 04/07/2022    INR 0.99 12/26/2020    INR 0.88 12/05/2012           CC  Ciaran Osborne MD  [id number]  [place] [id number]

## 2023-04-04 NOTE — LETTER
4/4/2023    Padmaja Sepulveda MD  [address] [place] [address]    RE: Kristie Jones       Dear Colleague,     [facility] the [facility].  HPI and Plan:   It is always my pleasure to see this very delightful 77-year-old lady who is one of my favorite patients.    I have known her for a long time as she has had nonischemic cardiomyopathy with severe left ventricular systolic function dysfunction for a long time.  She has never been troubled much by congestion but her symptoms are usually those of low cardiac output such as fatigue.    She had a tolerated time in 2012 with attempted placement of a biventricular pacer and recently I thought she may benefit from another attempt at biventricular pacing and I am so happy to see that doctor is course is able to replace in RA lead now she is 100% biventricular paced    She has noticed an improvement in her stamina and she realizes that it would never completely recover.  Nonetheless she is adjusting well.  She remains physically active.  When she goes on long walks that she stop several times to take a breath and this is very acceptable to her.  Otherwise she has no PND orthopnea and no symptoms of congestion.  She tells me that she used to have heartburn at night but not after the biventricular pacing.  I think this might just be a coincidence.    I was alarmed when she first presented with a blood pressure of 80 systolic.  When I rechecked it it was 96.    She has no signs of congestion.    I asked her to monitor blood pressures at home and if it is consistently less than 90 she should withhold the spironolactone.  She tells me she will also give us a call if that should happen    She is going on a river cruise along the [place] ending in [place] with [employer].  I am jealous and but am also looking forward to hearing about this trip when I see her again in 6 months time.    No orders of the defined  types were placed in this encounter.      Orders Placed This Encounter   Medications    losartan (COZAAR) 50 MG tablet     Sig: Take 1 tablet (50 mg) by mouth daily     Dispense:  90 tablet     Refill:  3    carvedilol (COREG) 25 MG tablet     Sig: TAKE 1 TABLET TWICE A DAY WITH MEALS     Dispense:  180 tablet     Refill:  3       Encounter Diagnoses   Name Primary?    Cardiomyopathy, unspecified type (H)     Idiopathic cardiomyopathy (H)     Generalized anxiety disorder        CURRENT MEDICATIONS:  Current Outpatient Medications   Medication Sig Dispense Refill    acetaminophen (TYLENOL) 500 MG tablet Take 1,000 mg by mouth daily as needed for mild pain      ALPRAZolam (XANAX) 0.25 MG tablet Take 1 tablet (0.25 mg) by mouth nightly as needed for anxiety 90 tablet 3    aspirin 81 MG tablet Take 81 mg by mouth every evening      CALCIUM 500 MG OR TABS Takes 3 tabs daily takees total of 1200 mg daily      carvedilol (COREG) 25 MG tablet TAKE 1 TABLET TWICE A DAY WITH MEALS 180 tablet 3    Cholecalciferol (VITAMIN D) 2000 UNITS tablet Take 1 tablet by mouth daily.      ciprofloxacin (CIPRO) 100 MG tablet Take as needed before dental appointments 12 tablet 0    ciprofloxacin (CIPRO) 500 MG tablet Take 1 tablet by mmouth before dental appointments 5 tablet 11    clobetasol (TEMOVATE) 0.05 % external cream Apply thin layer to bilateral hand twice daily for 2 weeks (Patient taking differently: 2 times daily as needed Apply thin layer to bilateral hand twice daily for 2 weeks) 45 g 1    co-enzyme Q-10 50 MG CAPS Take 1 capsule by mouth every morning      conjugated estrogens (PREMARIN) 0.625 MG/GM vaginal cream Place 0.5 g vaginally twice a week 30 g 3    lifitegrast (XIIDRA) 5 % opthalmic solution Place 1 drop into both eyes 2 times daily      losartan (COZAAR) 50 MG tablet Take 1 tablet (50 mg) by mouth daily 90 tablet 3    mirtazapine (REMERON) 7.5 MG tablet TAKE 1 TO 2 TABLETS 30 MINUTES BEFORE BEDTIME 180 tablet 3     Multiple Vitamins-Minerals (MULTIVITAMIN ADULT PO) Take 1 tablet by mouth daily      rosuvastatin (CRESTOR) 10 MG tablet TAKE 1 TABLET EVERY OTHER DAY 45 tablet 3    spironolactone (ALDACTONE) 25 MG tablet TAKE 1/2 TABLET BY MOUTH DAILY 45 tablet 1    venlafaxine (EFFEXOR XR) 75 MG 24 hr capsule TAKE 3 CAPSULES ONCE DAILY Strength: 75 mg 270 capsule 3    loratadine (CLARITIN) 10 MG tablet Take 10 mg by mouth every evening (Patient not taking: Reported on 4/4/2023)         ALLERGIES     Allergies   Allergen Reactions    Corticosteroids Other (See Comments)     flush up through chest and face, decadron  flushing  Other reaction(s): Erythema    Phenothiazines Anxiety and GI Disturbance     anxiety attack, compazine  Other reaction(s): Tardive Dyskinesia    Prochlorperazine      Other reaction(s): Tardive Dyskinesia    Amoxicillin Hives    Clindamycin Other (See Comments)     Burning sensation in chest    Decadron      flushing    Dexamethasone      Other reaction(s): Erythema    Compazine Anxiety       PAST MEDICAL HISTORY:  Past Medical History:   Diagnosis Date    Acute posthemorrhagic anemia 9/11/2015    Anemia     Chronic systolic congestive heart failure (H) 10/10/2018     EF 35%    Fibromyalgia     Gastro-oesophageal reflux disease     GENERAL OSTEOARTHROSIS [715.00] 11/23/2004    knee    Generalized anxiety disorder 9/30/2013    Hyperlipidemia LDL goal <130 2/10/2010    Failed simvastatin- muscle aches     Hypertension     because of the heart    Idiopathic cardiomyopathy (H) 1/19/2012    Dr. Osborne 3/2011 EF 30-35%     Insomnia 1/19/2012    Iron deficiency anemia 8/12/2015     Problem list name updated by automated process. Provider to review    LBBB (left bundle branch block)     Left ventricular systolic dysfunction:EF 35% 2/5/2012    Must stay on diovan per cardiology     Major depressive disorder, recurrent episode, mild (H) 2/17/2016    Migraine 6/21/2005     Problem list name updated by automated process.  Provider to review    Nonischemic cardiomyopathy (H)     EF 30-35%, Dr. Osborne [facility] (suspect virus); s/p AICD    NSVT (nonsustained ventricular tachycardia) (H)     Obesity 1/20/2012    SUSAN (obstructive sleep apnea) 01/19/2012    CPAP     Pure hypercholesterolemia     Restless leg syndrome     Type 2 diabetes mellitus without complication (H) 9/24/2016       PAST SURGICAL HISTORY:  Past Surgical History:   Procedure Laterality Date    APPENDECTOMY      ARTHROPLASTY KNEE  1/7/2013    Procedure: ARTHROPLASTY KNEE;  Right Total Knee Arthroplasty      ARTHROPLASTY REVISION KNEE Right 8/26/2015    Procedure: ARTHROPLASTY REVISION KNEE;  Surgeon: Néstor Beth MD;  Location:  OR    ARTHROSCOPY KNEE      bunionectomy left foot      CLOSED REDUCTION, PERCUTANEOUS PINNING FINGER, COMBINED Right 6/17/2021    Procedure: Closed reduction, pinning right long finger distal interphalangeal joint fracture;  Surgeon: Joseluis Delong MD;  Location:  OR    COLONOSCOPY      CORONARY ANGIOGRAPHY ADULT ORDER  2002    normal    CORONARY ANGIOGRAPHY ADULT ORDER  12/7/12    no significant focal narrowing that would benefit from mechanical intervention     EP PACEMAKER GENERATOR REPLACEMENT- BI-VENTRICULAR N/A 8/25/2022    Procedure: Pacemaker Generator Replacement Biventricular;  Surgeon: Hillary Gonzalez MD;  Location:  HEART CARDIAC CATH LAB    ESOPHAGOSCOPY, GASTROSCOPY, DUODENOSCOPY (EGD), COMBINED N/A 7/9/2021    Procedure: ESOPHAGOGASTRODUODENOSCOPY, WITH BIOPSY with distal esophagus biopies to rule out Barretts esophagus by cold biopsy forceps;  Surgeon: Travis Harrington MD;  Location:  GI    HYSTERECTOMY      IMPLANT AUTOMATIC IMPLANTABLE CARDIOVERTER DEFIBRILLATOR  4/30/12    INSERT THORACIC PACEMAKER LEAD EPICARDIAL  5/1/2012    Procedure:INSERT THORACIC PACEMAKER LEAD EPICARDIAL; EPICARDIAL LEAD PLACEMENT; Surgeon:WEST ARECHIGA; Location: OR    TONSILLECTOMY      TRANSPLANT - corneal lenses       Bilateral for cataracts       FAMILY HISTORY:  Family History   Problem Relation Age of Onset    Cancer Father         intraabdominal mass - not colon cancer    C.A.D. 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 Multiple ignacio problems, multiple hospitalizations in last 7 years, anxiety        Pt has 2 daughters, 2 step-daughters's and  7 grandchildrens        Youngest daughter Sabi has malignant brain tumor                 Social Determinants of Health     Financial Resource Strain: Low Risk  (8/6/2019)    Overall Financial Resource Strain (CARDIA)     Difficulty of Paying Living Expenses: Not hard at  "all   Food Insecurity: No Food Insecurity (8/6/2019)    Hunger Vital Sign     Worried About Running Out of Food in the Last Year: Never true     Ran Out of Food in the Last Year: Never true   Transportation Needs: No Transportation Needs (8/6/2019)    PRAPARE - Transportation     Lack of Transportation (Medical): No     Lack of Transportation (Non-Medical): No   Physical Activity: Insufficiently Active (8/6/2019)    Exercise Vital Sign     Days of Exercise per Week: 4 days     Minutes of Exercise per Session: 30 min   Stress: Stress Concern Present (8/6/2019)    [facility] of Occupational Health - Occupational Stress Questionnaire     Feeling of Stress : To some extent   Social Connections: Moderately Isolated (8/6/2019)    Social Connection and Isolation Panel [NHANES]     Frequency of Communication with Friends and Family: Twice a week     Frequency of Social Gatherings with Friends and Family: Once a week     Attends Jainism Services: Never     Active Member of Clubs or Organizations: No     Attends Club or Organization Meetings: Never     Marital Status:        Review of Systems:  Skin:  Negative     Eyes:  Positive for itching  ENT:  Negative    Respiratory:  Positive for sleep apnea;CPAP;cough  Cardiovascular:  Negative;chest pain;syncope or near-syncope;cyanosis;edema;exercise intolerance;fatigue;lightheadedness;dizziness Positive for  Gastroenterology: Negative    Genitourinary:  not assessed    Musculoskeletal:  Negative    Neurologic:  Positive for headaches  Psychiatric:  Positive for anxiety;depression;excessive stress  Heme/Lymph/Imm:  Positive for allergies  Endocrine:  Negative      Physical Exam:  Vitals: BP 96/60   Pulse 66   Ht 1.651 m (5' 5\")   Wt 85.7 kg (188 lb 14.4 oz)   SpO2 96%   BMI 31.43 kg/m      Constitutional:  cooperative;in no acute distress obese      Skin:  warm and dry to the touch   pacemaker incision in the left infraclavicular area was well-healed      Head:  " normocephalic        Eyes:  pupils equal and round        Lymph:      ENT:  no pallor or cyanosis, dentition good        Neck:  JVP normal;no carotid bruit        Respiratory:  clear to auscultation;normal respiratory excursion         Cardiac: regular rhythm;normal S1 and S2   distant heart sounds            pulses full and equal                                        GI:  abdomen soft obese      Extremities and Muscular Skeletal:  no deformities, clubbing, cyanosis, erythema observed;no edema              Neurological:  affect appropriate        Psych:  Alert and Oriented x 3    Recent Lab Results:  LIPID RESULTS:  Lab Results   Component Value Date    CHOL 165 11/07/2022    CHOL 170 01/25/2021    HDL 32 (L) 11/07/2022    HDL 39 (L) 01/25/2021    LDL 60 11/07/2022    LDL 80 01/25/2021    TRIG 364 (H) 11/07/2022    TRIG 253 (H) 01/25/2021    CHOLHDLRATIO 4.7 10/13/2015     LIVER ENZYME RESULTS:  Lab Results   Component Value Date    AST 27 04/09/2022    AST 28 12/26/2020    ALT 45 04/09/2022    ALT 32 12/26/2020     CBC RESULTS:  Lab Results   Component Value Date    WBC 3.3 (L) 08/25/2022    WBC 4.0 02/05/2021    RBC 3.82 08/25/2022    RBC 3.86 02/05/2021    HGB 11.7 08/25/2022    HGB 12.0 02/05/2021    HCT 34.9 (L) 08/25/2022    HCT 37.0 02/05/2021    MCV 91 08/25/2022    MCV 96 02/05/2021    MCH 30.6 08/25/2022    MCH 31.1 02/05/2021    MCHC 33.5 08/25/2022    MCHC 32.4 02/05/2021    RDW 12.6 08/25/2022    RDW 12.5 02/05/2021     (L) 08/25/2022     02/05/2021       BMP RESULTS:  Lab Results   Component Value Date     08/25/2022     02/05/2021    POTASSIUM 4.1 08/25/2022    POTASSIUM 4.2 06/17/2021    CHLORIDE 109 08/25/2022    CHLORIDE 108 02/05/2021    CO2 26 08/25/2022    CO2 23 02/05/2021    ANIONGAP 6 08/25/2022    ANIONGAP 8 02/05/2021     (H) 08/26/2022     (H) 08/25/2022     (H) 02/05/2021    BUN 15 08/25/2022    BUN 16 02/05/2021    CR 0.95 08/25/2022    CR  0.99 06/17/2021    GFRESTIMATED 62 08/25/2022    GFRESTIMATED 56 (L) 06/17/2021    GFRESTBLACK 64 06/17/2021    WILEY 8.9 08/25/2022    WILEY 9.1 02/05/2021        A1C RESULTS:  Lab Results   Component Value Date    A1C 6.2 (H) 11/07/2022    A1C 6.1 (H) 01/25/2021     INR RESULTS:  Lab Results   Component Value Date    INR 1.06 04/07/2022    INR 0.99 12/26/2020    INR 0.88 12/05/2012     Thank you for allowing me to participate in the care of your patient.    Sincerely,     DR TRENT BARBOSA MD     [facility]

## 2023-04-12 ENCOUNTER — TELEPHONE (OUTPATIENT)
Dept: CARDIOLOGY | Facility: CLINIC | Age: 78
End: 2023-04-12
Payer: COMMERCIAL

## 2023-04-12 NOTE — TELEPHONE ENCOUNTER
rKistie Mcknight called in to say that she has still been having some low BP readings, and has been carefully monitoring since here seeing Dr. Osborne last week.    She reports 77\60 up to 115\55 for the range.   She did not say she was symptomatic, and did not address if she held her Spironolactone as directed by Dr. Osborne if her BP was lower than 90 systolic.    Writer has placed call to Kristie Mcknight and asked her to call us back with these answers.    Kristan Connell RN on 4/12/2023 at 1:55 PM

## 2023-04-14 LAB
MDC_IDC_EPISODE_DTM: NORMAL
MDC_IDC_EPISODE_ID: NORMAL
MDC_IDC_EPISODE_TYPE: NORMAL
MDC_IDC_LEAD_IMPLANT_DT: NORMAL
MDC_IDC_LEAD_LOCATION: NORMAL
MDC_IDC_LEAD_LOCATION_DETAIL_1: NORMAL
MDC_IDC_LEAD_MFG: NORMAL
MDC_IDC_LEAD_MODEL: 4047
MDC_IDC_LEAD_MODEL: 4047
MDC_IDC_LEAD_MODEL: NORMAL
MDC_IDC_LEAD_MODEL: NORMAL
MDC_IDC_LEAD_POLARITY_TYPE: NORMAL
MDC_IDC_LEAD_SERIAL: NORMAL
MDC_IDC_MSMT_BATTERY_DTM: NORMAL
MDC_IDC_MSMT_BATTERY_REMAINING_LONGEVITY: 108 MO
MDC_IDC_MSMT_BATTERY_REMAINING_PERCENTAGE: 100 %
MDC_IDC_MSMT_BATTERY_STATUS: NORMAL
MDC_IDC_MSMT_CAP_CHARGE_DTM: NORMAL
MDC_IDC_MSMT_CAP_CHARGE_TIME: 9.2 S
MDC_IDC_MSMT_CAP_CHARGE_TYPE: NORMAL
MDC_IDC_MSMT_LEADCHNL_LV_IMPEDANCE_VALUE: 409 OHM
MDC_IDC_MSMT_LEADCHNL_LV_PACING_THRESHOLD_AMPLITUDE: 1.1 V
MDC_IDC_MSMT_LEADCHNL_LV_PACING_THRESHOLD_PULSEWIDTH: 0.5 MS
MDC_IDC_MSMT_LEADCHNL_RA_IMPEDANCE_VALUE: 687 OHM
MDC_IDC_MSMT_LEADCHNL_RA_PACING_THRESHOLD_AMPLITUDE: 0.5 V
MDC_IDC_MSMT_LEADCHNL_RA_PACING_THRESHOLD_PULSEWIDTH: 0.4 MS
MDC_IDC_MSMT_LEADCHNL_RV_IMPEDANCE_VALUE: 427 OHM
MDC_IDC_MSMT_LEADCHNL_RV_PACING_THRESHOLD_AMPLITUDE: 1.1 V
MDC_IDC_MSMT_LEADCHNL_RV_PACING_THRESHOLD_PULSEWIDTH: 0.4 MS
MDC_IDC_PG_IMPLANT_DTM: NORMAL
MDC_IDC_PG_MFG: NORMAL
MDC_IDC_PG_MODEL: NORMAL
MDC_IDC_PG_SERIAL: NORMAL
MDC_IDC_PG_TYPE: NORMAL
MDC_IDC_SESS_CLINIC_NAME: NORMAL
MDC_IDC_SESS_DTM: NORMAL
MDC_IDC_SESS_TYPE: NORMAL
MDC_IDC_SET_BRADY_AT_MODE_SWITCH_MODE: NORMAL
MDC_IDC_SET_BRADY_AT_MODE_SWITCH_RATE: 170 {BEATS}/MIN
MDC_IDC_SET_BRADY_LOWRATE: 55 {BEATS}/MIN
MDC_IDC_SET_BRADY_MAX_SENSOR_RATE: 130 {BEATS}/MIN
MDC_IDC_SET_BRADY_MAX_TRACKING_RATE: 130 {BEATS}/MIN
MDC_IDC_SET_BRADY_MODE: NORMAL
MDC_IDC_SET_BRADY_PAV_DELAY_LOW: 180 MS
MDC_IDC_SET_BRADY_SAV_DELAY_LOW: 120 MS
MDC_IDC_SET_CRT_LVRV_DELAY: 30 MS
MDC_IDC_SET_CRT_PACED_CHAMBERS: NORMAL
MDC_IDC_SET_LEADCHNL_LV_PACING_AMPLITUDE: 3 V
MDC_IDC_SET_LEADCHNL_LV_PACING_ANODE_ELECTRODE_1: NORMAL
MDC_IDC_SET_LEADCHNL_LV_PACING_ANODE_LOCATION_1: NORMAL
MDC_IDC_SET_LEADCHNL_LV_PACING_CAPTURE_MODE: NORMAL
MDC_IDC_SET_LEADCHNL_LV_PACING_CATHODE_ELECTRODE_1: NORMAL
MDC_IDC_SET_LEADCHNL_LV_PACING_CATHODE_LOCATION_1: NORMAL
MDC_IDC_SET_LEADCHNL_LV_PACING_PULSEWIDTH: 0.5 MS
MDC_IDC_SET_LEADCHNL_LV_SENSING_ADAPTATION_MODE: NORMAL
MDC_IDC_SET_LEADCHNL_LV_SENSING_ANODE_ELECTRODE_1: NORMAL
MDC_IDC_SET_LEADCHNL_LV_SENSING_ANODE_LOCATION_1: NORMAL
MDC_IDC_SET_LEADCHNL_LV_SENSING_CATHODE_ELECTRODE_1: NORMAL
MDC_IDC_SET_LEADCHNL_LV_SENSING_CATHODE_LOCATION_1: NORMAL
MDC_IDC_SET_LEADCHNL_LV_SENSING_SENSITIVITY: 1 MV
MDC_IDC_SET_LEADCHNL_RA_PACING_AMPLITUDE: 2 V
MDC_IDC_SET_LEADCHNL_RA_PACING_CAPTURE_MODE: NORMAL
MDC_IDC_SET_LEADCHNL_RA_PACING_POLARITY: NORMAL
MDC_IDC_SET_LEADCHNL_RA_PACING_PULSEWIDTH: 0.4 MS
MDC_IDC_SET_LEADCHNL_RA_SENSING_ADAPTATION_MODE: NORMAL
MDC_IDC_SET_LEADCHNL_RA_SENSING_POLARITY: NORMAL
MDC_IDC_SET_LEADCHNL_RA_SENSING_SENSITIVITY: 0.25 MV
MDC_IDC_SET_LEADCHNL_RV_PACING_AMPLITUDE: 2.2 V
MDC_IDC_SET_LEADCHNL_RV_PACING_CAPTURE_MODE: NORMAL
MDC_IDC_SET_LEADCHNL_RV_PACING_POLARITY: NORMAL
MDC_IDC_SET_LEADCHNL_RV_PACING_PULSEWIDTH: 0.4 MS
MDC_IDC_SET_LEADCHNL_RV_SENSING_ADAPTATION_MODE: NORMAL
MDC_IDC_SET_LEADCHNL_RV_SENSING_POLARITY: NORMAL
MDC_IDC_SET_LEADCHNL_RV_SENSING_SENSITIVITY: 0.6 MV
MDC_IDC_SET_ZONE_DETECTION_INTERVAL: 250 MS
MDC_IDC_SET_ZONE_DETECTION_INTERVAL: 333 MS
MDC_IDC_SET_ZONE_DETECTION_INTERVAL: 375 MS
MDC_IDC_SET_ZONE_TYPE: NORMAL
MDC_IDC_SET_ZONE_VENDOR_TYPE: NORMAL
MDC_IDC_STAT_AT_BURDEN_PERCENT: 0 %
MDC_IDC_STAT_AT_DTM_END: NORMAL
MDC_IDC_STAT_AT_DTM_START: NORMAL
MDC_IDC_STAT_BRADY_DTM_END: NORMAL
MDC_IDC_STAT_BRADY_DTM_START: NORMAL
MDC_IDC_STAT_BRADY_RA_PERCENT_PACED: 7 %
MDC_IDC_STAT_BRADY_RV_PERCENT_PACED: 100 %
MDC_IDC_STAT_CRT_DTM_END: NORMAL
MDC_IDC_STAT_CRT_DTM_START: NORMAL
MDC_IDC_STAT_CRT_LV_PERCENT_PACED: 100 %
MDC_IDC_STAT_EPISODE_RECENT_COUNT: 0
MDC_IDC_STAT_EPISODE_RECENT_COUNT_DTM_END: NORMAL
MDC_IDC_STAT_EPISODE_RECENT_COUNT_DTM_START: NORMAL
MDC_IDC_STAT_EPISODE_TYPE: NORMAL
MDC_IDC_STAT_EPISODE_VENDOR_TYPE: NORMAL
MDC_IDC_STAT_TACHYTHERAPY_ATP_DELIVERED_RECENT: 0
MDC_IDC_STAT_TACHYTHERAPY_ATP_DELIVERED_TOTAL: 0
MDC_IDC_STAT_TACHYTHERAPY_RECENT_DTM_END: NORMAL
MDC_IDC_STAT_TACHYTHERAPY_RECENT_DTM_START: NORMAL
MDC_IDC_STAT_TACHYTHERAPY_SHOCKS_ABORTED_RECENT: 0
MDC_IDC_STAT_TACHYTHERAPY_SHOCKS_ABORTED_TOTAL: 0
MDC_IDC_STAT_TACHYTHERAPY_SHOCKS_DELIVERED_RECENT: 0
MDC_IDC_STAT_TACHYTHERAPY_SHOCKS_DELIVERED_TOTAL: 0
MDC_IDC_STAT_TACHYTHERAPY_TOTAL_DTM_END: NORMAL
MDC_IDC_STAT_TACHYTHERAPY_TOTAL_DTM_START: NORMAL

## 2023-04-20 ENCOUNTER — PATIENT OUTREACH (OUTPATIENT)
Dept: CARE COORDINATION | Facility: CLINIC | Age: 78
End: 2023-04-20
Payer: COMMERCIAL

## 2023-04-20 SDOH — ECONOMIC STABILITY: INCOME INSECURITY: HOW HARD IS IT FOR YOU TO PAY FOR THE VERY BASICS LIKE FOOD, HOUSING, MEDICAL CARE, AND HEATING?: NOT HARD AT ALL

## 2023-04-20 SDOH — HEALTH STABILITY: PHYSICAL HEALTH: ON AVERAGE, HOW MANY DAYS PER WEEK DO YOU ENGAGE IN MODERATE TO STRENUOUS EXERCISE (LIKE A BRISK WALK)?: 2 DAYS

## 2023-04-20 SDOH — HEALTH STABILITY: PHYSICAL HEALTH: ON AVERAGE, HOW MANY MINUTES DO YOU ENGAGE IN EXERCISE AT THIS LEVEL?: 40 MIN

## 2023-04-20 SDOH — ECONOMIC STABILITY: FOOD INSECURITY: WITHIN THE PAST 12 MONTHS, YOU WORRIED THAT YOUR FOOD WOULD RUN OUT BEFORE YOU GOT MONEY TO BUY MORE.: NEVER TRUE

## 2023-04-20 SDOH — ECONOMIC STABILITY: FOOD INSECURITY: WITHIN THE PAST 12 MONTHS, THE FOOD YOU BOUGHT JUST DIDN'T LAST AND YOU DIDN'T HAVE MONEY TO GET MORE.: NEVER TRUE

## 2023-04-20 SDOH — ECONOMIC STABILITY: INCOME INSECURITY: IN THE LAST 12 MONTHS, WAS THERE A TIME WHEN YOU WERE NOT ABLE TO PAY THE MORTGAGE OR RENT ON TIME?: NO

## 2023-04-20 ASSESSMENT — SOCIAL DETERMINANTS OF HEALTH (SDOH)
HOW OFTEN DO YOU ATTEND CHURCH OR RELIGIOUS SERVICES?: NEVER
HOW OFTEN DO YOU GET TOGETHER WITH FRIENDS OR RELATIVES?: ONCE A WEEK
DO YOU BELONG TO ANY CLUBS OR ORGANIZATIONS SUCH AS CHURCH GROUPS UNIONS, FRATERNAL OR ATHLETIC GROUPS, OR SCHOOL GROUPS?: YES
IN A TYPICAL WEEK, HOW MANY TIMES DO YOU TALK ON THE PHONE WITH FAMILY, FRIENDS, OR NEIGHBORS?: TWICE A WEEK

## 2023-04-20 ASSESSMENT — LIFESTYLE VARIABLES
HOW MANY STANDARD DRINKS CONTAINING ALCOHOL DO YOU HAVE ON A TYPICAL DAY: 1 OR 2
AUDIT-C TOTAL SCORE: 1
HOW OFTEN DO YOU HAVE SIX OR MORE DRINKS ON ONE OCCASION: NEVER
SKIP TO QUESTIONS 9-10: 1
HOW OFTEN DO YOU HAVE A DRINK CONTAINING ALCOHOL: MONTHLY OR LESS

## 2023-04-21 ENCOUNTER — OFFICE VISIT (OUTPATIENT)
Dept: PEDIATRICS | Facility: CLINIC | Age: 78
End: 2023-04-21
Payer: COMMERCIAL

## 2023-04-21 VITALS
RESPIRATION RATE: 16 BRPM | WEIGHT: 188.7 LBS | TEMPERATURE: 97.6 F | BODY MASS INDEX: 31.4 KG/M2 | SYSTOLIC BLOOD PRESSURE: 90 MMHG | HEART RATE: 60 BPM | DIASTOLIC BLOOD PRESSURE: 50 MMHG | OXYGEN SATURATION: 98 %

## 2023-04-21 DIAGNOSIS — Z12.11 SCREENING FOR COLON CANCER: ICD-10-CM

## 2023-04-21 DIAGNOSIS — F41.1 GENERALIZED ANXIETY DISORDER: ICD-10-CM

## 2023-04-21 DIAGNOSIS — D12.6 ADENOMA OF COLON: ICD-10-CM

## 2023-04-21 DIAGNOSIS — Z01.818 PREOP GENERAL PHYSICAL EXAM: Primary | ICD-10-CM

## 2023-04-21 DIAGNOSIS — I42.9 IDIOPATHIC CARDIOMYOPATHY (H): ICD-10-CM

## 2023-04-21 DIAGNOSIS — D64.9 LOW HEMOGLOBIN: Primary | ICD-10-CM

## 2023-04-21 DIAGNOSIS — D64.9 LOW HEMOGLOBIN: ICD-10-CM

## 2023-04-21 DIAGNOSIS — E11.9 TYPE 2 DIABETES MELLITUS WITHOUT COMPLICATION, WITHOUT LONG-TERM CURRENT USE OF INSULIN (H): ICD-10-CM

## 2023-04-21 DIAGNOSIS — Z95.810 ICD (IMPLANTABLE CARDIOVERTER-DEFIBRILLATOR), BIVENTRICULAR, IN SITU: ICD-10-CM

## 2023-04-21 DIAGNOSIS — E78.5 HYPERLIPIDEMIA LDL GOAL <130: ICD-10-CM

## 2023-04-21 LAB
ALT SERPL W P-5'-P-CCNC: 20 U/L (ref 10–35)
ANION GAP SERPL CALCULATED.3IONS-SCNC: 14 MMOL/L (ref 7–15)
BUN SERPL-MCNC: 16.6 MG/DL (ref 8–23)
CALCIUM SERPL-MCNC: 9.4 MG/DL (ref 8.8–10.2)
CHLORIDE SERPL-SCNC: 106 MMOL/L (ref 98–107)
CREAT SERPL-MCNC: 0.95 MG/DL (ref 0.51–0.95)
CREAT UR-MCNC: 203 MG/DL
DEPRECATED HCO3 PLAS-SCNC: 20 MMOL/L (ref 22–29)
FERRITIN SERPL-MCNC: 228 NG/ML (ref 11–328)
GFR SERPL CREATININE-BSD FRML MDRD: 62 ML/MIN/1.73M2
GLUCOSE SERPL-MCNC: 192 MG/DL (ref 70–99)
HBA1C MFR BLD: 5.3 % (ref 0–5.6)
HGB BLD-MCNC: 11.6 G/DL (ref 11.7–15.7)
IRON BINDING CAPACITY (ROCHE): 275 UG/DL (ref 240–430)
IRON SATN MFR SERPL: 21 % (ref 15–46)
IRON SERPL-MCNC: 59 UG/DL (ref 37–145)
MICROALBUMIN UR-MCNC: 12.4 MG/L
MICROALBUMIN/CREAT UR: 6.11 MG/G CR (ref 0–25)
POTASSIUM SERPL-SCNC: 4.1 MMOL/L (ref 3.4–5.3)
SODIUM SERPL-SCNC: 140 MMOL/L (ref 136–145)
TSH SERPL DL<=0.005 MIU/L-ACNC: 1.72 UIU/ML (ref 0.3–4.2)

## 2023-04-21 PROCEDURE — 83036 HEMOGLOBIN GLYCOSYLATED A1C: CPT | Performed by: NURSE PRACTITIONER

## 2023-04-21 PROCEDURE — 84460 ALANINE AMINO (ALT) (SGPT): CPT | Performed by: NURSE PRACTITIONER

## 2023-04-21 PROCEDURE — 82728 ASSAY OF FERRITIN: CPT | Performed by: NURSE PRACTITIONER

## 2023-04-21 PROCEDURE — 80048 BASIC METABOLIC PNL TOTAL CA: CPT | Performed by: NURSE PRACTITIONER

## 2023-04-21 PROCEDURE — 82570 ASSAY OF URINE CREATININE: CPT | Performed by: NURSE PRACTITIONER

## 2023-04-21 PROCEDURE — 84443 ASSAY THYROID STIM HORMONE: CPT | Performed by: NURSE PRACTITIONER

## 2023-04-21 PROCEDURE — 82043 UR ALBUMIN QUANTITATIVE: CPT | Performed by: NURSE PRACTITIONER

## 2023-04-21 PROCEDURE — 83550 IRON BINDING TEST: CPT | Performed by: NURSE PRACTITIONER

## 2023-04-21 PROCEDURE — 85018 HEMOGLOBIN: CPT | Performed by: NURSE PRACTITIONER

## 2023-04-21 PROCEDURE — 99214 OFFICE O/P EST MOD 30 MIN: CPT | Performed by: NURSE PRACTITIONER

## 2023-04-21 PROCEDURE — 83540 ASSAY OF IRON: CPT | Performed by: NURSE PRACTITIONER

## 2023-04-21 PROCEDURE — 36415 COLL VENOUS BLD VENIPUNCTURE: CPT | Performed by: NURSE PRACTITIONER

## 2023-04-21 ASSESSMENT — PATIENT HEALTH QUESTIONNAIRE - PHQ9
SUM OF ALL RESPONSES TO PHQ QUESTIONS 1-9: 0
SUM OF ALL RESPONSES TO PHQ QUESTIONS 1-9: 0
10. IF YOU CHECKED OFF ANY PROBLEMS, HOW DIFFICULT HAVE THESE PROBLEMS MADE IT FOR YOU TO DO YOUR WORK, TAKE CARE OF THINGS AT HOME, OR GET ALONG WITH OTHER PEOPLE: NOT DIFFICULT AT ALL

## 2023-04-21 ASSESSMENT — PAIN SCALES - GENERAL: PAINLEVEL: NO PAIN (0)

## 2023-04-21 NOTE — PATIENT INSTRUCTIONS
Let's start taking 1/2 of your losartan daily (25mg daily).  Repeat BP on Tuesday      For informational purposes only. Not to replace the advice of your health care provider. Copyright   ,  [employer]. All rights reserved. Clinically reviewed by Leta Villa MD. [employer] [id number] - REV .  Preparing for Your Surgery  Getting started  A nurse will call you to review your health history and instructions. They will give you an arrival time based on your scheduled surgery time. Please be ready to share:  Your doctor's clinic name and phone number  Your medical, surgical, and anesthesia history  A list of allergies and sensitivities  A list of medicines, including herbal treatments and over-the-counter drugs  Whether the patient has a legal guardian (ask how to send us the papers in advance)  Please tell us if you're pregnant--or if there's any chance you might be pregnant. Some surgeries may injure a fetus (unborn baby), so they require a pregnancy test. Surgeries that are safe for a fetus don't always need a test, and you can choose whether to have one.   If you have a child who's having surgery, please ask for a copy of Preparing for Your Child's Surgery.    Preparing for surgery  Within 10 to 30 days of surgery: Have a pre-op exam (sometimes called an H&P, or History and Physical). This can be done at a clinic or pre-operative center.  If you're having a , you may not need this exam. Talk to your care team.  At your pre-op exam, talk to your care team about all medicines you take. If you need to stop any medicines before surgery, ask when to start taking them again.  We do this for your safety. Many medicines can make you bleed too much during surgery. Some change how well surgery (anesthesia) drugs work.  Call your insurance company to let them know you're having surgery. (If you don't have insurance, call [phone].)  Call your clinic if there's any change in your health. This  includes signs of a cold or flu (sore throat, runny nose, cough, rash, fever). It also includes a scrape or scratch near the surgery site.  If you have questions on the day of surgery, call your hospital or surgery center.  Eating and drinking guidelines  For your safety: Unless your surgeon tells you otherwise, follow the guidelines below.  Eat and drink as usual until 8 hours before you arrive for surgery. After that, no food or milk.  Drink clear liquids until 2 hours before you arrive. These are liquids you can see through, like water, Gatorade, and Propel Water. They also include plain black coffee and tea (no cream or milk), candy, and breath mints. You can spit out gum when you arrive.  If you drink alcohol: Stop drinking it the night before surgery.  If your care team tells you to take medicine on the morning of surgery, it's okay to take it with a sip of water.  Preventing infection  Shower or bathe the night before and morning of your surgery. Follow the instructions your clinic gave you. (If no instructions, use regular soap.)  Don't shave or clip hair near your surgery site. We'll remove the hair if needed.  Don't smoke or vape the morning of surgery. You may chew nicotine gum up to 2 hours before surgery. A nicotine patch is okay.  Note: Some surgeries require you to completely quit smoking and nicotine. Check with your surgeon.  Your care team will make every effort to keep you safe from infection. We will:  Clean our hands often with soap and water (or an alcohol-based hand rub).  Clean the skin at your surgery site with a special soap that kills germs.  Give you a special gown to keep you warm. (Cold raises the risk of infection.)  Wear special hair covers, masks, gowns and gloves during surgery.  Give antibiotic medicine, if prescribed. Not all surgeries need antibiotics.  What to bring on the day of surgery  Photo ID and insurance card  Copy of your health care directive, if you have one  Glasses  and hearing aids (bring cases)  You can't wear contacts during surgery  Inhaler and eye drops, if you use them (tell us about these when you arrive)  CPAP machine or breathing device, if you use them  A few personal items, if spending the night  If you have . . .  A pacemaker, ICD (cardiac defibrillator) or other implant: Bring the ID card.  An implanted stimulator: Bring the remote control.  A legal guardian: Bring a copy of the certified (court-stamped) guardianship papers.  Please remove any jewelry, including body piercings. Leave jewelry and other valuables at home.  If you're going home the day of surgery  You must have a responsible adult drive you home. They should stay with you overnight as well.  If you don't have someone to stay with you, and you aren't safe to go home alone, we may keep you overnight. Insurance often won't pay for this.  After surgery  If it's hard to control your pain or you need more pain medicine, please call your surgeon's office.  Questions?   If you have any questions for your care team, list them here: _________________________________________________________________________________________________________________________________________________________________________ ____________________________________ ____________________________________ ____________________________________

## 2023-04-21 NOTE — PROGRESS NOTES
[facility] [place]  [address]  [address]  [place] [address]  Phone: [phone]  Fax: [phone]  Primary Provider: Padmaja Sepulveda  Pre-op Performing Provider: APOLINAR GUTIERREZ      PREOPERATIVE EVALUATION:  Today's date: 4/21/2023    Kristie Jones is a 77 year old female who presents for a preoperative evaluation.  Surgical Information:  Surgery/Procedure: Colonoscopy   Surgery Location: [facility]   Surgeon: Dr. Dara Flowers   Surgery Date: 04/27/2023  Time of Surgery: NA  Where patient plans to recover: At home with family  Fax number for surgical facility: Note does not need to be faxed, will be available electronically in Epic.    Assessment & Plan     The proposed surgical procedure is considered LOW risk.    Preop general physical exam  Cleared for procedure  - Hemoglobin    Adenoma of colon  Screening for colon cancer  Last colonoscopy 2018.  5 year repeat needed due to adenoma of the colon.    Type 2 diabetes mellitus without complication, without long-term current use of insulin (H)  Diet controlled.   Lab Results   Component Value Date    A1C 5.3 04/21/2023    A1C 6.2 11/07/2022    A1C 6.4 04/07/2022    A1C 6.0 11/18/2021    A1C 6.1 01/25/2021    A1C 6.0 03/10/2020    A1C 5.2 08/06/2019    A1C 5.6 09/26/2018    A1C 5.4 02/13/2018     - TSH WITH FREE T4 REFLEX  - BASIC METABOLIC PANEL  - Albumin Random Urine Quantitative with Creat Ratio  - Hemoglobin A1c    Hyperlipidemia LDL goal <130  Stable.  Continue taking rosuvastatin daily  - ALT    Idiopathic cardiomyopathy (H)  Stable.  Followed by cardiology  Decreased losartan from 50mg to 25mg today.  Repeat blood pressure Tuesday.  - PRIMARY CARE FOLLOW-UP SCHEDULING; Future    Generalized anxiety disorder  Stable.  Continue with alprazolam as needed.  Continue with mirtazapine and venlafaxine.    ICD (implantable cardioverter-defibrillator), biventricular, in situ  Followed by  cardiology.       Implanted Device:   - Type of device: Pacemaker and defibrilator Patient advised to bring device information on day of surgery.      Risks and Recommendations:  The patient has the following additional risks and recommendations for perioperative complications:  Cardiovascular:   Pacemaker  Diabetes:  - Patient is not on insulin therapy: regular NPO guidelines can be followed.   Obstructive Sleep Apnea:   Stable. Wears CPAP nightly    Medication Instructions:   - aspirin: Discontinue aspirin 7-10 days prior to procedure to reduce bleeding risk. It should be resumed postoperatively.    - ACE/ARB: May be continued on the day of surgery.    - Calcium Channel Blockers: May be continued on the day of surgery.   - Statins: Continue taking on the day of surgery.    - Benzodiazepines: Continue without modification.   - SSRIs, SNRIs, TCAs, Antipsychotics: Continue without modification.     RECOMMENDATION:  APPROVAL GIVEN to proceed with proposed procedure, without further diagnostic evaluation.      Subjective     HPI related to upcoming procedure:  Repeat colonoscopy from 2018.  Adenoma of colon.  Screening for colon cancer        4/20/2023    11:35 AM   Preop Questions   1. Have you ever had a heart attack or stroke? No   2. Have you ever had surgery on your heart or blood vessels, such as a stent placement, a coronary artery bypass, or surgery on an artery in your head, neck, heart, or legs? No   3. Do you have chest pain with activity? No   4. Do you have a history of  heart failure? YES - Cardiomyopathy   5. Do you currently have a cold, bronchitis or symptoms of other infection? No   6. Do you have a cough, shortness of breath, or wheezing? No   7. Do you or anyone in your family have previous history of blood clots? No   8. Do you or does anyone in your family have a serious bleeding problem such as prolonged bleeding following surgeries or cuts? No   9. Have you ever had problems with anemia or been  told to take iron pills? YES - Hx   10. Have you had any abnormal blood loss such as black, tarry or bloody stools, or abnormal vaginal bleeding? No   11. Have you ever had a blood transfusion? YES - Prior surgery   11a. Have you ever had a transfusion reaction? No   12. Are you willing to have a blood transfusion if it is medically needed before, during, or after your surgery? Yes   13. Have you or any of your relatives ever had problems with anesthesia? No   14. Do you have sleep apnea, excessive snoring or daytime drowsiness? YES - Dx with SUSAN   14a. Do you have a CPAP machine? Yes   15. Do you have any artifical heart valves or other implanted medical devices like a pacemaker, defibrillator, or continuous glucose monitor? YES - pacemaker/defib present   15a. What type of device do you have? BS pacemaker/ defibrillator   15b. Name of the clinic that manages your device:  [facility]   16. Do you have artificial joints? YES - Right knee.   17. Are you allergic to latex? No       Health Care Directive:  Patient has a Health Care Directive on file    Preoperative Review of :   reviewed - controlled substances reflected in medication list.      Status of Chronic Conditions:  CHF - Patient has a longstanding history of moderate-severe CHF. Currently the patient's condition is stable. Current treatment regimen includes Angiotensin 2 receptor blocker, Coreg and ASA. The patient denies chest pain, edema, orthopnea, SOB or recent weight gain. Last Echocardiogram 2022, EKG 2022.     DIABETES - Patient has a longstanding history of DiabetesType Type II . Diet controlled and denies significant side effects. Control has been good. Complicating factors include but are not limited to: hypertension and hyperlipidemia.     HYPERLIPIDEMIA - Patient has a long history of significant Hyperlipidemia requiring medication for treatment with recent good control. Patient reports no problems or side effects with the  medication.     HYPERTENSION - Patient has longstanding history of HTN , currently denies any symptoms referable to elevated blood pressure. Specifically denies chest pain, palpitations, dyspnea, orthopnea, PND or peripheral edema. Blood pressure readings have been in normal range. Current medication regimen is as listed below. Patient denies any side effects of medication.     SLEEP PROBLEM - Patient has a longstanding history of sleep apnea.  Uses CPAP.      Review of Systems  CONSTITUTIONAL: NEGATIVE for fever, chills, change in weight  INTEGUMENTARY/SKIN: NEGATIVE for worrisome rashes, moles or lesions  EYES: NEGATIVE for vision changes or irritation  ENT/MOUTH: NEGATIVE for ear, mouth and throat problems  RESP: NEGATIVE for significant cough or SOB  CV: NEGATIVE for chest pain, palpitations or peripheral edema  GI: NEGATIVE for nausea, abdominal pain, heartburn, or change in bowel habits  : NEGATIVE for frequency, dysuria, or hematuria  MUSCULOSKELETAL: NEGATIVE for significant arthralgias or myalgia  NEURO: NEGATIVE for weakness, dizziness or paresthesias  ENDOCRINE: NEGATIVE for temperature intolerance, skin/hair changes  HEME: NEGATIVE for bleeding problems  PSYCHIATRIC: NEGATIVE for changes in mood or affect    Patient Active Problem List    Diagnosis Date Noted     Chronic systolic congestive heart failure (H) 10/10/2018     Priority: High       EF 35%       Idiopathic cardiomyopathy (H) 01/19/2012     Priority: High     Dr. 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 08/31/2015     Priority: Medium     Total knee replacement status 08/26/2015     Priority: Medium     Generalized anxiety disorder 09/30/2013     Priority: Medium     ICD (implantable cardioverter-defibrillator), biventricular, in situ 04/30/2012     Priority: Medium     SUSAN (obstructive sleep apnea) 01/19/2012     Priority: Medium     Cataract 01/19/2012     Priority: Medium     S.p removal 2011  Utility update for deleted IMO code  [employer] Update utility       Insomnia 01/19/2012     Priority: Medium     Esophageal reflux 02/04/2008     Priority: Medium     S/P total  right knee replacement: 1/7/13 01/11/2013     Priority: Low     Advanced directives, counseling/discussion 07/01/2011     Priority: Low     Advance Directive Problem List Overview:   Name Relationship Phone    Primary Health Care Agent            Alternative Health Care Agent          Patient states has Advance Directive and will bring in a copy to clinic. 7/1/2011        GENERAL OSTEOARTHROSIS [715.00] 11/23/2004     Priority: Low     knee        Past Medical History:   Diagnosis Date     Acute posthemorrhagic anemia 09/11/2015     Anemia      Chronic systolic congestive heart failure (H) 10/10/2018     EF 35%     Fibromyalgia      Gastro-oesophageal reflux disease      GENERAL OSTEOARTHROSIS [715.00] 11/23/2004    knee     Generalized anxiety disorder 09/30/2013     Hyperlipidemia LDL goal <130 02/10/2010    Failed simvastatin- muscle aches      Hypertension     because of the heart     Idiopathic cardiomyopathy (H) 01/19/2012    Dr. Osborne 3/2011 EF 30-35%      Insomnia 01/19/2012     Iron deficiency anemia 08/12/2015     Problem list name updated by automated process. Provider to review     LBBB (left bundle branch block)      Left ventricular systolic dysfunction:EF 35% 02/05/2012    Must stay on diovan per cardiology      Major depressive disorder, recurrent episode, mild (H) 02/17/2016      Migraine 06/21/2005     Problem list name updated by automated process. Provider to review     Nonischemic cardiomyopathy (H)     EF 30-35%, Dr. Osborne [facility] (suspect virus); s/p AICD     NSVT (nonsustained ventricular tachycardia) (H)      Obesity 01/20/2012     SUSAN (obstructive sleep apnea) 01/19/2012    CPAP      Pure hypercholesterolemia      Restless leg syndrome      Type 2 diabetes mellitus without complication (H) 09/24/2016     Past Surgical History:   Procedure Laterality Date     APPENDECTOMY       ARTHROPLASTY KNEE  1/7/2013    Procedure: ARTHROPLASTY KNEE;  Right Total Knee Arthroplasty       ARTHROPLASTY REVISION KNEE Right 8/26/2015    Procedure: ARTHROPLASTY REVISION KNEE;  Surgeon: Néstor Beth MD;  Location: [facility] OR     ARTHROSCOPY KNEE       bunionectomy left foot       CLOSED REDUCTION, PERCUTANEOUS PINNING FINGER, COMBINED Right 6/17/2021    Procedure: Closed reduction, pinning right long finger distal interphalangeal joint fracture;  Surgeon: Joseluis Delong MD;  Location: [facility] OR     COLONOSCOPY       CORONARY ANGIOGRAPHY ADULT ORDER  2002    normal     CORONARY ANGIOGRAPHY ADULT ORDER  12/7/12    no significant focal narrowing that would benefit from mechanical intervention      EP PACEMAKER GENERATOR REPLACEMENT- BI-VENTRICULAR N/A 8/25/2022    Procedure: Pacemaker Generator Replacement Biventricular;  Surgeon: Hillary Gonzalez MD;  Location:  HEART CARDIAC CATH LAB     ESOPHAGOSCOPY, GASTROSCOPY, DUODENOSCOPY (EGD), COMBINED N/A 7/9/2021    Procedure: ESOPHAGOGASTRODUODENOSCOPY, WITH BIOPSY with distal esophagus biopies to rule out Barretts esophagus by cold biopsy forceps;  Surgeon: Travis Harrington MD;  Location:  GI     HYSTERECTOMY       IMPLANT AUTOMATIC IMPLANTABLE CARDIOVERTER DEFIBRILLATOR  4/30/12     INSERT THORACIC PACEMAKER LEAD EPICARDIAL  5/1/2012    Procedure:INSERT THORACIC PACEMAKER LEAD EPICARDIAL; EPICARDIAL LEAD PLACEMENT; Surgeon:WEST ARECHIGA  PRIYANK; Location:[facility] OR     TONSILLECTOMY       TRANSPLANT - corneal lenses      Bilateral for cataracts     Current Outpatient Medications   Medication Sig Dispense Refill     acetaminophen (TYLENOL) 500 MG tablet Take 1,000 mg by mouth daily as needed for mild pain       ALPRAZolam (XANAX) 0.25 MG tablet Take 1 tablet (0.25 mg) by mouth nightly as needed for anxiety 90 tablet 3     aspirin 81 MG tablet Take 81 mg by mouth every evening       CALCIUM 500 MG OR TABS Takes 3 tabs daily takees total of 1200 mg daily       carvedilol (COREG) 25 MG tablet TAKE 1 TABLET TWICE A DAY WITH MEALS 180 tablet 3     Cholecalciferol (VITAMIN D) 2000 UNITS tablet Take 1 tablet by mouth daily.       ciprofloxacin (CIPRO) 500 MG tablet Take 1 tablet by mmouth before dental appointments 5 tablet 11     co-enzyme Q-10 50 MG CAPS Take 1 capsule by mouth every morning       conjugated estrogens (PREMARIN) 0.625 MG/GM vaginal cream Place 0.5 g vaginally twice a week 30 g 3     lifitegrast (XIIDRA) 5 % opthalmic solution Place 1 drop into both eyes 2 times daily       loratadine (CLARITIN) 10 MG tablet Take 10 mg by mouth every evening       losartan (COZAAR) 50 MG tablet Take 1 tablet (50 mg) by mouth daily 90 tablet 3     mirtazapine (REMERON) 7.5 MG tablet TAKE 1 TO 2 TABLETS 30 MINUTES BEFORE BEDTIME 180 tablet 3     Multiple Vitamins-Minerals (MULTIVITAMIN ADULT PO) Take 1 tablet by mouth daily       rosuvastatin (CRESTOR) 10 MG tablet TAKE 1 TABLET EVERY OTHER DAY 45 tablet 3     venlafaxine (EFFEXOR XR) 75 MG 24 hr capsule TAKE 3 CAPSULES ONCE DAILY Strength: 75 mg 270 capsule 3     clobetasol (TEMOVATE) 0.05 % external cream Apply thin layer to bilateral hand twice daily for 2 weeks (Patient not taking: Reported on 4/21/2023) 45 g 1       Allergies   Allergen Reactions     Corticosteroids Other (See Comments)     flush up through chest and face, decadron  flushing  Other reaction(s): Erythema     Phenothiazines Anxiety and GI  Disturbance     anxiety attack, compazine  Other reaction(s): Tardive Dyskinesia     Prochlorperazine      Other reaction(s): Tardive Dyskinesia     Amoxicillin Hives     Clindamycin Other (See Comments)     Burning sensation in chest     Decadron      flushing     Dexamethasone      Other reaction(s): Erythema     Compazine Anxiety        Social History     Tobacco Use     Smoking status: Never     Smokeless tobacco: Never   Vaping Use     Vaping status: Never Used   Substance Use Topics     Alcohol use: Yes     Comment: 2 glasses of wine/mo     Family History   Problem Relation Age of Onset     Cancer Father         intraabdominal mass - not colon cancer     Other Cancer Father      C.A.D. Mother      Hypertension Mother      Lipids Mother      Heart Disease Mother      Hyperlipidemia Mother      Cancer Daughter 36        brain      Diabetes Paternal Uncle 52     Prostate Cancer Brother      Heart Disease Sister         murmur     Anxiety Disorder Sister      Colon Cancer No family hx of      History   Drug Use No         Objective     BP 90/50 (BP Location: Right arm, Patient Position: Sitting, Cuff Size: Adult Large)   Pulse 60   Temp 97.6  F (36.4  C) (Tympanic)   Resp 16   Wt 85.6 kg (188 lb 11.2 oz)   SpO2 98%   BMI 31.40 kg/m      Physical Exam  GENERAL APPEARANCE: healthy, alert and no distress  HENT: ear canals and TM's normal and nose and mouth without ulcers or lesions  RESP: lungs clear to auscultation - no rales, rhonchi or wheezes  CV: regular rate and rhythm, normal S1 S2, no S3 or S4 and no murmur, click or rub   ABDOMEN: soft, nontender, no HSM or masses and bowel sounds normal  NEURO: Normal strength and tone, sensory exam grossly normal, mentation intact and speech normal    Recent Labs   Lab Test 11/07/22  0912 08/25/22  0836 04/09/22  0851 04/08/22  0649 04/07/22  1345 04/07/22  1252   HGB  --  11.7 12.4   < >  --  12.6   PLT  --  138* 154   < >  --  133*   INR  --   --   --   --  1.06   --    NA  --  141 139   < >  --  134   POTASSIUM  --  4.1 4.2   < >  --  3.7   CR  --  0.95 0.72   < >  --  1.02   A1C 6.2*  --   --   --   --  6.4*    < > = values in this interval not displayed.        Diagnostics:  Recent Results (from the past 168 hour(s))   TSH WITH FREE T4 REFLEX    Collection Time: 04/21/23 11:06 AM   Result Value Ref Range    TSH 1.72 0.30 - 4.20 uIU/mL   BASIC METABOLIC PANEL    Collection Time: 04/21/23 11:06 AM   Result Value Ref Range    Sodium 140 136 - 145 mmol/L    Potassium 4.1 3.4 - 5.3 mmol/L    Chloride 106 98 - 107 mmol/L    Carbon Dioxide (CO2) 20 (L) 22 - 29 mmol/L    Anion Gap 14 7 - 15 mmol/L    Urea Nitrogen 16.6 8.0 - 23.0 mg/dL    Creatinine 0.95 0.51 - 0.95 mg/dL    Calcium 9.4 8.8 - 10.2 mg/dL    Glucose 192 (H) 70 - 99 mg/dL    GFR Estimate 62 >60 mL/min/1.73m2   ALT    Collection Time: 04/21/23 11:06 AM   Result Value Ref Range    ALT 20 10 - 35 U/L   Albumin Random Urine Quantitative with Creat Ratio    Collection Time: 04/21/23 11:06 AM   Result Value Ref Range    Creatinine Urine mg/dL 203.0 mg/dL    Albumin Urine mg/L 12.4 mg/L    Albumin Urine mg/g Cr 6.11 0.00 - 25.00 mg/g Cr   Hemoglobin A1c    Collection Time: 04/21/23 11:06 AM   Result Value Ref Range    Hemoglobin A1C 5.3 0.0 - 5.6 %   Hemoglobin    Collection Time: 04/21/23 11:06 AM   Result Value Ref Range    Hemoglobin 11.6 (L) 11.7 - 15.7 g/dL   Ferritin    Collection Time: 04/21/23 11:06 AM   Result Value Ref Range    Ferritin 228 11 - 328 ng/mL   Iron and iron binding capacity    Collection Time: 04/21/23 11:06 AM   Result Value Ref Range    Iron 59 37 - 145 ug/dL    Iron Binding Capacity 275 240 - 430 ug/dL    Iron Sat Index 21 15 - 46 %      No EKG required for low risk surgery (cataract, skin procedure, breast biopsy, etc).   Colonoscopy    Revised Cardiac Risk Index (RCRI):  The patient has the following serious cardiovascular risks for perioperative complications:   - Congestive Heart Failure  (pulmonary edema, PND, s3 omid, CXR with pulmonary congestion, basilar rales) = 1 point     RCRI Interpretation: 1 point: Class II (low risk - 0.9% complication rate)           Signed Electronically by: Alanis Buitrago NP  Copy of this evaluation report is provided to requesting physician.      Answers for HPI/ROS submitted by the patient on 4/21/2023  If you checked off any problems, how difficult have these problems made it for you to do your work, take care of things at home, or get along with other people?: Not difficult at all  PHQ9 TOTAL SCORE: 0

## 2023-04-25 ENCOUNTER — ALLIED HEALTH/NURSE VISIT (OUTPATIENT)
Dept: PEDIATRICS | Facility: CLINIC | Age: 78
End: 2023-04-25
Payer: COMMERCIAL

## 2023-04-25 ENCOUNTER — NURSE TRIAGE (OUTPATIENT)
Dept: NURSING | Facility: CLINIC | Age: 78
End: 2023-04-25

## 2023-04-25 VITALS — OXYGEN SATURATION: 99 % | HEART RATE: 61 BPM | SYSTOLIC BLOOD PRESSURE: 89 MMHG | DIASTOLIC BLOOD PRESSURE: 50 MMHG

## 2023-04-25 DIAGNOSIS — Z01.30 BP CHECK: Primary | ICD-10-CM

## 2023-04-25 DIAGNOSIS — D64.9 LOW HEMOGLOBIN: Primary | ICD-10-CM

## 2023-04-25 PROCEDURE — 99207 PR NO CHARGE NURSE ONLY: CPT

## 2023-04-25 NOTE — TELEPHONE ENCOUNTER
Allergy and Anaphylaxis Emergency Plan     Child's name:   MARIS WANG Date of plan:  7/26/2021     YOB: 2004 Age:   16 YEARS OLD Weight:  180 LBS      Child has allergy to:  ALL TREE NUTS (EXCEPT ROASTED/PROCESSED ALMOND/HAZELNUT)     Child has asthma YES    Child has had anaphylaxis  YES   Child may carry medicine YES   Child may give him/herself medicine YES       IMPORTANT REMINDER   Anaphylaxis is a potentially life-threating , severe allergic reaction.  If in doubt, give epinephrine.    For Severe Allergy and Anaphylaxis   What to look for Give epinephrine!  What to do   If child has ANY of these severe symptoms after eating the food or having a sting, give epinephrine   · Shortness of breath, wheezing, or coughing   · Skin color is pale or has bluish color   · Weak Pulse   · Fainting or dizziness  · Tight or hoarse throat   · Trouble breathing or swallowing  · Swelling of lips or tongue that bother breathing   · Vomiting or diarrhea (if severe or combined with other symptoms)  · Many hives or redness over body  · Feeling of \"doom,\" confusion, altered consciousness, or agitation   SPECIAL SITUATION: If this box is checked, child has has an extremely severe allergy to an insect sting or the following food(s): _________________.  Even if the child has MILD symptoms after a sting or eating these foods, give epinephrine.    1. Inject epinephrine right away! Note time when epinephrine was given.  2. Call 911   · Ask for ambulance with epinephrine   · Tell rescue squad when epinephrine was given  3. Stay with child and:  · Call parents and child's doctor  · Give second dose of epinephrine, if symptoms get worse, continue, or do not get better in 5 minutes  · Keep child lying on back.  If the child vomits or has trouble breathing, keep child lying on his or her side  4. Give other medicine, if prescribed.  Do not use othe medicine in place of epinephrine   · Antihistamine  Triage Call:    Caller: Patient     Scheduled at [facility] for coloscopy on 4/27, prep starts am of 4/26/23.      Has been having low B/P's, below 90's.  Had B/P check today as follow-up from pre-op on 4/21/23.  Starting Saturday already was taking 1/2 dose of losartan after visit with NP in clinic for pre-op on 4/21/23, so now she isn't sure what to do as the B/P is still low.       She is very concerned about starting a coloscopy prep tomorrow, as this B/P is much lower than her usual's have been.      Cardiology follows patient and is prescribing losartan.     Protocol Recommended Disposition: Follow-up by Cardiology   Advise that will route to provider team ASAP to get a call back.    Also advised that if she isn't comfortable proceeding, she can cancel her coloscopy and reschedule after B/P questions and medications are worked out.   Caller verbalized understanding of instructions and questions answered.      Vita Linn RN on 4/25/2023 at 4:00 PM        Additional Information    Negative: MORE THAN A DOUBLE DOSE of a prescription or over-the-counter (OTC) drug    Negative: DOUBLE DOSE (an extra dose or lesser amount) of prescription drug and any symptoms (e.g., dizziness, nausea, pain, sleepiness)    Negative: DOUBLE DOSE (an extra dose or lesser amount) of over-the-counter (OTC) drug and any symptoms (e.g., dizziness, nausea, pain, sleepiness)    Negative: Took another person's prescription drug    Negative: DOUBLE DOSE (an extra dose or lesser amount) of prescription drug and NO symptoms  (Exception: A double dose of antibiotics.)    Negative: Diabetes drug error or overdose (e.g., took wrong type of insulin or took extra dose)    Negative: Caller has medication question about med NOT prescribed by PCP and triager unable to answer question (e.g., compatibility with other med, storage)    Negative: Prescription not at pharmacy and was prescribed by PCP recently  (Exception: triager has access to EMR    · Inhaler/bronchodialator              For Mild Allergic Reaction   What to look for  Monitor child  What to do    If child has had any mild symptoms, monitor child.  Symptoms may include:  · Itchy nose, sneezing, itchy mouth   · A few hives  · Mild stomach nausea or discomfort  Stay with child and:  · Watch closely  · Give antihistamine (if prescribes)  · Call parents and child's doctor   · If symptoms of severe allergy/anaphylaxis develop, use epinephrine. (See \"For Severe Allergy and Anaphylaxis.\")     Medicines/Doses  Epinephrine, intramuscular (list type) Dose:  0.30 mg (weight more than 25 kg)   Antihistamine, by mouth (type and dose):  BENADRYL 25 MG EVERY 6 HOURS AS NEEDED   Other ( for example inhaler/bronchodialator if child has asthma:  ALBUTEROL INHALER 2 PUFFS EVERY 4 HOURS AS NEEDED             Parent/Guardian Authorization Signature  Date  Physician/HCP Authorization Signature  Date       Additional Instructions:           Contacts    Call 911/ Rescue squad:       Doctor:  Phone:   Parent/Guardian: Phone:   Parent/Guardian:  Phone:     Other Emergency Contacts    Name/Relationship: Phone:   Name/Relationship:  Phone:                                              and prescription is recorded there. Go to Home Care and confirm for pharmacy.)    Negative: Pharmacy calling with prescription question and triager unable to answer question    Negative: Caller has URGENT medicine question about med that PCP or specialist prescribed and triager unable to answer question    Protocols used: MEDICATION QUESTION CALL-A-[place]

## 2023-04-25 NOTE — Clinical Note
BP check; patient concern about BP being low and if that will affect her before and after her procedure.  Patient stated that she starts the prep tomorrow and have concerns.

## 2023-04-25 NOTE — PROGRESS NOTES
Recommend decrease losartan (Cozaar) to 25mg. Cut pills in half or I can send in new prescription.    Padmaja Sepulveda MD  Internal Medicine & Pediatrics  [facility]  She/her

## 2023-04-25 NOTE — PROGRESS NOTES
Kristie Jones is a 77 year old patient who comes in today for a Blood Pressure check.  Initial BP:  BP (!) 89/50 (BP Location: Right arm, Patient Position: Sitting)   Pulse 61   SpO2 99%      61  Disposition: results routed to provider      BP check; patient concern about BP being low and if that will affect her before and after her procedure.  Patient stated that she starts the prep tomorrow and have concerns.       MARGE Rivas

## 2023-04-25 NOTE — H&P (VIEW-ONLY)
Recommend decrease losartan (Cozaar) to 25mg. Cut pills in half or I can send in new prescription.    Padmaja Sepulveda MD  Internal Medicine & Pediatrics  [facility]  She/her

## 2023-04-26 ENCOUNTER — TELEPHONE (OUTPATIENT)
Dept: CARDIOLOGY | Facility: CLINIC | Age: 78
End: 2023-04-26
Payer: COMMERCIAL

## 2023-04-26 DIAGNOSIS — I42.9 IDIOPATHIC CARDIOMYOPATHY (H): ICD-10-CM

## 2023-04-26 RX ORDER — LOSARTAN POTASSIUM 50 MG/1
50 TABLET ORAL DAILY
Qty: 90 TABLET | Refills: 3 | COMMUNITY
Start: 2023-04-26

## 2023-04-26 NOTE — TELEPHONE ENCOUNTER
Team received message from triage team that patient had called in yesterday about concerns over her low BP in relationship to the colonoscopy prep.  She has a low BP all the time now, and does not want to have it get even lower with the bowel prep for colonoscopy, scheduled for 4\27.    Writer notes that she has Losartan and Carvedilol on her med list.  States that after recently having in clinic BP check on 4\25, she was directed to cut the Losartan in 1\2 dose.  Writer has changed on med list to reflect actual dose patient is taking.  BP 89\50, pulse 61.   At home readings are also low, many with 90s to low 100 for systolic.    Writer attempted to reach patient, but had to LVM.  Instructed her to hold the Losartan for today, and I will ask Dr. Osborne about other options.   I also asked her to call back or send [employer] message.    Routed to Dr. Osborne for recommendations.    Kristan Connell, RN on 4/26/2023 at 8:43 AM      
Writer received call back from [employer].  She states she is just a bit anxious about the colonoscopy prep, since her BP is already low.  This morning it was 96\54.   She did not take Losartan or CoReg this morning, she was waiting for the feedback from Dr. Osborne.    Writer informed her of his recommendations.  She verbalized understanding to hold both medications today and tomorrow.    Writer instructed her to hydrate as best she can ahead of time, which now that she returned call at about 1130 am, does not give her much time to drink water.  She does verbalize understanding of importance of hydration.  She is using the Gatorade\Miralax prep, so at least the Gatorade has the electrolytes.   Instructed her to be extra careful when standing up.  She is in agreement.    Kristan Connell RN on 4/26/2023 at 11:30 AM    
yes

## 2023-04-26 NOTE — OR NURSING
Multiple calls/messages left for patient regarding pre op info. No answer. Detailed pre op info left on patient's name identified answering machine. [facility] leader notified.

## 2023-04-27 ENCOUNTER — ANESTHESIA (OUTPATIENT)
Dept: SURGERY | Facility: CLINIC | Age: 78
End: 2023-04-27
Payer: COMMERCIAL

## 2023-04-27 ENCOUNTER — HOSPITAL ENCOUNTER (OUTPATIENT)
Facility: CLINIC | Age: 78
Discharge: HOME | End: 2023-04-27
Attending: INTERNAL MEDICINE | Admitting: INTERNAL MEDICINE
Payer: COMMERCIAL

## 2023-04-27 ENCOUNTER — ANESTHESIA EVENT (OUTPATIENT)
Dept: SURGERY | Facility: CLINIC | Age: 78
End: 2023-04-27
Payer: COMMERCIAL

## 2023-04-27 VITALS
TEMPERATURE: 96 F | RESPIRATION RATE: 16 BRPM | HEART RATE: 73 BPM | BODY MASS INDEX: 31.92 KG/M2 | SYSTOLIC BLOOD PRESSURE: 108 MMHG | WEIGHT: 187 LBS | DIASTOLIC BLOOD PRESSURE: 56 MMHG | HEIGHT: 64 IN | OXYGEN SATURATION: 99 %

## 2023-04-27 LAB
COLONOSCOPY: NORMAL
GLUCOSE BLDC GLUCOMTR-MCNC: 86 MG/DL (ref 70–99)

## 2023-04-27 PROCEDURE — 272N000001 HC OR GENERAL SUPPLY STERILE: Performed by: INTERNAL MEDICINE

## 2023-04-27 PROCEDURE — 999N000141 HC STATISTIC PRE-PROCEDURE NURSING ASSESSMENT: Performed by: INTERNAL MEDICINE

## 2023-04-27 PROCEDURE — 370N000017 HC ANESTHESIA TECHNICAL FEE, PER MIN: Performed by: INTERNAL MEDICINE

## 2023-04-27 PROCEDURE — 250N000009 HC RX 250: Performed by: ANESTHESIOLOGY

## 2023-04-27 PROCEDURE — 258N000003 HC RX IP 258 OP 636: Performed by: ANESTHESIOLOGY

## 2023-04-27 PROCEDURE — 710N000012 HC RECOVERY PHASE 2, PER MINUTE: Performed by: INTERNAL MEDICINE

## 2023-04-27 PROCEDURE — 88305 TISSUE EXAM BY PATHOLOGIST: CPT | Mod: TC | Performed by: INTERNAL MEDICINE

## 2023-04-27 PROCEDURE — 360N000075 HC SURGERY LEVEL 2, PER MIN: Performed by: INTERNAL MEDICINE

## 2023-04-27 PROCEDURE — 250N000011 HC RX IP 250 OP 636: Performed by: ANESTHESIOLOGY

## 2023-04-27 PROCEDURE — 82962 GLUCOSE BLOOD TEST: CPT

## 2023-04-27 RX ORDER — LIDOCAINE HYDROCHLORIDE 10 MG/ML
INJECTION, SOLUTION INFILTRATION; PERINEURAL PRN
Status: DISCONTINUED | OUTPATIENT
Start: 2023-04-27 | End: 2023-04-27

## 2023-04-27 RX ORDER — ONDANSETRON 2 MG/ML
4 INJECTION INTRAMUSCULAR; INTRAVENOUS EVERY 6 HOURS PRN
Status: DISCONTINUED | OUTPATIENT
Start: 2023-04-27 | End: 2023-04-27 | Stop reason: HOSPADM

## 2023-04-27 RX ORDER — OXYCODONE HYDROCHLORIDE 5 MG/1
5 TABLET ORAL
Status: DISCONTINUED | OUTPATIENT
Start: 2023-04-27 | End: 2023-04-27 | Stop reason: HOSPADM

## 2023-04-27 RX ORDER — PROPOFOL 10 MG/ML
INJECTION, EMULSION INTRAVENOUS PRN
Status: DISCONTINUED | OUTPATIENT
Start: 2023-04-27 | End: 2023-04-27

## 2023-04-27 RX ORDER — LIDOCAINE 40 MG/G
CREAM TOPICAL
Status: DISCONTINUED | OUTPATIENT
Start: 2023-04-27 | End: 2023-04-27 | Stop reason: HOSPADM

## 2023-04-27 RX ORDER — NALOXONE HYDROCHLORIDE 0.4 MG/ML
0.4 INJECTION, SOLUTION INTRAMUSCULAR; INTRAVENOUS; SUBCUTANEOUS
Status: DISCONTINUED | OUTPATIENT
Start: 2023-04-27 | End: 2023-04-27 | Stop reason: HOSPADM

## 2023-04-27 RX ORDER — PROPOFOL 10 MG/ML
INJECTION, EMULSION INTRAVENOUS CONTINUOUS PRN
Status: DISCONTINUED | OUTPATIENT
Start: 2023-04-27 | End: 2023-04-27

## 2023-04-27 RX ORDER — NALOXONE HYDROCHLORIDE 0.4 MG/ML
0.2 INJECTION, SOLUTION INTRAMUSCULAR; INTRAVENOUS; SUBCUTANEOUS
Status: DISCONTINUED | OUTPATIENT
Start: 2023-04-27 | End: 2023-04-27 | Stop reason: HOSPADM

## 2023-04-27 RX ORDER — FLUMAZENIL 0.1 MG/ML
0.2 INJECTION, SOLUTION INTRAVENOUS
Status: DISCONTINUED | OUTPATIENT
Start: 2023-04-27 | End: 2023-04-27 | Stop reason: HOSPADM

## 2023-04-27 RX ORDER — ONDANSETRON 2 MG/ML
4 INJECTION INTRAMUSCULAR; INTRAVENOUS
Status: DISCONTINUED | OUTPATIENT
Start: 2023-04-27 | End: 2023-04-27 | Stop reason: HOSPADM

## 2023-04-27 RX ORDER — OXYCODONE HYDROCHLORIDE 5 MG/1
10 TABLET ORAL
Status: DISCONTINUED | OUTPATIENT
Start: 2023-04-27 | End: 2023-04-27 | Stop reason: HOSPADM

## 2023-04-27 RX ORDER — SODIUM CHLORIDE, SODIUM LACTATE, POTASSIUM CHLORIDE, CALCIUM CHLORIDE 600; 310; 30; 20 MG/100ML; MG/100ML; MG/100ML; MG/100ML
INJECTION, SOLUTION INTRAVENOUS CONTINUOUS PRN
Status: DISCONTINUED | OUTPATIENT
Start: 2023-04-27 | End: 2023-04-27

## 2023-04-27 RX ORDER — ONDANSETRON 4 MG/1
4 TABLET, ORALLY DISINTEGRATING ORAL EVERY 6 HOURS PRN
Status: DISCONTINUED | OUTPATIENT
Start: 2023-04-27 | End: 2023-04-27 | Stop reason: HOSPADM

## 2023-04-27 RX ORDER — SODIUM CHLORIDE, SODIUM LACTATE, POTASSIUM CHLORIDE, CALCIUM CHLORIDE 600; 310; 30; 20 MG/100ML; MG/100ML; MG/100ML; MG/100ML
INJECTION, SOLUTION INTRAVENOUS CONTINUOUS
Status: DISCONTINUED | OUTPATIENT
Start: 2023-04-27 | End: 2023-04-27 | Stop reason: HOSPADM

## 2023-04-27 RX ORDER — PROCHLORPERAZINE MALEATE 5 MG
5 TABLET ORAL EVERY 6 HOURS PRN
Status: DISCONTINUED | OUTPATIENT
Start: 2023-04-27 | End: 2023-04-27

## 2023-04-27 RX ADMIN — PROPOFOL 75 MCG/KG/MIN: 10 INJECTION, EMULSION INTRAVENOUS at 09:23

## 2023-04-27 RX ADMIN — PHENYLEPHRINE HYDROCHLORIDE 50 MCG: 10 INJECTION INTRAVENOUS at 09:33

## 2023-04-27 RX ADMIN — PROPOFOL 10 MG: 10 INJECTION, EMULSION INTRAVENOUS at 09:23

## 2023-04-27 RX ADMIN — SODIUM CHLORIDE, POTASSIUM CHLORIDE, SODIUM LACTATE AND CALCIUM CHLORIDE: 600; 310; 30; 20 INJECTION, SOLUTION INTRAVENOUS at 08:42

## 2023-04-27 RX ADMIN — SODIUM CHLORIDE, POTASSIUM CHLORIDE, SODIUM LACTATE AND CALCIUM CHLORIDE: 600; 310; 30; 20 INJECTION, SOLUTION INTRAVENOUS at 09:16

## 2023-04-27 RX ADMIN — LIDOCAINE HYDROCHLORIDE 20 MG: 10 INJECTION, SOLUTION INFILTRATION; PERINEURAL at 09:20

## 2023-04-27 RX ADMIN — MIDAZOLAM 2 MG: 1 INJECTION INTRAMUSCULAR; INTRAVENOUS at 09:16

## 2023-04-27 ASSESSMENT — ACTIVITIES OF DAILY LIVING (ADL): ADLS_ACUITY_SCORE: 35

## 2023-04-27 ASSESSMENT — NEW YORK HEART ASSOCIATION (NYHA) CLASSIFICATION: NYHA FUNCTIONAL CLASS: III

## 2023-04-27 NOTE — ANESTHESIA POSTPROCEDURE EVALUATION
Patient: Kristie A Linhart    Procedure: Procedure(s):  COLONOSCOPY with cold polypectomies       Anesthesia Type:  MAC    Note:     Postop Pain Control: Uneventful            Sign Out: Well controlled pain   PONV: No   Neuro/Psych: Uneventful            Sign Out: Acceptable/Baseline neuro status   Airway/Respiratory: Uneventful            Sign Out: Acceptable/Baseline resp. status   CV/Hemodynamics: Uneventful            Sign Out: Acceptable CV status; No obvious hypovolemia; No obvious fluid overload   Other NRE: NONE   DID A NON-ROUTINE EVENT OCCUR? No           Last vitals:  Vitals Value Taken Time   /56 04/27/23 1000   Temp 35.6  C (96  F) 04/27/23 0955   Pulse 77 04/27/23 1017   Resp 16 04/27/23 0955   SpO2 95 % 04/27/23 1017   Vitals shown include unvalidated device data.    Electronically Signed By: Ruben Beth MD  April 27, 2023  11:21 AM

## 2023-04-27 NOTE — ANESTHESIA CARE TRANSFER NOTE
Patient: Kristie A Linhart    Procedure: Procedure(s):  COLONOSCOPY with cold polypectomies       Diagnosis: Screen for colon cancer [Z12.11]  Diagnosis Additional Information: No value filed.    Anesthesia Type:   MAC     Note:    Oropharynx: oropharynx clear of all foreign objects  Level of Consciousness: awake  Oxygen Supplementation: room air    Independent Airway: airway patency satisfactory and stable  Dentition: dentition unchanged  Vital Signs Stable: post-procedure vital signs reviewed and stable  Report to RN Given: handoff report given  Patient transferred to: Phase II    Handoff Report: Identifed the Patient, Identified the Reponsible Provider, Reviewed the pertinent medical history, Discussed the surgical course, Reviewed Intra-OP anesthesia mangement and issues during anesthesia, Set expectations for post-procedure period and Allowed opportunity for questions and acknowledgement of understanding      Vitals:  Vitals Value Taken Time   /78 04/27/23 0955   Temp 35.6  C (96  F) 04/27/23 0955   Pulse 71 04/27/23 0957   Resp 16 04/27/23 0955   SpO2 100 % 04/27/23 0957   Vitals shown include unvalidated device data.    Electronically Signed By: WINNIE Treadwell CRNA  April 27, 2023  9:59 AM

## 2023-04-27 NOTE — INTERVAL H&P NOTE
"I have reviewed the surgical (or preoperative) H&P that is linked to this encounter, and examined the patient. There are no significant changes    Clinical Conditions Present on Arrival:  Clinically Significant Risk Factors Present on Admission                  # Obesity: Estimated body mass index is 31.4 kg/m  as calculated from the following:    Height as of 4/4/23: 1.651 m (5' 5\").    Weight as of 4/21/23: 85.6 kg (188 lb 11.2 oz).       "

## 2023-04-27 NOTE — ANESTHESIA PREPROCEDURE EVALUATION
Anesthesia Pre-Procedure Evaluation    Patient: Kristie Jones   MRN: [id number] : 1945        Preoperative Diagnosis: Finger fracture [S62.609A]   Procedure : Procedure(s):  Closed reduction possible open pinning right long finger distal interphalangeal joint fracture     Past Medical History:   Diagnosis Date     Acute posthemorrhagic anemia 2015     Anemia      Chronic systolic congestive heart failure (H) 10/10/2018     EF 35%     Fibromyalgia      Gastro-oesophageal reflux disease      GENERAL OSTEOARTHROSIS [715.00] 2004    knee     Generalized anxiety disorder 2013     Hyperlipidemia LDL goal <130 02/10/2010    Failed simvastatin- muscle aches      Hypertension     because of the heart     Idiopathic cardiomyopathy (H) 2012    Dr. Osborne 3/2011 EF 30-35%      Insomnia 2012     Iron deficiency anemia 2015     Problem list name updated by automated process. Provider to review     LBBB (left bundle branch block)      Left ventricular systolic dysfunction:EF 35% 2012    Must stay on diovan per cardiology      Major depressive disorder, recurrent episode, mild (H) 2016     Migraine 2005     Problem list name updated by automated process. Provider to review     Nonischemic cardiomyopathy (H)     EF 30-35%, Dr. Osborne [facility] (suspect virus); s/p AICD     NSVT (nonsustained ventricular tachycardia) (H)      Obesity 2012     SUSAN (obstructive sleep apnea) 2012    CPAP      Pure hypercholesterolemia      Restless leg syndrome      Type 2 diabetes mellitus without complication (H) 2016      Past Surgical History:   Procedure Laterality Date     APPENDECTOMY       ARTHROPLASTY KNEE  2013    Procedure: ARTHROPLASTY KNEE;  Right Total Knee Arthroplasty       ARTHROPLASTY REVISION KNEE Right 2015    Procedure: ARTHROPLASTY REVISION KNEE;  Surgeon: Néstor Beth MD;  Location: [facility] OR     ARTHROSCOPY KNEE       bunionectomy left foot       CLOSED  REDUCTION, PERCUTANEOUS PINNING FINGER, COMBINED Right 6/17/2021    Procedure: Closed reduction, pinning right long finger distal interphalangeal joint fracture;  Surgeon: Joseluis Delong MD;  Location:  OR     COLONOSCOPY       CORONARY ANGIOGRAPHY ADULT ORDER  2002    normal     CORONARY ANGIOGRAPHY ADULT ORDER  12/7/12    no significant focal narrowing that would benefit from mechanical intervention      EP PACEMAKER GENERATOR REPLACEMENT- BI-VENTRICULAR N/A 8/25/2022    Procedure: Pacemaker Generator Replacement Biventricular;  Surgeon: Hillary Gonzalez MD;  Location:  HEART [facility]ARD[facility] CATH LAB     ESOPHAGOSCOPY, GASTROSCOPY, DUODENOSCOPY (EGD), COMBINED N/A 7/9/2021    Procedure: ESOPHAGOGASTRODUODENOSCOPY, WITH BIOPSY with distal esophagus biopies to rule out Barretts esophagus by cold biopsy forceps;  Surgeon: Travis Harrington MD;  Location:  GI     HYSTERECTOMY       IMPLANT AUTOMATIC IMPLANTABLE CARDIOVERTER DEFIBRILLATOR  4/30/12     INSERT THORACIC PACEMAKER LEAD EPICARDIAL  5/1/2012    Procedure:INSERT THORACIC PACEMAKER LEAD EPICARDIAL; EPICARDIAL LEAD PLACEMENT; Surgeon:WEST ARECHIGA; Location: OR     TONSILLECTOMY       TRANSPLANT - corneal lenses      Bilateral for cataracts      Allergies   Allergen Reactions     Corticosteroids Other (See Comments)     flush up through chest and face, decadron  flushing  Other reaction(s): Erythema     Phenothiazines Anxiety and GI Disturbance     anxiety attack, compazine  Other reaction(s): Tardive Dyskinesia     Prochlorperazine      Other reaction(s): Tardive Dyskinesia     Amoxicillin Hives     Clindamycin Other (See Comments)     Burning sensation in chest     Dexamethasone      flushing     Dexamethasone      Other reaction(s): Erythema     Compazine Anxiety      Social History     Tobacco Use     Smoking status: Never     Smokeless tobacco: Never   Vaping Use     Vaping status: Never Used   Substance Use Topics     Alcohol  use: Yes     Comment: 2 glasses of wine/mo      Wt Readings from Last 1 Encounters:   04/27/23 84.8 kg (187 lb)        Anesthesia Evaluation   Pt has had prior anesthetic.     No history of anesthetic complications       ROS/MED HX  ENT/Pulmonary:     (+) sleep apnea, uses CPAP,     Neurologic:  - neg neurologic ROS     Cardiovascular: Comment: Chronic systolic congestive heart failure    ICD (implantable cardioverter-defibrillator), biventricular, in situ    Signs of severe pulmonary hypertension noted on echo    (+) hypertension-----CHF NYHA classification: III. ICD     METS/Exercise Tolerance:     Hematologic:  - neg hematologic  ROS     Musculoskeletal: Comment:  right long finger distal interphalangeal joint fracture (Right Hand)      GI/Hepatic:     (+) GERD, Asymptomatic on medication,     Renal/Genitourinary:  - neg Renal ROS     Endo:     (+) type II DM, Not using insulin, - not using insulin pump. Obesity,     Psychiatric/Substance Use:     (+) psychiatric history anxiety and depression     Infectious Disease:  - neg infectious disease ROS     Malignancy:  - neg malignancy ROS     Other:  - neg other ROS          Physical Exam    Airway        Mallampati: II   TM distance: > 3 FB   Neck ROM: full   Mouth opening: > 3 cm    Respiratory Devices and Support         Dental  no notable dental history     (+) Minor Abnormalities - some fillings, tiny chips      Cardiovascular   cardiovascular exam normal          Pulmonary   pulmonary exam normal                OUTSIDE LABS:  CBC:   Lab Results   Component Value Date    WBC 3.3 (L) 08/25/2022    WBC 9.6 04/09/2022    HGB 11.6 (L) 04/21/2023    HGB 11.7 08/25/2022    HCT 34.9 (L) 08/25/2022    HCT 38.1 04/09/2022     (L) 08/25/2022     04/09/2022     BMP:   Lab Results   Component Value Date     04/21/2023     08/25/2022    POTASSIUM 4.1 04/21/2023    POTASSIUM 4.1 08/25/2022    CHLORIDE 106 04/21/2023    CHLORIDE 109 08/25/2022    CO2  20 (L) 04/21/2023    CO2 26 08/25/2022    BUN 16.6 04/21/2023    BUN 15 08/25/2022    CR 0.95 04/21/2023    CR 0.95 08/25/2022     (H) 04/21/2023     (H) 08/26/2022     COAGS:   Lab Results   Component Value Date    PTT 30 12/05/2012    INR 1.06 04/07/2022    FIBR 465 04/09/2022     POC:   Lab Results   Component Value Date     (H) 06/17/2021     HEPATIC:   Lab Results   Component Value Date    ALBUMIN 3.5 04/09/2022    PROTTOTAL 6.6 (L) 04/09/2022    ALT 20 04/21/2023    AST 27 04/09/2022    ALKPHOS 74 04/09/2022    BILITOTAL 0.6 04/09/2022     OTHER:   Lab Results   Component Value Date    A1C 5.3 04/21/2023    WILEY 9.4 04/21/2023    MAG 2.1 04/07/2022    LIPASE 241 03/04/2016    TSH 1.72 04/21/2023    T4 0.78 02/13/2018    CRP 14.1 (H) 04/09/2022    SED 8 05/30/2019       Anesthesia Plan    ASA Status:  3      Anesthesia Type: MAC.              Consents    Anesthesia Plan(s) and associated risks, benefits, and realistic alternatives discussed. Questions answered and patient/representative(s) expressed understanding.     - Discussed: Risks, Benefits and Alternatives for the PROCEDURE were discussed     - Discussed with:  Patient         Postoperative Care            Comments:    Other Comments: Light sedation only - with versed. Avoid apnea or hypoventilation.                Ruben Beth MD

## 2023-05-01 LAB
PATH REPORT.COMMENTS IMP SPEC: NORMAL
PATH REPORT.COMMENTS IMP SPEC: NORMAL
PATH REPORT.FINAL DX SPEC: NORMAL
PATH REPORT.GROSS SPEC: NORMAL
PATH REPORT.MICROSCOPIC SPEC OTHER STN: NORMAL
PATH REPORT.RELEVANT HX SPEC: NORMAL
PHOTO IMAGE: NORMAL

## 2023-05-01 PROCEDURE — 88305 TISSUE EXAM BY PATHOLOGIST: CPT | Mod: 26 | Performed by: PATHOLOGY

## 2023-05-02 ENCOUNTER — TRANSFERRED RECORDS (OUTPATIENT)
Dept: HEALTH INFORMATION MANAGEMENT | Facility: CLINIC | Age: 78
End: 2023-05-02
Payer: COMMERCIAL

## 2023-05-18 ENCOUNTER — MYC MEDICAL ADVICE (OUTPATIENT)
Dept: PEDIATRICS | Facility: CLINIC | Age: 78
End: 2023-05-18
Payer: COMMERCIAL

## 2023-05-18 DIAGNOSIS — F41.1 GENERALIZED ANXIETY DISORDER: ICD-10-CM

## 2023-05-18 RX ORDER — ALPRAZOLAM 0.25 MG
0.25 TABLET ORAL
Qty: 90 TABLET | Refills: 0 | Status: SHIPPED | OUTPATIENT
Start: 2023-05-18 | End: 2023-09-13

## 2023-05-18 NOTE — TELEPHONE ENCOUNTER
Called pharmacy, xanax prescription  and must be resent to be refilled. Pended med.    Thank you!  Roman Parker RN on 2023 at 12:29 PM

## 2023-05-23 NOTE — TELEPHONE ENCOUNTER
Called [facility], spoke with Leslie, to see if they received the script from 05/18/2023.       Per Leslie it was too soon to fill, she attempted to re-run to see if insurnace will allow.       According to [facility] the last prescription was picked up by patient on 03/17/2023- therefore she is not able to get this one until 06/12/2023.     Sent patient a my chart message with this information and requesting to know how long [place].     Waiting for reply.    JOSE ELIAS Aguilera on 5/23/2023 at 10:31 AM

## 2023-05-23 NOTE — TELEPHONE ENCOUNTER
Per chart review the original script that was refilled on 2023 would have been written on 11/10/2022 with 3 refills. Script  6 months later-2023.     JOSE ELIAS Aguilera on 2023 at 11:32 AM

## 2023-05-25 NOTE — TELEPHONE ENCOUNTER
Ok for early refill of Xanax.      If a new prescription needs to be sent, or for 5 tablets so she has on hand when back, I can send in.    Padmaja Sepulveda MD  Internal Medicine & Pediatrics  [facility]  She/her

## 2023-05-25 NOTE — TELEPHONE ENCOUNTER
Called [facility] and spoke to pharmacist. Gave approval per PCP for early fill. They are processing this request now with the insurance. Advised patient via [employer].  Shawnee SALAS RN, BSN

## 2023-05-25 NOTE — TELEPHONE ENCOUNTER
Routing to Dr. Sepulveda - Ok for early refill of Xanax?   (A RN should be able to give a verbal ok to the pharmacy).     Patient leaving for vacation on 5/29-6/17.   Would like to have the rx on hand so she doesn't have to rush to the pharmacy when she is back.

## 2023-07-03 ENCOUNTER — ANCILLARY PROCEDURE (OUTPATIENT)
Dept: CARDIOLOGY | Facility: CLINIC | Age: 78
End: 2023-07-03
Attending: INTERNAL MEDICINE
Payer: COMMERCIAL

## 2023-07-03 DIAGNOSIS — I44.7 LBBB (LEFT BUNDLE BRANCH BLOCK): ICD-10-CM

## 2023-07-03 DIAGNOSIS — I42.9 IDIOPATHIC CARDIOMYOPATHY (H): ICD-10-CM

## 2023-07-03 DIAGNOSIS — Z95.810 ICD (IMPLANTABLE CARDIOVERTER-DEFIBRILLATOR) IN PLACE: ICD-10-CM

## 2023-07-03 DIAGNOSIS — I50.22 CHRONIC SYSTOLIC CONGESTIVE HEART FAILURE (H): ICD-10-CM

## 2023-07-03 DIAGNOSIS — E78.5 HYPERLIPIDEMIA LDL GOAL <130: Primary | ICD-10-CM

## 2023-07-03 PROCEDURE — 93296 REM INTERROG EVL PM/IDS: CPT | Performed by: INTERNAL MEDICINE

## 2023-07-03 PROCEDURE — 93295 DEV INTERROG REMOTE 1/2/MLT: CPT | Performed by: INTERNAL MEDICINE

## 2023-07-08 ENCOUNTER — HEALTH MAINTENANCE LETTER (OUTPATIENT)
Age: 78
End: 2023-07-08

## 2023-07-13 DIAGNOSIS — E78.5 HYPERLIPIDEMIA LDL GOAL <130: ICD-10-CM

## 2023-07-13 RX ORDER — ROSUVASTATIN CALCIUM 10 MG/1
TABLET, COATED ORAL
Qty: 45 TABLET | Refills: 3 | Status: SHIPPED | OUTPATIENT
Start: 2023-07-13 | End: 2024-01-08

## 2023-07-20 ENCOUNTER — MYC MEDICAL ADVICE (OUTPATIENT)
Dept: PEDIATRICS | Facility: CLINIC | Age: 78
End: 2023-07-20
Payer: COMMERCIAL

## 2023-07-20 DIAGNOSIS — I50.22 CHRONIC SYSTOLIC CONGESTIVE HEART FAILURE (H): Primary | ICD-10-CM

## 2023-07-22 LAB
MDC_IDC_EPISODE_DTM: NORMAL
MDC_IDC_EPISODE_ID: NORMAL
MDC_IDC_EPISODE_TYPE: NORMAL
MDC_IDC_LEAD_IMPLANT_DT: NORMAL
MDC_IDC_LEAD_LOCATION: NORMAL
MDC_IDC_LEAD_LOCATION_DETAIL_1: NORMAL
MDC_IDC_LEAD_MFG: NORMAL
MDC_IDC_LEAD_MODEL: 4047
MDC_IDC_LEAD_MODEL: 4047
MDC_IDC_LEAD_MODEL: NORMAL
MDC_IDC_LEAD_MODEL: NORMAL
MDC_IDC_LEAD_POLARITY_TYPE: NORMAL
MDC_IDC_LEAD_SERIAL: NORMAL
MDC_IDC_MSMT_BATTERY_DTM: NORMAL
MDC_IDC_MSMT_BATTERY_REMAINING_LONGEVITY: 102 MO
MDC_IDC_MSMT_BATTERY_REMAINING_PERCENTAGE: 100 %
MDC_IDC_MSMT_BATTERY_STATUS: NORMAL
MDC_IDC_MSMT_CAP_CHARGE_DTM: NORMAL
MDC_IDC_MSMT_CAP_CHARGE_TIME: 9.2 S
MDC_IDC_MSMT_CAP_CHARGE_TYPE: NORMAL
MDC_IDC_MSMT_LEADCHNL_LV_IMPEDANCE_VALUE: 370 OHM
MDC_IDC_MSMT_LEADCHNL_LV_PACING_THRESHOLD_AMPLITUDE: 1.6 V
MDC_IDC_MSMT_LEADCHNL_LV_PACING_THRESHOLD_PULSEWIDTH: 0.5 MS
MDC_IDC_MSMT_LEADCHNL_RA_IMPEDANCE_VALUE: 707 OHM
MDC_IDC_MSMT_LEADCHNL_RA_PACING_THRESHOLD_AMPLITUDE: 0.4 V
MDC_IDC_MSMT_LEADCHNL_RA_PACING_THRESHOLD_PULSEWIDTH: 0.4 MS
MDC_IDC_MSMT_LEADCHNL_RV_IMPEDANCE_VALUE: 407 OHM
MDC_IDC_MSMT_LEADCHNL_RV_PACING_THRESHOLD_AMPLITUDE: 0.9 V
MDC_IDC_MSMT_LEADCHNL_RV_PACING_THRESHOLD_PULSEWIDTH: 0.4 MS
MDC_IDC_PG_IMPLANT_DTM: NORMAL
MDC_IDC_PG_MFG: NORMAL
MDC_IDC_PG_MODEL: NORMAL
MDC_IDC_PG_SERIAL: NORMAL
MDC_IDC_PG_TYPE: NORMAL
MDC_IDC_SESS_CLINIC_NAME: NORMAL
MDC_IDC_SESS_DTM: NORMAL
MDC_IDC_SESS_TYPE: NORMAL
MDC_IDC_SET_BRADY_AT_MODE_SWITCH_MODE: NORMAL
MDC_IDC_SET_BRADY_AT_MODE_SWITCH_RATE: 170 {BEATS}/MIN
MDC_IDC_SET_BRADY_LOWRATE: 55 {BEATS}/MIN
MDC_IDC_SET_BRADY_MAX_SENSOR_RATE: 130 {BEATS}/MIN
MDC_IDC_SET_BRADY_MAX_TRACKING_RATE: 130 {BEATS}/MIN
MDC_IDC_SET_BRADY_MODE: NORMAL
MDC_IDC_SET_BRADY_PAV_DELAY_LOW: 180 MS
MDC_IDC_SET_BRADY_SAV_DELAY_LOW: 120 MS
MDC_IDC_SET_CRT_LVRV_DELAY: 30 MS
MDC_IDC_SET_CRT_PACED_CHAMBERS: NORMAL
MDC_IDC_SET_LEADCHNL_LV_PACING_AMPLITUDE: 2.7 V
MDC_IDC_SET_LEADCHNL_LV_PACING_ANODE_ELECTRODE_1: NORMAL
MDC_IDC_SET_LEADCHNL_LV_PACING_ANODE_LOCATION_1: NORMAL
MDC_IDC_SET_LEADCHNL_LV_PACING_CAPTURE_MODE: NORMAL
MDC_IDC_SET_LEADCHNL_LV_PACING_CATHODE_ELECTRODE_1: NORMAL
MDC_IDC_SET_LEADCHNL_LV_PACING_CATHODE_LOCATION_1: NORMAL
MDC_IDC_SET_LEADCHNL_LV_PACING_PULSEWIDTH: 0.5 MS
MDC_IDC_SET_LEADCHNL_LV_SENSING_ADAPTATION_MODE: NORMAL
MDC_IDC_SET_LEADCHNL_LV_SENSING_ANODE_ELECTRODE_1: NORMAL
MDC_IDC_SET_LEADCHNL_LV_SENSING_ANODE_LOCATION_1: NORMAL
MDC_IDC_SET_LEADCHNL_LV_SENSING_CATHODE_ELECTRODE_1: NORMAL
MDC_IDC_SET_LEADCHNL_LV_SENSING_CATHODE_LOCATION_1: NORMAL
MDC_IDC_SET_LEADCHNL_LV_SENSING_SENSITIVITY: 1 MV
MDC_IDC_SET_LEADCHNL_RA_PACING_AMPLITUDE: 2 V
MDC_IDC_SET_LEADCHNL_RA_PACING_CAPTURE_MODE: NORMAL
MDC_IDC_SET_LEADCHNL_RA_PACING_POLARITY: NORMAL
MDC_IDC_SET_LEADCHNL_RA_PACING_PULSEWIDTH: 0.4 MS
MDC_IDC_SET_LEADCHNL_RA_SENSING_ADAPTATION_MODE: NORMAL
MDC_IDC_SET_LEADCHNL_RA_SENSING_POLARITY: NORMAL
MDC_IDC_SET_LEADCHNL_RA_SENSING_SENSITIVITY: 0.25 MV
MDC_IDC_SET_LEADCHNL_RV_PACING_AMPLITUDE: 2 V
MDC_IDC_SET_LEADCHNL_RV_PACING_CAPTURE_MODE: NORMAL
MDC_IDC_SET_LEADCHNL_RV_PACING_POLARITY: NORMAL
MDC_IDC_SET_LEADCHNL_RV_PACING_PULSEWIDTH: 0.4 MS
MDC_IDC_SET_LEADCHNL_RV_SENSING_ADAPTATION_MODE: NORMAL
MDC_IDC_SET_LEADCHNL_RV_SENSING_POLARITY: NORMAL
MDC_IDC_SET_LEADCHNL_RV_SENSING_SENSITIVITY: 0.6 MV
MDC_IDC_SET_ZONE_DETECTION_INTERVAL: 250 MS
MDC_IDC_SET_ZONE_DETECTION_INTERVAL: 333 MS
MDC_IDC_SET_ZONE_DETECTION_INTERVAL: 375 MS
MDC_IDC_SET_ZONE_TYPE: NORMAL
MDC_IDC_SET_ZONE_VENDOR_TYPE: NORMAL
MDC_IDC_STAT_AT_BURDEN_PERCENT: 0 %
MDC_IDC_STAT_AT_DTM_END: NORMAL
MDC_IDC_STAT_AT_DTM_START: NORMAL
MDC_IDC_STAT_BRADY_DTM_END: NORMAL
MDC_IDC_STAT_BRADY_DTM_START: NORMAL
MDC_IDC_STAT_BRADY_RA_PERCENT_PACED: 7 %
MDC_IDC_STAT_BRADY_RV_PERCENT_PACED: 100 %
MDC_IDC_STAT_CRT_DTM_END: NORMAL
MDC_IDC_STAT_CRT_DTM_START: NORMAL
MDC_IDC_STAT_CRT_LV_PERCENT_PACED: 100 %
MDC_IDC_STAT_EPISODE_RECENT_COUNT: 0
MDC_IDC_STAT_EPISODE_RECENT_COUNT_DTM_END: NORMAL
MDC_IDC_STAT_EPISODE_RECENT_COUNT_DTM_START: NORMAL
MDC_IDC_STAT_EPISODE_TYPE: NORMAL
MDC_IDC_STAT_EPISODE_VENDOR_TYPE: NORMAL
MDC_IDC_STAT_TACHYTHERAPY_ATP_DELIVERED_RECENT: 0
MDC_IDC_STAT_TACHYTHERAPY_ATP_DELIVERED_TOTAL: 0
MDC_IDC_STAT_TACHYTHERAPY_RECENT_DTM_END: NORMAL
MDC_IDC_STAT_TACHYTHERAPY_RECENT_DTM_START: NORMAL
MDC_IDC_STAT_TACHYTHERAPY_SHOCKS_ABORTED_RECENT: 0
MDC_IDC_STAT_TACHYTHERAPY_SHOCKS_ABORTED_TOTAL: 0
MDC_IDC_STAT_TACHYTHERAPY_SHOCKS_DELIVERED_RECENT: 0
MDC_IDC_STAT_TACHYTHERAPY_SHOCKS_DELIVERED_TOTAL: 0
MDC_IDC_STAT_TACHYTHERAPY_TOTAL_DTM_END: NORMAL
MDC_IDC_STAT_TACHYTHERAPY_TOTAL_DTM_START: NORMAL

## 2023-07-25 ENCOUNTER — MYC MEDICAL ADVICE (OUTPATIENT)
Dept: CARDIOLOGY | Facility: CLINIC | Age: 78
End: 2023-07-25
Payer: COMMERCIAL

## 2023-08-01 RX ORDER — SPIRONOLACTONE 25 MG/1
TABLET ORAL
Qty: 45 TABLET | Refills: 4 | Status: SHIPPED | OUTPATIENT
Start: 2023-08-01 | End: 2024-01-02

## 2023-09-13 ENCOUNTER — MYC REFILL (OUTPATIENT)
Dept: PEDIATRICS | Facility: CLINIC | Age: 78
End: 2023-09-13
Payer: COMMERCIAL

## 2023-09-13 DIAGNOSIS — F41.1 GENERALIZED ANXIETY DISORDER: ICD-10-CM

## 2023-09-13 RX ORDER — ALPRAZOLAM 0.25 MG
0.25 TABLET ORAL
Qty: 90 TABLET | Refills: 0 | Status: SHIPPED | OUTPATIENT
Start: 2023-09-13 | End: 2023-12-10

## 2023-09-13 NOTE — TELEPHONE ENCOUNTER
Routing refill request to provider for review/approval because:  Drug not on the [facility] refill protocol     Last refill 5/18/23  qty 90.      Celeste Unger RN

## 2023-09-23 ENCOUNTER — MYC MEDICAL ADVICE (OUTPATIENT)
Dept: PEDIATRICS | Facility: CLINIC | Age: 78
End: 2023-09-23
Payer: COMMERCIAL

## 2023-10-03 ENCOUNTER — OFFICE VISIT (OUTPATIENT)
Dept: PEDIATRICS | Facility: CLINIC | Age: 78
End: 2023-10-03
Payer: COMMERCIAL

## 2023-10-03 VITALS
DIASTOLIC BLOOD PRESSURE: 50 MMHG | HEART RATE: 68 BPM | HEIGHT: 64 IN | SYSTOLIC BLOOD PRESSURE: 94 MMHG | RESPIRATION RATE: 18 BRPM | OXYGEN SATURATION: 96 % | WEIGHT: 198.9 LBS | BODY MASS INDEX: 33.96 KG/M2 | TEMPERATURE: 98.4 F

## 2023-10-03 DIAGNOSIS — Z13.1 SCREENING FOR DIABETES MELLITUS: ICD-10-CM

## 2023-10-03 DIAGNOSIS — E78.5 HYPERLIPIDEMIA LDL GOAL <130: ICD-10-CM

## 2023-10-03 DIAGNOSIS — Z00.00 ENCOUNTER FOR MEDICARE ANNUAL WELLNESS EXAM: Primary | ICD-10-CM

## 2023-10-03 DIAGNOSIS — D64.9 LOW HEMOGLOBIN: ICD-10-CM

## 2023-10-03 DIAGNOSIS — I42.9 IDIOPATHIC CARDIOMYOPATHY (H): ICD-10-CM

## 2023-10-03 DIAGNOSIS — E66.01 MORBID OBESITY (H): ICD-10-CM

## 2023-10-03 DIAGNOSIS — G47.00 INSOMNIA, UNSPECIFIED TYPE: ICD-10-CM

## 2023-10-03 DIAGNOSIS — E11.9 TYPE 2 DIABETES MELLITUS WITHOUT COMPLICATION, WITHOUT LONG-TERM CURRENT USE OF INSULIN (H): ICD-10-CM

## 2023-10-03 LAB
ALBUMIN SERPL BCG-MCNC: 4.3 G/DL (ref 3.5–5.2)
ALP SERPL-CCNC: 73 U/L (ref 35–104)
ALT SERPL W P-5'-P-CCNC: 16 U/L (ref 0–50)
ANION GAP SERPL CALCULATED.3IONS-SCNC: 11 MMOL/L (ref 7–15)
AST SERPL W P-5'-P-CCNC: 23 U/L (ref 0–45)
BILIRUB SERPL-MCNC: 0.6 MG/DL
BUN SERPL-MCNC: 19.1 MG/DL (ref 8–23)
CALCIUM SERPL-MCNC: 8.9 MG/DL (ref 8.8–10.2)
CHLORIDE SERPL-SCNC: 108 MMOL/L (ref 98–107)
CHOLEST SERPL-MCNC: 174 MG/DL
CREAT SERPL-MCNC: 0.87 MG/DL (ref 0.51–0.95)
DEPRECATED HCO3 PLAS-SCNC: 22 MMOL/L (ref 22–29)
EGFRCR SERPLBLD CKD-EPI 2021: 69 ML/MIN/1.73M2
ERYTHROCYTE [DISTWIDTH] IN BLOOD BY AUTOMATED COUNT: 12.7 % (ref 10–15)
GLUCOSE SERPL-MCNC: 123 MG/DL (ref 70–99)
HBA1C MFR BLD: 5.7 % (ref 0–5.6)
HCT VFR BLD AUTO: 33.3 % (ref 35–47)
HDLC SERPL-MCNC: 37 MG/DL
HGB BLD-MCNC: 11.3 G/DL (ref 11.7–15.7)
LDLC SERPL CALC-MCNC: 92 MG/DL
MCH RBC QN AUTO: 31.6 PG (ref 26.5–33)
MCHC RBC AUTO-ENTMCNC: 33.9 G/DL (ref 31.5–36.5)
MCV RBC AUTO: 93 FL (ref 78–100)
NONHDLC SERPL-MCNC: 137 MG/DL
PLATELET # BLD AUTO: 145 10E3/UL (ref 150–450)
POTASSIUM SERPL-SCNC: 4.2 MMOL/L (ref 3.4–5.3)
PROT SERPL-MCNC: 6.2 G/DL (ref 6.4–8.3)
RBC # BLD AUTO: 3.58 10E6/UL (ref 3.8–5.2)
SODIUM SERPL-SCNC: 141 MMOL/L (ref 135–145)
TRIGL SERPL-MCNC: 227 MG/DL
WBC # BLD AUTO: 3.7 10E3/UL (ref 4–11)

## 2023-10-03 PROCEDURE — G0008 ADMIN INFLUENZA VIRUS VAC: HCPCS | Performed by: STUDENT IN AN ORGANIZED HEALTH CARE EDUCATION/TRAINING PROGRAM

## 2023-10-03 PROCEDURE — 80053 COMPREHEN METABOLIC PANEL: CPT | Performed by: STUDENT IN AN ORGANIZED HEALTH CARE EDUCATION/TRAINING PROGRAM

## 2023-10-03 PROCEDURE — 36415 COLL VENOUS BLD VENIPUNCTURE: CPT | Performed by: STUDENT IN AN ORGANIZED HEALTH CARE EDUCATION/TRAINING PROGRAM

## 2023-10-03 PROCEDURE — 83036 HEMOGLOBIN GLYCOSYLATED A1C: CPT | Performed by: STUDENT IN AN ORGANIZED HEALTH CARE EDUCATION/TRAINING PROGRAM

## 2023-10-03 PROCEDURE — G0439 PPPS, SUBSEQ VISIT: HCPCS | Performed by: STUDENT IN AN ORGANIZED HEALTH CARE EDUCATION/TRAINING PROGRAM

## 2023-10-03 PROCEDURE — 90662 IIV NO PRSV INCREASED AG IM: CPT | Performed by: STUDENT IN AN ORGANIZED HEALTH CARE EDUCATION/TRAINING PROGRAM

## 2023-10-03 PROCEDURE — 85027 COMPLETE CBC AUTOMATED: CPT | Performed by: STUDENT IN AN ORGANIZED HEALTH CARE EDUCATION/TRAINING PROGRAM

## 2023-10-03 PROCEDURE — 80061 LIPID PANEL: CPT | Performed by: STUDENT IN AN ORGANIZED HEALTH CARE EDUCATION/TRAINING PROGRAM

## 2023-10-03 RX ORDER — MIRTAZAPINE 7.5 MG/1
TABLET, FILM COATED ORAL
Qty: 180 TABLET | Refills: 4 | Status: SHIPPED | OUTPATIENT
Start: 2023-10-03

## 2023-10-03 ASSESSMENT — ENCOUNTER SYMPTOMS
FEVER: 0
NAUSEA: 0
PALPITATIONS: 0
DYSURIA: 0
MYALGIAS: 1
HEADACHES: 1
ARTHRALGIAS: 0
PARESTHESIAS: 0
HEMATOCHEZIA: 0
HEMATURIA: 0
CONSTIPATION: 0
WEAKNESS: 0
CHILLS: 0
HEARTBURN: 0
COUGH: 0
SORE THROAT: 0
BREAST MASS: 0
ABDOMINAL PAIN: 0
EYE PAIN: 0
FREQUENCY: 0
NERVOUS/ANXIOUS: 0
DIARRHEA: 0
SHORTNESS OF BREATH: 1
DIZZINESS: 0
JOINT SWELLING: 0

## 2023-10-03 ASSESSMENT — PAIN SCALES - GENERAL: PAINLEVEL: NO PAIN (0)

## 2023-10-03 ASSESSMENT — ACTIVITIES OF DAILY LIVING (ADL): CURRENT_FUNCTION: NO ASSISTANCE NEEDED

## 2023-10-03 ASSESSMENT — PATIENT HEALTH QUESTIONNAIRE - PHQ9
SUM OF ALL RESPONSES TO PHQ QUESTIONS 1-9: 1
SUM OF ALL RESPONSES TO PHQ QUESTIONS 1-9: 1
10. IF YOU CHECKED OFF ANY PROBLEMS, HOW DIFFICULT HAVE THESE PROBLEMS MADE IT FOR YOU TO DO YOUR WORK, TAKE CARE OF THINGS AT HOME, OR GET ALONG WITH OTHER PEOPLE: NOT DIFFICULT AT ALL

## 2023-10-03 NOTE — PROGRESS NOTES
Recommend repeat CBC in 1-2 months. If lines are still down consider smear, etc. Had normal iron studies previously.    Padmaja Sepulveda MD  Internal Medicine & Pediatrics  [facility]  She/her

## 2023-10-03 NOTE — PROGRESS NOTES
"SUBJECTIVE:   Kristie Mcknight is a 77 year old who presents for Preventive Visit.      10/3/2023     9:42 AM   Additional Questions   Roomed by Joyce   Accompanied by none         10/3/2023     9:42 AM   Patient Reported Additional Medications   Patient reports taking the following new medications none       Are you in the first 12 months of your Medicare coverage?  No    Healthy Habits:     In general, how would you rate your overall health?  Fair    Frequency of exercise:  1 day/week    Duration of exercise:  15-30 minutes    Do you usually eat at least 4 servings of fruit and vegetables a day, include whole grains    & fiber and avoid regularly eating high fat or \"junk\" foods?  No    Taking medications regularly:  Yes    Medication side effects:  Muscle aches and Lightheadedness    Ability to successfully perform activities of daily living:  No assistance needed    Home Safety:  No safety concerns identified    Hearing Impairment:  Difficulty understanding soft or whispered speech    In the past 6 months, have you been bothered by leaking of urine?  No    In general, how would you rate your overall mental or emotional health?  Good    Additional concerns today:  Yes    Has been travelling a lot  Cruise on the [place]  Went to [facility]. recently    Daughter is doing ok   to     Today's PHQ-9 Score:       10/3/2023     9:39 AM   PHQ-9 SCORE   PHQ-9 Total Score MyChart 1 (Minimal depression)   PHQ-9 Total Score 1           Have you ever done Advance Care Planning? (For example, a Health Directive, POLST, or a discussion with a medical provider or your loved ones about your wishes): Yes, advance care planning is on file.       Fall risk  Fallen 2 or more times in the past year?: No  Any fall with injury in the past year?: No    Cognitive Screening   1) Repeat 3 items (Leader, Season, Table)    2) Clock draw: NORMAL  3) 3 item recall: Recalls 3 objects  Results: 3 items recalled: COGNITIVE IMPAIRMENT LESS " LIKELY    Mini-CogTM Copyright KATLYN Pope. Licensed by the author for use in [facility]; reprinted with permission ([email or web address]). All rights reserved.          Reviewed and updated as needed this visit by clinical staff   Tobacco  Allergies  Meds  Problems             Reviewed and updated as needed this visit by Provider    Allergies  Meds  Problems            Social History     Tobacco Use    Smoking status: Never    Smokeless tobacco: Never   Substance Use Topics    Alcohol use: Yes     Comment: 2 glasses of wine/mo             10/3/2023     9:44 AM   Alcohol Use   Prescreen: >3 drinks/day or >7 drinks/week? No          No data to display              Do you have a current opioid prescription? No  Do you use any other controlled substances or medications that are not prescribed by a provider? None              Current providers sharing in care for this patient include:   Patient Care Team:  Padmaja Sepulveda MD as PCP - General (Internal Medicine - Pediatrics)  Pepper Baugh MD as MD (Internal Medicine)  [place], Ciaran Montano MD as Assigned Heart and Vascular Provider  Padmaja Sepulveda MD as Assigned PCP    The following health maintenance items are reviewed in Epic and correct as of today:  Health Maintenance   Topic Date Due    DIABETIC FOOT EXAM  02/16/2017    HF ACTION PLAN  02/16/2019    ANNUAL REVIEW OF HM ORDERS  02/05/2022    CBC  08/25/2023    EYE EXAM  09/01/2023    INFLUENZA VACCINE (1) 09/01/2023    COVID-19 Vaccine (7 - 2023-24 season) 09/01/2023    BMP  10/21/2023    A1C  10/21/2023    LIPID  11/07/2023    MAMMO SCREENING  01/10/2024    PHQ-9  04/03/2024    ALT  04/21/2024    MICROALBUMIN  04/21/2024    DEXA  08/19/2024    MEDICARE ANNUAL WELLNESS VISIT  10/03/2024    FALL RISK ASSESSMENT  10/03/2024    DTAP/TDAP/TD IMMUNIZATION (3 - Td or Tdap) 02/20/2028    ADVANCE CARE PLANNING  10/03/2028    COLORECTAL CANCER SCREENING  04/27/2033    TSH W/FREE T4 REFLEX  Completed  "   HEPATITIS C SCREENING  Completed    DEPRESSION ACTION PLAN  Completed    MIGRAINE ACTION PLAN  Completed    Pneumococcal Vaccine: 65+ Years  Completed    ZOSTER IMMUNIZATION  Completed    IPV IMMUNIZATION  Aged Out    HPV IMMUNIZATION  Aged Out    MENINGITIS IMMUNIZATION  Aged Out           Mammogram Screening - Patient over age 75, has elected to continue with screening.  Pertinent mammograms are reviewed under the imaging tab.    Review of Systems   Constitutional:  Negative for chills and fever.   HENT:  Negative for congestion, ear pain, hearing loss and sore throat.    Eyes:  Negative for pain and visual disturbance.   Respiratory:  Positive for shortness of breath. Negative for cough.    Cardiovascular:  Negative for chest pain, palpitations and peripheral edema.   Gastrointestinal:  Negative for abdominal pain, constipation, diarrhea, heartburn, hematochezia and nausea.   Breasts:  Negative for tenderness, breast mass and discharge.   Genitourinary:  Negative for dysuria, frequency, genital sores, hematuria, pelvic pain, urgency, vaginal bleeding and vaginal discharge.   Musculoskeletal:  Positive for myalgias. Negative for arthralgias and joint swelling.   Skin:  Negative for rash.   Neurological:  Positive for headaches. Negative for dizziness, weakness and paresthesias.   Psychiatric/Behavioral:  Negative for mood changes. The patient is not nervous/anxious.        OBJECTIVE:   BP 94/50 (BP Location: Right arm, Patient Position: Sitting, Cuff Size: Adult Regular)   Pulse 68   Temp 98.4  F (36.9  C) (Tympanic)   Resp 18   Ht 1.626 m (5' 4\")   Wt 90.2 kg (198 lb 14.4 oz)   LMP  (LMP Unknown)   SpO2 96%   BMI 34.14 kg/m   Estimated body mass index is 34.14 kg/m  as calculated from the following:    Height as of this encounter: 1.626 m (5' 4\").    Weight as of this encounter: 90.2 kg (198 lb 14.4 oz).  Physical Exam  GENERAL: healthy, alert and no distress  EYES: Eyes grossly normal to inspection, " "and conjunctivae and sclerae normal  HENT: ear canals and TM's normal, nose and mouth without ulcers or lesions  NECK: no adenopathy, no asymmetry, masses, or scars and thyroid normal to palpation  RESP: lungs clear to auscultation - no rales, rhonchi or wheezes  CV: regular rate and rhythm, normal S1 S2, no S3 or S4, no murmur, click or rub, no peripheral edema   ABDOMEN: soft, nontender, no hepatosplenomegaly, no masses  MS: no gross musculoskeletal defects noted, no edema  SKIN: no suspicious lesions or rashes  NEURO: Normal strength and tone, mentation intact and speech normal  PSYCH: mentation appears normal, affect normal/bright    ASSESSMENT / PLAN:       ICD-10-CM    1. Encounter for Medicare annual wellness exam  Z00.00       2. Type 2 diabetes mellitus without complication, without long-term current use of insulin (H)  E11.9 Comprehensive metabolic panel (BMP + Alb, Alk Phos, ALT, AST, Total. Bili, TP)      3. Insomnia, unspecified type  G47.00 mirtazapine (REMERON) 7.5 MG tablet      4. Idiopathic cardiomyopathy (H)  I42.9       5. Morbid obesity (H)  E66.01       6. Screening for diabetes mellitus  Z13.1 Hemoglobin A1c      7. Hyperlipidemia LDL goal <130  E78.5 Lipid panel reflex to direct LDL Fasting      8. Low hemoglobin  D64.9 CBC with platelets        3. I can refill alprazolam when needed.  4. Follows with cardiology. Is taking losartan (Cozaar) 50mg daily. Blood pressure still on the lower side. Could consider decrease losartan (Cozaar) to 25mg or 12.5mg daily if lightheadedness.    COUNSELING:  Reviewed preventive health counseling, as reflected in patient instructions      BMI:   Estimated body mass index is 34.14 kg/m  as calculated from the following:    Height as of this encounter: 1.626 m (5' 4\").    Weight as of this encounter: 90.2 kg (198 lb 14.4 oz).         She reports that she has never smoked. She has never used smokeless tobacco.      Appropriate preventive services were discussed " with this patient, including applicable screening as appropriate for fall prevention, nutrition, physical activity, Tobacco-use cessation, weight loss and cognition.  Checklist reviewing preventive services available has been given to the patient.    Reviewed patients plan of care and provided an AVS. The Basic Care Plan (routine screening as documented in Health Maintenance) for Kristie meets the Care Plan requirement. This Care Plan has been established and reviewed with the Patient.          Padmaja Sepulveda MD  [facility]    Identified Health Risks:  I have reviewed Opioid Use Disorder and Substance Use Disorder risk factors and made any needed referrals. The patient was provided with suggestions to help her develop a healthy physical lifestyle.  She is at risk for lack of exercise and has been provided with information to increase physical activity for the benefit of her well-being.  The patient was counseled and encouraged to consider modifying their diet and eating habits. She was provided with information on recommended healthy diet options.  The patient was provided with written information regarding signs of hearing loss.

## 2023-10-03 NOTE — PATIENT INSTRUCTIONS
Patient Education   Personalized Prevention Plan  You are due for the preventive services outlined below.  Your care team is available to assist you in scheduling these services.  If you have already completed any of these items, please share that information with your care team to update in your medical record.  Health Maintenance Due   Topic Date Due     Diabetic Foot Exam  02/16/2017     Heart Failure Action Plan  02/16/2019     ANNUAL REVIEW OF HM ORDERS  02/05/2022     Complete Blood Count  08/25/2023     Eye Exam  09/01/2023     Flu Vaccine (1) 09/01/2023     COVID-19 Vaccine (7 - 2023-24 season) 09/01/2023     Basic Metabolic Panel  10/21/2023     Your Health Risk Assessment indicates you feel you are not in good health    A healthy lifestyle helps keep the body fit and the mind alert. It helps protect you from disease, helps you fight disease, and helps prevent chronic disease (disease that doesn't go away) from getting worse. This is important as you get older and begin to notice twinges in muscles and joints and a decline in the strength and stamina you once took for granted. A healthy lifestyle includes good healthcare, good nutrition, weight control, recreation, and regular exercise. Avoid harmful substances and do what you can to keep safe. Another part of a healthy lifestyle is stay mentally active and socially involved.    Good healthcare   Have a wellness visit every year.   If you have new symptoms, let us know right away. Don't wait until the next checkup.   Take medicines exactly as prescribed and keep your medicines in a safe place. Tell us if your medicine causes problems.   Healthy diet and weight control   Eat 3 or 4 small, nutritious, low-fat, high-fiber meals a day. Include a variety of fruits, vegetables, and whole-grain foods.   Make sure you get enough calcium in your diet. Calcium, vitamin D, and exercise help prevent osteoporosis (bone thinning).   If you live alone, try eating with  "others when you can. That way you get a good meal and have company while you eat it.   Try to keep a healthy weight. If you eat more calories than your body uses for energy, it will be stored as fat and you will gain weight.     Recreation   Recreation is not limited to sports and team events. It includes any activity that provides relaxation, interest, enjoyment, and exercise. Recreation provides an outlet for physical, mental, and social energy. It can give a sense of worth and achievement. It can help you stay healthy.    Mental Exercise and Social Involvement  Mental and emotional health is as important as physical health. Keep in touch with friends and family. Stay as active as possible. Continue to learn and challenge yourself.   Things you can do to stay mentally active are:  Learn something new, like a foreign language or musical instrument.   Play SCRABBLE or do crossword puzzles. If you cannot find people to play these games with you at home, you can play them with others on your computer through the Internet.   Join a games club--anything from card games to chess or checkers or lawn bowling.   Start a new hobby.   Go back to school.   Volunteer.   Read.   Keep up with world events.  Learning About Being Physically Active  What is physical activity?     Being physically active means doing any kind of activity that gets your body moving.  The types of physical activity that can help you get fit and stay healthy include:  Aerobic or \"cardio\" activities. These make your heart beat faster and make you breathe harder, such as brisk walking, riding a bike, or running. They strengthen your heart and lungs and build up your endurance.  Strength training activities. These make your muscles work against, or \"resist,\" something. Examples include lifting weights or doing push-ups. These activities help tone and strengthen your muscles and bones.  Stretches. These let you move your joints and muscles through their full " range of motion. Stretching helps you be more flexible.  Reaching a balance between these three types of physical activity is important because each one contributes to your overall fitness.  What are the benefits of being active?  Being active is one of the best things you can do for your health. It helps you to:  Feel stronger and have more energy to do all the things you like to do.  Focus better at school or work.  Feel, think, and sleep better.  Reach and stay at a healthy weight.  Lose fat and build lean muscle.  Lower your risk for serious health problems, including diabetes, heart attack, high blood pressure, and some cancers.  Keep your heart, lungs, bones, muscles, and joints strong and healthy.  How can you make being active part of your life?  Start slowly. Make it your long-term goal to get at least 30 minutes of exercise on most days of the week. Walking is a good choice. You also may want to do other activities, such as running, swimming, cycling, or playing tennis or team sports.  Pick activities that you like--ones that make your heart beat faster, your muscles stronger, and your muscles and joints more flexible. If you find more than one thing you like doing, do them all. You don't have to do the same thing every day.  Get your heart pumping every day. Any activity that makes your heart beat faster and keeps it at that rate for a while counts.  Here are some great ways to get your heart beating faster:  Go for a brisk walk, run, or bike ride.  Go for a hike or swim.  Go in-line skating.  Play a game of touch football, basketball, or soccer.  Ride a bike.  Play tennis or racquetball.  Climb stairs.  Even some household chores can be aerobic--just do them at a faster pace. Vacuuming, raking or mowing the lawn, sweeping the garage, and washing and waxing the car all can help get your heart rate up.  Strengthen your muscles during the week. You don't have to lift heavy weights or grow big, bulky muscles  "to get stronger. Doing a few simple activities that make your muscles work against, or \"resist,\" something can help you get stronger.  For example, you can:  Do push-ups or sit-ups, which use your own body weight as resistance.  Lift weights or dumbbells or use stretch bands at home or in a gym or community center.  Stretch your muscles often. Stretching will help you as you become more active. It can help you stay flexible, loosen tight muscles, and avoid injury. It can also help improve your balance and posture and can be a great way to relax.  Be sure to stretch the muscles you'll be using when you work out. It's best to warm your muscles slightly before you stretch them. Walk or do some other light aerobic activity for a few minutes, and then start stretching.  When you stretch your muscles:  Do it slowly. Stretching is not about going fast or making sudden movements.  Don't push or bounce during a stretch.  Hold each stretch for at least 15 to 30 seconds, if you can. You should feel a stretch in the muscle, but not pain.  Breathe out as you do the stretch. Then breathe in as you hold the stretch. Don't hold your breath.  If you're worried about how more activity might affect your health, have a checkup before you start. Follow any special advice your doctor gives you for getting a smart start.  Where can you learn more?  Go to https://www.Pax8.net/patiented  Enter W332 in the search box to learn more about \"Learning About Being Physically Active.\"  Current as of: October 10, 2022               Content Version: 13.7    4440-2793 [employer].   Care instructions adapted under license by your healthcare professional. If you have questions about a medical condition or this instruction, always ask your healthcare professional. [employer] disclaims any warranty or liability for your use of this information.      Learning About Dietary Guidelines  What are the Dietary Guidelines for " Americans?     Dietary Guidelines for Americans provide tips for eating well and staying healthy. This helps reduce the risk for long-term (chronic) diseases.  These guidelines recommend that you:  Eat and drink the right amount for you. The [place] government's food guide is called MyPlate. It can help you make your own well-balanced eating plan.  Try to balance your eating with your activity. This helps you stay at a healthy weight.  Drink alcohol in moderation, if at all.  Limit foods high in salt, saturated fat, trans fat, and added sugar.  These guidelines are from the U.S. Department of Agriculture and the U.S. Department of Health and Human Services. They are updated every 5 years.  What is MyPlate?  MyPlate is the [place] government's food guide. It can help you make your own well-balanced eating plan. A balanced eating plan means that you eat enough, but not too much, and that your food gives you the nutrients you need to stay healthy.  MyPlate focuses on eating plenty of whole grains, fruits, and vegetables, and on limiting fat and sugar. It is available online at www.ChooseMyPlate.gov.  How can you get started?  If you're trying to eat healthier, you can slowly change your eating habits over time. You don't have to make big changes all at once. Start by adding one or two healthy foods to your meals each day.  Grains  Choose whole-grain breads and cereals and whole-wheat pasta and whole-grain crackers.  Vegetables  Eat a variety of vegetables every day. They have lots of nutrients and are part of a heart-healthy diet.  Fruits  Eat a variety of fruits every day. Fruits contain lots of nutrients. Choose fresh fruit instead of fruit juice.  Protein foods  Choose fish and lean poultry more often. Eat red meat and fried meats less often. Dried beans, tofu, and nuts are also good sources of protein.  Dairy  Choose low-fat or fat-free products from this food group. If you have problems digesting milk, try eating cheese  "or yogurt instead.  Fats and oils  Limit fats and oils if you're trying to cut calories. Choose healthy fats when you cook. These include canola oil and olive oil.  Where can you learn more?  Go to https://www.GiveMeSport.net/patiented  Enter D676 in the search box to learn more about \"Learning About Dietary Guidelines.\"  Current as of: March 1, 2023               Content Version: 13.7    2286-6167 [employer].   Care instructions adapted under license by your healthcare professional. If you have questions about a medical condition or this instruction, always ask your healthcare professional. [employer] disclaims any warranty or liability for your use of this information.      Hearing Loss: Care Instructions  Overview     Hearing loss is a sudden or slow decrease in how well you hear. It can range from slight to profound. Permanent hearing loss can occur with aging. It also can happen when you are exposed long-term to loud noise. Examples include listening to loud music, riding motorcycles, or being around other loud machines.  Hearing loss can affect your work and home life. It can make you feel lonely or depressed. You may feel that you have lost your independence. But hearing aids and other devices can help you hear better and feel connected to others.  Follow-up care is a key part of your treatment and safety. Be sure to make and go to all appointments, and call your doctor if you are having problems. It's also a good idea to know your test results and keep a list of the medicines you take.  How can you care for yourself at home?  Avoid loud noises whenever possible. This helps keep your hearing from getting worse.  Always wear hearing protection around loud noises.  Wear a hearing aid as directed.  A professional can help you pick a hearing aid that will work best for you.  You can also get hearing aids over the counter for mild to moderate hearing loss.  Have hearing tests as your " "doctor suggests. They can show whether your hearing has changed. Your hearing aid may need to be adjusted.  Use other devices as needed. These may include:  Telephone amplifiers and hearing aids that can connect to a television, stereo, radio, or microphone.  Devices that use lights or vibrations. These alert you to the doorbell, a ringing telephone, or a baby monitor.  Television closed-captioning. This shows the words at the bottom of the screen. Most new TVs can do this.  TTY (text telephone). This lets you type messages back and forth on the telephone instead of talking or listening. These devices are also called TDD. When messages are typed on the keyboard, they are sent over the phone line to a receiving TTY. The message is shown on a monitor.  Use text messaging, social media, and email if it is hard for you to communicate by telephone.  Try to learn a listening technique called speechreading. It is not lipreading. You pay attention to people's gestures, expressions, posture, and tone of voice. These clues can help you understand what a person is saying. Face the person you are talking to, and have them face you. Make sure the lighting is good. You need to see the other person's face clearly.  Think about counseling if you need help to adjust to your hearing loss.  When should you call for help?  Watch closely for changes in your health, and be sure to contact your doctor if:    You think your hearing is getting worse.     You have new symptoms, such as dizziness or nausea.   Where can you learn more?  Go to https://www.1bib.net/patiented  Enter R798 in the search box to learn more about \"Hearing Loss: Care Instructions.\"  Current as of: March 1, 2023               Content Version: 13.7    2541-3350 [employer].   Care instructions adapted under license by your healthcare professional. If you have questions about a medical condition or this instruction, always ask your healthcare " professional. [employer] disclaims any warranty or liability for your use of this information.

## 2023-10-03 NOTE — LETTER
"October 9, 2023      Kristie WOLF LinHollis  [address]  [place] [address]        Dear Kristie Mcknight,     Here are your recent lab results:     Your metabolic panel (kidney function, electrolytes, liver enzymes) was normal.      The cholesterol levels look good! I would continue the cholesterol medication.      Your trigylcerides are elevated. Eating more good fats such as those in olive oil, canola oil, flaxseed and fish can help lower this as well as decreasing carbohydrate intake such as processed sugars, non-whole wheat breads/pasta/rice/cereals, alcohol, sweets.      The A1c is also up a little as expected. Increasing body movement and focusing on healthy eating can help with this.      The hemoglobin and platelets are stable but still a little low, and now the white blood count is a little low. I recommend repeating this blood test (does not have to be fasting) in 1-2 months. You can make the \"lab only\" appointment through [employer] or by calling us at [phone].   If those counts are still low we may need to do other blood work to figure out why.       Please let us know if you have questions or concerns.      Best,     Padmaja Sepulveda MD   Internal Medicine & Pediatrics   [facility]                   Resulted Orders   Comprehensive metabolic panel (BMP + Alb, Alk Phos, ALT, AST, Total. Bili, TP)   Result Value Ref Range    Sodium 141 135 - 145 mmol/L      Comment:      Reference intervals for this test were updated on 09/26/2023 to more accurately reflect our healthy population. There may be differences in the flagging of prior results with similar values performed with this method. Interpretation of those prior results can be made in the context of the updated reference intervals.     Potassium 4.2 3.4 - 5.3 mmol/L    Carbon Dioxide (CO2) 22 22 - 29 mmol/L    Anion Gap 11 7 - 15 mmol/L    Urea Nitrogen 19.1 8.0 - 23.0 mg/dL    Creatinine 0.87 0.51 - 0.95 mg/dL    GFR Estimate 69 >60 " mL/min/1.73m2    Calcium 8.9 8.8 - 10.2 mg/dL    Chloride 108 (H) 98 - 107 mmol/L    Glucose 123 (H) 70 - 99 mg/dL    Alkaline Phosphatase 73 35 - 104 U/L    AST 23 0 - 45 U/L      Comment:      Reference intervals for this test were updated on 6/12/2023 to more accurately reflect our healthy population. There may be differences in the flagging of prior results with similar values performed with this method. Interpretation of those prior results can be made in the context of the updated reference intervals.    ALT 16 0 - 50 U/L      Comment:      Reference intervals for this test were updated on 6/12/2023 to more accurately reflect our healthy population. There may be differences in the flagging of prior results with similar values performed with this method. Interpretation of those prior results can be made in the context of the updated reference intervals.      Protein Total 6.2 (L) 6.4 - 8.3 g/dL    Albumin 4.3 3.5 - 5.2 g/dL    Bilirubin Total 0.6 <=1.2 mg/dL   CBC with platelets   Result Value Ref Range    WBC Count 3.7 (L) 4.0 - 11.0 10e3/uL    RBC Count 3.58 (L) 3.80 - 5.20 10e6/uL    Hemoglobin 11.3 (L) 11.7 - 15.7 g/dL    Hematocrit 33.3 (L) 35.0 - 47.0 %    MCV 93 78 - 100 fL    MCH 31.6 26.5 - 33.0 pg    MCHC 33.9 31.5 - 36.5 g/dL    RDW 12.7 10.0 - 15.0 %    Platelet Count 145 (L) 150 - 450 10e3/uL   Lipid panel reflex to direct LDL Fasting   Result Value Ref Range    Cholesterol 174 <200 mg/dL    Triglycerides 227 (H) <150 mg/dL    Direct Measure HDL 37 (L) >=50 mg/dL    LDL Cholesterol Calculated 92 <=100 mg/dL    Non HDL Cholesterol 137 (H) <130 mg/dL    Narrative    Cholesterol  Desirable:  <200 mg/dL    Triglycerides  Normal:  Less than 150 mg/dL  Borderline High:  150-199 mg/dL  High:  200-499 mg/dL  Very High:  Greater than or equal to 500 mg/dL    Direct Measure HDL  Female:  Greater than or equal to 50 mg/dL   Male:  Greater than or equal to 40 mg/dL    LDL Cholesterol  Desirable:   <100mg/dL  Above Desirable:  100-129 mg/dL   Borderline High:  130-159 mg/dL   High:  160-189 mg/dL   Very High:  >= 190 mg/dL    Non HDL Cholesterol  Desirable:  130 mg/dL  Above Desirable:  130-159 mg/dL  Borderline High:  160-189 mg/dL  High:  190-219 mg/dL  Very High:  Greater than or equal to 220 mg/dL   Hemoglobin A1c   Result Value Ref Range    Hemoglobin A1C 5.7 (H) 0.0 - 5.6 %      Comment:      Normal <5.7%   Prediabetes 5.7-6.4%    Diabetes 6.5% or higher     Note: Adopted from ADA consensus guidelines.

## 2023-10-23 ENCOUNTER — OFFICE VISIT (OUTPATIENT)
Dept: CARDIOLOGY | Facility: CLINIC | Age: 78
End: 2023-10-23
Attending: INTERNAL MEDICINE
Payer: COMMERCIAL

## 2023-10-23 VITALS
DIASTOLIC BLOOD PRESSURE: 68 MMHG | HEART RATE: 65 BPM | WEIGHT: 197 LBS | HEIGHT: 64 IN | SYSTOLIC BLOOD PRESSURE: 111 MMHG | OXYGEN SATURATION: 96 % | BODY MASS INDEX: 33.63 KG/M2

## 2023-10-23 DIAGNOSIS — Z95.810 ICD (IMPLANTABLE CARDIOVERTER-DEFIBRILLATOR) IN PLACE: ICD-10-CM

## 2023-10-23 DIAGNOSIS — I42.9 IDIOPATHIC CARDIOMYOPATHY (H): ICD-10-CM

## 2023-10-23 DIAGNOSIS — I44.7 LBBB (LEFT BUNDLE BRANCH BLOCK): ICD-10-CM

## 2023-10-23 DIAGNOSIS — E78.5 HYPERLIPIDEMIA LDL GOAL <130: ICD-10-CM

## 2023-10-23 DIAGNOSIS — I50.22 CHRONIC SYSTOLIC CONGESTIVE HEART FAILURE (H): ICD-10-CM

## 2023-10-23 PROCEDURE — 93284 PRGRMG EVAL IMPLANTABLE DFB: CPT | Performed by: INTERNAL MEDICINE

## 2023-10-23 PROCEDURE — 99213 OFFICE O/P EST LOW 20 MIN: CPT | Mod: 25 | Performed by: INTERNAL MEDICINE

## 2023-10-23 NOTE — LETTER
10/23/2023    Padmaja Sepulveda MD  [address] [address]    RE: Kristie Jones       Dear Colleague,     I had the [facility].  HPI and Plan:   It is always my pleasure to see this very delightful 77-year-old lady who is one of my favorite patients.     I have known her for a long time as she has had nonischemic cardiomyopathy with severe left ventricular systolic function dysfunction for a long time.  She has never been troubled much by congestion but her symptoms are usually those of low cardiac output such as fatigue.     She had a tolerated time in 2012 with attempted placement of a biventricular pacer and recently I thought she may benefit from another attempt at biventricular pacing and I am so happy to see that doctor is course is able to replace in RA lead now she is 100% biventricular paced.    Proximally she has been well since then.  Stamina has increased.  I am very happy to hear about her recent [place] cruise.  She would walk for a few hours in the morning and she has no problems keeping up with the rest of the tour group as long as she takes an occasional 20 second rest.  She has no cardiac symptoms    Cardiac examination is notable only for the presence of her biventricular ICD.  There is really no signs of congestion    Device interrogation reveals normal functioning without any significant ventricular arrhythmias detected.    Overall very happy with her progress.  We will see again in 6 months time we will repeat echocardiogram prior to clinic visit.    Orders Placed This Encounter   Procedures    Follow-Up with Cardiology       No orders of the defined types were placed in this encounter.      Encounter Diagnoses   Name Primary?    Idiopathic cardiomyopathy (H)     ICD (implantable cardioverter-defibrillator) in place     Hyperlipidemia LDL goal <130     LBBB (left bundle branch block)     Chronic systolic congestive  heart failure (H)        CURRENT MEDICATIONS:  Current Outpatient Medications   Medication Sig Dispense Refill    acetaminophen (TYLENOL) 500 MG tablet Take 1,000 mg by mouth daily as needed for mild pain      ALPRAZolam (XANAX) 0.25 MG tablet Take 1 tablet (0.25 mg) by mouth nightly as needed for anxiety 90 tablet 0    aspirin 81 MG tablet Take 81 mg by mouth every evening      CALCIUM 500 MG OR TABS Takes 3 tabs daily takees total of 1200 mg daily      carvedilol (COREG) 25 MG tablet TAKE 1 TABLET TWICE A DAY WITH MEALS 180 tablet 3    Cholecalciferol (VITAMIN D) 2000 UNITS tablet Take 1 tablet by mouth daily.      ciprofloxacin (CIPRO) 500 MG tablet Take 1 tablet by mmouth before dental appointments 5 tablet 11    clobetasol (TEMOVATE) 0.05 % external cream Apply thin layer to bilateral hand twice daily for 2 weeks 45 g 1    co-enzyme Q-10 50 MG CAPS Take 1 capsule by mouth every morning      conjugated estrogens (PREMARIN) 0.625 MG/GM vaginal cream Place 0.5 g vaginally twice a week 30 g 3    lifitegrast (XIIDRA) 5 % opthalmic solution Place 1 drop into both eyes 2 times daily      loratadine (CLARITIN) 10 MG tablet Take 10 mg by mouth every evening      losartan (COZAAR) 50 MG tablet Take 50 mg by mouth daily 90 tablet 3    mirtazapine (REMERON) 7.5 MG tablet TAKE 1 TO 2 TABLETS 30 MINUTES BEFORE BEDTIME 180 tablet 4    Multiple Vitamins-Minerals (MULTIVITAMIN ADULT PO) Take 1 tablet by mouth daily      rosuvastatin (CRESTOR) 10 MG tablet TAKE 1 TABLET EVERY OTHER DAY 45 tablet 3    spironolactone (ALDACTONE) 25 MG tablet Take 1/2 tablet ( 12.5 MG ) daily 45 tablet 4    venlafaxine (EFFEXOR XR) 75 MG 24 hr capsule TAKE 3 CAPSULES ONCE DAILY Strength: 75 mg 270 capsule 3       ALLERGIES     Allergies   Allergen Reactions    Corticosteroids Other (See Comments)     flush up through chest and face, decadron  flushing  Other reaction(s): Erythema    Phenothiazines Anxiety and GI Disturbance     anxiety attack,  compazine  Other reaction(s): Tardive Dyskinesia    Prochlorperazine      Other reaction(s): Tardive Dyskinesia    Amoxicillin Hives    Clindamycin Other (See Comments)     Burning sensation in chest    Dexamethasone      flushing    Dexamethasone      Other reaction(s): Erythema    Compazine Anxiety       PAST MEDICAL HISTORY:  Past Medical History:   Diagnosis Date    Acute posthemorrhagic anemia 09/11/2015    Anemia     Chronic systolic congestive heart failure (H) 10/10/2018     EF 35%    Fibromyalgia     Gastro-oesophageal reflux disease     GENERAL OSTEOARTHROSIS [715.00] 11/23/2004    knee    Generalized anxiety disorder 09/30/2013    Hyperlipidemia LDL goal <130 02/10/2010    Failed simvastatin- muscle aches     Hypertension     because of the heart    Idiopathic cardiomyopathy (H) 01/19/2012    Dr. Osborne 3/2011 EF 30-35%     Insomnia 01/19/2012    Iron deficiency anemia 08/12/2015     Problem list name updated by automated process. Provider to review    LBBB (left bundle branch block)     Left ventricular systolic dysfunction:EF 35% 02/05/2012    Must stay on diovan per cardiology     Major depressive disorder, recurrent episode, mild (H24) 02/17/2016    Migraine 06/21/2005     Problem list name updated by automated process. Provider to review    Nonischemic cardiomyopathy (H)     EF 30-35%, Dr. Osborne [facility] (suspect virus); s/p AICD    NSVT (nonsustained ventricular tachycardia) (H)     Obesity 01/20/2012    SUSAN (obstructive sleep apnea) 01/19/2012    CPAP     Pure hypercholesterolemia     Restless leg syndrome     Type 2 diabetes mellitus without complication (H) 09/24/2016       PAST SURGICAL HISTORY:  Past Surgical History:   Procedure Laterality Date    APPENDECTOMY      ARTHROPLASTY KNEE  1/7/2013    Procedure: ARTHROPLASTY KNEE;  Right Total Knee Arthroplasty      ARTHROPLASTY REVISION KNEE Right 8/26/2015    Procedure: ARTHROPLASTY REVISION KNEE;  Surgeon: Néstor Beth MD;  Location:  OR     ARTHROSCOPY KNEE      bunionectomy left foot      CLOSED REDUCTION, PERCUTANEOUS PINNING FINGER, COMBINED Right 6/17/2021    Procedure: Closed reduction, pinning right long finger distal interphalangeal joint fracture;  Surgeon: Joseluis Delong MD;  Location:  OR    COLONOSCOPY      COLONOSCOPY N/A 4/27/2023    Procedure: COLONOSCOPY with cold polypectomies;  Surgeon: Dara Flowers MD;  Location: [facility] Main OR    CORONARY ANGIOGRAPHY ADULT ORDER  2002    normal    CORONARY ANGIOGRAPHY ADULT ORDER  12/7/12    no significant focal narrowing that would benefit from mechanical intervention     EP PACEMAKER GENERATOR REPLACEMENT- BI-VENTRICULAR N/A 8/25/2022    Procedure: Pacemaker Generator Replacement Biventricular;  Surgeon: Hillary Gonzalez MD;  Location:  HEART CARDIAC CATH LAB    ESOPHAGOSCOPY, GASTROSCOPY, DUODENOSCOPY (EGD), COMBINED N/A 7/9/2021    Procedure: ESOPHAGOGASTRODUODENOSCOPY, WITH BIOPSY with distal esophagus biopies to rule out Barretts esophagus by cold biopsy forceps;  Surgeon: Travis Harrington MD;  Location:  GI    HYSTERECTOMY      IMPLANT AUTOMATIC IMPLANTABLE CARDIOVERTER DEFIBRILLATOR  4/30/12    INSERT THORACIC PACEMAKER LEAD EPICARDIAL  5/1/2012    Procedure:INSERT THORACIC PACEMAKER LEAD EPICARDIAL; EPICARDIAL LEAD PLACEMENT; Surgeon:WEST ARECHIGA; Location: OR    TONSILLECTOMY      TRANSPLANT - corneal lenses      Bilateral for cataracts       FAMILY HISTORY:  Family History   Problem Relation Age of Onset    Cancer Father         intraabdominal mass - not colon cancer    Other Cancer Father     C.A.D. 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 Multiple ignacio problems, multiple hospitalizations in last 7 years, anxiety        Pt has 2 daughters, 2 step-daughters's and  7 grandchildrens        Youngest daughter Sabi has malignant brain tumor                 Social Determinants of Health     Financial Resource Strain: Low Risk  (10/3/2023)    Financial Resource Strain     Within the past 12 months, have you or your family members you live with been unable to get utilities (heat, electricity) when it was really needed?: No   Food Insecurity: Low Risk  (10/3/2023)    Food Insecurity     Within the past 12 months, did you worry that your food would run out before you got money to buy more?: No     Within the past 12 months, did the food you bought just not last and you didn t have money to get more?: No   Transportation Needs: Low Risk  (10/3/2023)    Transportation Needs     Within the past 12 months, has lack of transportation kept  you from medical appointments, getting your medicines, non-medical meetings or appointments, work, or from getting things that you need?: No   Physical Activity: Insufficiently Active (4/20/2023)    Exercise Vital Sign     Days of Exercise per Week: 2 days     Minutes of Exercise per Session: 40 min   Stress: No Stress Concern Present (4/20/2023)    [employer] [place] [employer] - Occupational Stress Questionnaire     Feeling of Stress : Not at all   Social Connections: Moderately Integrated (4/20/2023)    Social Connection and Isolation Panel [NHANES]     Frequency of Communication with Friends and Family: Twice a week     Frequency of Social Gatherings with Friends and Family: Once a week     Attends Adventism Services: Never     Active Member of Clubs or Organizations: Yes     Marital Status:    Interpersonal Safety: Low Risk  (10/3/2023)    Interpersonal Safety     Do you feel physically and emotionally safe where you currently live?: Yes     Within the past 12 months, have you been hit, slapped, kicked or otherwise physically hurt by someone?: No     Within the past 12 months, have you been humiliated or emotionally abused in other ways by your partner or ex-partner?: No   Housing Stability: Low Risk  (10/3/2023)    Housing Stability     Do you have housing? : Yes     Are you worried about losing your housing?: No       Review of Systems:  Skin:  Negative     Eyes:  Negative    ENT:  Negative    Respiratory:  Positive for shortness of breath;sleep apnea;CPAP  Cardiovascular:  Negative for;palpitations;chest pain;edema;dizziness;syncope or near-syncope;fatigue lightheadedness;Positive for  Gastroenterology: Negative    Genitourinary:  Negative    Musculoskeletal:  Positive for joint pain;joint stiffness  Neurologic:  Positive for headaches;numbness or tingling of hands  Psychiatric:  Negative    Heme/Lymph/Imm:  Negative    Endocrine:  Negative      Physical Exam:  Vitals: /68   Pulse 65  "  Ht 1.626 m (5' 4\")   Wt 89.4 kg (197 lb)   LMP  (LMP Unknown)   SpO2 96%   BMI 33.81 kg/m      Constitutional:  cooperative;in no acute distress obese      Skin:  warm and dry to the touch   pacemaker incision in the left infraclavicular area was well-healed      Head:  normocephalic        Eyes:  pupils equal and round        Lymph:      ENT:  no pallor or cyanosis, dentition good        Neck:  JVP normal;no carotid bruit        Respiratory:  clear to auscultation;normal respiratory excursion         Cardiac: regular rhythm;normal S1 and S2   distant heart sounds            pulses full and equal                                        GI:  abdomen soft obese      Extremities and Muscular Skeletal:  no deformities, clubbing, cyanosis, erythema observed;no edema              Neurological:  affect appropriate        Psych:  Alert and Oriented x 3        Recent Lab Results:  LIPID RESULTS:  Lab Results   Component Value Date    CHOL 174 10/03/2023    CHOL 170 01/25/2021    HDL 37 (L) 10/03/2023    HDL 39 (L) 01/25/2021    LDL 92 10/03/2023    LDL 80 01/25/2021    TRIG 227 (H) 10/03/2023    TRIG 253 (H) 01/25/2021    CHOLHDLRATIO 4.7 10/13/2015       LIVER ENZYME RESULTS:  Lab Results   Component Value Date    AST 23 10/03/2023    AST 28 12/26/2020    ALT 16 10/03/2023    ALT 32 12/26/2020       CBC RESULTS:  Lab Results   Component Value Date    WBC 3.7 (L) 10/03/2023    WBC 4.0 02/05/2021    RBC 3.58 (L) 10/03/2023    RBC 3.86 02/05/2021    HGB 11.3 (L) 10/03/2023    HGB 12.0 02/05/2021    HCT 33.3 (L) 10/03/2023    HCT 37.0 02/05/2021    MCV 93 10/03/2023    MCV 96 02/05/2021    MCH 31.6 10/03/2023    MCH 31.1 02/05/2021    MCHC 33.9 10/03/2023    MCHC 32.4 02/05/2021    RDW 12.7 10/03/2023    RDW 12.5 02/05/2021     (L) 10/03/2023     02/05/2021       BMP RESULTS:  Lab Results   Component Value Date     10/03/2023     02/05/2021    POTASSIUM 4.2 10/03/2023    POTASSIUM 4.1 08/25/2022 "    POTASSIUM 4.2 06/17/2021    CHLORIDE 108 (H) 10/03/2023    CHLORIDE 109 08/25/2022    CHLORIDE 108 02/05/2021    CO2 22 10/03/2023    CO2 26 08/25/2022    CO2 23 02/05/2021    ANIONGAP 11 10/03/2023    ANIONGAP 6 08/25/2022    ANIONGAP 8 02/05/2021     (H) 10/03/2023    GLC 86 04/27/2023     (H) 08/25/2022     (H) 02/05/2021    BUN 19.1 10/03/2023    BUN 15 08/25/2022    BUN 16 02/05/2021    CR 0.87 10/03/2023    CR 0.99 06/17/2021    GFRESTIMATED 69 10/03/2023    GFRESTIMATED 56 (L) 06/17/2021    GFRESTBLACK 64 06/17/2021    WILEY 8.9 10/03/2023    WILEY 9.1 02/05/2021        A1C RESULTS:  Lab Results   Component Value Date    A1C 5.7 (H) 10/03/2023    A1C 6.1 (H) 01/25/2021       INR RESULTS:  Lab Results   Component Value Date    INR 1.06 04/07/2022    INR 0.99 12/26/2020    INR 0.88 12/05/2012           CC  Hillary Gonzalez MD  [address]  [place]  [place] [address]      Thank you for allowing me to participate in the care of your patient.      Sincerely,     DR TRENT BARBOSA MD     [facility]

## 2023-10-23 NOTE — PROGRESS NOTES
HPI and Plan:   It is always my pleasure to see this very delightful 77-year-old lady who is one of my favorite patients.     I have known her for a long time as she has had nonischemic cardiomyopathy with severe left ventricular systolic function dysfunction for a long time.  She has never been troubled much by congestion but her symptoms are usually those of low cardiac output such as fatigue.     She had a tolerated time in 2012 with attempted placement of a biventricular pacer and recently I thought she may benefit from another attempt at biventricular pacing and I am so happy to see that doctor is course is able to replace in RA lead now she is 100% biventricular paced.    Proximally she has been well since then.  Stamina has increased.  I am very happy to hear about her recent [place] cruise.  She would walk for a few hours in the morning and she has no problems keeping up with the rest of the tour group as long as she takes an occasional 20 second rest.  She has no cardiac symptoms    Cardiac examination is notable only for the presence of her biventricular ICD.  There is really no signs of congestion    Device interrogation reveals normal functioning without any significant ventricular arrhythmias detected.    Overall very happy with her progress.  We will see again in 6 months time we will repeat echocardiogram prior to clinic visit.    Orders Placed This Encounter   Procedures    Follow-Up with Cardiology       No orders of the defined types were placed in this encounter.      Encounter Diagnoses   Name Primary?    Idiopathic cardiomyopathy (H)     ICD (implantable cardioverter-defibrillator) in place     Hyperlipidemia LDL goal <130     LBBB (left bundle branch block)     Chronic systolic congestive heart failure (H)        CURRENT MEDICATIONS:  Current Outpatient Medications   Medication Sig Dispense Refill    acetaminophen (TYLENOL) 500 MG tablet Take 1,000 mg by mouth daily as needed for mild pain       ALPRAZolam (XANAX) 0.25 MG tablet Take 1 tablet (0.25 mg) by mouth nightly as needed for anxiety 90 tablet 0    aspirin 81 MG tablet Take 81 mg by mouth every evening      CALCIUM 500 MG OR TABS Takes 3 tabs daily takees total of 1200 mg daily      carvedilol (COREG) 25 MG tablet TAKE 1 TABLET TWICE A DAY WITH MEALS 180 tablet 3    Cholecalciferol (VITAMIN D) 2000 UNITS tablet Take 1 tablet by mouth daily.      ciprofloxacin (CIPRO) 500 MG tablet Take 1 tablet by mmout before dental appointments 5 tablet 11    clobetasol (TEMOVATE) 0.05 % external cream Apply thin layer to bilateral hand twice daily for 2 weeks 45 g 1    co-enzyme Q-10 50 MG CAPS Take 1 capsule by mouth every morning      conjugated estrogens (PREMARIN) 0.625 MG/GM vaginal cream Place 0.5 g vaginally twice a week 30 g 3    lifitegrast (XIIDRA) 5 % opthalmic solution Place 1 drop into both eyes 2 times daily      loratadine (CLARITIN) 10 MG tablet Take 10 mg by mouth every evening      losartan (COZAAR) 50 MG tablet Take 50 mg by mouth daily 90 tablet 3    mirtazapine (REMERON) 7.5 MG tablet TAKE 1 TO 2 TABLETS 30 MINUTES BEFORE BEDTIME 180 tablet 4    Multiple Vitamins-Minerals (MULTIVITAMIN ADULT PO) Take 1 tablet by mouth daily      rosuvastatin (CRESTOR) 10 MG tablet TAKE 1 TABLET EVERY OTHER DAY 45 tablet 3    spironolactone (ALDACTONE) 25 MG tablet Take 1/2 tablet ( 12.5 MG ) daily 45 tablet 4    venlafaxine (EFFEXOR XR) 75 MG 24 hr capsule TAKE 3 CAPSULES ONCE DAILY Strength: 75 mg 270 capsule 3       ALLERGIES     Allergies   Allergen Reactions    Corticosteroids Other (See Comments)     flush up through chest and face, decadron  flushing  Other reaction(s): Erythema    Phenothiazines Anxiety and GI Disturbance     anxiety attack, compazine  Other reaction(s): Tardive Dyskinesia    Prochlorperazine      Other reaction(s): Tardive Dyskinesia    Amoxicillin Hives    Clindamycin Other (See Comments)     Burning sensation in chest     Dexamethasone      flushing    Dexamethasone      Other reaction(s): Erythema    Compazine Anxiety       PAST MEDICAL HISTORY:  Past Medical History:   Diagnosis Date    Acute posthemorrhagic anemia 09/11/2015    Anemia     Chronic systolic congestive heart failure (H) 10/10/2018     EF 35%    Fibromyalgia     Gastro-oesophageal reflux disease     GENERAL OSTEOARTHROSIS [715.00] 11/23/2004    knee    Generalized anxiety disorder 09/30/2013    Hyperlipidemia LDL goal <130 02/10/2010    Failed simvastatin- muscle aches     Hypertension     because of the heart    Idiopathic cardiomyopathy (H) 01/19/2012    Dr. Osborne 3/2011 EF 30-35%     Insomnia 01/19/2012    Iron deficiency anemia 08/12/2015     Problem list name updated by automated process. Provider to review    LBBB (left bundle branch block)     Left ventricular systolic dysfunction:EF 35% 02/05/2012    Must stay on diovan per cardiology     Major depressive disorder, recurrent episode, mild (H24) 02/17/2016    Migraine 06/21/2005     Problem list name updated by automated process. Provider to review    Nonischemic cardiomyopathy (H)     EF 30-35%, Dr. Osborne [facility] (suspect virus); s/p AICD    NSVT (nonsustained ventricular tachycardia) (H)     Obesity 01/20/2012    SUSAN (obstructive sleep apnea) 01/19/2012    CPAP     Pure hypercholesterolemia     Restless leg syndrome     Type 2 diabetes mellitus without complication (H) 09/24/2016       PAST SURGICAL HISTORY:  Past Surgical History:   Procedure Laterality Date    APPENDECTOMY      ARTHROPLASTY KNEE  1/7/2013    Procedure: ARTHROPLASTY KNEE;  Right Total Knee Arthroplasty      ARTHROPLASTY REVISION KNEE Right 8/26/2015    Procedure: ARTHROPLASTY REVISION KNEE;  Surgeon: Néstor Beth MD;  Location: [facility] OR    ARTHROSCOPY KNEE      bunionectomy left foot      CLOSED REDUCTION, PERCUTANEOUS PINNING FINGER, COMBINED Right 6/17/2021    Procedure: Closed reduction, pinning right long finger distal interphalangeal joint  fracture;  Surgeon: Jsoeluis Delong MD;  Location: [facility] OR    COLONOSCOPY      COLONOSCOPY N/A 4/27/2023    Procedure: COLONOSCOPY with cold polypectomies;  Surgeon: Dara Flowers MD;  Location: [facility] Main OR    CORONARY ANGIOGRAPHY ADULT ORDER  2002    normal    CORONARY ANGIOGRAPHY ADULT ORDER  12/7/12    no significant focal narrowing that would benefit from mechanical intervention     EP PACEMAKER GENERATOR REPLACEMENT- BI-VENTRICULAR N/A 8/25/2022    Procedure: Pacemaker Generator Replacement Biventricular;  Surgeon: Hillary Gonzalez MD;  Location:  HEART CARDIAC CATH LAB    ESOPHAGOSCOPY, GASTROSCOPY, DUODENOSCOPY (EGD), COMBINED N/A 7/9/2021    Procedure: ESOPHAGOGASTRODUODENOSCOPY, WITH BIOPSY with distal esophagus biopies to rule out Barretts esophagus by cold biopsy forceps;  Surgeon: Travis Harrington MD;  Location:  GI    HYSTERECTOMY      IMPLANT AUTOMATIC IMPLANTABLE CARDIOVERTER DEFIBRILLATOR  4/30/12    INSERT THORACIC PACEMAKER LEAD EPICARDIAL  5/1/2012    Procedure:INSERT THORACIC PACEMAKER LEAD EPICARDIAL; EPICARDIAL LEAD PLACEMENT; Surgeon:WEST ARECHIGA; Location: OR    TONSILLECTOMY      TRANSPLANT - corneal lenses      Bilateral for cataracts       FAMILY HISTORY:  Family History   Problem Relation Age of Onset    Cancer Father         intraabdominal mass - not colon cancer    Other Cancer Father     C.A.D. 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 Multiple ignacio problems, multiple hospitalizations in last 7 years, anxiety        Pt has 2 daughters, 2 step-daughters's and  7 grandchildrens        Youngest daughter Sabi has malignant brain tumor                 Social Determinants of Health     Financial Resource Strain: Low Risk  (10/3/2023)    Financial Resource Strain     Within the past 12 months, have you or your family members you live with been unable to get utilities (heat, electricity) when it was really needed?: No   Food Insecurity: Low Risk  (10/3/2023)    Food Insecurity     Within the past 12 months, did you worry that your food would run out before you got money to buy more?: No     Within the past 12 months, did the food you bought just not last and you didn t have money to get more?: No   Transportation Needs: Low Risk  (10/3/2023)    Transportation Needs     Within the past 12 months, has lack of transportation kept you from medical appointments, getting your medicines, non-medical meetings or appointments, work, or from getting things that you need?: No   Physical Activity: Insufficiently Active (2023)    Exercise  "Vital Sign     Days of Exercise per Week: 2 days     Minutes of Exercise per Session: 40 min   Stress: No Stress Concern Present (4/20/2023)    [place] [employer] - Occupational Stress Questionnaire     Feeling of Stress : Not at all   Social Connections: Moderately Integrated (4/20/2023)    Social Connection and Isolation Panel [NHANES]     Frequency of Communication with Friends and Family: Twice a week     Frequency of Social Gatherings with Friends and Family: Once a week     Attends Shinto Services: Never     Active Member of Clubs or Organizations: Yes     Marital Status:    Interpersonal Safety: Low Risk  (10/3/2023)    Interpersonal Safety     Do you feel physically and emotionally safe where you currently live?: Yes     Within the past 12 months, have you been hit, slapped, kicked or otherwise physically hurt by someone?: No     Within the past 12 months, have you been humiliated or emotionally abused in other ways by your partner or ex-partner?: No   Housing Stability: Low Risk  (10/3/2023)    Housing Stability     Do you have housing? : Yes     Are you worried about losing your housing?: No       Review of Systems:  Skin:  Negative     Eyes:  Negative    ENT:  Negative    Respiratory:  Positive for shortness of breath;sleep apnea;CPAP  Cardiovascular:  Negative for;palpitations;chest pain;edema;dizziness;syncope or near-syncope;fatigue lightheadedness;Positive for  Gastroenterology: Negative    Genitourinary:  Negative    Musculoskeletal:  Positive for joint pain;joint stiffness  Neurologic:  Positive for headaches;numbness or tingling of hands  Psychiatric:  Negative    Heme/Lymph/Imm:  Negative    Endocrine:  Negative      Physical Exam:  Vitals: /68   Pulse 65   Ht 1.626 m (5' 4\")   Wt 89.4 kg (197 lb)   LMP  (LMP Unknown)   SpO2 96%   BMI 33.81 kg/m      Constitutional:  cooperative;in no acute distress obese      Skin:  warm and dry to the touch   pacemaker " incision in the left infraclavicular area was well-healed      Head:  normocephalic        Eyes:  pupils equal and round        Lymph:      ENT:  no pallor or cyanosis, dentition good        Neck:  JVP normal;no carotid bruit        Respiratory:  clear to auscultation;normal respiratory excursion         Cardiac: regular rhythm;normal S1 and S2   distant heart sounds            pulses full and equal                                        GI:  abdomen soft obese      Extremities and Muscular Skeletal:  no deformities, clubbing, cyanosis, erythema observed;no edema              Neurological:  affect appropriate        Psych:  Alert and Oriented x 3        Recent Lab Results:  LIPID RESULTS:  Lab Results   Component Value Date    CHOL 174 10/03/2023    CHOL 170 01/25/2021    HDL 37 (L) 10/03/2023    HDL 39 (L) 01/25/2021    LDL 92 10/03/2023    LDL 80 01/25/2021    TRIG 227 (H) 10/03/2023    TRIG 253 (H) 01/25/2021    CHOLHDLRATIO 4.7 10/13/2015       LIVER ENZYME RESULTS:  Lab Results   Component Value Date    AST 23 10/03/2023    AST 28 12/26/2020    ALT 16 10/03/2023    ALT 32 12/26/2020       CBC RESULTS:  Lab Results   Component Value Date    WBC 3.7 (L) 10/03/2023    WBC 4.0 02/05/2021    RBC 3.58 (L) 10/03/2023    RBC 3.86 02/05/2021    HGB 11.3 (L) 10/03/2023    HGB 12.0 02/05/2021    HCT 33.3 (L) 10/03/2023    HCT 37.0 02/05/2021    MCV 93 10/03/2023    MCV 96 02/05/2021    MCH 31.6 10/03/2023    MCH 31.1 02/05/2021    MCHC 33.9 10/03/2023    MCHC 32.4 02/05/2021    RDW 12.7 10/03/2023    RDW 12.5 02/05/2021     (L) 10/03/2023     02/05/2021       BMP RESULTS:  Lab Results   Component Value Date     10/03/2023     02/05/2021    POTASSIUM 4.2 10/03/2023    POTASSIUM 4.1 08/25/2022    POTASSIUM 4.2 06/17/2021    CHLORIDE 108 (H) 10/03/2023    CHLORIDE 109 08/25/2022    CHLORIDE 108 02/05/2021    CO2 22 10/03/2023    CO2 26 08/25/2022    CO2 23 02/05/2021    ANIONGAP 11 10/03/2023     ANIONGAP 6 08/25/2022    ANIONGAP 8 02/05/2021     (H) 10/03/2023    GLC 86 04/27/2023     (H) 08/25/2022     (H) 02/05/2021    BUN 19.1 10/03/2023    BUN 15 08/25/2022    BUN 16 02/05/2021    CR 0.87 10/03/2023    CR 0.99 06/17/2021    GFRESTIMATED 69 10/03/2023    GFRESTIMATED 56 (L) 06/17/2021    GFRESTBLACK 64 06/17/2021    WILEY 8.9 10/03/2023    WILEY 9.1 02/05/2021        A1C RESULTS:  Lab Results   Component Value Date    A1C 5.7 (H) 10/03/2023    A1C 6.1 (H) 01/25/2021       INR RESULTS:  Lab Results   Component Value Date    INR 1.06 04/07/2022    INR 0.99 12/26/2020    INR 0.88 12/05/2012           CC  Hillary Gonzalez MD  [address]  [place] [id number]

## 2023-10-24 LAB
MDC_IDC_LEAD_CONNECTION_STATUS: NORMAL
MDC_IDC_LEAD_IMPLANT_DT: NORMAL
MDC_IDC_LEAD_LOCATION: NORMAL
MDC_IDC_LEAD_LOCATION_DETAIL_1: NORMAL
MDC_IDC_LEAD_MFG: NORMAL
MDC_IDC_LEAD_MODEL: 4047
MDC_IDC_LEAD_MODEL: 4047
MDC_IDC_LEAD_MODEL: NORMAL
MDC_IDC_LEAD_MODEL: NORMAL
MDC_IDC_LEAD_POLARITY_TYPE: NORMAL
MDC_IDC_LEAD_SERIAL: NORMAL
MDC_IDC_MSMT_BATTERY_DTM: NORMAL
MDC_IDC_MSMT_BATTERY_REMAINING_LONGEVITY: 108 MO
MDC_IDC_MSMT_BATTERY_REMAINING_PERCENTAGE: 100 %
MDC_IDC_MSMT_BATTERY_STATUS: NORMAL
MDC_IDC_MSMT_CAP_CHARGE_DTM: NORMAL
MDC_IDC_MSMT_CAP_CHARGE_TIME: 9.2 S
MDC_IDC_MSMT_CAP_CHARGE_TYPE: NORMAL
MDC_IDC_MSMT_LEADCHNL_LV_IMPEDANCE_VALUE: 375 OHM
MDC_IDC_MSMT_LEADCHNL_LV_PACING_THRESHOLD_AMPLITUDE: 1.5 V
MDC_IDC_MSMT_LEADCHNL_LV_PACING_THRESHOLD_PULSEWIDTH: 0.5 MS
MDC_IDC_MSMT_LEADCHNL_RA_IMPEDANCE_VALUE: 697 OHM
MDC_IDC_MSMT_LEADCHNL_RA_PACING_THRESHOLD_AMPLITUDE: 0.5 V
MDC_IDC_MSMT_LEADCHNL_RA_PACING_THRESHOLD_PULSEWIDTH: 0.4 MS
MDC_IDC_MSMT_LEADCHNL_RV_IMPEDANCE_VALUE: 408 OHM
MDC_IDC_MSMT_LEADCHNL_RV_PACING_THRESHOLD_AMPLITUDE: 1 V
MDC_IDC_MSMT_LEADCHNL_RV_PACING_THRESHOLD_PULSEWIDTH: 0.4 MS
MDC_IDC_PG_IMPLANT_DTM: NORMAL
MDC_IDC_PG_MFG: NORMAL
MDC_IDC_PG_MODEL: NORMAL
MDC_IDC_PG_SERIAL: NORMAL
MDC_IDC_PG_TYPE: NORMAL
MDC_IDC_SESS_CLINIC_NAME: NORMAL
MDC_IDC_SESS_DTM: NORMAL
MDC_IDC_SESS_TYPE: NORMAL
MDC_IDC_SET_BRADY_AT_MODE_SWITCH_MODE: NORMAL
MDC_IDC_SET_BRADY_AT_MODE_SWITCH_RATE: 170 {BEATS}/MIN
MDC_IDC_SET_BRADY_LOWRATE: 55 {BEATS}/MIN
MDC_IDC_SET_BRADY_MAX_SENSOR_RATE: 130 {BEATS}/MIN
MDC_IDC_SET_BRADY_MAX_TRACKING_RATE: 130 {BEATS}/MIN
MDC_IDC_SET_BRADY_MODE: NORMAL
MDC_IDC_SET_BRADY_PAV_DELAY_LOW: 180 MS
MDC_IDC_SET_BRADY_SAV_DELAY_LOW: 120 MS
MDC_IDC_SET_CRT_LVRV_DELAY: 30 MS
MDC_IDC_SET_CRT_PACED_CHAMBERS: NORMAL
MDC_IDC_SET_LEADCHNL_LV_PACING_AMPLITUDE: 2.5 V
MDC_IDC_SET_LEADCHNL_LV_PACING_ANODE_ELECTRODE_1: NORMAL
MDC_IDC_SET_LEADCHNL_LV_PACING_ANODE_LOCATION_1: NORMAL
MDC_IDC_SET_LEADCHNL_LV_PACING_CAPTURE_MODE: NORMAL
MDC_IDC_SET_LEADCHNL_LV_PACING_CATHODE_ELECTRODE_1: NORMAL
MDC_IDC_SET_LEADCHNL_LV_PACING_CATHODE_LOCATION_1: NORMAL
MDC_IDC_SET_LEADCHNL_LV_PACING_PULSEWIDTH: 0.5 MS
MDC_IDC_SET_LEADCHNL_LV_SENSING_ADAPTATION_MODE: NORMAL
MDC_IDC_SET_LEADCHNL_LV_SENSING_ANODE_ELECTRODE_1: NORMAL
MDC_IDC_SET_LEADCHNL_LV_SENSING_ANODE_LOCATION_1: NORMAL
MDC_IDC_SET_LEADCHNL_LV_SENSING_CATHODE_ELECTRODE_1: NORMAL
MDC_IDC_SET_LEADCHNL_LV_SENSING_CATHODE_LOCATION_1: NORMAL
MDC_IDC_SET_LEADCHNL_LV_SENSING_SENSITIVITY: 1 MV
MDC_IDC_SET_LEADCHNL_RA_PACING_AMPLITUDE: 2 V
MDC_IDC_SET_LEADCHNL_RA_PACING_CAPTURE_MODE: NORMAL
MDC_IDC_SET_LEADCHNL_RA_PACING_POLARITY: NORMAL
MDC_IDC_SET_LEADCHNL_RA_PACING_PULSEWIDTH: 0.4 MS
MDC_IDC_SET_LEADCHNL_RA_SENSING_ADAPTATION_MODE: NORMAL
MDC_IDC_SET_LEADCHNL_RA_SENSING_POLARITY: NORMAL
MDC_IDC_SET_LEADCHNL_RA_SENSING_SENSITIVITY: 0.25 MV
MDC_IDC_SET_LEADCHNL_RV_PACING_AMPLITUDE: 2 V
MDC_IDC_SET_LEADCHNL_RV_PACING_CAPTURE_MODE: NORMAL
MDC_IDC_SET_LEADCHNL_RV_PACING_POLARITY: NORMAL
MDC_IDC_SET_LEADCHNL_RV_PACING_PULSEWIDTH: 0.4 MS
MDC_IDC_SET_LEADCHNL_RV_SENSING_ADAPTATION_MODE: NORMAL
MDC_IDC_SET_LEADCHNL_RV_SENSING_POLARITY: NORMAL
MDC_IDC_SET_LEADCHNL_RV_SENSING_SENSITIVITY: 0.6 MV
MDC_IDC_SET_ZONE_DETECTION_INTERVAL: 250 MS
MDC_IDC_SET_ZONE_DETECTION_INTERVAL: 333 MS
MDC_IDC_SET_ZONE_DETECTION_INTERVAL: 375 MS
MDC_IDC_SET_ZONE_STATUS: NORMAL
MDC_IDC_SET_ZONE_TYPE: NORMAL
MDC_IDC_SET_ZONE_VENDOR_TYPE: NORMAL
MDC_IDC_STAT_AT_BURDEN_PERCENT: 0 %
MDC_IDC_STAT_AT_DTM_END: NORMAL
MDC_IDC_STAT_AT_DTM_START: NORMAL
MDC_IDC_STAT_BRADY_DTM_END: NORMAL
MDC_IDC_STAT_BRADY_DTM_START: NORMAL
MDC_IDC_STAT_BRADY_RA_PERCENT_PACED: 6 %
MDC_IDC_STAT_BRADY_RV_PERCENT_PACED: 100 %
MDC_IDC_STAT_CRT_DTM_END: NORMAL
MDC_IDC_STAT_CRT_DTM_START: NORMAL
MDC_IDC_STAT_CRT_LV_PERCENT_PACED: 100 %
MDC_IDC_STAT_EPISODE_RECENT_COUNT: 0
MDC_IDC_STAT_EPISODE_RECENT_COUNT_DTM_END: NORMAL
MDC_IDC_STAT_EPISODE_RECENT_COUNT_DTM_START: NORMAL
MDC_IDC_STAT_EPISODE_TYPE: NORMAL
MDC_IDC_STAT_EPISODE_VENDOR_TYPE: NORMAL
MDC_IDC_STAT_TACHYTHERAPY_ATP_DELIVERED_RECENT: 0
MDC_IDC_STAT_TACHYTHERAPY_ATP_DELIVERED_TOTAL: 0
MDC_IDC_STAT_TACHYTHERAPY_RECENT_DTM_END: NORMAL
MDC_IDC_STAT_TACHYTHERAPY_RECENT_DTM_START: NORMAL
MDC_IDC_STAT_TACHYTHERAPY_SHOCKS_ABORTED_RECENT: 0
MDC_IDC_STAT_TACHYTHERAPY_SHOCKS_ABORTED_TOTAL: 0
MDC_IDC_STAT_TACHYTHERAPY_SHOCKS_DELIVERED_RECENT: 0
MDC_IDC_STAT_TACHYTHERAPY_SHOCKS_DELIVERED_TOTAL: 0
MDC_IDC_STAT_TACHYTHERAPY_TOTAL_DTM_END: NORMAL
MDC_IDC_STAT_TACHYTHERAPY_TOTAL_DTM_START: NORMAL

## 2023-11-06 ENCOUNTER — TELEPHONE (OUTPATIENT)
Dept: PEDIATRICS | Facility: CLINIC | Age: 78
End: 2023-11-06
Payer: COMMERCIAL

## 2023-11-06 NOTE — TELEPHONE ENCOUNTER
Forms/Letter Request    Type of form/letter:  [facility]    Have you been seen for this request: N/A    Do we have the form/letter: Yes:     Who is the form from? Insurance comp    Where did/will the form come from? form was mailed in    When is form/letter needed by: asap    How would you like the form/letter returned: Mail  Is this the correct address?: No -mail to [facility]  Attn: [employer]  [facility] [address]  [place] [address]    Patient Notified form requests are processed in 3-5 business days:Yes    Could we send this information to you in [facility] or would you prefer to receive a phone call?:   N?A

## 2023-11-06 NOTE — TELEPHONE ENCOUNTER
[facility] reward form was mailed.      Joyce Manriquez on 11/6/2023 at 1:38 PM

## 2023-11-07 ENCOUNTER — TRANSFERRED RECORDS (OUTPATIENT)
Dept: HEALTH INFORMATION MANAGEMENT | Facility: CLINIC | Age: 78
End: 2023-11-07
Payer: COMMERCIAL

## 2023-12-10 ENCOUNTER — MYC REFILL (OUTPATIENT)
Dept: PEDIATRICS | Facility: CLINIC | Age: 78
End: 2023-12-10
Payer: COMMERCIAL

## 2023-12-10 DIAGNOSIS — F41.1 GENERALIZED ANXIETY DISORDER: ICD-10-CM

## 2023-12-10 RX ORDER — ALPRAZOLAM 0.25 MG
0.25 TABLET ORAL
Qty: 90 TABLET | Refills: 0 | Status: SHIPPED | OUTPATIENT
Start: 2023-12-10

## 2023-12-11 ENCOUNTER — PATIENT OUTREACH (OUTPATIENT)
Dept: CARE COORDINATION | Facility: CLINIC | Age: 78
End: 2023-12-11
Payer: COMMERCIAL

## 2024-01-02 ENCOUNTER — MYC MEDICAL ADVICE (OUTPATIENT)
Dept: PEDIATRICS | Facility: CLINIC | Age: 79
End: 2024-01-02
Payer: COMMERCIAL

## 2024-01-02 DIAGNOSIS — I50.22 CHRONIC SYSTOLIC CONGESTIVE HEART FAILURE (H): ICD-10-CM

## 2024-01-02 DIAGNOSIS — F41.1 GENERALIZED ANXIETY DISORDER: ICD-10-CM

## 2024-01-03 RX ORDER — SPIRONOLACTONE 25 MG/1
TABLET ORAL
Qty: 45 TABLET | Refills: 4 | Status: SHIPPED | OUTPATIENT
Start: 2024-01-03

## 2024-01-03 RX ORDER — VENLAFAXINE HYDROCHLORIDE 75 MG/1
CAPSULE, EXTENDED RELEASE ORAL
Qty: 270 CAPSULE | Refills: 4 | Status: SHIPPED | OUTPATIENT
Start: 2024-01-03

## 2024-01-05 ENCOUNTER — HOSPITAL ENCOUNTER (OUTPATIENT)
Dept: MAMMOGRAPHY | Facility: CLINIC | Age: 79
Discharge: HOME | End: 2024-01-05
Attending: STUDENT IN AN ORGANIZED HEALTH CARE EDUCATION/TRAINING PROGRAM | Admitting: STUDENT IN AN ORGANIZED HEALTH CARE EDUCATION/TRAINING PROGRAM
Payer: COMMERCIAL

## 2024-01-05 DIAGNOSIS — Z12.31 VISIT FOR SCREENING MAMMOGRAM: ICD-10-CM

## 2024-01-05 PROCEDURE — 77063 BREAST TOMOSYNTHESIS BI: CPT

## 2024-01-08 ENCOUNTER — MYC MEDICAL ADVICE (OUTPATIENT)
Dept: PEDIATRICS | Facility: CLINIC | Age: 79
End: 2024-01-08
Payer: COMMERCIAL

## 2024-01-08 DIAGNOSIS — E78.5 HYPERLIPIDEMIA LDL GOAL <130: ICD-10-CM

## 2024-01-08 RX ORDER — ROSUVASTATIN CALCIUM 10 MG/1
TABLET, COATED ORAL
Qty: 45 TABLET | Refills: 1 | Status: SHIPPED | OUTPATIENT
Start: 2024-01-08

## 2024-01-08 NOTE — TELEPHONE ENCOUNTER
Pt requesting pharmacy change.     Refill #s adjusted. New pharmacy pended      Sadie JAIME RN on 1/8/2024 at 1:26 PM

## 2024-01-16 ENCOUNTER — TRANSFERRED RECORDS (OUTPATIENT)
Dept: HEALTH INFORMATION MANAGEMENT | Facility: CLINIC | Age: 79
End: 2024-01-16
Payer: COMMERCIAL

## 2024-02-08 ENCOUNTER — ANCILLARY PROCEDURE (OUTPATIENT)
Dept: CARDIOLOGY | Facility: CLINIC | Age: 79
End: 2024-02-08
Attending: INTERNAL MEDICINE
Payer: COMMERCIAL

## 2024-02-08 DIAGNOSIS — Z95.810 ICD (IMPLANTABLE CARDIOVERTER-DEFIBRILLATOR) IN PLACE: ICD-10-CM

## 2024-02-08 DIAGNOSIS — I42.9 IDIOPATHIC CARDIOMYOPATHY (H): ICD-10-CM

## 2024-02-08 PROCEDURE — 93295 DEV INTERROG REMOTE 1/2/MLT: CPT | Performed by: INTERNAL MEDICINE

## 2024-02-08 PROCEDURE — 93296 REM INTERROG EVL PM/IDS: CPT | Performed by: INTERNAL MEDICINE

## 2024-02-12 LAB
MDC_IDC_EPISODE_DTM: NORMAL
MDC_IDC_EPISODE_ID: NORMAL
MDC_IDC_EPISODE_TYPE: NORMAL
MDC_IDC_LEAD_CONNECTION_STATUS: NORMAL
MDC_IDC_LEAD_IMPLANT_DT: NORMAL
MDC_IDC_LEAD_LOCATION: NORMAL
MDC_IDC_LEAD_LOCATION_DETAIL_1: NORMAL
MDC_IDC_LEAD_MFG: NORMAL
MDC_IDC_LEAD_MODEL: 4047
MDC_IDC_LEAD_MODEL: 4047
MDC_IDC_LEAD_MODEL: NORMAL
MDC_IDC_LEAD_MODEL: NORMAL
MDC_IDC_LEAD_POLARITY_TYPE: NORMAL
MDC_IDC_LEAD_SERIAL: NORMAL
MDC_IDC_MSMT_BATTERY_DTM: NORMAL
MDC_IDC_MSMT_BATTERY_REMAINING_LONGEVITY: 102 MO
MDC_IDC_MSMT_BATTERY_REMAINING_PERCENTAGE: 100 %
MDC_IDC_MSMT_BATTERY_STATUS: NORMAL
MDC_IDC_MSMT_CAP_CHARGE_DTM: NORMAL
MDC_IDC_MSMT_CAP_CHARGE_TIME: 9.2 S
MDC_IDC_MSMT_CAP_CHARGE_TYPE: NORMAL
MDC_IDC_MSMT_LEADCHNL_LV_IMPEDANCE_VALUE: 362 OHM
MDC_IDC_MSMT_LEADCHNL_LV_PACING_THRESHOLD_AMPLITUDE: 1.7 V
MDC_IDC_MSMT_LEADCHNL_LV_PACING_THRESHOLD_PULSEWIDTH: 0.5 MS
MDC_IDC_MSMT_LEADCHNL_RA_IMPEDANCE_VALUE: 687 OHM
MDC_IDC_MSMT_LEADCHNL_RA_PACING_THRESHOLD_AMPLITUDE: 0.5 V
MDC_IDC_MSMT_LEADCHNL_RA_PACING_THRESHOLD_PULSEWIDTH: 0.4 MS
MDC_IDC_MSMT_LEADCHNL_RV_IMPEDANCE_VALUE: 402 OHM
MDC_IDC_MSMT_LEADCHNL_RV_PACING_THRESHOLD_AMPLITUDE: 0.9 V
MDC_IDC_MSMT_LEADCHNL_RV_PACING_THRESHOLD_PULSEWIDTH: 0.4 MS
MDC_IDC_PG_IMPLANT_DTM: NORMAL
MDC_IDC_PG_MFG: NORMAL
MDC_IDC_PG_MODEL: NORMAL
MDC_IDC_PG_SERIAL: NORMAL
MDC_IDC_PG_TYPE: NORMAL
MDC_IDC_SESS_CLINIC_NAME: NORMAL
MDC_IDC_SESS_DTM: NORMAL
MDC_IDC_SESS_TYPE: NORMAL
MDC_IDC_SET_BRADY_AT_MODE_SWITCH_MODE: NORMAL
MDC_IDC_SET_BRADY_AT_MODE_SWITCH_RATE: 170 {BEATS}/MIN
MDC_IDC_SET_BRADY_LOWRATE: 55 {BEATS}/MIN
MDC_IDC_SET_BRADY_MAX_SENSOR_RATE: 130 {BEATS}/MIN
MDC_IDC_SET_BRADY_MAX_TRACKING_RATE: 130 {BEATS}/MIN
MDC_IDC_SET_BRADY_MODE: NORMAL
MDC_IDC_SET_BRADY_PAV_DELAY_LOW: 180 MS
MDC_IDC_SET_BRADY_SAV_DELAY_LOW: 120 MS
MDC_IDC_SET_CRT_LVRV_DELAY: 30 MS
MDC_IDC_SET_CRT_PACED_CHAMBERS: NORMAL
MDC_IDC_SET_LEADCHNL_LV_PACING_AMPLITUDE: 2.7 V
MDC_IDC_SET_LEADCHNL_LV_PACING_ANODE_ELECTRODE_1: NORMAL
MDC_IDC_SET_LEADCHNL_LV_PACING_ANODE_LOCATION_1: NORMAL
MDC_IDC_SET_LEADCHNL_LV_PACING_CAPTURE_MODE: NORMAL
MDC_IDC_SET_LEADCHNL_LV_PACING_CATHODE_ELECTRODE_1: NORMAL
MDC_IDC_SET_LEADCHNL_LV_PACING_CATHODE_LOCATION_1: NORMAL
MDC_IDC_SET_LEADCHNL_LV_PACING_PULSEWIDTH: 0.5 MS
MDC_IDC_SET_LEADCHNL_LV_SENSING_ADAPTATION_MODE: NORMAL
MDC_IDC_SET_LEADCHNL_LV_SENSING_ANODE_ELECTRODE_1: NORMAL
MDC_IDC_SET_LEADCHNL_LV_SENSING_ANODE_LOCATION_1: NORMAL
MDC_IDC_SET_LEADCHNL_LV_SENSING_CATHODE_ELECTRODE_1: NORMAL
MDC_IDC_SET_LEADCHNL_LV_SENSING_CATHODE_LOCATION_1: NORMAL
MDC_IDC_SET_LEADCHNL_LV_SENSING_SENSITIVITY: 1 MV
MDC_IDC_SET_LEADCHNL_RA_PACING_AMPLITUDE: 2 V
MDC_IDC_SET_LEADCHNL_RA_PACING_CAPTURE_MODE: NORMAL
MDC_IDC_SET_LEADCHNL_RA_PACING_POLARITY: NORMAL
MDC_IDC_SET_LEADCHNL_RA_PACING_PULSEWIDTH: 0.4 MS
MDC_IDC_SET_LEADCHNL_RA_SENSING_ADAPTATION_MODE: NORMAL
MDC_IDC_SET_LEADCHNL_RA_SENSING_POLARITY: NORMAL
MDC_IDC_SET_LEADCHNL_RA_SENSING_SENSITIVITY: 0.25 MV
MDC_IDC_SET_LEADCHNL_RV_PACING_AMPLITUDE: 2 V
MDC_IDC_SET_LEADCHNL_RV_PACING_CAPTURE_MODE: NORMAL
MDC_IDC_SET_LEADCHNL_RV_PACING_POLARITY: NORMAL
MDC_IDC_SET_LEADCHNL_RV_PACING_PULSEWIDTH: 0.4 MS
MDC_IDC_SET_LEADCHNL_RV_SENSING_ADAPTATION_MODE: NORMAL
MDC_IDC_SET_LEADCHNL_RV_SENSING_POLARITY: NORMAL
MDC_IDC_SET_LEADCHNL_RV_SENSING_SENSITIVITY: 0.6 MV
MDC_IDC_SET_ZONE_DETECTION_INTERVAL: 250 MS
MDC_IDC_SET_ZONE_DETECTION_INTERVAL: 333 MS
MDC_IDC_SET_ZONE_DETECTION_INTERVAL: 375 MS
MDC_IDC_SET_ZONE_STATUS: NORMAL
MDC_IDC_SET_ZONE_TYPE: NORMAL
MDC_IDC_SET_ZONE_VENDOR_TYPE: NORMAL
MDC_IDC_STAT_AT_BURDEN_PERCENT: 0 %
MDC_IDC_STAT_AT_DTM_END: NORMAL
MDC_IDC_STAT_AT_DTM_START: NORMAL
MDC_IDC_STAT_BRADY_DTM_END: NORMAL
MDC_IDC_STAT_BRADY_DTM_START: NORMAL
MDC_IDC_STAT_BRADY_RA_PERCENT_PACED: 1 %
MDC_IDC_STAT_BRADY_RV_PERCENT_PACED: 100 %
MDC_IDC_STAT_CRT_DTM_END: NORMAL
MDC_IDC_STAT_CRT_DTM_START: NORMAL
MDC_IDC_STAT_CRT_LV_PERCENT_PACED: 100 %
MDC_IDC_STAT_EPISODE_RECENT_COUNT: 0
MDC_IDC_STAT_EPISODE_RECENT_COUNT_DTM_END: NORMAL
MDC_IDC_STAT_EPISODE_RECENT_COUNT_DTM_START: NORMAL
MDC_IDC_STAT_EPISODE_TYPE: NORMAL
MDC_IDC_STAT_EPISODE_VENDOR_TYPE: NORMAL
MDC_IDC_STAT_TACHYTHERAPY_ATP_DELIVERED_RECENT: 0
MDC_IDC_STAT_TACHYTHERAPY_ATP_DELIVERED_TOTAL: 0
MDC_IDC_STAT_TACHYTHERAPY_RECENT_DTM_END: NORMAL
MDC_IDC_STAT_TACHYTHERAPY_RECENT_DTM_START: NORMAL
MDC_IDC_STAT_TACHYTHERAPY_SHOCKS_ABORTED_RECENT: 0
MDC_IDC_STAT_TACHYTHERAPY_SHOCKS_ABORTED_TOTAL: 0
MDC_IDC_STAT_TACHYTHERAPY_SHOCKS_DELIVERED_RECENT: 0
MDC_IDC_STAT_TACHYTHERAPY_SHOCKS_DELIVERED_TOTAL: 0
MDC_IDC_STAT_TACHYTHERAPY_TOTAL_DTM_END: NORMAL
MDC_IDC_STAT_TACHYTHERAPY_TOTAL_DTM_START: NORMAL

## 2024-04-10 NOTE — TELEPHONE ENCOUNTER
Pt returned call. She reports she is feeling well. She did have a bit of nausea and diarrhea when starting the colchicine and decreased the dose from 2 daily to 1 daily. Now she feels good. No neck pain and no complaints. Will forward to Rosalinda Valencia RN  
There are no Wet Read(s) to document.

## 2024-04-13 ENCOUNTER — HEALTH MAINTENANCE LETTER (OUTPATIENT)
Age: 79
End: 2024-04-13

## 2024-04-18 ENCOUNTER — HOSPITAL ENCOUNTER (OUTPATIENT)
Dept: CARDIOLOGY | Facility: CLINIC | Age: 79
Discharge: HOME | End: 2024-04-18
Attending: INTERNAL MEDICINE | Admitting: INTERNAL MEDICINE
Payer: COMMERCIAL

## 2024-04-18 DIAGNOSIS — I42.9 IDIOPATHIC CARDIOMYOPATHY (H): ICD-10-CM

## 2024-04-18 DIAGNOSIS — I44.7 LBBB (LEFT BUNDLE BRANCH BLOCK): ICD-10-CM

## 2024-04-18 DIAGNOSIS — Z95.810 ICD (IMPLANTABLE CARDIOVERTER-DEFIBRILLATOR) IN PLACE: ICD-10-CM

## 2024-04-18 DIAGNOSIS — I50.22 CHRONIC SYSTOLIC CONGESTIVE HEART FAILURE (H): ICD-10-CM

## 2024-04-18 DIAGNOSIS — E78.5 HYPERLIPIDEMIA LDL GOAL <130: ICD-10-CM

## 2024-04-18 LAB — LVEF ECHO: NORMAL

## 2024-04-18 PROCEDURE — C8929 TTE W OR WO FOL WCON,DOPPLER: HCPCS

## 2024-04-18 PROCEDURE — 255N000002 HC RX 255 OP 636: Performed by: INTERNAL MEDICINE

## 2024-04-18 PROCEDURE — 93306 TTE W/DOPPLER COMPLETE: CPT | Mod: 26 | Performed by: INTERNAL MEDICINE

## 2024-04-18 RX ADMIN — HUMAN ALBUMIN MICROSPHERES AND PERFLUTREN 3 ML: 10; .22 INJECTION, SOLUTION INTRAVENOUS at 11:08

## 2024-04-24 ENCOUNTER — OFFICE VISIT (OUTPATIENT)
Dept: CARDIOLOGY | Facility: CLINIC | Age: 79
End: 2024-04-24
Attending: INTERNAL MEDICINE
Payer: COMMERCIAL

## 2024-04-24 VITALS
SYSTOLIC BLOOD PRESSURE: 98 MMHG | HEIGHT: 64 IN | DIASTOLIC BLOOD PRESSURE: 58 MMHG | HEART RATE: 73 BPM | WEIGHT: 201 LBS | BODY MASS INDEX: 34.31 KG/M2

## 2024-04-24 DIAGNOSIS — I44.7 LBBB (LEFT BUNDLE BRANCH BLOCK): ICD-10-CM

## 2024-04-24 DIAGNOSIS — Z95.810 ICD (IMPLANTABLE CARDIOVERTER-DEFIBRILLATOR) IN PLACE: ICD-10-CM

## 2024-04-24 DIAGNOSIS — I50.22 CHRONIC SYSTOLIC CONGESTIVE HEART FAILURE (H): ICD-10-CM

## 2024-04-24 DIAGNOSIS — I42.9 IDIOPATHIC CARDIOMYOPATHY (H): ICD-10-CM

## 2024-04-24 DIAGNOSIS — E78.5 HYPERLIPIDEMIA LDL GOAL <130: ICD-10-CM

## 2024-04-24 PROCEDURE — 99213 OFFICE O/P EST LOW 20 MIN: CPT | Performed by: INTERNAL MEDICINE

## 2024-04-24 NOTE — LETTER
4/24/2024    Deirdre E. Milligan, MD  [address] [facility] [address]    RE: Kristie Jones       Dear Colleague,     I had the pleasure of seeing Kristie Jones in the [facility].  HPI and Plan:   As always electively see this very pleasant lady in for follow-up of nonischemic cardiomyopathy with severe left ventricular systolic dysfunction    In the many years since I have known her I am very happy to report that she has never been troubled much by congestion but her symptoms are usually those of low cardiac output such as fatigue.     She had a torrid time in 2012 with attempted placement of a biventricular pacer and in 2022 I thought she may benefit from another attempt at biventricular pacing.fortunately on this occasion ventricular patient was successful.      This has not resulted in any significant improvement in ejection fraction which has remained about 25 to 30%.  Symptomatically she is better.  She is able to walk 2 miles a day.  She is active in gardening and she walks up and down the stairs in her house without spearing seeing any significant problems.      Her blood pressure has persistently been low and she has no issues with dizzy spells or syncopal episodes.  She denies shortness of breath or other heart failure symptoms     Cardiac examination reveals no evidence of CHF at all.      Recent echocardiogram continues to demonstrate ejection fraction in the 25 to 30% range.  No major valvular lesions     All medications are well-tolerated.      Overall doing well.  I will see her in 6 months time and look forward to telling me about her trip along the [place] with [employer].    Orders Placed This Encounter   Procedures    Follow-Up with Cardiology       No orders of the defined types were placed in this encounter.      Encounter Diagnoses   Name Primary?    Idiopathic cardiomyopathy (H)     ICD (implantable cardioverter-defibrillator) in place     Hyperlipidemia LDL goal  <130     LBBB (left bundle branch block)     Chronic systolic congestive heart failure (H)        CURRENT MEDICATIONS:  Current Outpatient Medications   Medication Sig Dispense Refill    acetaminophen (TYLENOL) 500 MG tablet Take 1,000 mg by mouth daily as needed for mild pain      ALPRAZolam (XANAX) 0.25 MG tablet Take 1 tablet (0.25 mg) by mouth nightly as needed for anxiety 90 tablet 0    aspirin 81 MG tablet Take 81 mg by mouth every evening      CALCIUM 500 MG OR TABS Takes 3 tabs daily takees total of 1200 mg daily      carvedilol (COREG) 25 MG tablet TAKE 1 TABLET TWICE A DAY WITH MEALS 180 tablet 3    Cholecalciferol (VITAMIN D) 2000 UNITS tablet Take 1 tablet by mouth daily.      clobetasol (TEMOVATE) 0.05 % external cream Apply thin layer to bilateral hand twice daily for 2 weeks 45 g 1    co-enzyme Q-10 50 MG CAPS Take 1 capsule by mouth every morning      conjugated estrogens (PREMARIN) 0.625 MG/GM vaginal cream Place 0.5 g vaginally twice a week 30 g 3    lifitegrast (XIIDRA) 5 % opthalmic solution Place 1 drop into both eyes 2 times daily      loratadine (CLARITIN) 10 MG tablet Take 10 mg by mouth every evening      losartan (COZAAR) 50 MG tablet Take 50 mg by mouth daily 90 tablet 3    mirtazapine (REMERON) 7.5 MG tablet TAKE 1 TO 2 TABLETS 30 MINUTES BEFORE BEDTIME 180 tablet 4    Multiple Vitamins-Minerals (MULTIVITAMIN ADULT PO) Take 1 tablet by mouth daily      rosuvastatin (CRESTOR) 10 MG tablet TAKE 1 TABLET EVERY OTHER DAY 45 tablet 1    spironolactone (ALDACTONE) 25 MG tablet Take 1/2 tablet ( 12.5 MG ) daily 45 tablet 4    venlafaxine (EFFEXOR XR) 75 MG 24 hr capsule TAKE 3 CAPSULES ONCE DAILY Strength: 75 mg 270 capsule 4    ciprofloxacin (CIPRO) 500 MG tablet Take 1 tablet by mmouth before dental appointments (Patient not taking: Reported on 4/24/2024) 5 tablet 11       ALLERGIES     Allergies   Allergen Reactions    Corticosteroids Other (See Comments)     flush up through chest and face,  decadron  flushing  Other reaction(s): Erythema    Phenothiazines Anxiety and GI Disturbance     anxiety attack, compazine  Other reaction(s): Tardive Dyskinesia    Prochlorperazine      Other reaction(s): Tardive Dyskinesia    Amoxicillin Hives    Clindamycin Other (See Comments)     Burning sensation in chest    Dexamethasone      flushing    Dexamethasone      Other reaction(s): Erythema    Compazine Anxiety       PAST MEDICAL HISTORY:  Past Medical History:   Diagnosis Date    Acute posthemorrhagic anemia 09/11/2015    Anemia     Chronic systolic congestive heart failure (H) 10/10/2018     EF 35%    Fibromyalgia     Gastro-oesophageal reflux disease     GENERAL OSTEOARTHROSIS [715.00] 11/23/2004    knee    Generalized anxiety disorder 09/30/2013    Hyperlipidemia LDL goal <130 02/10/2010    Failed simvastatin- muscle aches     Hypertension     because of the heart    Idiopathic cardiomyopathy (H) 01/19/2012    Dr. Osborne 3/2011 EF 30-35%     Insomnia 01/19/2012    Iron deficiency anemia 08/12/2015     Problem list name updated by automated process. Provider to review    LBBB (left bundle branch block)     Left ventricular systolic dysfunction:EF 35% 02/05/2012    Must stay on diovan per cardiology     Major depressive disorder, recurrent episode, mild (H24) 02/17/2016    Migraine 06/21/2005     Problem list name updated by automated process. Provider to review    Nonischemic cardiomyopathy (H)     EF 30-35%, Dr. Osborne [facility] (suspect virus); s/p AICD    NSVT (nonsustained ventricular tachycardia) (H)     Obesity 01/20/2012    SUSAN (obstructive sleep apnea) 01/19/2012    CPAP     Pure hypercholesterolemia     Restless leg syndrome     Type 2 diabetes mellitus without complication (H) 09/24/2016       PAST SURGICAL HISTORY:  Past Surgical History:   Procedure Laterality Date    APPENDECTOMY      ARTHROPLASTY KNEE  1/7/2013    Procedure: ARTHROPLASTY KNEE;  Right Total Knee Arthroplasty      ARTHROPLASTY REVISION KNEE Right  8/26/2015    Procedure: ARTHROPLASTY REVISION KNEE;  Surgeon: Néstor Beth MD;  Location:  OR    ARTHROSCOPY KNEE      bunionectomy left foot      CLOSED REDUCTION, PERCUTANEOUS PINNING FINGER, COMBINED Right 6/17/2021    Procedure: Closed reduction, pinning right long finger distal interphalangeal joint fracture;  Surgeon: Joseluis Delong MD;  Location:  OR    COLONOSCOPY      COLONOSCOPY N/A 4/27/2023    Procedure: COLONOSCOPY with cold polypectomies;  Surgeon: Dara Flowers MD;  Location: [facility] Main OR    CORONARY ANGIOGRAPHY ADULT ORDER  2002    normal    CORONARY ANGIOGRAPHY ADULT ORDER  12/7/12    no significant focal narrowing that would benefit from mechanical intervention     EP PACEMAKER GENERATOR REPLACEMENT- BI-VENTRICULAR N/A 8/25/2022    Procedure: Pacemaker Generator Replacement Biventricular;  Surgeon: Hillary Gonzalez MD;  Location:  HEART CARDIAC CATH LAB    ESOPHAGOSCOPY, GASTROSCOPY, DUODENOSCOPY (EGD), COMBINED N/A 7/9/2021    Procedure: ESOPHAGOGASTRODUODENOSCOPY, WITH BIOPSY with distal esophagus biopies to rule out Barretts esophagus by cold biopsy forceps;  Surgeon: Travis Harrington MD;  Location:  GI    HYSTERECTOMY      IMPLANT AUTOMATIC IMPLANTABLE CARDIOVERTER DEFIBRILLATOR  4/30/12    INSERT THORACIC PACEMAKER LEAD EPICARDIAL  5/1/2012    Procedure:INSERT THORACIC PACEMAKER LEAD EPICARDIAL; EPICARDIAL LEAD PLACEMENT; Surgeon:WEST ARECHIGA; Location: OR    TONSILLECTOMY      TRANSPLANT - corneal lenses      Bilateral for cataracts       FAMILY HISTORY:  Family History   Problem Relation Age of Onset    Cancer Father         intraabdominal mass - not colon cancer    Other Cancer Father     C.A.D. 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 Multiple ignacio problems, multiple hospitalizations in last 7 years, anxiety        Pt has 2 daughters, 2 step-daughters's and  7 grandchildrens        Youngest daughter Sabi has malignant brain tumor                 Social Determinants of Health     Financial Resource Strain: Low Risk  (10/3/2023)    Financial Resource Strain     Within the past 12 months, have you or your family members you live with been unable to get utilities (heat, electricity) when it was really needed?: No   Food Insecurity: Low Risk  (10/3/2023)    Food Insecurity     Within the past 12 months, did you worry that your food would run out before you got money to buy more?: No     Within the past 12 months, did the food you bought just not last and you didn t have money to get more?: No   Transportation Needs:  "Low Risk  (10/3/2023)    Transportation Needs     Within the past 12 months, has lack of transportation kept you from medical appointments, getting your medicines, non-medical meetings or appointments, work, or from getting things that you need?: No   Physical Activity: Insufficiently Active (4/20/2023)    Exercise Vital Sign     Days of Exercise per Week: 2 days     Minutes of Exercise per Session: 40 min   Stress: No Stress Concern Present (4/20/2023)    [employer] - Occupational Stress Questionnaire     Feeling of Stress : Not at all   Social Connections: Moderately Integrated (4/20/2023)    Social Connection and Isolation Panel [NHANES]     Frequency of Communication with Friends and Family: Twice a week     Frequency of Social Gatherings with Friends and Family: Once a week     Attends Spiritism Services: Never     Active Member of Clubs or Organizations: Yes     Marital Status:    Interpersonal Safety: Low Risk  (10/3/2023)    Interpersonal Safety     Do you feel physically and emotionally safe where you currently live?: Yes     Within the past 12 months, have you been hit, slapped, kicked or otherwise physically hurt by someone?: No     Within the past 12 months, have you been humiliated or emotionally abused in other ways by your partner or ex-partner?: No   Housing Stability: Low Risk  (10/3/2023)    Housing Stability     Do you have housing? : Yes     Are you worried about losing your housing?: No       Review of Systems:  Skin:        Eyes:       ENT:       Respiratory:  Negative    Cardiovascular:  Negative    Gastroenterology:      Genitourinary:       Musculoskeletal:       Neurologic:       Psychiatric:       Heme/Lymph/Imm:       Endocrine:         Physical Exam:  Vitals: BP 98/58   Pulse 73   Ht 1.626 m (5' 4\")   Wt 91.2 kg (201 lb)   LMP  (LMP Unknown)   BMI 34.50 kg/m      Constitutional:  cooperative;in no acute distress obese      Skin:  warm and dry to " the touch   pacemaker incision in the left infraclavicular area was well-healed      Head:  normocephalic        Eyes:  pupils equal and round        Lymph:      ENT:  no pallor or cyanosis, dentition good        Neck:  JVP normal;no carotid bruit        Respiratory:  clear to auscultation;normal respiratory excursion         Cardiac: regular rhythm;normal S1 and S2   distant heart sounds            pulses full and equal                                        GI:  abdomen soft obese      Extremities and Muscular Skeletal:  no deformities, clubbing, cyanosis, erythema observed;no edema              Neurological:  affect appropriate        Psych:  Alert and Oriented x 3        Recent Lab Results:  LIPID RESULTS:  Lab Results   Component Value Date    CHOL 174 10/03/2023    CHOL 170 01/25/2021    HDL 37 (L) 10/03/2023    HDL 39 (L) 01/25/2021    LDL 92 10/03/2023    LDL 80 01/25/2021    TRIG 227 (H) 10/03/2023    TRIG 253 (H) 01/25/2021    CHOLHDLRATIO 4.7 10/13/2015       LIVER ENZYME RESULTS:  Lab Results   Component Value Date    AST 23 10/03/2023    AST 28 12/26/2020    ALT 16 10/03/2023    ALT 32 12/26/2020       CBC RESULTS:  Lab Results   Component Value Date    WBC 3.7 (L) 10/03/2023    WBC 4.0 02/05/2021    RBC 3.58 (L) 10/03/2023    RBC 3.86 02/05/2021    HGB 11.3 (L) 10/03/2023    HGB 12.0 02/05/2021    HCT 33.3 (L) 10/03/2023    HCT 37.0 02/05/2021    MCV 93 10/03/2023    MCV 96 02/05/2021    MCH 31.6 10/03/2023    MCH 31.1 02/05/2021    MCHC 33.9 10/03/2023    MCHC 32.4 02/05/2021    RDW 12.7 10/03/2023    RDW 12.5 02/05/2021     (L) 10/03/2023     02/05/2021       BMP RESULTS:  Lab Results   Component Value Date     10/03/2023     02/05/2021    POTASSIUM 4.2 10/03/2023    POTASSIUM 4.1 08/25/2022    POTASSIUM 4.2 06/17/2021    CHLORIDE 108 (H) 10/03/2023    CHLORIDE 109 08/25/2022    CHLORIDE 108 02/05/2021    CO2 22 10/03/2023    CO2 26 08/25/2022    CO2 23 02/05/2021     ANIONGAP 11 10/03/2023    ANIONGAP 6 08/25/2022    ANIONGAP 8 02/05/2021     (H) 10/03/2023    GLC 86 04/27/2023     (H) 08/25/2022     (H) 02/05/2021    BUN 19.1 10/03/2023    BUN 15 08/25/2022    BUN 16 02/05/2021    CR 0.87 10/03/2023    CR 0.99 06/17/2021    GFRESTIMATED 69 10/03/2023    GFRESTIMATED 56 (L) 06/17/2021    GFRESTBLACK 64 06/17/2021    WILEY 8.9 10/03/2023    WILEY 9.1 02/05/2021        A1C RESULTS:  Lab Results   Component Value Date    A1C 5.7 (H) 10/03/2023    A1C 6.1 (H) 01/25/2021       INR RESULTS:  Lab Results   Component Value Date    INR 1.06 04/07/2022    INR 0.99 12/26/2020    INR 0.88 12/05/2012           CC  Trent Osborne MD  [address]  [place] [address]      Thank you for allowing me to participate in the care of your patient.      Sincerely,     DR TRENT OSBORNE MD     [facility]

## 2024-04-24 NOTE — PROGRESS NOTES
HPI and Plan:   As always electively see this very pleasant lady in for follow-up of nonischemic cardiomyopathy with severe left ventricular systolic dysfunction    In the many years since I have known her I am very happy to report that she has never been troubled much by congestion but her symptoms are usually those of low cardiac output such as fatigue.     She had a torrid time in 2012 with attempted placement of a biventricular pacer and in 2022 I thought she may benefit from another attempt at biventricular pacing.fortunately on this occasion ventricular patient was successful.      This has not resulted in any significant improvement in ejection fraction which has remained about 25 to 30%.  Symptomatically she is better.  She is able to walk 2 miles a day.  She is active in gardening and she walks up and down the stairs in her house without spearing seeing any significant problems.      Her blood pressure has persistently been low and she has no issues with dizzy spells or syncopal episodes.  She denies shortness of breath or other heart failure symptoms     Cardiac examination reveals no evidence of CHF at all.      Recent echocardiogram continues to demonstrate ejection fraction in the 25 to 30% range.  No major valvular lesions     All medications are well-tolerated.      Overall doing well.  I will see her in 6 months time and look forward to telling me about her trip along the [place] with [place] cruises.    Orders Placed This Encounter   Procedures    Follow-Up with Cardiology       No orders of the defined types were placed in this encounter.      Encounter Diagnoses   Name Primary?    Idiopathic cardiomyopathy (H)     ICD (implantable cardioverter-defibrillator) in place     Hyperlipidemia LDL goal <130     LBBB (left bundle branch block)     Chronic systolic congestive heart failure (H)        CURRENT MEDICATIONS:  Current Outpatient Medications   Medication Sig Dispense Refill    acetaminophen  (TYLENOL) 500 MG tablet Take 1,000 mg by mouth daily as needed for mild pain      ALPRAZolam (XANAX) 0.25 MG tablet Take 1 tablet (0.25 mg) by mouth nightly as needed for anxiety 90 tablet 0    aspirin 81 MG tablet Take 81 mg by mouth every evening      CALCIUM 500 MG OR TABS Takes 3 tabs daily takees total of 1200 mg daily      carvedilol (COREG) 25 MG tablet TAKE 1 TABLET TWICE A DAY WITH MEALS 180 tablet 3    Cholecalciferol (VITAMIN D) 2000 UNITS tablet Take 1 tablet by mouth daily.      clobetasol (TEMOVATE) 0.05 % external cream Apply thin layer to bilateral hand twice daily for 2 weeks 45 g 1    co-enzyme Q-10 50 MG CAPS Take 1 capsule by mouth every morning      conjugated estrogens (PREMARIN) 0.625 MG/GM vaginal cream Place 0.5 g vaginally twice a week 30 g 3    lifitegrast (XIIDRA) 5 % opthalmic solution Place 1 drop into both eyes 2 times daily      loratadine (CLARITIN) 10 MG tablet Take 10 mg by mouth every evening      losartan (COZAAR) 50 MG tablet Take 50 mg by mouth daily 90 tablet 3    mirtazapine (REMERON) 7.5 MG tablet TAKE 1 TO 2 TABLETS 30 MINUTES BEFORE BEDTIME 180 tablet 4    Multiple Vitamins-Minerals (MULTIVITAMIN ADULT PO) Take 1 tablet by mouth daily      rosuvastatin (CRESTOR) 10 MG tablet TAKE 1 TABLET EVERY OTHER DAY 45 tablet 1    spironolactone (ALDACTONE) 25 MG tablet Take 1/2 tablet ( 12.5 MG ) daily 45 tablet 4    venlafaxine (EFFEXOR XR) 75 MG 24 hr capsule TAKE 3 CAPSULES ONCE DAILY Strength: 75 mg 270 capsule 4    ciprofloxacin (CIPRO) 500 MG tablet Take 1 tablet by mmouth before dental appointments (Patient not taking: Reported on 4/24/2024) 5 tablet 11       ALLERGIES     Allergies   Allergen Reactions    Corticosteroids Other (See Comments)     flush up through chest and face, decadron  flushing  Other reaction(s): Erythema    Phenothiazines Anxiety and GI Disturbance     anxiety attack, compazine  Other reaction(s): Tardive Dyskinesia    Prochlorperazine      Other  reaction(s): Tardive Dyskinesia    Amoxicillin Hives    Clindamycin Other (See Comments)     Burning sensation in chest    Dexamethasone      flushing    Dexamethasone      Other reaction(s): Erythema    Compazine Anxiety       PAST MEDICAL HISTORY:  Past Medical History:   Diagnosis Date    Acute posthemorrhagic anemia 09/11/2015    Anemia     Chronic systolic congestive heart failure (H) 10/10/2018     EF 35%    Fibromyalgia     Gastro-oesophageal reflux disease     GENERAL OSTEOARTHROSIS [715.00] 11/23/2004    knee    Generalized anxiety disorder 09/30/2013    Hyperlipidemia LDL goal <130 02/10/2010    Failed simvastatin- muscle aches     Hypertension     because of the heart    Idiopathic cardiomyopathy (H) 01/19/2012    Dr. Osborne 3/2011 EF 30-35%     Insomnia 01/19/2012    Iron deficiency anemia 08/12/2015     Problem list name updated by automated process. Provider to review    LBBB (left bundle branch block)     Left ventricular systolic dysfunction:EF 35% 02/05/2012    Must stay on diovan per cardiology     Major depressive disorder, recurrent episode, mild (H24) 02/17/2016    Migraine 06/21/2005     Problem list name updated by automated process. Provider to review    Nonischemic cardiomyopathy (H)     EF 30-35%, Dr. Osborne [facility] (suspect virus); s/p AICD    NSVT (nonsustained ventricular tachycardia) (H)     Obesity 01/20/2012    SUSAN (obstructive sleep apnea) 01/19/2012    CPAP     Pure hypercholesterolemia     Restless leg syndrome     Type 2 diabetes mellitus without complication (H) 09/24/2016       PAST SURGICAL HISTORY:  Past Surgical History:   Procedure Laterality Date    APPENDECTOMY      ARTHROPLASTY KNEE  1/7/2013    Procedure: ARTHROPLASTY KNEE;  Right Total Knee Arthroplasty      ARTHROPLASTY REVISION KNEE Right 8/26/2015    Procedure: ARTHROPLASTY REVISION KNEE;  Surgeon: Néstor Beth MD;  Location: [facility] OR    ARTHROSCOPY KNEE      bunionectomy left foot      CLOSED REDUCTION, PERCUTANEOUS PINNING  FINGER, COMBINED Right 6/17/2021    Procedure: Closed reduction, pinning right long finger distal interphalangeal joint fracture;  Surgeon: Joseluis Delong MD;  Location: [facility] OR    COLONOSCOPY      COLONOSCOPY N/A 4/27/2023    Procedure: COLONOSCOPY with cold polypectomies;  Surgeon: Dara Flowers MD;  Location: [facility] Main OR    CORONARY ANGIOGRAPHY ADULT ORDER  2002    normal    CORONARY ANGIOGRAPHY ADULT ORDER  12/7/12    no significant focal narrowing that would benefit from mechanical intervention     EP PACEMAKER GENERATOR REPLACEMENT- BI-VENTRICULAR N/A 8/25/2022    Procedure: Pacemaker Generator Replacement Biventricular;  Surgeon: Hillary Gonzalez MD;  Location:  HEART CARDIAC CATH LAB    ESOPHAGOSCOPY, GASTROSCOPY, DUODENOSCOPY (EGD), COMBINED N/A 7/9/2021    Procedure: ESOPHAGOGASTRODUODENOSCOPY, WITH BIOPSY with distal esophagus biopies to rule out Barretts esophagus by cold biopsy forceps;  Surgeon: Travis Harrington MD;  Location:  GI    HYSTERECTOMY      IMPLANT AUTOMATIC IMPLANTABLE CARDIOVERTER DEFIBRILLATOR  4/30/12    INSERT THORACIC PACEMAKER LEAD EPICARDIAL  5/1/2012    Procedure:INSERT THORACIC PACEMAKER LEAD EPICARDIAL; EPICARDIAL LEAD PLACEMENT; Surgeon:WEST ARECHIGA; Location: OR    TONSILLECTOMY      TRANSPLANT - corneal lenses      Bilateral for cataracts       FAMILY HISTORY:  Family History   Problem Relation Age of Onset    Cancer Father         intraabdominal mass - not colon cancer    Other Cancer Father     C.A.D. 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 Multiple ignacio problems, multiple hospitalizations in last 7 years, anxiety        Pt has 2 daughters, 2 step-daughters's and  7 grandchildrens        Youngest daughter Sabi has malignant brain tumor                 Social Determinants of Health     Financial Resource Strain: Low Risk  (10/3/2023)    Financial Resource Strain     Within the past 12 months, have you or your family members you live with been unable to get utilities (heat, electricity) when it was really needed?: No   Food Insecurity: Low Risk  (10/3/2023)    Food Insecurity     Within the past 12 months, did you worry that your food would run out before you got money to buy more?: No     Within the past 12 months, did the food you bought just not last and you didn t have money to get more?: No   Transportation Needs: Low Risk  (10/3/2023)    Transportation Needs     Within the past 12 months, has lack of transportation kept you from medical appointments, getting your medicines, non-medical meetings or  "appointments, work, or from getting things that you need?: No   Physical Activity: Insufficiently Active (4/20/2023)    Exercise Vital Sign     Days of Exercise per Week: 2 days     Minutes of Exercise per Session: 40 min   Stress: No Stress Concern Present (4/20/2023)    [employer] - Occupational Stress Questionnaire     Feeling of Stress : Not at all   Social Connections: Moderately Integrated (4/20/2023)    Social Connection and Isolation Panel [NHANES]     Frequency of Communication with Friends and Family: Twice a week     Frequency of Social Gatherings with Friends and Family: Once a week     Attends Taoism Services: Never     Active Member of Clubs or Organizations: Yes     Marital Status:    Interpersonal Safety: Low Risk  (10/3/2023)    Interpersonal Safety     Do you feel physically and emotionally safe where you currently live?: Yes     Within the past 12 months, have you been hit, slapped, kicked or otherwise physically hurt by someone?: No     Within the past 12 months, have you been humiliated or emotionally abused in other ways by your partner or ex-partner?: No   Housing Stability: Low Risk  (10/3/2023)    Housing Stability     Do you have housing? : Yes     Are you worried about losing your housing?: No       Review of Systems:  Skin:        Eyes:       ENT:       Respiratory:  Negative    Cardiovascular:  Negative    Gastroenterology:      Genitourinary:       Musculoskeletal:       Neurologic:       Psychiatric:       Heme/Lymph/Imm:       Endocrine:         Physical Exam:  Vitals: BP 98/58   Pulse 73   Ht 1.626 m (5' 4\")   Wt 91.2 kg (201 lb)   LMP  (LMP Unknown)   BMI 34.50 kg/m      Constitutional:  cooperative;in no acute distress obese      Skin:  warm and dry to the touch   pacemaker incision in the left infraclavicular area was well-healed      Head:  normocephalic        Eyes:  pupils equal and round        Lymph:      ENT:  no pallor or " cyanosis, dentition good        Neck:  JVP normal;no carotid bruit        Respiratory:  clear to auscultation;normal respiratory excursion         Cardiac: regular rhythm;normal S1 and S2   distant heart sounds            pulses full and equal                                        GI:  abdomen soft obese      Extremities and Muscular Skeletal:  no deformities, clubbing, cyanosis, erythema observed;no edema              Neurological:  affect appropriate        Psych:  Alert and Oriented x 3        Recent Lab Results:  LIPID RESULTS:  Lab Results   Component Value Date    CHOL 174 10/03/2023    CHOL 170 01/25/2021    HDL 37 (L) 10/03/2023    HDL 39 (L) 01/25/2021    LDL 92 10/03/2023    LDL 80 01/25/2021    TRIG 227 (H) 10/03/2023    TRIG 253 (H) 01/25/2021    CHOLHDLRATIO 4.7 10/13/2015       LIVER ENZYME RESULTS:  Lab Results   Component Value Date    AST 23 10/03/2023    AST 28 12/26/2020    ALT 16 10/03/2023    ALT 32 12/26/2020       CBC RESULTS:  Lab Results   Component Value Date    WBC 3.7 (L) 10/03/2023    WBC 4.0 02/05/2021    RBC 3.58 (L) 10/03/2023    RBC 3.86 02/05/2021    HGB 11.3 (L) 10/03/2023    HGB 12.0 02/05/2021    HCT 33.3 (L) 10/03/2023    HCT 37.0 02/05/2021    MCV 93 10/03/2023    MCV 96 02/05/2021    MCH 31.6 10/03/2023    MCH 31.1 02/05/2021    MCHC 33.9 10/03/2023    MCHC 32.4 02/05/2021    RDW 12.7 10/03/2023    RDW 12.5 02/05/2021     (L) 10/03/2023     02/05/2021       BMP RESULTS:  Lab Results   Component Value Date     10/03/2023     02/05/2021    POTASSIUM 4.2 10/03/2023    POTASSIUM 4.1 08/25/2022    POTASSIUM 4.2 06/17/2021    CHLORIDE 108 (H) 10/03/2023    CHLORIDE 109 08/25/2022    CHLORIDE 108 02/05/2021    CO2 22 10/03/2023    CO2 26 08/25/2022    CO2 23 02/05/2021    ANIONGAP 11 10/03/2023    ANIONGAP 6 08/25/2022    ANIONGAP 8 02/05/2021     (H) 10/03/2023    GLC 86 04/27/2023     (H) 08/25/2022     (H) 02/05/2021    BUN 19.1  10/03/2023    BUN 15 08/25/2022    BUN 16 02/05/2021    CR 0.87 10/03/2023    CR 0.99 06/17/2021    GFRESTIMATED 69 10/03/2023    GFRESTIMATED 56 (L) 06/17/2021    GFRESTBLACK 64 06/17/2021    WILEY 8.9 10/03/2023    WILEY 9.1 02/05/2021        A1C RESULTS:  Lab Results   Component Value Date    A1C 5.7 (H) 10/03/2023    A1C 6.1 (H) 01/25/2021       INR RESULTS:  Lab Results   Component Value Date    INR 1.06 04/07/2022    INR 0.99 12/26/2020    INR 0.88 12/05/2012           CC  Ciaran Osborne MD  [id number]  [place] [id number]

## 2024-05-10 ENCOUNTER — ANCILLARY PROCEDURE (OUTPATIENT)
Dept: CARDIOLOGY | Facility: CLINIC | Age: 79
End: 2024-05-10
Attending: INTERNAL MEDICINE
Payer: COMMERCIAL

## 2024-05-10 DIAGNOSIS — I42.9 IDIOPATHIC CARDIOMYOPATHY (H): ICD-10-CM

## 2024-05-10 DIAGNOSIS — Z95.810 ICD (IMPLANTABLE CARDIOVERTER-DEFIBRILLATOR) IN PLACE: ICD-10-CM

## 2024-05-10 LAB
MDC_IDC_EPISODE_DTM: NORMAL
MDC_IDC_EPISODE_ID: NORMAL
MDC_IDC_EPISODE_TYPE: NORMAL
MDC_IDC_LEAD_CONNECTION_STATUS: NORMAL
MDC_IDC_LEAD_IMPLANT_DT: NORMAL
MDC_IDC_LEAD_LOCATION: NORMAL
MDC_IDC_LEAD_LOCATION_DETAIL_1: NORMAL
MDC_IDC_LEAD_MFG: NORMAL
MDC_IDC_LEAD_MODEL: 4047
MDC_IDC_LEAD_MODEL: 4047
MDC_IDC_LEAD_MODEL: NORMAL
MDC_IDC_LEAD_MODEL: NORMAL
MDC_IDC_LEAD_POLARITY_TYPE: NORMAL
MDC_IDC_LEAD_SERIAL: NORMAL
MDC_IDC_MSMT_BATTERY_DTM: NORMAL
MDC_IDC_MSMT_BATTERY_REMAINING_LONGEVITY: 102 MO
MDC_IDC_MSMT_BATTERY_REMAINING_PERCENTAGE: 100 %
MDC_IDC_MSMT_BATTERY_STATUS: NORMAL
MDC_IDC_MSMT_CAP_CHARGE_DTM: NORMAL
MDC_IDC_MSMT_CAP_CHARGE_TIME: 9.2 S
MDC_IDC_MSMT_CAP_CHARGE_TYPE: NORMAL
MDC_IDC_MSMT_LEADCHNL_LV_IMPEDANCE_VALUE: 393 OHM
MDC_IDC_MSMT_LEADCHNL_LV_PACING_THRESHOLD_AMPLITUDE: 1.4 V
MDC_IDC_MSMT_LEADCHNL_LV_PACING_THRESHOLD_PULSEWIDTH: 0.5 MS
MDC_IDC_MSMT_LEADCHNL_RA_IMPEDANCE_VALUE: 743 OHM
MDC_IDC_MSMT_LEADCHNL_RA_PACING_THRESHOLD_AMPLITUDE: 0.4 V
MDC_IDC_MSMT_LEADCHNL_RA_PACING_THRESHOLD_PULSEWIDTH: 0.4 MS
MDC_IDC_MSMT_LEADCHNL_RV_IMPEDANCE_VALUE: 404 OHM
MDC_IDC_MSMT_LEADCHNL_RV_PACING_THRESHOLD_AMPLITUDE: 0.9 V
MDC_IDC_MSMT_LEADCHNL_RV_PACING_THRESHOLD_PULSEWIDTH: 0.4 MS
MDC_IDC_PG_IMPLANT_DTM: NORMAL
MDC_IDC_PG_MFG: NORMAL
MDC_IDC_PG_MODEL: NORMAL
MDC_IDC_PG_SERIAL: NORMAL
MDC_IDC_PG_TYPE: NORMAL
MDC_IDC_SESS_CLINIC_NAME: NORMAL
MDC_IDC_SESS_DTM: NORMAL
MDC_IDC_SESS_TYPE: NORMAL
MDC_IDC_SET_BRADY_AT_MODE_SWITCH_MODE: NORMAL
MDC_IDC_SET_BRADY_AT_MODE_SWITCH_RATE: 170 {BEATS}/MIN
MDC_IDC_SET_BRADY_LOWRATE: 55 {BEATS}/MIN
MDC_IDC_SET_BRADY_MAX_SENSOR_RATE: 130 {BEATS}/MIN
MDC_IDC_SET_BRADY_MAX_TRACKING_RATE: 130 {BEATS}/MIN
MDC_IDC_SET_BRADY_MODE: NORMAL
MDC_IDC_SET_BRADY_PAV_DELAY_LOW: 180 MS
MDC_IDC_SET_BRADY_SAV_DELAY_LOW: 120 MS
MDC_IDC_SET_CRT_LVRV_DELAY: 30 MS
MDC_IDC_SET_CRT_PACED_CHAMBERS: NORMAL
MDC_IDC_SET_LEADCHNL_LV_PACING_AMPLITUDE: 2.9 V
MDC_IDC_SET_LEADCHNL_LV_PACING_ANODE_ELECTRODE_1: NORMAL
MDC_IDC_SET_LEADCHNL_LV_PACING_ANODE_LOCATION_1: NORMAL
MDC_IDC_SET_LEADCHNL_LV_PACING_CAPTURE_MODE: NORMAL
MDC_IDC_SET_LEADCHNL_LV_PACING_CATHODE_ELECTRODE_1: NORMAL
MDC_IDC_SET_LEADCHNL_LV_PACING_CATHODE_LOCATION_1: NORMAL
MDC_IDC_SET_LEADCHNL_LV_PACING_PULSEWIDTH: 0.5 MS
MDC_IDC_SET_LEADCHNL_LV_SENSING_ADAPTATION_MODE: NORMAL
MDC_IDC_SET_LEADCHNL_LV_SENSING_ANODE_ELECTRODE_1: NORMAL
MDC_IDC_SET_LEADCHNL_LV_SENSING_ANODE_LOCATION_1: NORMAL
MDC_IDC_SET_LEADCHNL_LV_SENSING_CATHODE_ELECTRODE_1: NORMAL
MDC_IDC_SET_LEADCHNL_LV_SENSING_CATHODE_LOCATION_1: NORMAL
MDC_IDC_SET_LEADCHNL_LV_SENSING_SENSITIVITY: 1 MV
MDC_IDC_SET_LEADCHNL_RA_PACING_AMPLITUDE: 2 V
MDC_IDC_SET_LEADCHNL_RA_PACING_CAPTURE_MODE: NORMAL
MDC_IDC_SET_LEADCHNL_RA_PACING_POLARITY: NORMAL
MDC_IDC_SET_LEADCHNL_RA_PACING_PULSEWIDTH: 0.4 MS
MDC_IDC_SET_LEADCHNL_RA_SENSING_ADAPTATION_MODE: NORMAL
MDC_IDC_SET_LEADCHNL_RA_SENSING_POLARITY: NORMAL
MDC_IDC_SET_LEADCHNL_RA_SENSING_SENSITIVITY: 0.25 MV
MDC_IDC_SET_LEADCHNL_RV_PACING_AMPLITUDE: 2 V
MDC_IDC_SET_LEADCHNL_RV_PACING_CAPTURE_MODE: NORMAL
MDC_IDC_SET_LEADCHNL_RV_PACING_POLARITY: NORMAL
MDC_IDC_SET_LEADCHNL_RV_PACING_PULSEWIDTH: 0.4 MS
MDC_IDC_SET_LEADCHNL_RV_SENSING_ADAPTATION_MODE: NORMAL
MDC_IDC_SET_LEADCHNL_RV_SENSING_POLARITY: NORMAL
MDC_IDC_SET_LEADCHNL_RV_SENSING_SENSITIVITY: 0.6 MV
MDC_IDC_SET_ZONE_DETECTION_INTERVAL: 250 MS
MDC_IDC_SET_ZONE_DETECTION_INTERVAL: 333 MS
MDC_IDC_SET_ZONE_DETECTION_INTERVAL: 375 MS
MDC_IDC_SET_ZONE_STATUS: NORMAL
MDC_IDC_SET_ZONE_TYPE: NORMAL
MDC_IDC_SET_ZONE_VENDOR_TYPE: NORMAL
MDC_IDC_STAT_AT_BURDEN_PERCENT: 0 %
MDC_IDC_STAT_AT_DTM_END: NORMAL
MDC_IDC_STAT_AT_DTM_START: NORMAL
MDC_IDC_STAT_BRADY_DTM_END: NORMAL
MDC_IDC_STAT_BRADY_DTM_START: NORMAL
MDC_IDC_STAT_BRADY_RA_PERCENT_PACED: 1 %
MDC_IDC_STAT_BRADY_RV_PERCENT_PACED: 100 %
MDC_IDC_STAT_CRT_DTM_END: NORMAL
MDC_IDC_STAT_CRT_DTM_START: NORMAL
MDC_IDC_STAT_CRT_LV_PERCENT_PACED: 100 %
MDC_IDC_STAT_EPISODE_RECENT_COUNT: 0
MDC_IDC_STAT_EPISODE_RECENT_COUNT_DTM_END: NORMAL
MDC_IDC_STAT_EPISODE_RECENT_COUNT_DTM_START: NORMAL
MDC_IDC_STAT_EPISODE_TYPE: NORMAL
MDC_IDC_STAT_EPISODE_VENDOR_TYPE: NORMAL
MDC_IDC_STAT_TACHYTHERAPY_ATP_DELIVERED_RECENT: 0
MDC_IDC_STAT_TACHYTHERAPY_ATP_DELIVERED_TOTAL: 0
MDC_IDC_STAT_TACHYTHERAPY_RECENT_DTM_END: NORMAL
MDC_IDC_STAT_TACHYTHERAPY_RECENT_DTM_START: NORMAL
MDC_IDC_STAT_TACHYTHERAPY_SHOCKS_ABORTED_RECENT: 0
MDC_IDC_STAT_TACHYTHERAPY_SHOCKS_ABORTED_TOTAL: 0
MDC_IDC_STAT_TACHYTHERAPY_SHOCKS_DELIVERED_RECENT: 0
MDC_IDC_STAT_TACHYTHERAPY_SHOCKS_DELIVERED_TOTAL: 0
MDC_IDC_STAT_TACHYTHERAPY_TOTAL_DTM_END: NORMAL
MDC_IDC_STAT_TACHYTHERAPY_TOTAL_DTM_START: NORMAL

## 2024-05-10 PROCEDURE — 93296 REM INTERROG EVL PM/IDS: CPT | Performed by: INTERNAL MEDICINE

## 2024-05-10 PROCEDURE — 93295 DEV INTERROG REMOTE 1/2/MLT: CPT | Performed by: INTERNAL MEDICINE

## 2024-08-07 ENCOUNTER — TELEPHONE (OUTPATIENT)
Dept: CARDIOLOGY | Facility: CLINIC | Age: 79
End: 2024-08-07
Payer: COMMERCIAL

## 2024-08-07 NOTE — TELEPHONE ENCOUNTER
On 8/2 her heart logic on her ICD device alerted that she might me retaining fluid.  Per that call, the device nurse that called her had this conversation with her:         Heart Logic 17 today. Started increasing July 13 and crossed threshold 8/1/2024.   Called and spoke with pt who states she just returned from a 10 day cruise and had a long flight [place]. States she wore compression socks. Also states she had a URI is on antibiotics.  Discussed Heartlogic capabilities and function. Informed her we kayli be watching this over the next couple of weeks to be sure it comes down. In the meantime she knows to call with any symptoms: weight gain , swelling or SOB  Jair MOCTEZUMA

## 2024-08-07 NOTE — TELEPHONE ENCOUNTER
Writer received much appreciated information from the device RN team.   I have called back to Kristie Mcknight.    She is having no symptoms, other than the sinus infection which she is being treated by PCP.  She was able to do all the excursions and participate in everything [place] [facility] [place] [facility].    No edema, no change in weight, no change in breathing from her usual.  She has been able to do all the ADLs as usual.  States she has no symptoms of fluid retention.    Instructed her about S & Sx to report for heart failure, or decline in status; call us, Dr. Osborne's team.    Assured her that the device will continue with monitoring, and if any alerts, device team will call her.      Kristie Mcknight verbalized understanding and agreement with the plan.      Kristan Connell RN, BSN

## 2024-08-07 NOTE — TELEPHONE ENCOUNTER
"Team received phone call from Kristie Mcknight.  States  \"I received a message about my device.  They said that the report shows my body is retaining water.  No recommendations were given.  Can Dr. Osborne tell me what to do please?\"    Writer is aware that patient was scheduled for a remote device check today.    Routing to Device team RN for review, as I have no data or notes to refer to for this patient.    Kristan Connell RN on 8/7/2024 at 1:08 PM    "

## 2024-08-12 ENCOUNTER — ANCILLARY PROCEDURE (OUTPATIENT)
Dept: CARDIOLOGY | Facility: CLINIC | Age: 79
End: 2024-08-12
Attending: INTERNAL MEDICINE
Payer: COMMERCIAL

## 2024-08-12 DIAGNOSIS — Z95.810 ICD (IMPLANTABLE CARDIOVERTER-DEFIBRILLATOR) IN PLACE: ICD-10-CM

## 2024-08-12 DIAGNOSIS — I42.9 IDIOPATHIC CARDIOMYOPATHY (H): ICD-10-CM

## 2024-08-12 PROCEDURE — 93295 DEV INTERROG REMOTE 1/2/MLT: CPT | Performed by: INTERNAL MEDICINE

## 2024-08-12 PROCEDURE — 93296 REM INTERROG EVL PM/IDS: CPT | Performed by: INTERNAL MEDICINE

## 2024-08-14 LAB
MDC_IDC_EPISODE_DTM: NORMAL
MDC_IDC_EPISODE_ID: NORMAL
MDC_IDC_EPISODE_TYPE: NORMAL
MDC_IDC_LEAD_CONNECTION_STATUS: NORMAL
MDC_IDC_LEAD_IMPLANT_DT: NORMAL
MDC_IDC_LEAD_LOCATION: NORMAL
MDC_IDC_LEAD_LOCATION_DETAIL_1: NORMAL
MDC_IDC_LEAD_MFG: NORMAL
MDC_IDC_LEAD_MODEL: 4047
MDC_IDC_LEAD_MODEL: 4047
MDC_IDC_LEAD_MODEL: NORMAL
MDC_IDC_LEAD_MODEL: NORMAL
MDC_IDC_LEAD_POLARITY_TYPE: NORMAL
MDC_IDC_LEAD_SERIAL: NORMAL
MDC_IDC_MSMT_BATTERY_DTM: NORMAL
MDC_IDC_MSMT_BATTERY_REMAINING_LONGEVITY: 90 MO
MDC_IDC_MSMT_BATTERY_REMAINING_PERCENTAGE: 100 %
MDC_IDC_MSMT_BATTERY_STATUS: NORMAL
MDC_IDC_MSMT_CAP_CHARGE_DTM: NORMAL
MDC_IDC_MSMT_CAP_CHARGE_TIME: 9.2 S
MDC_IDC_MSMT_CAP_CHARGE_TYPE: NORMAL
MDC_IDC_MSMT_LEADCHNL_LV_IMPEDANCE_VALUE: 402 OHM
MDC_IDC_MSMT_LEADCHNL_LV_PACING_THRESHOLD_AMPLITUDE: 1 V
MDC_IDC_MSMT_LEADCHNL_LV_PACING_THRESHOLD_PULSEWIDTH: 0.5 MS
MDC_IDC_MSMT_LEADCHNL_RA_IMPEDANCE_VALUE: 721 OHM
MDC_IDC_MSMT_LEADCHNL_RA_PACING_THRESHOLD_AMPLITUDE: 0.4 V
MDC_IDC_MSMT_LEADCHNL_RA_PACING_THRESHOLD_PULSEWIDTH: 0.4 MS
MDC_IDC_MSMT_LEADCHNL_RV_IMPEDANCE_VALUE: 391 OHM
MDC_IDC_MSMT_LEADCHNL_RV_PACING_THRESHOLD_AMPLITUDE: 0.9 V
MDC_IDC_MSMT_LEADCHNL_RV_PACING_THRESHOLD_PULSEWIDTH: 0.4 MS
MDC_IDC_PG_IMPLANT_DTM: NORMAL
MDC_IDC_PG_MFG: NORMAL
MDC_IDC_PG_MODEL: NORMAL
MDC_IDC_PG_SERIAL: NORMAL
MDC_IDC_PG_TYPE: NORMAL
MDC_IDC_SESS_CLINIC_NAME: NORMAL
MDC_IDC_SESS_DTM: NORMAL
MDC_IDC_SESS_TYPE: NORMAL
MDC_IDC_SET_BRADY_AT_MODE_SWITCH_MODE: NORMAL
MDC_IDC_SET_BRADY_AT_MODE_SWITCH_RATE: 170 {BEATS}/MIN
MDC_IDC_SET_BRADY_LOWRATE: 55 {BEATS}/MIN
MDC_IDC_SET_BRADY_MAX_SENSOR_RATE: 130 {BEATS}/MIN
MDC_IDC_SET_BRADY_MAX_TRACKING_RATE: 130 {BEATS}/MIN
MDC_IDC_SET_BRADY_MODE: NORMAL
MDC_IDC_SET_BRADY_PAV_DELAY_LOW: 180 MS
MDC_IDC_SET_BRADY_SAV_DELAY_LOW: 120 MS
MDC_IDC_SET_CRT_LVRV_DELAY: 30 MS
MDC_IDC_SET_CRT_PACED_CHAMBERS: NORMAL
MDC_IDC_SET_LEADCHNL_LV_PACING_AMPLITUDE: 2.7 V
MDC_IDC_SET_LEADCHNL_LV_PACING_ANODE_ELECTRODE_1: NORMAL
MDC_IDC_SET_LEADCHNL_LV_PACING_ANODE_LOCATION_1: NORMAL
MDC_IDC_SET_LEADCHNL_LV_PACING_CAPTURE_MODE: NORMAL
MDC_IDC_SET_LEADCHNL_LV_PACING_CATHODE_ELECTRODE_1: NORMAL
MDC_IDC_SET_LEADCHNL_LV_PACING_CATHODE_LOCATION_1: NORMAL
MDC_IDC_SET_LEADCHNL_LV_PACING_PULSEWIDTH: 0.5 MS
MDC_IDC_SET_LEADCHNL_LV_SENSING_ADAPTATION_MODE: NORMAL
MDC_IDC_SET_LEADCHNL_LV_SENSING_ANODE_ELECTRODE_1: NORMAL
MDC_IDC_SET_LEADCHNL_LV_SENSING_ANODE_LOCATION_1: NORMAL
MDC_IDC_SET_LEADCHNL_LV_SENSING_CATHODE_ELECTRODE_1: NORMAL
MDC_IDC_SET_LEADCHNL_LV_SENSING_CATHODE_LOCATION_1: NORMAL
MDC_IDC_SET_LEADCHNL_LV_SENSING_SENSITIVITY: 1 MV
MDC_IDC_SET_LEADCHNL_RA_PACING_AMPLITUDE: 2 V
MDC_IDC_SET_LEADCHNL_RA_PACING_CAPTURE_MODE: NORMAL
MDC_IDC_SET_LEADCHNL_RA_PACING_POLARITY: NORMAL
MDC_IDC_SET_LEADCHNL_RA_PACING_PULSEWIDTH: 0.4 MS
MDC_IDC_SET_LEADCHNL_RA_SENSING_ADAPTATION_MODE: NORMAL
MDC_IDC_SET_LEADCHNL_RA_SENSING_POLARITY: NORMAL
MDC_IDC_SET_LEADCHNL_RA_SENSING_SENSITIVITY: 0.25 MV
MDC_IDC_SET_LEADCHNL_RV_PACING_AMPLITUDE: 2 V
MDC_IDC_SET_LEADCHNL_RV_PACING_CAPTURE_MODE: NORMAL
MDC_IDC_SET_LEADCHNL_RV_PACING_POLARITY: NORMAL
MDC_IDC_SET_LEADCHNL_RV_PACING_PULSEWIDTH: 0.4 MS
MDC_IDC_SET_LEADCHNL_RV_SENSING_ADAPTATION_MODE: NORMAL
MDC_IDC_SET_LEADCHNL_RV_SENSING_POLARITY: NORMAL
MDC_IDC_SET_LEADCHNL_RV_SENSING_SENSITIVITY: 0.6 MV
MDC_IDC_SET_ZONE_DETECTION_INTERVAL: 250 MS
MDC_IDC_SET_ZONE_DETECTION_INTERVAL: 333 MS
MDC_IDC_SET_ZONE_DETECTION_INTERVAL: 375 MS
MDC_IDC_SET_ZONE_STATUS: NORMAL
MDC_IDC_SET_ZONE_TYPE: NORMAL
MDC_IDC_SET_ZONE_VENDOR_TYPE: NORMAL
MDC_IDC_STAT_AT_BURDEN_PERCENT: 0 %
MDC_IDC_STAT_AT_DTM_END: NORMAL
MDC_IDC_STAT_AT_DTM_START: NORMAL
MDC_IDC_STAT_BRADY_DTM_END: NORMAL
MDC_IDC_STAT_BRADY_DTM_START: NORMAL
MDC_IDC_STAT_BRADY_RA_PERCENT_PACED: 1 %
MDC_IDC_STAT_BRADY_RV_PERCENT_PACED: 100 %
MDC_IDC_STAT_CRT_DTM_END: NORMAL
MDC_IDC_STAT_CRT_DTM_START: NORMAL
MDC_IDC_STAT_CRT_LV_PERCENT_PACED: 100 %
MDC_IDC_STAT_EPISODE_RECENT_COUNT: 0
MDC_IDC_STAT_EPISODE_RECENT_COUNT_DTM_END: NORMAL
MDC_IDC_STAT_EPISODE_RECENT_COUNT_DTM_START: NORMAL
MDC_IDC_STAT_EPISODE_TYPE: NORMAL
MDC_IDC_STAT_EPISODE_VENDOR_TYPE: NORMAL
MDC_IDC_STAT_TACHYTHERAPY_ATP_DELIVERED_RECENT: 0
MDC_IDC_STAT_TACHYTHERAPY_ATP_DELIVERED_TOTAL: 0
MDC_IDC_STAT_TACHYTHERAPY_RECENT_DTM_END: NORMAL
MDC_IDC_STAT_TACHYTHERAPY_RECENT_DTM_START: NORMAL
MDC_IDC_STAT_TACHYTHERAPY_SHOCKS_ABORTED_RECENT: 0
MDC_IDC_STAT_TACHYTHERAPY_SHOCKS_ABORTED_TOTAL: 0
MDC_IDC_STAT_TACHYTHERAPY_SHOCKS_DELIVERED_RECENT: 0
MDC_IDC_STAT_TACHYTHERAPY_SHOCKS_DELIVERED_TOTAL: 0
MDC_IDC_STAT_TACHYTHERAPY_TOTAL_DTM_END: NORMAL
MDC_IDC_STAT_TACHYTHERAPY_TOTAL_DTM_START: NORMAL

## 2024-09-03 ENCOUNTER — PATIENT OUTREACH (OUTPATIENT)
Dept: CARE COORDINATION | Facility: CLINIC | Age: 79
End: 2024-09-03
Payer: COMMERCIAL

## 2024-09-17 ENCOUNTER — PATIENT OUTREACH (OUTPATIENT)
Dept: CARE COORDINATION | Facility: CLINIC | Age: 79
End: 2024-09-17
Payer: COMMERCIAL

## 2024-09-26 ENCOUNTER — PATIENT OUTREACH (OUTPATIENT)
Dept: CARE COORDINATION | Facility: CLINIC | Age: 79
End: 2024-09-26
Payer: COMMERCIAL

## 2024-10-03 ENCOUNTER — TELEPHONE (OUTPATIENT)
Dept: CARDIOLOGY | Facility: CLINIC | Age: 79
End: 2024-10-03
Payer: COMMERCIAL

## 2024-10-03 NOTE — TELEPHONE ENCOUNTER
Health Call Center    Phone Message    May a detailed message be left on voicemail: yes     Reason for Call: Other: Kristie Mcknight called requesting to speak with her care team about her follow up in January and wants to know when her team would like her to have her device checked in-clinic. Kristie Mcknight has a remote check in December and doesn't know when she should come in for her in-clinic check. Please reach out to Kristie Mcknight to discuss. Thank you!     Action Taken: Other: Cardiology    Travel Screening: Not Applicable    Thank you!  [facility]       Date of Service:

## 2024-10-03 NOTE — TELEPHONE ENCOUNTER
Called patient back, no answer. LVM letting her know that per protocol, in clinic device checks should be completed every year. Her last in clinic check was completed on 10/23/23 and her last remote check was completed on 8/12/24. We currently have her scheduled for her next remote check on 12/10/24 as a back up in case her in clinic check is not completed prior to then. Ideally, we would like her in clinic check to be completed sometime in mind-November (though this is not a strict deadline). Asked that she call back to the Device RN line (# provided) so we could get her scheduled.  DARRICK RN

## 2024-10-08 NOTE — TELEPHONE ENCOUNTER
Pt left  wanting to schedule her annual threshold in November in [place].     Called pt back. Told her that [place] is full until January. Pt decided to schedule for [place] in November. Scheduled for 11/12/2024. She is familiar with the [place] location.

## 2024-11-05 ASSESSMENT — PATIENT HEALTH QUESTIONNAIRE - PHQ9: SUM OF ALL RESPONSES TO PHQ QUESTIONS 1-9: 5

## 2024-11-09 ENCOUNTER — HEALTH MAINTENANCE LETTER (OUTPATIENT)
Age: 79
End: 2024-11-09

## 2024-11-12 ENCOUNTER — ANCILLARY PROCEDURE (OUTPATIENT)
Dept: CARDIOLOGY | Facility: CLINIC | Age: 79
End: 2024-11-12
Attending: INTERNAL MEDICINE
Payer: COMMERCIAL

## 2024-11-12 DIAGNOSIS — I42.9 IDIOPATHIC CARDIOMYOPATHY (H): ICD-10-CM

## 2024-11-12 DIAGNOSIS — Z95.810 ICD (IMPLANTABLE CARDIOVERTER-DEFIBRILLATOR) IN PLACE: ICD-10-CM

## 2024-11-12 LAB
MDC_IDC_LEAD_CONNECTION_STATUS: NORMAL
MDC_IDC_LEAD_IMPLANT_DT: NORMAL
MDC_IDC_LEAD_LOCATION: NORMAL
MDC_IDC_LEAD_LOCATION_DETAIL_1: NORMAL
MDC_IDC_LEAD_MFG: NORMAL
MDC_IDC_LEAD_MODEL: 4047
MDC_IDC_LEAD_MODEL: 4047
MDC_IDC_LEAD_MODEL: NORMAL
MDC_IDC_LEAD_MODEL: NORMAL
MDC_IDC_LEAD_POLARITY_TYPE: NORMAL
MDC_IDC_LEAD_SERIAL: NORMAL
MDC_IDC_MSMT_BATTERY_DTM: NORMAL
MDC_IDC_MSMT_BATTERY_REMAINING_LONGEVITY: 90 MO
MDC_IDC_MSMT_BATTERY_REMAINING_PERCENTAGE: 100 %
MDC_IDC_MSMT_BATTERY_STATUS: NORMAL
MDC_IDC_MSMT_CAP_CHARGE_DTM: NORMAL
MDC_IDC_MSMT_CAP_CHARGE_TIME: 9.2 S
MDC_IDC_MSMT_CAP_CHARGE_TYPE: NORMAL
MDC_IDC_MSMT_LEADCHNL_LV_IMPEDANCE_VALUE: 385 OHM
MDC_IDC_MSMT_LEADCHNL_LV_PACING_THRESHOLD_AMPLITUDE: 1.3 V
MDC_IDC_MSMT_LEADCHNL_LV_PACING_THRESHOLD_PULSEWIDTH: 0.5 MS
MDC_IDC_MSMT_LEADCHNL_RA_IMPEDANCE_VALUE: 746 OHM
MDC_IDC_MSMT_LEADCHNL_RA_PACING_THRESHOLD_AMPLITUDE: 0.9 V
MDC_IDC_MSMT_LEADCHNL_RA_PACING_THRESHOLD_PULSEWIDTH: 0.4 MS
MDC_IDC_MSMT_LEADCHNL_RV_IMPEDANCE_VALUE: 387 OHM
MDC_IDC_MSMT_LEADCHNL_RV_LEAD_CHANNEL_STATUS: NORMAL
MDC_IDC_MSMT_LEADCHNL_RV_PACING_THRESHOLD_AMPLITUDE: 0.9 V
MDC_IDC_MSMT_LEADCHNL_RV_PACING_THRESHOLD_PULSEWIDTH: 0.4 MS
MDC_IDC_PG_IMPLANT_DTM: NORMAL
MDC_IDC_PG_MFG: NORMAL
MDC_IDC_PG_MODEL: NORMAL
MDC_IDC_PG_SERIAL: NORMAL
MDC_IDC_PG_TYPE: NORMAL
MDC_IDC_SESS_CLINIC_NAME: NORMAL
MDC_IDC_SESS_DTM: NORMAL
MDC_IDC_SESS_TYPE: NORMAL
MDC_IDC_SET_BRADY_AT_MODE_SWITCH_MODE: NORMAL
MDC_IDC_SET_BRADY_AT_MODE_SWITCH_RATE: 170 {BEATS}/MIN
MDC_IDC_SET_BRADY_LOWRATE: 55 {BEATS}/MIN
MDC_IDC_SET_BRADY_MAX_SENSOR_RATE: 130 {BEATS}/MIN
MDC_IDC_SET_BRADY_MAX_TRACKING_RATE: 130 {BEATS}/MIN
MDC_IDC_SET_BRADY_MODE: NORMAL
MDC_IDC_SET_BRADY_PAV_DELAY_LOW: 180 MS
MDC_IDC_SET_BRADY_SAV_DELAY_LOW: 120 MS
MDC_IDC_SET_CRT_LVRV_DELAY: 30 MS
MDC_IDC_SET_CRT_PACED_CHAMBERS: NORMAL
MDC_IDC_SET_LEADCHNL_LV_PACING_AMPLITUDE: 2.7 V
MDC_IDC_SET_LEADCHNL_LV_PACING_ANODE_ELECTRODE_1: NORMAL
MDC_IDC_SET_LEADCHNL_LV_PACING_ANODE_LOCATION_1: NORMAL
MDC_IDC_SET_LEADCHNL_LV_PACING_CAPTURE_MODE: NORMAL
MDC_IDC_SET_LEADCHNL_LV_PACING_CATHODE_ELECTRODE_1: NORMAL
MDC_IDC_SET_LEADCHNL_LV_PACING_CATHODE_LOCATION_1: NORMAL
MDC_IDC_SET_LEADCHNL_LV_PACING_PULSEWIDTH: 0.5 MS
MDC_IDC_SET_LEADCHNL_LV_SENSING_ADAPTATION_MODE: NORMAL
MDC_IDC_SET_LEADCHNL_LV_SENSING_ANODE_ELECTRODE_1: NORMAL
MDC_IDC_SET_LEADCHNL_LV_SENSING_ANODE_LOCATION_1: NORMAL
MDC_IDC_SET_LEADCHNL_LV_SENSING_CATHODE_ELECTRODE_1: NORMAL
MDC_IDC_SET_LEADCHNL_LV_SENSING_CATHODE_LOCATION_1: NORMAL
MDC_IDC_SET_LEADCHNL_LV_SENSING_SENSITIVITY: 1 MV
MDC_IDC_SET_LEADCHNL_RA_PACING_AMPLITUDE: 2 V
MDC_IDC_SET_LEADCHNL_RA_PACING_CAPTURE_MODE: NORMAL
MDC_IDC_SET_LEADCHNL_RA_PACING_POLARITY: NORMAL
MDC_IDC_SET_LEADCHNL_RA_PACING_PULSEWIDTH: 0.4 MS
MDC_IDC_SET_LEADCHNL_RA_SENSING_ADAPTATION_MODE: NORMAL
MDC_IDC_SET_LEADCHNL_RA_SENSING_POLARITY: NORMAL
MDC_IDC_SET_LEADCHNL_RA_SENSING_SENSITIVITY: 0.25 MV
MDC_IDC_SET_LEADCHNL_RV_PACING_AMPLITUDE: 2 V
MDC_IDC_SET_LEADCHNL_RV_PACING_CAPTURE_MODE: NORMAL
MDC_IDC_SET_LEADCHNL_RV_PACING_POLARITY: NORMAL
MDC_IDC_SET_LEADCHNL_RV_PACING_PULSEWIDTH: 0.4 MS
MDC_IDC_SET_LEADCHNL_RV_SENSING_ADAPTATION_MODE: NORMAL
MDC_IDC_SET_LEADCHNL_RV_SENSING_POLARITY: NORMAL
MDC_IDC_SET_LEADCHNL_RV_SENSING_SENSITIVITY: 0.6 MV
MDC_IDC_SET_ZONE_DETECTION_INTERVAL: 250 MS
MDC_IDC_SET_ZONE_DETECTION_INTERVAL: 333 MS
MDC_IDC_SET_ZONE_DETECTION_INTERVAL: 375 MS
MDC_IDC_SET_ZONE_STATUS: NORMAL
MDC_IDC_SET_ZONE_TYPE: NORMAL
MDC_IDC_SET_ZONE_VENDOR_TYPE: NORMAL
MDC_IDC_STAT_AT_BURDEN_PERCENT: 0 %
MDC_IDC_STAT_AT_DTM_END: NORMAL
MDC_IDC_STAT_AT_DTM_START: NORMAL
MDC_IDC_STAT_BRADY_DTM_END: NORMAL
MDC_IDC_STAT_BRADY_DTM_START: NORMAL
MDC_IDC_STAT_BRADY_RA_PERCENT_PACED: 1 %
MDC_IDC_STAT_BRADY_RV_PERCENT_PACED: 100 %
MDC_IDC_STAT_CRT_DTM_END: NORMAL
MDC_IDC_STAT_CRT_DTM_START: NORMAL
MDC_IDC_STAT_CRT_LV_PERCENT_PACED: 100 %
MDC_IDC_STAT_EPISODE_RECENT_COUNT: 0
MDC_IDC_STAT_EPISODE_RECENT_COUNT_DTM_END: NORMAL
MDC_IDC_STAT_EPISODE_RECENT_COUNT_DTM_START: NORMAL
MDC_IDC_STAT_EPISODE_TYPE: NORMAL
MDC_IDC_STAT_EPISODE_VENDOR_TYPE: NORMAL
MDC_IDC_STAT_TACHYTHERAPY_ATP_DELIVERED_RECENT: 0
MDC_IDC_STAT_TACHYTHERAPY_ATP_DELIVERED_TOTAL: 0
MDC_IDC_STAT_TACHYTHERAPY_RECENT_DTM_END: NORMAL
MDC_IDC_STAT_TACHYTHERAPY_RECENT_DTM_START: NORMAL
MDC_IDC_STAT_TACHYTHERAPY_SHOCKS_ABORTED_RECENT: 0
MDC_IDC_STAT_TACHYTHERAPY_SHOCKS_ABORTED_TOTAL: 0
MDC_IDC_STAT_TACHYTHERAPY_SHOCKS_DELIVERED_RECENT: 0
MDC_IDC_STAT_TACHYTHERAPY_SHOCKS_DELIVERED_TOTAL: 0
MDC_IDC_STAT_TACHYTHERAPY_TOTAL_DTM_END: NORMAL
MDC_IDC_STAT_TACHYTHERAPY_TOTAL_DTM_START: NORMAL

## 2024-11-12 PROCEDURE — 93284 PRGRMG EVAL IMPLANTABLE DFB: CPT | Performed by: INTERNAL MEDICINE

## 2025-01-14 ENCOUNTER — HOSPITAL ENCOUNTER (OUTPATIENT)
Dept: MAMMOGRAPHY | Facility: CLINIC | Age: 80
Discharge: HOME | End: 2025-01-14
Payer: COMMERCIAL

## 2025-01-14 DIAGNOSIS — Z12.31 VISIT FOR SCREENING MAMMOGRAM: ICD-10-CM

## 2025-01-14 PROCEDURE — 77067 SCR MAMMO BI INCL CAD: CPT

## 2025-01-14 PROCEDURE — 77063 BREAST TOMOSYNTHESIS BI: CPT

## 2025-01-21 ENCOUNTER — TRANSFERRED RECORDS (OUTPATIENT)
Dept: HEALTH INFORMATION MANAGEMENT | Facility: CLINIC | Age: 80
End: 2025-01-21
Payer: COMMERCIAL

## 2025-02-10 NOTE — TELEPHONE ENCOUNTER
Another message left for patient to review that appointment tomorrow not needed.  Awaiting return call.  Also sent my chart message to patient asking for her to call to discuss appt.  JOSE ELIAS Johnson  
LM for pt that the OV on 10/5 is not needed per Device nurse (Farhana)  and to please call back to discuss. Isma   
Pt called back and the OV was discussed and pt made aware that it was not needed. Pt asked about the other OV tomorrow. After looking at this visit, which is the device check closer the check was in [place] and the OV in [place]. Explained that pt will keep the device check and the device nurses will get pt set up with her follow ups at they are needed. OV with Dr Gonzalez is not needed and cancelled. Pt states understanding. JNelsonRN   
YELITZA another message for pt that her OV tomorrow in [place] is not needed and would like to pt to call back to let us know that she has received this message. Isma  
REQUIRED- Click to run Sepsis Recognition Calculator

## 2025-02-17 ENCOUNTER — ANCILLARY PROCEDURE (OUTPATIENT)
Dept: CARDIOLOGY | Facility: CLINIC | Age: 80
End: 2025-02-17
Attending: INTERNAL MEDICINE
Payer: COMMERCIAL

## 2025-02-17 DIAGNOSIS — Z95.810 ICD (IMPLANTABLE CARDIOVERTER-DEFIBRILLATOR) IN PLACE: ICD-10-CM

## 2025-02-17 DIAGNOSIS — I42.9 IDIOPATHIC CARDIOMYOPATHY (H): ICD-10-CM

## 2025-02-17 PROCEDURE — 93295 DEV INTERROG REMOTE 1/2/MLT: CPT | Performed by: INTERNAL MEDICINE

## 2025-02-17 PROCEDURE — 93296 REM INTERROG EVL PM/IDS: CPT | Performed by: INTERNAL MEDICINE

## 2025-02-20 LAB
MDC_IDC_EPISODE_DTM: NORMAL
MDC_IDC_EPISODE_ID: NORMAL
MDC_IDC_EPISODE_TYPE: NORMAL
MDC_IDC_LEAD_CONNECTION_STATUS: NORMAL
MDC_IDC_LEAD_IMPLANT_DT: NORMAL
MDC_IDC_LEAD_LOCATION: NORMAL
MDC_IDC_LEAD_LOCATION_DETAIL_1: NORMAL
MDC_IDC_LEAD_MFG: NORMAL
MDC_IDC_LEAD_MODEL: 4047
MDC_IDC_LEAD_MODEL: 4047
MDC_IDC_LEAD_MODEL: NORMAL
MDC_IDC_LEAD_MODEL: NORMAL
MDC_IDC_LEAD_POLARITY_TYPE: NORMAL
MDC_IDC_LEAD_SERIAL: NORMAL
MDC_IDC_MSMT_BATTERY_DTM: NORMAL
MDC_IDC_MSMT_BATTERY_REMAINING_LONGEVITY: 84 MO
MDC_IDC_MSMT_BATTERY_REMAINING_PERCENTAGE: 98 %
MDC_IDC_MSMT_BATTERY_STATUS: NORMAL
MDC_IDC_MSMT_CAP_CHARGE_DTM: NORMAL
MDC_IDC_MSMT_CAP_CHARGE_TIME: 9.3 S
MDC_IDC_MSMT_CAP_CHARGE_TYPE: NORMAL
MDC_IDC_MSMT_LEADCHNL_LV_IMPEDANCE_VALUE: 385 OHM
MDC_IDC_MSMT_LEADCHNL_LV_PACING_THRESHOLD_AMPLITUDE: 1.5 V
MDC_IDC_MSMT_LEADCHNL_LV_PACING_THRESHOLD_PULSEWIDTH: 0.5 MS
MDC_IDC_MSMT_LEADCHNL_RA_IMPEDANCE_VALUE: 744 OHM
MDC_IDC_MSMT_LEADCHNL_RA_PACING_THRESHOLD_AMPLITUDE: 0.4 V
MDC_IDC_MSMT_LEADCHNL_RA_PACING_THRESHOLD_PULSEWIDTH: 0.4 MS
MDC_IDC_MSMT_LEADCHNL_RV_IMPEDANCE_VALUE: 395 OHM
MDC_IDC_MSMT_LEADCHNL_RV_PACING_THRESHOLD_AMPLITUDE: 1 V
MDC_IDC_MSMT_LEADCHNL_RV_PACING_THRESHOLD_PULSEWIDTH: 0.4 MS
MDC_IDC_PG_IMPLANT_DTM: NORMAL
MDC_IDC_PG_MFG: NORMAL
MDC_IDC_PG_MODEL: NORMAL
MDC_IDC_PG_SERIAL: NORMAL
MDC_IDC_PG_TYPE: NORMAL
MDC_IDC_SESS_CLINIC_NAME: NORMAL
MDC_IDC_SESS_DTM: NORMAL
MDC_IDC_SESS_TYPE: NORMAL
MDC_IDC_SET_BRADY_AT_MODE_SWITCH_MODE: NORMAL
MDC_IDC_SET_BRADY_AT_MODE_SWITCH_RATE: 170 {BEATS}/MIN
MDC_IDC_SET_BRADY_LOWRATE: 55 {BEATS}/MIN
MDC_IDC_SET_BRADY_MAX_SENSOR_RATE: 130 {BEATS}/MIN
MDC_IDC_SET_BRADY_MAX_TRACKING_RATE: 130 {BEATS}/MIN
MDC_IDC_SET_BRADY_MODE: NORMAL
MDC_IDC_SET_BRADY_PAV_DELAY_LOW: 180 MS
MDC_IDC_SET_BRADY_SAV_DELAY_LOW: 120 MS
MDC_IDC_SET_CRT_LVRV_DELAY: 30 MS
MDC_IDC_SET_CRT_PACED_CHAMBERS: NORMAL
MDC_IDC_SET_LEADCHNL_LV_PACING_AMPLITUDE: 2.8 V
MDC_IDC_SET_LEADCHNL_LV_PACING_ANODE_ELECTRODE_1: NORMAL
MDC_IDC_SET_LEADCHNL_LV_PACING_ANODE_LOCATION_1: NORMAL
MDC_IDC_SET_LEADCHNL_LV_PACING_CAPTURE_MODE: NORMAL
MDC_IDC_SET_LEADCHNL_LV_PACING_CATHODE_ELECTRODE_1: NORMAL
MDC_IDC_SET_LEADCHNL_LV_PACING_CATHODE_LOCATION_1: NORMAL
MDC_IDC_SET_LEADCHNL_LV_PACING_PULSEWIDTH: 0.5 MS
MDC_IDC_SET_LEADCHNL_LV_SENSING_ADAPTATION_MODE: NORMAL
MDC_IDC_SET_LEADCHNL_LV_SENSING_ANODE_ELECTRODE_1: NORMAL
MDC_IDC_SET_LEADCHNL_LV_SENSING_ANODE_LOCATION_1: NORMAL
MDC_IDC_SET_LEADCHNL_LV_SENSING_CATHODE_ELECTRODE_1: NORMAL
MDC_IDC_SET_LEADCHNL_LV_SENSING_CATHODE_LOCATION_1: NORMAL
MDC_IDC_SET_LEADCHNL_LV_SENSING_SENSITIVITY: 1 MV
MDC_IDC_SET_LEADCHNL_RA_PACING_AMPLITUDE: 2 V
MDC_IDC_SET_LEADCHNL_RA_PACING_CAPTURE_MODE: NORMAL
MDC_IDC_SET_LEADCHNL_RA_PACING_POLARITY: NORMAL
MDC_IDC_SET_LEADCHNL_RA_PACING_PULSEWIDTH: 0.4 MS
MDC_IDC_SET_LEADCHNL_RA_SENSING_ADAPTATION_MODE: NORMAL
MDC_IDC_SET_LEADCHNL_RA_SENSING_POLARITY: NORMAL
MDC_IDC_SET_LEADCHNL_RA_SENSING_SENSITIVITY: 0.25 MV
MDC_IDC_SET_LEADCHNL_RV_PACING_AMPLITUDE: 2 V
MDC_IDC_SET_LEADCHNL_RV_PACING_CAPTURE_MODE: NORMAL
MDC_IDC_SET_LEADCHNL_RV_PACING_POLARITY: NORMAL
MDC_IDC_SET_LEADCHNL_RV_PACING_PULSEWIDTH: 0.4 MS
MDC_IDC_SET_LEADCHNL_RV_SENSING_ADAPTATION_MODE: NORMAL
MDC_IDC_SET_LEADCHNL_RV_SENSING_POLARITY: NORMAL
MDC_IDC_SET_LEADCHNL_RV_SENSING_SENSITIVITY: 0.6 MV
MDC_IDC_SET_ZONE_DETECTION_INTERVAL: 250 MS
MDC_IDC_SET_ZONE_DETECTION_INTERVAL: 333 MS
MDC_IDC_SET_ZONE_DETECTION_INTERVAL: 375 MS
MDC_IDC_SET_ZONE_STATUS: NORMAL
MDC_IDC_SET_ZONE_TYPE: NORMAL
MDC_IDC_SET_ZONE_VENDOR_TYPE: NORMAL
MDC_IDC_STAT_AT_BURDEN_PERCENT: 0 %
MDC_IDC_STAT_AT_DTM_END: NORMAL
MDC_IDC_STAT_AT_DTM_START: NORMAL
MDC_IDC_STAT_BRADY_DTM_END: NORMAL
MDC_IDC_STAT_BRADY_DTM_START: NORMAL
MDC_IDC_STAT_BRADY_RA_PERCENT_PACED: 0 %
MDC_IDC_STAT_BRADY_RV_PERCENT_PACED: 100 %
MDC_IDC_STAT_CRT_DTM_END: NORMAL
MDC_IDC_STAT_CRT_DTM_START: NORMAL
MDC_IDC_STAT_CRT_LV_PERCENT_PACED: 100 %
MDC_IDC_STAT_EPISODE_RECENT_COUNT: 0
MDC_IDC_STAT_EPISODE_RECENT_COUNT_DTM_END: NORMAL
MDC_IDC_STAT_EPISODE_RECENT_COUNT_DTM_START: NORMAL
MDC_IDC_STAT_EPISODE_TYPE: NORMAL
MDC_IDC_STAT_EPISODE_VENDOR_TYPE: NORMAL
MDC_IDC_STAT_TACHYTHERAPY_ATP_DELIVERED_RECENT: 0
MDC_IDC_STAT_TACHYTHERAPY_ATP_DELIVERED_TOTAL: 0
MDC_IDC_STAT_TACHYTHERAPY_RECENT_DTM_END: NORMAL
MDC_IDC_STAT_TACHYTHERAPY_RECENT_DTM_START: NORMAL
MDC_IDC_STAT_TACHYTHERAPY_SHOCKS_ABORTED_RECENT: 0
MDC_IDC_STAT_TACHYTHERAPY_SHOCKS_ABORTED_TOTAL: 0
MDC_IDC_STAT_TACHYTHERAPY_SHOCKS_DELIVERED_RECENT: 0
MDC_IDC_STAT_TACHYTHERAPY_SHOCKS_DELIVERED_TOTAL: 0
MDC_IDC_STAT_TACHYTHERAPY_TOTAL_DTM_END: NORMAL
MDC_IDC_STAT_TACHYTHERAPY_TOTAL_DTM_START: NORMAL

## 2025-03-02 ENCOUNTER — HEALTH MAINTENANCE LETTER (OUTPATIENT)
Age: 80
End: 2025-03-02

## 2025-04-22 ENCOUNTER — MYC MEDICAL ADVICE (OUTPATIENT)
Dept: CARDIOLOGY | Facility: CLINIC | Age: 80
End: 2025-04-22
Payer: COMMERCIAL

## 2025-04-22 DIAGNOSIS — I50.22 CHRONIC SYSTOLIC CONGESTIVE HEART FAILURE (H): ICD-10-CM

## 2025-04-22 NOTE — TELEPHONE ENCOUNTER
See patient's MyChart message   - Patient requesting a refill of her spironolactone (ALDACTONE) 25 MG tablet medication be sent to the [id number] - [id number], [place] - [address] [place] [address]     spironolactone (ALDACTONE) 25 MG tablet 45 tablet 4 1/3/2024 -- No   Sig: Take 1/2 tablet ( 12.5 MG ) daily     - Pended the patient's spironolactone (ALDACTONE) 25 MG tablet medication with the [id number] - [place], [place] - [address] [place] [address]     Routing to the [facility]ill pool to go through the protocol.     Merna GARCIA RN   [facility]

## 2025-04-22 NOTE — TELEPHONE ENCOUNTER
Appears that Spironolactone is filled by Dr. Milligan  Routing to [place] team for consideration  Jose Guadalupe Hinds RN on 4/22/2025 at 4:53 PM

## 2025-04-24 RX ORDER — SPIRONOLACTONE 25 MG/1
TABLET ORAL
Qty: 45 TABLET | Refills: 4 | OUTPATIENT
Start: 2025-04-24

## 2025-04-24 NOTE — TELEPHONE ENCOUNTER
1st attempt: sent MYC to schedule. Cards sent the refill request to [facility].    Norma Grajeda on 4/24/2025 at 2:41 PM

## 2025-05-01 NOTE — TELEPHONE ENCOUNTER
This writer spoke to patient, stated that she is getting care somewhere else and will reach out to them for medication request.        CAESAR Ann MA

## 2025-05-20 ENCOUNTER — ANCILLARY PROCEDURE (OUTPATIENT)
Dept: CARDIOLOGY | Facility: CLINIC | Age: 80
End: 2025-05-20
Attending: INTERNAL MEDICINE
Payer: COMMERCIAL

## 2025-05-20 DIAGNOSIS — I42.9 IDIOPATHIC CARDIOMYOPATHY (H): ICD-10-CM

## 2025-05-20 DIAGNOSIS — Z95.810 ICD (IMPLANTABLE CARDIOVERTER-DEFIBRILLATOR) IN PLACE: ICD-10-CM

## 2025-05-20 PROCEDURE — 93295 DEV INTERROG REMOTE 1/2/MLT: CPT | Performed by: INTERNAL MEDICINE

## 2025-05-20 PROCEDURE — 93296 REM INTERROG EVL PM/IDS: CPT | Performed by: INTERNAL MEDICINE

## 2025-05-28 LAB
MDC_IDC_EPISODE_DTM: NORMAL
MDC_IDC_EPISODE_ID: NORMAL
MDC_IDC_EPISODE_TYPE: NORMAL
MDC_IDC_LEAD_CONNECTION_STATUS: NORMAL
MDC_IDC_LEAD_IMPLANT_DT: NORMAL
MDC_IDC_LEAD_LOCATION: NORMAL
MDC_IDC_LEAD_LOCATION_DETAIL_1: NORMAL
MDC_IDC_LEAD_MFG: NORMAL
MDC_IDC_LEAD_MODEL: 4047
MDC_IDC_LEAD_MODEL: 4047
MDC_IDC_LEAD_MODEL: NORMAL
MDC_IDC_LEAD_MODEL: NORMAL
MDC_IDC_LEAD_POLARITY_TYPE: NORMAL
MDC_IDC_LEAD_SERIAL: NORMAL
MDC_IDC_MSMT_BATTERY_DTM: NORMAL
MDC_IDC_MSMT_BATTERY_REMAINING_LONGEVITY: 90 MO
MDC_IDC_MSMT_BATTERY_REMAINING_PERCENTAGE: 100 %
MDC_IDC_MSMT_BATTERY_STATUS: NORMAL
MDC_IDC_MSMT_CAP_CHARGE_DTM: NORMAL
MDC_IDC_MSMT_CAP_CHARGE_TIME: 9.3 S
MDC_IDC_MSMT_CAP_CHARGE_TYPE: NORMAL
MDC_IDC_MSMT_LEADCHNL_LV_IMPEDANCE_VALUE: 421 OHM
MDC_IDC_MSMT_LEADCHNL_LV_PACING_THRESHOLD_AMPLITUDE: 1.1 V
MDC_IDC_MSMT_LEADCHNL_LV_PACING_THRESHOLD_PULSEWIDTH: 0.5 MS
MDC_IDC_MSMT_LEADCHNL_RA_IMPEDANCE_VALUE: 729 OHM
MDC_IDC_MSMT_LEADCHNL_RA_PACING_THRESHOLD_AMPLITUDE: 0.4 V
MDC_IDC_MSMT_LEADCHNL_RA_PACING_THRESHOLD_PULSEWIDTH: 0.4 MS
MDC_IDC_MSMT_LEADCHNL_RV_IMPEDANCE_VALUE: 403 OHM
MDC_IDC_MSMT_LEADCHNL_RV_PACING_THRESHOLD_AMPLITUDE: 0.9 V
MDC_IDC_MSMT_LEADCHNL_RV_PACING_THRESHOLD_PULSEWIDTH: 0.4 MS
MDC_IDC_PG_IMPLANT_DTM: NORMAL
MDC_IDC_PG_MFG: NORMAL
MDC_IDC_PG_MODEL: NORMAL
MDC_IDC_PG_SERIAL: NORMAL
MDC_IDC_PG_TYPE: NORMAL
MDC_IDC_SESS_CLINIC_NAME: NORMAL
MDC_IDC_SESS_DTM: NORMAL
MDC_IDC_SESS_TYPE: NORMAL
MDC_IDC_SET_BRADY_AT_MODE_SWITCH_MODE: NORMAL
MDC_IDC_SET_BRADY_AT_MODE_SWITCH_RATE: 170 {BEATS}/MIN
MDC_IDC_SET_BRADY_LOWRATE: 55 {BEATS}/MIN
MDC_IDC_SET_BRADY_MAX_SENSOR_RATE: 130 {BEATS}/MIN
MDC_IDC_SET_BRADY_MAX_TRACKING_RATE: 130 {BEATS}/MIN
MDC_IDC_SET_BRADY_MODE: NORMAL
MDC_IDC_SET_BRADY_PAV_DELAY_LOW: 180 MS
MDC_IDC_SET_BRADY_SAV_DELAY_LOW: 120 MS
MDC_IDC_SET_CRT_LVRV_DELAY: 30 MS
MDC_IDC_SET_CRT_PACED_CHAMBERS: NORMAL
MDC_IDC_SET_LEADCHNL_LV_PACING_AMPLITUDE: 2.6 V
MDC_IDC_SET_LEADCHNL_LV_PACING_ANODE_ELECTRODE_1: NORMAL
MDC_IDC_SET_LEADCHNL_LV_PACING_ANODE_LOCATION_1: NORMAL
MDC_IDC_SET_LEADCHNL_LV_PACING_CAPTURE_MODE: NORMAL
MDC_IDC_SET_LEADCHNL_LV_PACING_CATHODE_ELECTRODE_1: NORMAL
MDC_IDC_SET_LEADCHNL_LV_PACING_CATHODE_LOCATION_1: NORMAL
MDC_IDC_SET_LEADCHNL_LV_PACING_PULSEWIDTH: 0.5 MS
MDC_IDC_SET_LEADCHNL_LV_SENSING_ADAPTATION_MODE: NORMAL
MDC_IDC_SET_LEADCHNL_LV_SENSING_ANODE_ELECTRODE_1: NORMAL
MDC_IDC_SET_LEADCHNL_LV_SENSING_ANODE_LOCATION_1: NORMAL
MDC_IDC_SET_LEADCHNL_LV_SENSING_CATHODE_ELECTRODE_1: NORMAL
MDC_IDC_SET_LEADCHNL_LV_SENSING_CATHODE_LOCATION_1: NORMAL
MDC_IDC_SET_LEADCHNL_LV_SENSING_SENSITIVITY: 1 MV
MDC_IDC_SET_LEADCHNL_RA_PACING_AMPLITUDE: 2 V
MDC_IDC_SET_LEADCHNL_RA_PACING_CAPTURE_MODE: NORMAL
MDC_IDC_SET_LEADCHNL_RA_PACING_POLARITY: NORMAL
MDC_IDC_SET_LEADCHNL_RA_PACING_PULSEWIDTH: 0.4 MS
MDC_IDC_SET_LEADCHNL_RA_SENSING_ADAPTATION_MODE: NORMAL
MDC_IDC_SET_LEADCHNL_RA_SENSING_POLARITY: NORMAL
MDC_IDC_SET_LEADCHNL_RA_SENSING_SENSITIVITY: 0.25 MV
MDC_IDC_SET_LEADCHNL_RV_PACING_AMPLITUDE: 2 V
MDC_IDC_SET_LEADCHNL_RV_PACING_CAPTURE_MODE: NORMAL
MDC_IDC_SET_LEADCHNL_RV_PACING_POLARITY: NORMAL
MDC_IDC_SET_LEADCHNL_RV_PACING_PULSEWIDTH: 0.4 MS
MDC_IDC_SET_LEADCHNL_RV_SENSING_ADAPTATION_MODE: NORMAL
MDC_IDC_SET_LEADCHNL_RV_SENSING_POLARITY: NORMAL
MDC_IDC_SET_LEADCHNL_RV_SENSING_SENSITIVITY: 0.6 MV
MDC_IDC_SET_ZONE_DETECTION_INTERVAL: 250 MS
MDC_IDC_SET_ZONE_DETECTION_INTERVAL: 333 MS
MDC_IDC_SET_ZONE_DETECTION_INTERVAL: 375 MS
MDC_IDC_SET_ZONE_STATUS: NORMAL
MDC_IDC_SET_ZONE_TYPE: NORMAL
MDC_IDC_SET_ZONE_VENDOR_TYPE: NORMAL
MDC_IDC_STAT_AT_BURDEN_PERCENT: 0 %
MDC_IDC_STAT_AT_DTM_END: NORMAL
MDC_IDC_STAT_AT_DTM_START: NORMAL
MDC_IDC_STAT_BRADY_DTM_END: NORMAL
MDC_IDC_STAT_BRADY_DTM_START: NORMAL
MDC_IDC_STAT_BRADY_RA_PERCENT_PACED: 2 %
MDC_IDC_STAT_BRADY_RV_PERCENT_PACED: 100 %
MDC_IDC_STAT_CRT_DTM_END: NORMAL
MDC_IDC_STAT_CRT_DTM_START: NORMAL
MDC_IDC_STAT_CRT_LV_PERCENT_PACED: 100 %
MDC_IDC_STAT_EPISODE_RECENT_COUNT: 0
MDC_IDC_STAT_EPISODE_RECENT_COUNT_DTM_END: NORMAL
MDC_IDC_STAT_EPISODE_RECENT_COUNT_DTM_START: NORMAL
MDC_IDC_STAT_EPISODE_TYPE: NORMAL
MDC_IDC_STAT_EPISODE_VENDOR_TYPE: NORMAL
MDC_IDC_STAT_TACHYTHERAPY_ATP_DELIVERED_RECENT: 0
MDC_IDC_STAT_TACHYTHERAPY_ATP_DELIVERED_TOTAL: 0
MDC_IDC_STAT_TACHYTHERAPY_RECENT_DTM_END: NORMAL
MDC_IDC_STAT_TACHYTHERAPY_RECENT_DTM_START: NORMAL
MDC_IDC_STAT_TACHYTHERAPY_SHOCKS_ABORTED_RECENT: 0
MDC_IDC_STAT_TACHYTHERAPY_SHOCKS_ABORTED_TOTAL: 0
MDC_IDC_STAT_TACHYTHERAPY_SHOCKS_DELIVERED_RECENT: 0
MDC_IDC_STAT_TACHYTHERAPY_SHOCKS_DELIVERED_TOTAL: 0
MDC_IDC_STAT_TACHYTHERAPY_TOTAL_DTM_END: NORMAL
MDC_IDC_STAT_TACHYTHERAPY_TOTAL_DTM_START: NORMAL

## 2025-07-25 ENCOUNTER — HOSPITAL ENCOUNTER (OUTPATIENT)
Dept: CARDIOLOGY | Facility: CLINIC | Age: 80
Discharge: HOME | End: 2025-07-25
Attending: INTERNAL MEDICINE | Admitting: INTERNAL MEDICINE
Payer: COMMERCIAL

## 2025-07-25 DIAGNOSIS — I42.9 IDIOPATHIC CARDIOMYOPATHY (H): ICD-10-CM

## 2025-07-25 LAB — LVEF ECHO: NORMAL

## 2025-07-25 PROCEDURE — C8929 TTE W OR WO FOL WCON,DOPPLER: HCPCS

## 2025-07-25 PROCEDURE — 255N000002 HC RX 255 OP 636: Performed by: INTERNAL MEDICINE

## 2025-07-25 PROCEDURE — 93306 TTE W/DOPPLER COMPLETE: CPT | Mod: 26 | Performed by: INTERNAL MEDICINE

## 2025-07-25 RX ADMIN — HUMAN ALBUMIN MICROSPHERES AND PERFLUTREN 2 ML (DILUTED): 10; .22 INJECTION, SOLUTION INTRAVENOUS at 12:44

## 2025-07-29 ENCOUNTER — OFFICE VISIT (OUTPATIENT)
Dept: CARDIOLOGY | Facility: CLINIC | Age: 80
End: 2025-07-29
Attending: INTERNAL MEDICINE
Payer: COMMERCIAL

## 2025-07-29 VITALS
HEART RATE: 66 BPM | WEIGHT: 189.1 LBS | BODY MASS INDEX: 32.28 KG/M2 | DIASTOLIC BLOOD PRESSURE: 54 MMHG | HEIGHT: 64 IN | SYSTOLIC BLOOD PRESSURE: 88 MMHG

## 2025-07-29 DIAGNOSIS — I42.9 IDIOPATHIC CARDIOMYOPATHY (H): ICD-10-CM

## 2025-07-29 PROCEDURE — 3078F DIAST BP <80 MM HG: CPT | Performed by: INTERNAL MEDICINE

## 2025-07-29 PROCEDURE — 99214 OFFICE O/P EST MOD 30 MIN: CPT | Performed by: INTERNAL MEDICINE

## 2025-07-29 PROCEDURE — 3074F SYST BP LT 130 MM HG: CPT | Performed by: INTERNAL MEDICINE

## 2025-07-29 NOTE — LETTER
7/29/2025    WINNIE BARNETT CNP  [facility] [address]  [place] [address]    RE: Kristie Jones       Dear Colleague,     I had the pleasure of seeing Kristie Jones in the [facility].  HPI and Plan:   It is always a pleasure for me to see this very pleasant 79-year-old lady for follow-up of nonischemic cardiomyopathy with moderate to severe left ventricular systolic dysfunction.     In the many years since I have known her I am very happy to report that she has never been troubled much by congestion but her symptoms are usually those of low cardiac output such as fatigue.     She had difficulties with device implantation which is nicely summarized as below:   S/p attempted CRT-D implantation 2012 resulting in limited dissection of CS. No good anatomic options for LV lead placement  - CV surgery (Dr. Lakhani) implanted a new LV lead thoracoscopically on posetrolateral aspect of LV  - Had pocket revision ~1 m after (5/2012) d/t discomfort. Transvenous LV lead that had been left in place during CV surgery was removed. RA lead noted to be dislodged following procedure  - LV lead inactivated as native QRS spontaneously narrowed to 100 ms and device programed VVI to minimize V pacing.     - Most recently, had increasing fatigue and questioned if CRT activation could be restored. She then reached [facility].     - Dr. Gonzalez noted intrinsic QRS had now widened with chronic IVCD and CRT could be reactivated.     - She therefore had generator replacement of [employer] CRT-D and new RA lead implantation. Old RA lead was capped and abandoned.     Since that time she has been successfully biventricular paced.  Recent device interrogation does demonstrate 100% biventricular pacing.    Current cardiac medications include aspirin, carvedilol 25 mg twice daily, spironolactone 25 mg daily, losartan 50 mg daily.    As always it is a pleasure for me to see this truly delightful lady.  She  is always cheerful and optimistic.    Sofia is hampered by knee pain.  She is scheduled to have a third knee replacement down at the [facility].    She denies heart failure symptoms and she has no chest pains or palpitations.    Cardiac examination reveals a euvolemic status.  Her blood pressure is chronically low.    Recent echocardiography continues to show ejection fraction of 25 to 30%.    She is not on Jardiance.  She is not diabetic and as mentioned above no history of CHF admissions.  I do think there may be some mortality benefit though might not be very great.  She is agreeable to giving it a try.    Otherwise, cardiac point of view she is cleared to have a knee replacement surgery.    I will continue to follow-up every 6 months.      No orders of the defined types were placed in this encounter.      Orders Placed This Encounter   Medications     empagliflozin (JARDIANCE) 10 MG TABS tablet     Sig: Take 1 tablet (10 mg) by mouth daily.     Dispense:  90 tablet     Refill:  1     empagliflozin (JARDIANCE) 10 MG TABS tablet     Sig: Take 1 tablet (10 mg) by mouth daily.     Dispense:  90 tablet     Refill:  1       Encounter Diagnosis   Name Primary?     Idiopathic cardiomyopathy (H)        CURRENT MEDICATIONS:  Current Outpatient Medications   Medication Sig Dispense Refill     acetaminophen (TYLENOL) 500 MG tablet Take 1,000 mg by mouth daily as needed for mild pain       ALPRAZolam (XANAX) 0.25 MG tablet Take 1 tablet (0.25 mg) by mouth nightly as needed for anxiety 90 tablet 0     aspirin 81 MG tablet Take 81 mg by mouth every evening       CALCIUM 500 MG OR TABS Takes 3 tabs daily takees total of 1200 mg daily       carvedilol (COREG) 25 MG tablet TAKE 1 TABLET TWICE A DAY WITH MEALS 180 tablet 3     Cholecalciferol (VITAMIN D) 2000 UNITS tablet Take 1 tablet by mouth daily.       ciprofloxacin (CIPRO) 500 MG tablet Take 1 tablet by out before dental appointments 5 tablet 11     clobetasol  (TEMOVATE) 0.05 % external cream Apply thin layer to bilateral hand twice daily for 2 weeks 45 g 1     co-enzyme Q-10 50 MG CAPS Take 1 capsule by mouth every morning       conjugated estrogens (PREMARIN) 0.625 MG/GM vaginal cream Place 0.5 g vaginally twice a week (Patient taking differently: Place 0.5 g vaginally twice a week. PRN) 30 g 3     empagliflozin (JARDIANCE) 10 MG TABS tablet Take 1 tablet (10 mg) by mouth daily. 90 tablet 1     empagliflozin (JARDIANCE) 10 MG TABS tablet Take 1 tablet (10 mg) by mouth daily. 90 tablet 1     lifitegrast (XIIDRA) 5 % opthalmic solution Place 1 drop into both eyes 2 times daily       loratadine (CLARITIN) 10 MG tablet Take 10 mg by mouth every evening       losartan (COZAAR) 50 MG tablet Take 50 mg by mouth daily 90 tablet 3     mirtazapine (REMERON) 7.5 MG tablet TAKE 1 TO 2 TABLETS 30 MINUTES BEFORE BEDTIME 180 tablet 4     Multiple Vitamins-Minerals (MULTIVITAMIN ADULT PO) Take 1 tablet by mouth daily       rosuvastatin (CRESTOR) 10 MG tablet TAKE 1 TABLET EVERY OTHER DAY 45 tablet 1     spironolactone (ALDACTONE) 25 MG tablet Take 1/2 tablet ( 12.5 MG ) daily 45 tablet 4     venlafaxine (EFFEXOR XR) 75 MG 24 hr capsule TAKE 3 CAPSULES ONCE DAILY Strength: 75 mg 270 capsule 4       ALLERGIES     Allergies   Allergen Reactions     Corticosteroids Other (See Comments)     flush up through chest and face, decadron  flushing  Other reaction(s): Erythema     Phenothiazines Anxiety and GI Disturbance     anxiety attack, compazine  Other reaction(s): Tardive Dyskinesia     Prochlorperazine      Other reaction(s): Tardive Dyskinesia     Amoxicillin Hives     Clindamycin Other (See Comments)     Burning sensation in chest     Dexamethasone      flushing     Dexamethasone      Other reaction(s): Erythema     Compazine Anxiety       PAST MEDICAL HISTORY:  Past Medical History:   Diagnosis Date     Acute posthemorrhagic anemia 09/11/2015     Anemia      Chronic systolic congestive  heart failure (H) 10/10/2018     EF 35%     Fibromyalgia      Gastro-oesophageal reflux disease      GENERAL OSTEOARTHROSIS [715.00] 11/23/2004    knee     Generalized anxiety disorder 09/30/2013     Hyperlipidemia LDL goal <130 02/10/2010    Failed simvastatin- muscle aches      Hypertension     because of the heart     Idiopathic cardiomyopathy (H) 01/19/2012    Dr. Osborne 3/2011 EF 30-35%      Insomnia 01/19/2012     Iron deficiency anemia 08/12/2015     Problem list name updated by automated process. Provider to review     LBBB (left bundle branch block)      Left ventricular systolic dysfunction:EF 35% 02/05/2012    Must stay on diovan per cardiology      Major depressive disorder, recurrent episode, mild 02/17/2016     Migraine 06/21/2005     Problem list name updated by automated process. Provider to review     Nonischemic cardiomyopathy (H)     EF 30-35%, Dr. Osborne [facility] (suspect virus); s/p AICD     NSVT (nonsustained ventricular tachycardia) (H)      Obesity 01/20/2012     SUSAN (obstructive sleep apnea) 01/19/2012    CPAP      Pure hypercholesterolemia      Restless leg syndrome      Type 2 diabetes mellitus without complication (H) 09/24/2016       PAST SURGICAL HISTORY:  Past Surgical History:   Procedure Laterality Date     APPENDECTOMY       ARTHROPLASTY KNEE  1/7/2013    Procedure: ARTHROPLASTY KNEE;  Right Total Knee Arthroplasty       ARTHROPLASTY REVISION KNEE Right 8/26/2015    Procedure: ARTHROPLASTY REVISION KNEE;  Surgeon: Néstor Beth MD;  Location:  OR     ARTHROSCOPY KNEE       bunionectomy left foot       CLOSED REDUCTION, PERCUTANEOUS PINNING FINGER, COMBINED Right 6/17/2021    Procedure: Closed reduction, pinning right long finger distal interphalangeal joint fracture;  Surgeon: Jsoeluis Delong MD;  Location:  OR     COLONOSCOPY       COLONOSCOPY N/A 4/27/2023    Procedure: COLONOSCOPY with cold polypectomies;  Surgeon: Dara Flowers MD;  Location: [facility] OR     CORONARY  ANGIOGRAPHY ADULT ORDER  2002    normal     CORONARY ANGIOGRAPHY ADULT ORDER  12/7/12    no significant focal narrowing that would benefit from mechanical intervention      EP PACEMAKER GENERATOR REPLACEMENT- BI-VENTRICULAR N/A 8/25/2022    Procedure: Pacemaker Generator Replacement Biventricular;  Surgeon: Hillary Gonzalez MD;  Location:  HEART CARDIAC CATH LAB     ESOPHAGOSCOPY, GASTROSCOPY, DUODENOSCOPY (EGD), COMBINED N/A 7/9/2021    Procedure: ESOPHAGOGASTRODUODENOSCOPY, WITH BIOPSY with distal esophagus biopies to rule out Barretts esophagus by cold biopsy forceps;  Surgeon: Travis Harrington MD;  Location:  GI     HYSTERECTOMY       IMPLANT AUTOMATIC IMPLANTABLE CARDIOVERTER DEFIBRILLATOR  4/30/12     INSERT THORACIC PACEMAKER LEAD EPICARDIAL  5/1/2012    Procedure:INSERT THORACIC PACEMAKER LEAD EPICARDIAL; EPICARDIAL LEAD PLACEMENT; Surgeon:WEST ARECHIGA; Location: OR     TONSILLECTOMY       TRANSPLANT - corneal lenses      Bilateral for cataracts       FAMILY HISTORY:  Family History   Problem Relation Age of Onset     Cancer Father         intraabdominal mass - not colon cancer     Other Cancer Father      C.A.D. 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 Multiple ignacio problems, multiple hospitalizations in last 7 years, anxiety        Pt has 2 daughters, 2 step-daughters's and  7 grandchildrens        Youngest daughter Sabi has malignant brain tumor                 Social Drivers of Health     Financial Resource Strain: Low Risk  (2025)    Received from [facility]    Financial Resource Strain      Difficulty of Paying Living Expenses: 3   Food Insecurity: No Food Insecurity (2025)    Received from [facility]    Hunger Vital Sign      Worried About Running Out of Food in the Last Year: Never true      Ran Out of Food in the Last Year: Never true   Transportation Needs: No Transportation Needs (2025)    Received from [facility]    PRAPARE - Transportation      Lack of Transportation (Medical): No      Lack of Transportation (Non-Medical): No   Physical Activity: Insufficiently Active (2023)    Exercise Vital Sign      Days of Exercise per Week: 2 days      Minutes of Exercise per Session: 40 min   Stress: No Stress Concern Present (2023)    [employer] - Occupational Stress Questionnaire      Feeling of Stress : Not at all   Social Connections: Socially Integrated (3/9/2024)    Received from [facility]      Do you often feel lonely or isolated from those around you?: 0   Interpersonal Safety: Low Risk  (10/3/2023)    Interpersonal Safety      Do you feel physically and emotionally safe where you currently live?: Yes      Within the past 12 months, have you been hit, slapped, kicked or otherwise physically hurt by someone?: No      Within the past 12 months, have you been humiliated or emotionally abused in other ways by your partner or ex-partner?: No   Housing Stability: Low Risk  (6/5/2025)    Received from [facility]    Housing Stability      What is your living situation today?: I have a steady place to live       Review of Systems:  Skin:  not assessed     Eyes:  not assessed    ENT:  not assessed    Respiratory:  Negative    Cardiovascular:    Positive for, lightheadedness, dizziness  Gastroenterology: not assessed    Genitourinary:  not assessed    Musculoskeletal:  not assessed    Neurologic:  not assessed    Psychiatric:  not assessed    Heme/Lymph/Imm:  not assessed    Endocrine:  not assessed      Physical Exam:  Vitals: BP (!) 88/54 (BP Location: Right arm, Patient Position: Sitting)   Pulse 66   Ht 1.626 m (5' 4\")   Wt 85.8 kg (189 lb 1.6 oz)   LMP  (LMP Unknown)   BMI 32.46 kg/m      Constitutional:  cooperative, in no acute distress obese      Skin:  warm and dry to the touch   pacemaker incision in the left infraclavicular area was well-healed      Head:  normocephalic        Eyes:  pupils equal and round        Lymph:      ENT:  no pallor or cyanosis, dentition good        Neck:  JVP normal, no carotid bruit        Respiratory:  clear to auscultation, normal respiratory excursion         Cardiac: regular rhythm, normal S1 and S2   distant heart sounds            pulses full and equal                                        GI:  abdomen soft obese      Extremities and Muscular Skeletal:  no deformities, clubbing, cyanosis, erythema observed, no edema              Neurological:  " affect appropriate        Psych:  Alert and Oriented x 3        Recent Lab Results:  LIPID RESULTS:  Lab Results   Component Value Date    CHOL 174 10/03/2023    CHOL 170 01/25/2021    HDL 37 (L) 10/03/2023    HDL 39 (L) 01/25/2021    LDL 92 10/03/2023    LDL 80 01/25/2021    TRIG 260 (H) 02/18/2025    TRIG 253 (H) 01/25/2021    CHOLHDLRATIO 4.7 10/13/2015       LIVER ENZYME RESULTS:  Lab Results   Component Value Date    AST 23 10/03/2023    AST 28 12/26/2020    ALT 16 10/03/2023    ALT 32 12/26/2020       CBC RESULTS:  Lab Results   Component Value Date    WBC 3.7 (L) 10/03/2023    WBC 4.0 02/05/2021    RBC 3.58 (L) 10/03/2023    RBC 3.86 02/05/2021    HGB 11.3 (L) 10/03/2023    HGB 12.0 02/05/2021    HCT 33.3 (L) 10/03/2023    HCT 37.0 02/05/2021    MCV 93 10/03/2023    MCV 96 02/05/2021    MCH 31.6 10/03/2023    MCH 31.1 02/05/2021    MCHC 33.9 10/03/2023    MCHC 32.4 02/05/2021    RDW 12.7 10/03/2023    RDW 12.5 02/05/2021     (L) 10/03/2023     02/05/2021       BMP RESULTS:  Lab Results   Component Value Date     10/03/2023     02/05/2021    POTASSIUM 4.2 10/03/2023    POTASSIUM 4.1 08/25/2022    POTASSIUM 4.2 06/17/2021    CHLORIDE 105 02/18/2025    CHLORIDE 108 02/05/2021    CO2 22 10/03/2023    CO2 26 08/25/2022    CO2 23 02/05/2021    ANIONGAP 11 10/03/2023    ANIONGAP 6 08/25/2022    ANIONGAP 8 02/05/2021     (H) 10/03/2023    GLC 86 04/27/2023     (H) 08/25/2022     (H) 02/05/2021    BUN 19.1 10/03/2023    BUN 15 08/25/2022    BUN 16 02/05/2021    CR 0.87 10/03/2023    CR 0.99 06/17/2021    GFRESTIMATED 69 10/03/2023    GFRESTIMATED 56 (L) 06/17/2021    GFRESTBLACK 64 06/17/2021    WILEY 8.9 10/03/2023    WILEY 9.1 02/05/2021        A1C RESULTS:  Lab Results   Component Value Date    A1C 5.7 (H) 10/03/2023    A1C 6.1 (H) 01/25/2021       INR RESULTS:  Lab Results   Component Value Date    INR 1.06 04/07/2022    INR 0.99 12/26/2020    INR 0.88 12/05/2012            CC  Trent Osborne MD  [address]  [facility]  [place] [address]                   Thank you for allowing me to participate in the care of your patient.      Sincerely,     DR TRENT OSBORNE MD     [facility]  cc:   Trent Osborne MD  [address]  [place] [address]

## 2025-07-29 NOTE — PROGRESS NOTES
HPI and Plan:   It is always a pleasure for me to see this very pleasant 79-year-old lady for follow-up of nonischemic cardiomyopathy with moderate to severe left ventricular systolic dysfunction.     In the many years since I have known her I am very happy to report that she has never been troubled much by congestion but her symptoms are usually those of low cardiac output such as fatigue.     She had difficulties with device implantation which is nicely summarized as below:   S/p attempted CRT-D implantation 2012 resulting in limited dissection of CS. No good anatomic options for LV lead placement  - CV surgery (Dr. Lakhani) implanted a new LV lead thoracoscopically on posetrolateral aspect of LV  - Had pocket revision ~1 m after (5/2012) d/t discomfort. Transvenous LV lead that had been left in place during CV surgery was removed. RA lead noted to be dislodged following procedure  - LV lead inactivated as native QRS spontaneously narrowed to 100 ms and device programed VVI to minimize V pacing.     - Most recently, had increasing fatigue and questioned if CRT activation could be restored. She then reached [facility].     - Dr. Gonzalez noted intrinsic QRS had now widened with chronic IVCD and CRT could be reactivated.     - She therefore had generator replacement of [employer] CRT-D and new RA lead implantation. Old RA lead was capped and abandoned.     Since that time she has been successfully biventricular paced.  Recent device interrogation does demonstrate 100% biventricular pacing.    Current cardiac medications include aspirin, carvedilol 25 mg twice daily, spironolactone 25 mg daily, losartan 50 mg daily.    As always it is a pleasure for me to see this truly delightful lady.  She is always cheerful and optimistic.    Sofia is hampered by knee pain.  She is scheduled to have a third knee replacement down at the [facility].    She denies heart failure symptoms and she has no chest pains or  palpitations.    Cardiac examination reveals a euvolemic status.  Her blood pressure is chronically low.    Recent echocardiography continues to show ejection fraction of 25 to 30%.    She is not on Jardiance.  She is not diabetic and as mentioned above no history of CHF admissions.  I do think there may be some mortality benefit though might not be very great.  She is agreeable to giving it a try.    Otherwise, cardiac point of view she is cleared to have a knee replacement surgery.    I will continue to follow-up every 6 months.      No orders of the defined types were placed in this encounter.      Orders Placed This Encounter   Medications    empagliflozin (JARDIANCE) 10 MG TABS tablet     Sig: Take 1 tablet (10 mg) by mouth daily.     Dispense:  90 tablet     Refill:  1    empagliflozin (JARDIANCE) 10 MG TABS tablet     Sig: Take 1 tablet (10 mg) by mouth daily.     Dispense:  90 tablet     Refill:  1       Encounter Diagnosis   Name Primary?    Idiopathic cardiomyopathy (H)        CURRENT MEDICATIONS:  Current Outpatient Medications   Medication Sig Dispense Refill    acetaminophen (TYLENOL) 500 MG tablet Take 1,000 mg by mouth daily as needed for mild pain      ALPRAZolam (XANAX) 0.25 MG tablet Take 1 tablet (0.25 mg) by mouth nightly as needed for anxiety 90 tablet 0    aspirin 81 MG tablet Take 81 mg by mouth every evening      CALCIUM 500 MG OR TABS Takes 3 tabs daily takees total of 1200 mg daily      carvedilol (COREG) 25 MG tablet TAKE 1 TABLET TWICE A DAY WITH MEALS 180 tablet 3    Cholecalciferol (VITAMIN D) 2000 UNITS tablet Take 1 tablet by mouth daily.      ciprofloxacin (CIPRO) 500 MG tablet Take 1 tablet by mmouth before dental appointments 5 tablet 11    clobetasol (TEMOVATE) 0.05 % external cream Apply thin layer to bilateral hand twice daily for 2 weeks 45 g 1    co-enzyme Q-10 50 MG CAPS Take 1 capsule by mouth every morning      conjugated estrogens (PREMARIN) 0.625 MG/GM vaginal cream Place  0.5 g vaginally twice a week (Patient taking differently: Place 0.5 g vaginally twice a week. PRN) 30 g 3    empagliflozin (JARDIANCE) 10 MG TABS tablet Take 1 tablet (10 mg) by mouth daily. 90 tablet 1    empagliflozin (JARDIANCE) 10 MG TABS tablet Take 1 tablet (10 mg) by mouth daily. 90 tablet 1    lifitegrast (XIIDRA) 5 % opthalmic solution Place 1 drop into both eyes 2 times daily      loratadine (CLARITIN) 10 MG tablet Take 10 mg by mouth every evening      losartan (COZAAR) 50 MG tablet Take 50 mg by mouth daily 90 tablet 3    mirtazapine (REMERON) 7.5 MG tablet TAKE 1 TO 2 TABLETS 30 MINUTES BEFORE BEDTIME 180 tablet 4    Multiple Vitamins-Minerals (MULTIVITAMIN ADULT PO) Take 1 tablet by mouth daily      rosuvastatin (CRESTOR) 10 MG tablet TAKE 1 TABLET EVERY OTHER DAY 45 tablet 1    spironolactone (ALDACTONE) 25 MG tablet Take 1/2 tablet ( 12.5 MG ) daily 45 tablet 4    venlafaxine (EFFEXOR XR) 75 MG 24 hr capsule TAKE 3 CAPSULES ONCE DAILY Strength: 75 mg 270 capsule 4       ALLERGIES     Allergies   Allergen Reactions    Corticosteroids Other (See Comments)     flush up through chest and face, decadron  flushing  Other reaction(s): Erythema    Phenothiazines Anxiety and GI Disturbance     anxiety attack, compazine  Other reaction(s): Tardive Dyskinesia    Prochlorperazine      Other reaction(s): Tardive Dyskinesia    Amoxicillin Hives    Clindamycin Other (See Comments)     Burning sensation in chest    Dexamethasone      flushing    Dexamethasone      Other reaction(s): Erythema    Compazine Anxiety       PAST MEDICAL HISTORY:  Past Medical History:   Diagnosis Date    Acute posthemorrhagic anemia 09/11/2015    Anemia     Chronic systolic congestive heart failure (H) 10/10/2018     EF 35%    Fibromyalgia     Gastro-oesophageal reflux disease     GENERAL OSTEOARTHROSIS [715.00] 11/23/2004    knee    Generalized anxiety disorder 09/30/2013    Hyperlipidemia LDL goal <130 02/10/2010    Failed simvastatin-  muscle aches     Hypertension     because of the heart    Idiopathic cardiomyopathy (H) 01/19/2012    Dr. Osborne 3/2011 EF 30-35%     Insomnia 01/19/2012    Iron deficiency anemia 08/12/2015     Problem list name updated by automated process. Provider to review    LBBB (left bundle branch block)     Left ventricular systolic dysfunction:EF 35% 02/05/2012    Must stay on diovan per cardiology     Major depressive disorder, recurrent episode, mild 02/17/2016    Migraine 06/21/2005     Problem list name updated by automated process. Provider to review    Nonischemic cardiomyopathy (H)     EF 30-35%, Dr. Osborne [facility] (suspect virus); s/p AICD    NSVT (nonsustained ventricular tachycardia) (H)     Obesity 01/20/2012    SUSAN (obstructive sleep apnea) 01/19/2012    CPAP     Pure hypercholesterolemia     Restless leg syndrome     Type 2 diabetes mellitus without complication (H) 09/24/2016       PAST SURGICAL HISTORY:  Past Surgical History:   Procedure Laterality Date    APPENDECTOMY      ARTHROPLASTY KNEE  1/7/2013    Procedure: ARTHROPLASTY KNEE;  Right Total Knee Arthroplasty      ARTHROPLASTY REVISION KNEE Right 8/26/2015    Procedure: ARTHROPLASTY REVISION KNEE;  Surgeon: Néstor Beth MD;  Location:  OR    ARTHROSCOPY KNEE      bunionectomy left foot      CLOSED REDUCTION, PERCUTANEOUS PINNING FINGER, COMBINED Right 6/17/2021    Procedure: Closed reduction, pinning right long finger distal interphalangeal joint fracture;  Surgeon: Joseluis Delong MD;  Location:  OR    COLONOSCOPY      COLONOSCOPY N/A 4/27/2023    Procedure: COLONOSCOPY with cold polypectomies;  Surgeon: Dara Flowers MD;  Location: [facility] OR    CORONARY ANGIOGRAPHY ADULT ORDER  2002    normal    CORONARY ANGIOGRAPHY ADULT ORDER  12/7/12    no significant focal narrowing that would benefit from mechanical intervention     EP PACEMAKER GENERATOR REPLACEMENT- BI-VENTRICULAR N/A 8/25/2022    Procedure: Pacemaker Generator Replacement  Biventricular;  Surgeon: Hillary Gonzalez MD;  Location:  HEART CARDIAC CATH LAB    ESOPHAGOSCOPY, GASTROSCOPY, DUODENOSCOPY (EGD), COMBINED N/A 7/9/2021    Procedure: ESOPHAGOGASTRODUODENOSCOPY, WITH BIOPSY with distal esophagus biopies to rule out Barretts esophagus by cold biopsy forceps;  Surgeon: Travis Harrington MD;  Location:  GI    HYSTERECTOMY      IMPLANT AUTOMATIC IMPLANTABLE CARDIOVERTER DEFIBRILLATOR  4/30/12    INSERT THORACIC PACEMAKER LEAD EPICARDIAL  5/1/2012    Procedure:INSERT THORACIC PACEMAKER LEAD EPICARDIAL; EPICARDIAL LEAD PLACEMENT; Surgeon:WEST ARECHIGA; Location: OR    TONSILLECTOMY      TRANSPLANT - corneal lenses      Bilateral for cataracts       FAMILY HISTORY:  Family History   Problem Relation Age of Onset    Cancer Father         intraabdominal mass - not colon cancer    Other Cancer Father     C.A.D. 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,[id number]   Tobacco Use    Smoking status: Never    Smokeless tobacco: Never   Vaping Use    Vaping status: Never Used   Substance and Sexual Activity    Alcohol use: Yes     Comment: 2 glasses of wine/mo    Drug use: No    Sexual activity: Not Currently     Partners: Male     Birth control/protection: Surgical     Comment: She has had a hysterectomy   Other Topics Concern    Parent/sibling w/ CABG, MI or angioplasty before 65F  55M? 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 Multiple ignacio problems, multiple hospitalizations in last 7 years, anxiety        Pt has 2 daughters, 2 step-daughters's and  7 grandchildrens        Youngest daughter Sabi has malignant brain tumor                 Social Drivers of Health     Financial Resource Strain: Low Risk  (2025)    Received from [facility]    Financial Resource Strain     Difficulty of Paying Living Expenses: 3   Food Insecurity: No Food Insecurity (2025)    Received from [facility]    Hunger Vital Sign     Worried About Running Out of Food in the Last Year: Never true     Ran Out of Food in the Last Year: Never true   Transportation Needs: No Transportation Needs (2025)    Received from [facility]    PRAPARE - Transportation     Lack of Transportation (Medical): No     Lack of Transportation (Non-Medical): No   Physical Activity: Insufficiently Active (2023)    Exercise Vital Sign     Days of Exercise per Week: 2 days     Minutes of Exercise per Session: 40 min   Stress: No Stress Concern Present (2023)    [employer] - Occupational Stress Questionnaire     Feeling of Stress : Not at all   Social Connections: Socially Integrated (3/9/2024)    Received from [facility]    Social Connections     Do you often feel lonely or isolated from those around you?: 0   Interpersonal Safety: Low Risk  (10/3/2023)    Interpersonal Safety     Do you feel physically and emotionally safe where you currently live?: Yes     Within the past 12 months, have you been hit, slapped, kicked or otherwise physically hurt by  "someone?: No     Within the past 12 months, have you been humiliated or emotionally abused in other ways by your partner or ex-partner?: No   Housing Stability: Low Risk  (6/5/2025)    Received from [facility]    Housing Stability     What is your living situation today?: I have a steady place to live       Review of Systems:  Skin:  not assessed     Eyes:  not assessed    ENT:  not assessed    Respiratory:  Negative    Cardiovascular:    Positive for, lightheadedness, dizziness  Gastroenterology: not assessed    Genitourinary:  not assessed    Musculoskeletal:  not assessed    Neurologic:  not assessed    Psychiatric:  not assessed    Heme/Lymph/Imm:  not assessed    Endocrine:  not assessed      Physical Exam:  Vitals: BP (!) 88/54 (BP Location: Right arm, Patient Position: Sitting)   Pulse 66   Ht 1.626 m (5' 4\")   Wt 85.8 kg (189 lb 1.6 oz)   LMP  (LMP Unknown)   BMI 32.46 kg/m      Constitutional:  cooperative, in no acute distress obese      Skin:  warm and dry to the touch   pacemaker incision in the left infraclavicular area was well-healed      Head:  normocephalic        Eyes:  pupils equal and round        Lymph:      ENT:  no pallor or cyanosis, dentition good        Neck:  JVP normal, no carotid bruit        Respiratory:  clear to auscultation, normal respiratory excursion         Cardiac: regular rhythm, normal S1 and S2   distant heart sounds            pulses full and equal                                        GI:  abdomen soft obese      Extremities and Muscular Skeletal:  no deformities, clubbing, cyanosis, erythema observed, no edema              Neurological:  affect appropriate        Psych:  Alert and Oriented x 3        Recent Lab Results:  LIPID RESULTS:  Lab Results   Component Value Date    CHOL 174 10/03/2023    CHOL 170 01/25/2021    HDL 37 (L) 10/03/2023    HDL 39 (L) 01/25/2021    LDL 92 10/03/2023    LDL 80 01/25/2021    TRIG 260 (H) 02/18/2025    TRIG 253 (H) 01/25/2021    " CHOLHDLRATIO 4.7 10/13/2015       LIVER ENZYME RESULTS:  Lab Results   Component Value Date    AST 23 10/03/2023    AST 28 12/26/2020    ALT 16 10/03/2023    ALT 32 12/26/2020       CBC RESULTS:  Lab Results   Component Value Date    WBC 3.7 (L) 10/03/2023    WBC 4.0 02/05/2021    RBC 3.58 (L) 10/03/2023    RBC 3.86 02/05/2021    HGB 11.3 (L) 10/03/2023    HGB 12.0 02/05/2021    HCT 33.3 (L) 10/03/2023    HCT 37.0 02/05/2021    MCV 93 10/03/2023    MCV 96 02/05/2021    MCH 31.6 10/03/2023    MCH 31.1 02/05/2021    MCHC 33.9 10/03/2023    MCHC 32.4 02/05/2021    RDW 12.7 10/03/2023    RDW 12.5 02/05/2021     (L) 10/03/2023     02/05/2021       BMP RESULTS:  Lab Results   Component Value Date     10/03/2023     02/05/2021    POTASSIUM 4.2 10/03/2023    POTASSIUM 4.1 08/25/2022    POTASSIUM 4.2 06/17/2021    CHLORIDE 105 02/18/2025    CHLORIDE 108 02/05/2021    CO2 22 10/03/2023    CO2 26 08/25/2022    CO2 23 02/05/2021    ANIONGAP 11 10/03/2023    ANIONGAP 6 08/25/2022    ANIONGAP 8 02/05/2021     (H) 10/03/2023    GLC 86 04/27/2023     (H) 08/25/2022     (H) 02/05/2021    BUN 19.1 10/03/2023    BUN 15 08/25/2022    BUN 16 02/05/2021    CR 0.87 10/03/2023    CR 0.99 06/17/2021    GFRESTIMATED 69 10/03/2023    GFRESTIMATED 56 (L) 06/17/2021    GFRESTBLACK 64 06/17/2021    WILEY 8.9 10/03/2023    WILEY 9.1 02/05/2021        A1C RESULTS:  Lab Results   Component Value Date    A1C 5.7 (H) 10/03/2023    A1C 6.1 (H) 01/25/2021       INR RESULTS:  Lab Results   Component Value Date    INR 1.06 04/07/2022    INR 0.99 12/26/2020    INR 0.88 12/05/2012           CC  Ciaran Osborne MD  [id number]  [place] [id number]

## 2025-08-23 ENCOUNTER — HEALTH MAINTENANCE LETTER (OUTPATIENT)
Age: 80
End: 2025-08-23

## 2025-08-28 ENCOUNTER — ANCILLARY PROCEDURE (OUTPATIENT)
Dept: CARDIOLOGY | Facility: CLINIC | Age: 80
End: 2025-08-28
Attending: INTERNAL MEDICINE
Payer: COMMERCIAL

## 2025-08-28 DIAGNOSIS — Z95.810 ICD (IMPLANTABLE CARDIOVERTER-DEFIBRILLATOR) IN PLACE: ICD-10-CM

## 2025-08-28 DIAGNOSIS — I42.9 IDIOPATHIC CARDIOMYOPATHY (H): Primary | ICD-10-CM

## 2025-08-28 LAB
MDC_IDC_EPISODE_DTM: NORMAL
MDC_IDC_EPISODE_ID: NORMAL
MDC_IDC_EPISODE_TYPE: NORMAL
MDC_IDC_LEAD_CONNECTION_STATUS: NORMAL
MDC_IDC_LEAD_IMPLANT_DT: NORMAL
MDC_IDC_LEAD_LOCATION: NORMAL
MDC_IDC_LEAD_LOCATION_DETAIL_1: NORMAL
MDC_IDC_LEAD_MFG: NORMAL
MDC_IDC_LEAD_MODEL: 4047
MDC_IDC_LEAD_MODEL: 4047
MDC_IDC_LEAD_MODEL: NORMAL
MDC_IDC_LEAD_MODEL: NORMAL
MDC_IDC_LEAD_POLARITY_TYPE: NORMAL
MDC_IDC_LEAD_SERIAL: NORMAL
MDC_IDC_MSMT_BATTERY_DTM: NORMAL
MDC_IDC_MSMT_BATTERY_REMAINING_LONGEVITY: 84 MO
MDC_IDC_MSMT_BATTERY_REMAINING_PERCENTAGE: 96 %
MDC_IDC_MSMT_BATTERY_STATUS: NORMAL
MDC_IDC_MSMT_CAP_CHARGE_DTM: NORMAL
MDC_IDC_MSMT_CAP_CHARGE_TIME: 9.4 S
MDC_IDC_MSMT_CAP_CHARGE_TYPE: NORMAL
MDC_IDC_MSMT_LEADCHNL_LV_IMPEDANCE_VALUE: 375 OHM
MDC_IDC_MSMT_LEADCHNL_LV_PACING_THRESHOLD_AMPLITUDE: 1.7 V
MDC_IDC_MSMT_LEADCHNL_LV_PACING_THRESHOLD_PULSEWIDTH: 0.5 MS
MDC_IDC_MSMT_LEADCHNL_RA_IMPEDANCE_VALUE: 674 OHM
MDC_IDC_MSMT_LEADCHNL_RA_PACING_THRESHOLD_AMPLITUDE: 0.4 V
MDC_IDC_MSMT_LEADCHNL_RA_PACING_THRESHOLD_PULSEWIDTH: 0.4 MS
MDC_IDC_MSMT_LEADCHNL_RV_IMPEDANCE_VALUE: 404 OHM
MDC_IDC_MSMT_LEADCHNL_RV_PACING_THRESHOLD_AMPLITUDE: 0.9 V
MDC_IDC_MSMT_LEADCHNL_RV_PACING_THRESHOLD_PULSEWIDTH: 0.4 MS
MDC_IDC_PG_IMPLANT_DTM: NORMAL
MDC_IDC_PG_MFG: NORMAL
MDC_IDC_PG_MODEL: NORMAL
MDC_IDC_PG_SERIAL: NORMAL
MDC_IDC_PG_TYPE: NORMAL
MDC_IDC_SESS_CLINIC_NAME: NORMAL
MDC_IDC_SESS_DTM: NORMAL
MDC_IDC_SESS_TYPE: NORMAL
MDC_IDC_SET_BRADY_AT_MODE_SWITCH_MODE: NORMAL
MDC_IDC_SET_BRADY_AT_MODE_SWITCH_RATE: 170 {BEATS}/MIN
MDC_IDC_SET_BRADY_LOWRATE: 55 {BEATS}/MIN
MDC_IDC_SET_BRADY_MAX_SENSOR_RATE: 130 {BEATS}/MIN
MDC_IDC_SET_BRADY_MAX_TRACKING_RATE: 130 {BEATS}/MIN
MDC_IDC_SET_BRADY_MODE: NORMAL
MDC_IDC_SET_BRADY_PAV_DELAY_LOW: 180 MS
MDC_IDC_SET_BRADY_SAV_DELAY_LOW: 120 MS
MDC_IDC_SET_CRT_LVRV_DELAY: 30 MS
MDC_IDC_SET_CRT_PACED_CHAMBERS: NORMAL
MDC_IDC_SET_LEADCHNL_LV_PACING_AMPLITUDE: 2.7 V
MDC_IDC_SET_LEADCHNL_LV_PACING_ANODE_ELECTRODE_1: NORMAL
MDC_IDC_SET_LEADCHNL_LV_PACING_ANODE_LOCATION_1: NORMAL
MDC_IDC_SET_LEADCHNL_LV_PACING_CAPTURE_MODE: NORMAL
MDC_IDC_SET_LEADCHNL_LV_PACING_CATHODE_ELECTRODE_1: NORMAL
MDC_IDC_SET_LEADCHNL_LV_PACING_CATHODE_LOCATION_1: NORMAL
MDC_IDC_SET_LEADCHNL_LV_PACING_PULSEWIDTH: 0.5 MS
MDC_IDC_SET_LEADCHNL_LV_SENSING_ADAPTATION_MODE: NORMAL
MDC_IDC_SET_LEADCHNL_LV_SENSING_ANODE_ELECTRODE_1: NORMAL
MDC_IDC_SET_LEADCHNL_LV_SENSING_ANODE_LOCATION_1: NORMAL
MDC_IDC_SET_LEADCHNL_LV_SENSING_CATHODE_ELECTRODE_1: NORMAL
MDC_IDC_SET_LEADCHNL_LV_SENSING_CATHODE_LOCATION_1: NORMAL
MDC_IDC_SET_LEADCHNL_LV_SENSING_SENSITIVITY: 1 MV
MDC_IDC_SET_LEADCHNL_RA_PACING_AMPLITUDE: 2 V
MDC_IDC_SET_LEADCHNL_RA_PACING_CAPTURE_MODE: NORMAL
MDC_IDC_SET_LEADCHNL_RA_PACING_POLARITY: NORMAL
MDC_IDC_SET_LEADCHNL_RA_PACING_PULSEWIDTH: 0.4 MS
MDC_IDC_SET_LEADCHNL_RA_SENSING_ADAPTATION_MODE: NORMAL
MDC_IDC_SET_LEADCHNL_RA_SENSING_POLARITY: NORMAL
MDC_IDC_SET_LEADCHNL_RA_SENSING_SENSITIVITY: 0.25 MV
MDC_IDC_SET_LEADCHNL_RV_PACING_AMPLITUDE: 2 V
MDC_IDC_SET_LEADCHNL_RV_PACING_CAPTURE_MODE: NORMAL
MDC_IDC_SET_LEADCHNL_RV_PACING_POLARITY: NORMAL
MDC_IDC_SET_LEADCHNL_RV_PACING_PULSEWIDTH: 0.4 MS
MDC_IDC_SET_LEADCHNL_RV_SENSING_ADAPTATION_MODE: NORMAL
MDC_IDC_SET_LEADCHNL_RV_SENSING_POLARITY: NORMAL
MDC_IDC_SET_LEADCHNL_RV_SENSING_SENSITIVITY: 0.6 MV
MDC_IDC_SET_ZONE_DETECTION_INTERVAL: 250 MS
MDC_IDC_SET_ZONE_DETECTION_INTERVAL: 333 MS
MDC_IDC_SET_ZONE_DETECTION_INTERVAL: 375 MS
MDC_IDC_SET_ZONE_STATUS: NORMAL
MDC_IDC_SET_ZONE_TYPE: NORMAL
MDC_IDC_SET_ZONE_VENDOR_TYPE: NORMAL
MDC_IDC_STAT_AT_BURDEN_PERCENT: 0 %
MDC_IDC_STAT_AT_DTM_END: NORMAL
MDC_IDC_STAT_AT_DTM_START: NORMAL
MDC_IDC_STAT_BRADY_DTM_END: NORMAL
MDC_IDC_STAT_BRADY_DTM_START: NORMAL
MDC_IDC_STAT_BRADY_RA_PERCENT_PACED: 2 %
MDC_IDC_STAT_BRADY_RV_PERCENT_PACED: 100 %
MDC_IDC_STAT_CRT_DTM_END: NORMAL
MDC_IDC_STAT_CRT_DTM_START: NORMAL
MDC_IDC_STAT_CRT_LV_PERCENT_PACED: 100 %
MDC_IDC_STAT_EPISODE_RECENT_COUNT: 0
MDC_IDC_STAT_EPISODE_RECENT_COUNT_DTM_END: NORMAL
MDC_IDC_STAT_EPISODE_RECENT_COUNT_DTM_START: NORMAL
MDC_IDC_STAT_EPISODE_TYPE: NORMAL
MDC_IDC_STAT_EPISODE_VENDOR_TYPE: NORMAL
MDC_IDC_STAT_TACHYTHERAPY_ATP_DELIVERED_RECENT: 0
MDC_IDC_STAT_TACHYTHERAPY_ATP_DELIVERED_TOTAL: 0
MDC_IDC_STAT_TACHYTHERAPY_RECENT_DTM_END: NORMAL
MDC_IDC_STAT_TACHYTHERAPY_RECENT_DTM_START: NORMAL
MDC_IDC_STAT_TACHYTHERAPY_SHOCKS_ABORTED_RECENT: 0
MDC_IDC_STAT_TACHYTHERAPY_SHOCKS_ABORTED_TOTAL: 0
MDC_IDC_STAT_TACHYTHERAPY_SHOCKS_DELIVERED_RECENT: 0
MDC_IDC_STAT_TACHYTHERAPY_SHOCKS_DELIVERED_TOTAL: 0
MDC_IDC_STAT_TACHYTHERAPY_TOTAL_DTM_END: NORMAL
MDC_IDC_STAT_TACHYTHERAPY_TOTAL_DTM_START: NORMAL

## 2025-08-28 PROCEDURE — 93295 DEV INTERROG REMOTE 1/2/MLT: CPT | Performed by: INTERNAL MEDICINE

## 2025-08-28 PROCEDURE — 93296 REM INTERROG EVL PM/IDS: CPT | Performed by: INTERNAL MEDICINE

## (undated) DEVICE — SUCTION MANIFOLD NEPTUNE 2 SYS 1 PORT 702-025-000

## (undated) DEVICE — BLADE ESU PLASMABLADE SPATULA TIP 4MM PS200-040

## (undated) DEVICE — CAST PADDING 4" STERILE 9044S

## (undated) DEVICE — ENDO TRAP POLYP QUICK CATCH 710201

## (undated) DEVICE — POUCH TYRX ABSORB ANTIBACTERIAL LG

## (undated) DEVICE — KIT ENDO TURNOVER/PROCEDURE W/CLEAN A SCOPE LINERS 103888

## (undated) DEVICE — BAG CLEAR TRASH 1.3M 39X33" P4040C

## (undated) DEVICE — ENDO SNARE POLYPECTOMY OVAL 15MM LOOP SD-240U-15

## (undated) DEVICE — DRAPE C-ARM MINI 5423

## (undated) DEVICE — ENDO FORCEP ENDOJAW BIOPSY 2.8MMX160CM FB-220K

## (undated) DEVICE — PREP CHLORAPREP 26ML TINTED HI-LITE ORANGE 930815

## (undated) DEVICE — TUBING SUCTION MEDI-VAC 1/4"X20' N620A - HE

## (undated) DEVICE — CAST BUCKET

## (undated) DEVICE — SU PROLENE 5-0 RB-1DA 36"  8556H

## (undated) DEVICE — LINEN HALF SHEET 5512

## (undated) DEVICE — RAD INTRODUCER KIT MICRO 5FRX10CM .018 NITINOL G/W

## (undated) DEVICE — BNDG KLING 2" 2231

## (undated) DEVICE — PACK PCMKR PERM SRG PROC LF SAN32PC573

## (undated) DEVICE — CABLE PACING ALLIGATOR CLIP 301-CG

## (undated) DEVICE — DEFIB PRO-PADZ LVP LQD GEL ADULT 8900-2105-01

## (undated) DEVICE — PACK HAND SOP32HARMO

## (undated) DEVICE — SLING ARM LG 79-99157

## (undated) DEVICE — LINEN FULL SHEET 5511

## (undated) DEVICE — SOL WATER IRRIG 1000ML BOTTLE 2F7114

## (undated) DEVICE — SHEATH PRELUDE SNAP 13CM 6FR

## (undated) DEVICE — ENDO BITE BLOCK ADULT OLYMPUS LATEX FREE MAJ-1632

## (undated) DEVICE — GLOVE PROTEXIS POWDER FREE 8.0 ORTHOPEDIC 2D73ET80

## (undated) DEVICE — FORCEP BIOPSY 2.3MM DISP COATED 000388

## (undated) RX ORDER — FENTANYL CITRATE 50 UG/ML
INJECTION, SOLUTION INTRAMUSCULAR; INTRAVENOUS
Status: DISPENSED
Start: 2021-07-09

## (undated) RX ORDER — GLYCOPYRROLATE 0.2 MG/ML
INJECTION INTRAMUSCULAR; INTRAVENOUS
Status: DISPENSED
Start: 2021-06-17

## (undated) RX ORDER — KETOROLAC TROMETHAMINE 15 MG/ML
INJECTION, SOLUTION INTRAMUSCULAR; INTRAVENOUS
Status: DISPENSED
Start: 2022-08-25

## (undated) RX ORDER — OXYCODONE AND ACETAMINOPHEN 5; 325 MG/1; MG/1
TABLET ORAL
Status: DISPENSED
Start: 2022-08-25

## (undated) RX ORDER — CEFAZOLIN SODIUM 1 G/3ML
INJECTION, POWDER, FOR SOLUTION INTRAMUSCULAR; INTRAVENOUS
Status: DISPENSED
Start: 2021-06-17

## (undated) RX ORDER — FENTANYL CITRATE 50 UG/ML
INJECTION, SOLUTION INTRAMUSCULAR; INTRAVENOUS
Status: DISPENSED
Start: 2021-06-17

## (undated) RX ORDER — BUPIVACAINE HYDROCHLORIDE 2.5 MG/ML
INJECTION, SOLUTION EPIDURAL; INFILTRATION; INTRACAUDAL
Status: DISPENSED
Start: 2022-08-25

## (undated) RX ORDER — ONDANSETRON 2 MG/ML
INJECTION INTRAMUSCULAR; INTRAVENOUS
Status: DISPENSED
Start: 2021-06-17

## (undated) RX ORDER — ASPIRIN 81 MG/1
TABLET, CHEWABLE ORAL
Status: DISPENSED
Start: 2022-08-25

## (undated) RX ORDER — FENTANYL CITRATE 50 UG/ML
INJECTION, SOLUTION INTRAMUSCULAR; INTRAVENOUS
Status: DISPENSED
Start: 2022-08-25

## (undated) RX ORDER — DEXAMETHASONE SODIUM PHOSPHATE 4 MG/ML
INJECTION, SOLUTION INTRA-ARTICULAR; INTRALESIONAL; INTRAMUSCULAR; INTRAVENOUS; SOFT TISSUE
Status: DISPENSED
Start: 2021-06-17

## (undated) RX ORDER — FENTANYL CITRATE 50 UG/ML
INJECTION, SOLUTION INTRAMUSCULAR; INTRAVENOUS
Status: DISPENSED
Start: 2018-04-04

## (undated) RX ORDER — LIDOCAINE HYDROCHLORIDE 10 MG/ML
INJECTION, SOLUTION EPIDURAL; INFILTRATION; INTRACAUDAL; PERINEURAL
Status: DISPENSED
Start: 2022-08-25

## (undated) RX ORDER — BUPIVACAINE HYDROCHLORIDE 5 MG/ML
INJECTION, SOLUTION EPIDURAL; INTRACAUDAL
Status: DISPENSED
Start: 2021-06-17

## (undated) RX ORDER — PROPOFOL 10 MG/ML
INJECTION, EMULSION INTRAVENOUS
Status: DISPENSED
Start: 2021-06-17

## (undated) RX ORDER — CEFAZOLIN SODIUM 2 G/100ML
INJECTION, SOLUTION INTRAVENOUS
Status: DISPENSED
Start: 2021-06-17

## (undated) RX ORDER — FENTANYL CITRATE-0.9 % NACL/PF 10 MCG/ML
PLASTIC BAG, INJECTION (ML) INTRAVENOUS
Status: DISPENSED
Start: 2023-04-27